# Patient Record
Sex: MALE | Race: ASIAN | NOT HISPANIC OR LATINO | Employment: UNEMPLOYED | URBAN - METROPOLITAN AREA
[De-identification: names, ages, dates, MRNs, and addresses within clinical notes are randomized per-mention and may not be internally consistent; named-entity substitution may affect disease eponyms.]

---

## 2017-01-03 ENCOUNTER — GENERIC CONVERSION - ENCOUNTER (OUTPATIENT)
Dept: OTHER | Facility: OTHER | Age: 52
End: 2017-01-03

## 2017-03-13 ENCOUNTER — GENERIC CONVERSION - ENCOUNTER (OUTPATIENT)
Dept: OTHER | Facility: OTHER | Age: 52
End: 2017-03-13

## 2017-03-13 ENCOUNTER — APPOINTMENT (OUTPATIENT)
Dept: LAB | Facility: CLINIC | Age: 52
End: 2017-03-13
Payer: COMMERCIAL

## 2017-03-13 ENCOUNTER — TRANSCRIBE ORDERS (OUTPATIENT)
Dept: LAB | Facility: CLINIC | Age: 52
End: 2017-03-13

## 2017-03-13 DIAGNOSIS — E03.9 UNSPECIFIED HYPOTHYROIDISM: ICD-10-CM

## 2017-03-13 DIAGNOSIS — E11.9 TYPE 2 DIABETES MELLITUS WITHOUT COMPLICATIONS (HCC): ICD-10-CM

## 2017-03-13 DIAGNOSIS — I10 ESSENTIAL (PRIMARY) HYPERTENSION: ICD-10-CM

## 2017-03-13 DIAGNOSIS — E11.9 DIABETES MELLITUS WITHOUT COMPLICATION (HCC): Primary | ICD-10-CM

## 2017-03-13 DIAGNOSIS — F10.99 ALCOHOL USE WITH ALCOHOL-INDUCED DISORDER (HCC): ICD-10-CM

## 2017-03-13 DIAGNOSIS — E03.9 HYPOTHYROIDISM: ICD-10-CM

## 2017-03-13 LAB
ALBUMIN SERPL BCP-MCNC: 3.9 G/DL (ref 3.5–5)
ALP SERPL-CCNC: 73 U/L (ref 46–116)
ALT SERPL W P-5'-P-CCNC: 67 U/L (ref 12–78)
ANION GAP SERPL CALCULATED.3IONS-SCNC: 7 MMOL/L (ref 4–13)
AST SERPL W P-5'-P-CCNC: 36 U/L (ref 5–45)
BILIRUB SERPL-MCNC: 0.7 MG/DL (ref 0.2–1)
BUN SERPL-MCNC: 9 MG/DL (ref 5–25)
CALCIUM SERPL-MCNC: 8.7 MG/DL (ref 8.3–10.1)
CHLORIDE SERPL-SCNC: 101 MMOL/L (ref 100–108)
CO2 SERPL-SCNC: 30 MMOL/L (ref 21–32)
CREAT SERPL-MCNC: 0.9 MG/DL (ref 0.6–1.3)
CREAT UR-MCNC: 229 MG/DL
EST. AVERAGE GLUCOSE BLD GHB EST-MCNC: 154 MG/DL
GFR SERPL CREATININE-BSD FRML MDRD: >60 ML/MIN/1.73SQ M
GLUCOSE SERPL-MCNC: 169 MG/DL (ref 65–140)
HBA1C MFR BLD: 7 % (ref 4.2–6.3)
MICROALBUMIN UR-MCNC: 14 MG/L (ref 0–20)
MICROALBUMIN/CREAT 24H UR: 6 MG/G CREATININE (ref 0–30)
POTASSIUM SERPL-SCNC: 4.3 MMOL/L (ref 3.5–5.3)
PROT SERPL-MCNC: 7.5 G/DL (ref 6.4–8.2)
SODIUM SERPL-SCNC: 138 MMOL/L (ref 136–145)
TSH SERPL DL<=0.05 MIU/L-ACNC: 4.24 UIU/ML (ref 0.36–3.74)

## 2017-03-13 PROCEDURE — 80053 COMPREHEN METABOLIC PANEL: CPT

## 2017-03-13 PROCEDURE — 82570 ASSAY OF URINE CREATININE: CPT

## 2017-03-13 PROCEDURE — 36415 COLL VENOUS BLD VENIPUNCTURE: CPT

## 2017-03-13 PROCEDURE — 83036 HEMOGLOBIN GLYCOSYLATED A1C: CPT

## 2017-03-13 PROCEDURE — 84443 ASSAY THYROID STIM HORMONE: CPT

## 2017-03-13 PROCEDURE — 82043 UR ALBUMIN QUANTITATIVE: CPT

## 2017-03-30 ENCOUNTER — GENERIC CONVERSION - ENCOUNTER (OUTPATIENT)
Dept: OTHER | Facility: OTHER | Age: 52
End: 2017-03-30

## 2017-04-03 ENCOUNTER — APPOINTMENT (OUTPATIENT)
Dept: LAB | Facility: CLINIC | Age: 52
End: 2017-04-03
Payer: COMMERCIAL

## 2017-04-03 DIAGNOSIS — E03.9 UNSPECIFIED HYPOTHYROIDISM: ICD-10-CM

## 2017-04-03 DIAGNOSIS — E11.9 DIABETES MELLITUS WITHOUT COMPLICATION (HCC): ICD-10-CM

## 2017-04-03 LAB
EST. AVERAGE GLUCOSE BLD GHB EST-MCNC: 143 MG/DL
HBA1C MFR BLD: 6.6 % (ref 4.2–6.3)
TSH SERPL DL<=0.05 MIU/L-ACNC: 0.06 UIU/ML (ref 0.36–3.74)

## 2017-04-03 PROCEDURE — 83036 HEMOGLOBIN GLYCOSYLATED A1C: CPT

## 2017-04-03 PROCEDURE — 84443 ASSAY THYROID STIM HORMONE: CPT

## 2017-04-03 PROCEDURE — 36415 COLL VENOUS BLD VENIPUNCTURE: CPT

## 2017-04-11 ENCOUNTER — GENERIC CONVERSION - ENCOUNTER (OUTPATIENT)
Dept: OTHER | Facility: OTHER | Age: 52
End: 2017-04-11

## 2017-04-27 ENCOUNTER — ALLSCRIPTS OFFICE VISIT (OUTPATIENT)
Dept: OTHER | Facility: OTHER | Age: 52
End: 2017-04-27

## 2017-06-05 ENCOUNTER — TRANSCRIBE ORDERS (OUTPATIENT)
Dept: LAB | Facility: CLINIC | Age: 52
End: 2017-06-05

## 2017-06-05 ENCOUNTER — APPOINTMENT (OUTPATIENT)
Dept: LAB | Facility: CLINIC | Age: 52
End: 2017-06-05
Payer: COMMERCIAL

## 2017-06-05 DIAGNOSIS — E03.9 HYPOTHYROIDISM: ICD-10-CM

## 2017-06-05 LAB — TSH SERPL DL<=0.05 MIU/L-ACNC: 0.6 UIU/ML (ref 0.36–3.74)

## 2017-06-05 PROCEDURE — 84443 ASSAY THYROID STIM HORMONE: CPT

## 2017-06-05 PROCEDURE — 36415 COLL VENOUS BLD VENIPUNCTURE: CPT

## 2017-06-08 DIAGNOSIS — E03.9 HYPOTHYROIDISM: ICD-10-CM

## 2017-09-18 ENCOUNTER — GENERIC CONVERSION - ENCOUNTER (OUTPATIENT)
Dept: OTHER | Facility: OTHER | Age: 52
End: 2017-09-18

## 2017-11-22 ENCOUNTER — ALLSCRIPTS OFFICE VISIT (OUTPATIENT)
Dept: OTHER | Facility: OTHER | Age: 52
End: 2017-11-22

## 2017-11-22 DIAGNOSIS — E03.9 HYPOTHYROIDISM: ICD-10-CM

## 2017-11-22 DIAGNOSIS — I10 ESSENTIAL (PRIMARY) HYPERTENSION: ICD-10-CM

## 2017-11-22 DIAGNOSIS — E78.5 HYPERLIPIDEMIA: ICD-10-CM

## 2017-11-22 DIAGNOSIS — E11.9 TYPE 2 DIABETES MELLITUS WITHOUT COMPLICATIONS (HCC): ICD-10-CM

## 2017-11-22 DIAGNOSIS — M25.512 PAIN IN LEFT SHOULDER: ICD-10-CM

## 2017-12-01 ENCOUNTER — TRANSCRIBE ORDERS (OUTPATIENT)
Dept: LAB | Facility: CLINIC | Age: 52
End: 2017-12-01

## 2017-12-01 ENCOUNTER — APPOINTMENT (OUTPATIENT)
Dept: LAB | Facility: CLINIC | Age: 52
End: 2017-12-01
Payer: COMMERCIAL

## 2017-12-01 DIAGNOSIS — E03.9 HYPOTHYROIDISM: ICD-10-CM

## 2017-12-01 DIAGNOSIS — E11.9 TYPE 2 DIABETES MELLITUS WITHOUT COMPLICATIONS (HCC): ICD-10-CM

## 2017-12-01 DIAGNOSIS — I10 ESSENTIAL (PRIMARY) HYPERTENSION: ICD-10-CM

## 2017-12-01 DIAGNOSIS — E78.5 HYPERLIPIDEMIA: ICD-10-CM

## 2017-12-01 DIAGNOSIS — M25.512 PAIN IN LEFT SHOULDER: ICD-10-CM

## 2017-12-01 LAB
ALBUMIN SERPL BCP-MCNC: 3.7 G/DL (ref 3.5–5)
ALP SERPL-CCNC: 82 U/L (ref 46–116)
ALT SERPL W P-5'-P-CCNC: 72 U/L (ref 12–78)
ANION GAP SERPL CALCULATED.3IONS-SCNC: 4 MMOL/L (ref 4–13)
AST SERPL W P-5'-P-CCNC: 59 U/L (ref 5–45)
BILIRUB SERPL-MCNC: 1.5 MG/DL (ref 0.2–1)
BUN SERPL-MCNC: 10 MG/DL (ref 5–25)
CALCIUM SERPL-MCNC: 9.2 MG/DL (ref 8.3–10.1)
CHLORIDE SERPL-SCNC: 100 MMOL/L (ref 100–108)
CHOLEST SERPL-MCNC: 159 MG/DL (ref 50–200)
CO2 SERPL-SCNC: 30 MMOL/L (ref 21–32)
CREAT SERPL-MCNC: 0.82 MG/DL (ref 0.6–1.3)
CREAT UR-MCNC: 213 MG/DL
ERYTHROCYTE [DISTWIDTH] IN BLOOD BY AUTOMATED COUNT: 11.8 % (ref 11.6–15.1)
EST. AVERAGE GLUCOSE BLD GHB EST-MCNC: 157 MG/DL
GFR SERPL CREATININE-BSD FRML MDRD: 103 ML/MIN/1.73SQ M
GLUCOSE P FAST SERPL-MCNC: 187 MG/DL (ref 65–99)
HBA1C MFR BLD: 7.1 % (ref 4.2–6.3)
HCT VFR BLD AUTO: 48.2 % (ref 36.5–49.3)
HDLC SERPL-MCNC: 52 MG/DL (ref 40–60)
HGB BLD-MCNC: 16.4 G/DL (ref 12–17)
LDLC SERPL CALC-MCNC: 89 MG/DL (ref 0–100)
MCH RBC QN AUTO: 30.5 PG (ref 26.8–34.3)
MCHC RBC AUTO-ENTMCNC: 34 G/DL (ref 31.4–37.4)
MCV RBC AUTO: 90 FL (ref 82–98)
MICROALBUMIN UR-MCNC: 8 MG/L (ref 0–20)
MICROALBUMIN/CREAT 24H UR: 4 MG/G CREATININE (ref 0–30)
PLATELET # BLD AUTO: 187 THOUSANDS/UL (ref 149–390)
PMV BLD AUTO: 9.9 FL (ref 8.9–12.7)
POTASSIUM SERPL-SCNC: 5.2 MMOL/L (ref 3.5–5.3)
PROT SERPL-MCNC: 7.8 G/DL (ref 6.4–8.2)
RBC # BLD AUTO: 5.37 MILLION/UL (ref 3.88–5.62)
SODIUM SERPL-SCNC: 134 MMOL/L (ref 136–145)
TRIGL SERPL-MCNC: 90 MG/DL
TSH SERPL DL<=0.05 MIU/L-ACNC: 1.46 UIU/ML (ref 0.36–3.74)
WBC # BLD AUTO: 7.5 THOUSAND/UL (ref 4.31–10.16)

## 2017-12-01 PROCEDURE — 83036 HEMOGLOBIN GLYCOSYLATED A1C: CPT

## 2017-12-01 PROCEDURE — 82043 UR ALBUMIN QUANTITATIVE: CPT

## 2017-12-01 PROCEDURE — 36415 COLL VENOUS BLD VENIPUNCTURE: CPT

## 2017-12-01 PROCEDURE — 84443 ASSAY THYROID STIM HORMONE: CPT

## 2017-12-01 PROCEDURE — 80061 LIPID PANEL: CPT

## 2017-12-01 PROCEDURE — 85027 COMPLETE CBC AUTOMATED: CPT

## 2017-12-01 PROCEDURE — 80053 COMPREHEN METABOLIC PANEL: CPT

## 2017-12-01 PROCEDURE — 82570 ASSAY OF URINE CREATININE: CPT

## 2017-12-05 ENCOUNTER — GENERIC CONVERSION - ENCOUNTER (OUTPATIENT)
Dept: FAMILY MEDICINE CLINIC | Facility: CLINIC | Age: 52
End: 2017-12-05

## 2017-12-05 ENCOUNTER — APPOINTMENT (OUTPATIENT)
Dept: PHYSICAL THERAPY | Facility: CLINIC | Age: 52
End: 2017-12-05
Payer: COMMERCIAL

## 2017-12-05 DIAGNOSIS — M25.512 PAIN IN LEFT SHOULDER: ICD-10-CM

## 2017-12-05 PROCEDURE — G8984 CARRY CURRENT STATUS: HCPCS

## 2017-12-05 PROCEDURE — 97140 MANUAL THERAPY 1/> REGIONS: CPT

## 2017-12-05 PROCEDURE — G8985 CARRY GOAL STATUS: HCPCS

## 2017-12-05 PROCEDURE — 97110 THERAPEUTIC EXERCISES: CPT

## 2017-12-05 PROCEDURE — 97161 PT EVAL LOW COMPLEX 20 MIN: CPT

## 2017-12-07 ENCOUNTER — APPOINTMENT (OUTPATIENT)
Dept: PHYSICAL THERAPY | Facility: CLINIC | Age: 52
End: 2017-12-07
Payer: COMMERCIAL

## 2017-12-07 PROCEDURE — 97140 MANUAL THERAPY 1/> REGIONS: CPT

## 2017-12-07 PROCEDURE — 97110 THERAPEUTIC EXERCISES: CPT

## 2017-12-11 ENCOUNTER — APPOINTMENT (OUTPATIENT)
Dept: PHYSICAL THERAPY | Facility: CLINIC | Age: 52
End: 2017-12-11
Payer: COMMERCIAL

## 2017-12-14 ENCOUNTER — APPOINTMENT (OUTPATIENT)
Dept: PHYSICAL THERAPY | Facility: CLINIC | Age: 52
End: 2017-12-14
Payer: COMMERCIAL

## 2017-12-14 PROCEDURE — 97110 THERAPEUTIC EXERCISES: CPT

## 2017-12-14 PROCEDURE — 97140 MANUAL THERAPY 1/> REGIONS: CPT

## 2017-12-18 ENCOUNTER — APPOINTMENT (OUTPATIENT)
Dept: PHYSICAL THERAPY | Facility: CLINIC | Age: 52
End: 2017-12-18
Payer: COMMERCIAL

## 2017-12-18 PROCEDURE — 97110 THERAPEUTIC EXERCISES: CPT

## 2017-12-18 PROCEDURE — 97140 MANUAL THERAPY 1/> REGIONS: CPT

## 2017-12-21 ENCOUNTER — APPOINTMENT (OUTPATIENT)
Dept: PHYSICAL THERAPY | Facility: CLINIC | Age: 52
End: 2017-12-21
Payer: COMMERCIAL

## 2017-12-22 ENCOUNTER — APPOINTMENT (OUTPATIENT)
Dept: PHYSICAL THERAPY | Facility: CLINIC | Age: 52
End: 2017-12-22
Payer: COMMERCIAL

## 2017-12-22 PROCEDURE — 97140 MANUAL THERAPY 1/> REGIONS: CPT

## 2017-12-22 PROCEDURE — 97110 THERAPEUTIC EXERCISES: CPT

## 2017-12-26 ENCOUNTER — APPOINTMENT (OUTPATIENT)
Dept: PHYSICAL THERAPY | Facility: CLINIC | Age: 52
End: 2017-12-26
Payer: COMMERCIAL

## 2017-12-26 PROCEDURE — 97110 THERAPEUTIC EXERCISES: CPT

## 2017-12-26 PROCEDURE — 97140 MANUAL THERAPY 1/> REGIONS: CPT

## 2017-12-28 ENCOUNTER — APPOINTMENT (OUTPATIENT)
Dept: PHYSICAL THERAPY | Facility: CLINIC | Age: 52
End: 2017-12-28
Payer: COMMERCIAL

## 2018-01-02 ENCOUNTER — APPOINTMENT (OUTPATIENT)
Dept: PHYSICAL THERAPY | Facility: CLINIC | Age: 53
End: 2018-01-02
Payer: COMMERCIAL

## 2018-01-02 PROCEDURE — 97140 MANUAL THERAPY 1/> REGIONS: CPT

## 2018-01-02 PROCEDURE — 97110 THERAPEUTIC EXERCISES: CPT

## 2018-01-04 ENCOUNTER — APPOINTMENT (OUTPATIENT)
Dept: PHYSICAL THERAPY | Facility: CLINIC | Age: 53
End: 2018-01-04
Payer: COMMERCIAL

## 2018-01-08 ENCOUNTER — APPOINTMENT (OUTPATIENT)
Dept: PHYSICAL THERAPY | Facility: CLINIC | Age: 53
End: 2018-01-08
Payer: COMMERCIAL

## 2018-01-08 PROCEDURE — 97110 THERAPEUTIC EXERCISES: CPT

## 2018-01-08 PROCEDURE — 97140 MANUAL THERAPY 1/> REGIONS: CPT

## 2018-01-10 NOTE — RESULT NOTES
Verified Results  (1) COMPREHENSIVE METABOLIC PANEL [id number] 81:94JF Ernie Storrz Order Number: [id number]     Test Name Result Flag Reference   GLUCOSE,RANDM 169 mg/dL H    If the patient is fasting, the ADA then defines impaired fasting glucose as > 100 mg/dL and diabetes as > or equal to 123 mg/dL  SODIUM 138 mmol/L  136-145   POTASSIUM 4 3 mmol/L  3 5-5 3   CHLORIDE 101 mmol/L  100-108   CARBON DIOXIDE 30 mmol/L  21-32   ANION GAP (CALC) 7 mmol/L  4-13   BLOOD UREA NITROGEN 9 mg/dL  5-25   CREATININE 0 90 mg/dL  0 60-1 30   Standardized to IDMS reference method   CALCIUM 8 7 mg/dL  8 3-10 1   BILI, TOTAL 0 70 mg/dL  0 20-1 00   ALK PHOSPHATAS 73 U/L     ALT (SGPT) 67 U/L  12-78   AST(SGOT) 36 U/L  5-45   ALBUMIN 3 9 g/dL  3 5-5 0   TOTAL PROTEIN 7 5 g/dL  6 4-8 2   eGFR Non-African American      >60 0 ml/min/1 73sq m   - Patient Instructions: This bloodwork is non-fasting  Please drink two glasses of water morning of bloodwork  National Kidney Disease Education Program recommendations are as follows:  GFR calculation is accurate only with a steady state creatinine  Chronic Kidney disease less than 60 ml/min/1 73 sq  meters  Kidney failure less than 15 ml/min/1 73 sq  meters       (1) MICROALBUMIN CREATININE RATIO, RANDOM URINE 73IJK0521 07:25AM [facility] Order Number: [id number]     Test Name Result Flag Reference   MICROALBUMIN/ CREAT R 6 mg/g creatinine  0-30   MICROALBUMIN,URINE 14 0 mg/L  0 0-20 0   CREATININE URINE 229 0 mg/dL

## 2018-01-11 ENCOUNTER — APPOINTMENT (OUTPATIENT)
Dept: PHYSICAL THERAPY | Facility: CLINIC | Age: 53
End: 2018-01-11
Payer: COMMERCIAL

## 2018-01-12 NOTE — PROGRESS NOTES
Assessment    1  Encounter for preventive health examination (V70 0) (Z00 00)   2  Hypothyroidism (244 9) (E03 9)   3  Diabetes mellitus type II, controlled (250 00) (E11 9)   4  Benign essential hypertension (401 1) (I10)   5  Left shoulder pain (719 41) (M25 512)    Plan  Benign essential hypertension, Diabetes mellitus type II, controlled, Hyperlipidemia,  Hypothyroidism, Left shoulder pain    · (1) LIPID PANEL FASTING W DIRECT LDL REFLEX; Status:Active; Requested  for:22Nov2017;    · (1) TSH WITH FT4 REFLEX; Status:Active; Requested for:22Nov2017;   Diabetes mellitus type II, controlled, Left shoulder pain    · (1) HEMOGLOBIN A1C; Status:Active; Requested for:22Nov2017;   Left shoulder pain    · (1) CBC/ PLT (NO DIFF); Status:Active; Requested for:22Nov2017;    · (1) COMPREHENSIVE METABOLIC PANEL; Status:Active; Requested for:22Nov2017;    · (1) MICROALBUMIN CREATININE RATIO, RANDOM URINE; Status:Active; Requested  for:22Nov2017;    · *1 - [facility]  55 yo M with left shoulder insidious onset of  left shoulder pain, suspect rotator cuff tendinopathy without any significant tear  Please  evaluate and treat with modalitities as indicated  Treat 2-3 times per week for 6-8  weeks  Status: Active  Requested for: 22Nov2017  Care Summary provided  : Yes    Discussion/Summary  Impression: health maintenance visit, healthy adult male  Currently, he eats an adequate diet and has an adequate exercise regimen  HTN- Well controlled with current medications and asymptomatic  Will continue with current medications and check screening blood work  Hypothyroid- Continue with medications  Will check TSH  If any abnormalities will have patient return to discuss any changes in medication dosing  DMII- Counselled on diet and physical activity recommendations  Avoid carb heavy meals and get at least 150 minutes of moderate intensity CV activity weekly  Will check a1c   Continue with metformin 1000 mg BID  Left shoulder pain- Strength intact with good ROM  No signs of rotator cuff tear or impingement, however there is likely rotator cuff tendinopathy  Discussed treatment options, will refer to PT to work on rotator cuff strengthening and ROM exercises  The patient was counseled regarding instructions for management, risk factor reductions, patient and family education, impressions, risks and benefits of treatment options, importance of compliance with treatment  Possible side effects of new medications were reviewed with the patient/guardian today  The treatment plan was reviewed with the patient/guardian  The patient/guardian understands and agrees with the treatment plan     Self Referrals: No      Chief Complaint  Annual physical      History of Present Illness  HM, Adult Male: The patient is being seen for a health maintenance evaluation  General Health: The patient's health since the last visit is described as good  He has regular dental visits  He denies vision problems  He denies hearing loss  Immunizations status: up to date  Lifestyle:  He consumes a diverse and healthy diet  He does not have any weight concerns  He exercises regularly  He does not use tobacco  He consumes alcohol  He denies drug use  Screening:   HPI: 55 yo M with history of HTN, DMII, and hypothyroid comes for routine follow up and to discuss pain that he has been having in his left shoulder  He has ashvni taking his medications daily as directed  He denies having any increased fatigue, increased thirst of urination  He gets regular physical activity with daily yoga and riding on a stationary bike about every other day  He watches what he eats and avoids fried, fatty, or carbohydrate heavy foods  He does not check his blood sugars or blood pressure regularly  He denies having any changes in his vision, headaches, chest pain or chest pressure  He has been having pain in his left shoulder for the past 1-2 months   He denies any injury or specific activity that brought on the pain  The pain is worse with reaching over his head or with lifting things above his shoulders  There is no numbness, tingling or weakness of his left arm  Pain does not wake him up from sleep  Review of Systems    Constitutional: No fever or chills, feels well, no tiredness, no recent weight gain or weight loss  Eyes: No complaints of eye pain, no red eyes, no discharge from eyes, no itchy eyes  ENT: no complaints of earache, no hearing loss, no nosebleeds, no nasal discharge, no sore throat, no hoarseness  Cardiovascular: No complaints of slow heart rate, no fast heart rate, no chest pain, no palpitations, no leg claudication, no lower extremity  Respiratory: No complaints of shortness of breath, no wheezing, no cough, no SOB on exertion, no orthopnea or PND  Gastrointestinal: No complaints of abdominal pain, no constipation, no nausea or vomiting, no diarrhea or bloody stools  Genitourinary: No complaints of dysuria, no incontinence, no hesitancy, no nocturia, no genital lesion, no testicular pain  Musculoskeletal: as noted in HPI  Integumentary: No complaints of skin rash or skin lesions, no itching, no skin wound, no dry skin  Neurological: No compliants of headache, no confusion, no convulsions, no numbness or tingling, no dizziness or fainting, no limb weakness, no difficulty walking  Psychiatric: Is not suicidal, no sleep disturbances, no anxiety or depression, no change in personality, no emotional problems  Endocrine: No complaints of proptosis, no hot flashes, no muscle weakness, no erectile dysfunction, no deepening of the voice, no feelings of weakness  Hematologic/Lymphatic: No complaints of swollen glands, no swollen glands in the neck, does not bleed easily, no easy bruising  ROS reviewed  Active Problems    1  Alcohol use disorder (305 00) (F10 99)   2  Benign essential hypertension (401 1) (I10)   3   Diabetes mellitus type II, controlled (250 00) (E11 9)   4  Hyperlipidemia (272 4) (E78 5)   5  Hypothyroidism (244 9) (E03 9)   6  Need for influenza vaccination (V04 81) (Z23)   7  Right wrist pain (719 43) (M25 531)    Past Medical History    · History of Elevated LFTs (790 6) (R79 89)   · History of back injury (V15 59) (G10 025)   · History of balanitis (V13 89) (C63 143)   · History of low back pain (V13 59) (Z87 39)   · History of type 2 diabetes mellitus (V12 29) (Z86 39)   · History of upper respiratory infection (V12 09) (Z87 09)   · History of Impaired fasting glucose (790 21) (R73 01)   · History of Shoulder joint pain, unspecified laterality    Family History  Mother    · Family history of diabetes mellitus (V18 0) (Z83 3)   · Denied: Family history of substance abuse   · No family history of mental disorder  Father    · Family history of diabetes mellitus (V18 0) (Z83 3)   · Denied: Family history of substance abuse   · No family history of mental disorder    Social History    · Never A Smoker    Current Meds   1  Levothyroxine Sodium 150 MCG Oral Tablet; TAKE 1 TAB BY MOUTH ONCE DAILY   WITH WATER ON EMPTY STOMACH  WAIT 45 MINS FOR ANY FOOD/JUICE/MEDS; Therapy: 16GNR0534 to (Evaluate:14Mar2018)  Requested for: 86Zcx9187; Last   Rx:[id number] Ordered   2  Lisinopril-Hydrochlorothiazide 10-12 5 MG Oral Tablet; TAKE 1 TABLET DAILY IN THE   MORNING; Therapy: 22Fgl9794 to (479 [id number])  Requested for: 27Apr2017; Last   Rx:[id number] Ordered   3  MetFORMIN HCl - 1000 MG Oral Tablet; TAKE ONE TABLET BY MOUTH TWICE DAILY   AS DIRECTED; Therapy: 67WCA7679 to (RFFYDEJB:67BWS8088)  Requested for: 27Apr2017; Last   Rx:27Apr2017 Ordered   4  TraMADol HCl - 50 MG Oral Tablet; TAKE 1 TABLET 3 TIMES DAILY AS NEEDED; Therapy: 87YFW3604 to (Evaluate:76Dxq8678); Last Rx:[id number] Ordered    Allergies    1   No Known Drug Allergies    Vitals   Recorded: 69DHZ9420 10:07AM   Temperature 97 2 F, Tympanic   Heart Rate 80 Respiration 14   Systolic 871, LUE, Sitting   Diastolic 72, LUE, Sitting   BP CUFF SIZE Large   Height 5 ft 7 5 in   Weight 199 lb 2 oz   BMI Calculated 30 73   BSA Calculated 2 03     Physical Exam    Constitutional   General appearance: No acute distress, well appearing and well nourished  Head and Face   Head and face: Normal     Palpation of the face and sinuses: No sinus tenderness  Eyes   Conjunctiva and lids: No erythema, swelling or discharge  Pupils and irises: Equal, round, reactive to light  Ears, Nose, Mouth, and Throat   External inspection of ears and nose: Normal     Otoscopic examination: Tympanic membranes translucent with normal light reflex  Canals patent without erythema  Hearing: Normal     Nasal mucosa, septum, and turbinates: Normal without edema or erythema  Lips, teeth, and gums: Normal, good dentition  Oropharynx: Normal with no erythema, edema, exudate or lesions  Neck   Neck: Supple, symmetric, trachea midline, no masses  Thyroid: Normal, no thyromegaly  Pulmonary   Respiratory effort: No increased work of breathing or signs of respiratory distress  Auscultation of lungs: Clear to auscultation  Cardiovascular   Auscultation of heart: Normal rate and rhythm, normal S1 and S2, no murmurs  Peripheral vascular exam: Normal     Examination of extremities for edema and/or varicosities: Normal     Abdomen   Abdomen: Non-tender, no masses  Lymphatic   Palpation of lymph nodes in neck: No lymphadenopathy  Musculoskeletal   Gait and station: Normal     Inspection/palpation of digits and nails: Normal without clubbing or cyanosis  Inspection/palpation of joints, bones, and muscles: Abnormal   Left shoulder with FROM  Msucle strength testing 5/5 with rotator cuff testing  Pain illicited with empty can, belly press and lift off  Range of motion: Normal   Painful active ROM above 90 degrees of abduction     Muscle strength/tone: Normal     Skin   Skin and subcutaneous tissue: Normal without rashes or lesions  Palpation of skin and subcutaneous tissue: Normal turgor  Neurologic   Cranial nerves: Cranial nerves 2-12 intact  Cortical function: Normal mental status  Reflexes: 2+ and symmetric  Sensation: No sensory loss  Coordination: Normal finger to nose and heel to shin  Psychiatric   Judgment and insight: Normal     Orientation to person, place and time: Normal     Recent and remote memory: Intact  Mood and affect: Normal        Attending Note  Attending Note: Attending Note: I discussed the case with the Resident and reviewed the Resident's note, I supervised the Resident and I agree with the Resident management plan as it was presented to me  Level of Participation: I was present in clinic, but did not examine the patient  I agree with the Resident's note  Signatures   Electronically signed by :  Shirley Pizarro DO; Nov 22 2017 10:51AM EST                       (Author)    Electronically signed by : GISELLE Wolfe ; Nov 27 2017  9:07AM EST                       (Author)

## 2018-01-13 VITALS
BODY MASS INDEX: 30.18 KG/M2 | RESPIRATION RATE: 14 BRPM | HEART RATE: 80 BPM | TEMPERATURE: 97.2 F | SYSTOLIC BLOOD PRESSURE: 122 MMHG | WEIGHT: 199.13 LBS | HEIGHT: 68 IN | DIASTOLIC BLOOD PRESSURE: 72 MMHG

## 2018-01-13 VITALS
RESPIRATION RATE: 16 BRPM | SYSTOLIC BLOOD PRESSURE: 116 MMHG | TEMPERATURE: 97 F | WEIGHT: 201.13 LBS | BODY MASS INDEX: 30.48 KG/M2 | HEART RATE: 88 BPM | DIASTOLIC BLOOD PRESSURE: 80 MMHG | HEIGHT: 68 IN

## 2018-01-13 NOTE — RESULT NOTES
Verified Results  (1) TSH 03Apr2017 07:40AM [facility]     Test Name Result Flag Reference   TSH 0 058 uIU/mL L 0 358-3 740   Patients undergoing fluorescein dye angiography may retain small amounts of fluorescein in the body for 48-72 hours post procedure  Samples containing fluorescein can produce falsely depressed TSH values  If the patient had this procedure,a specimen should be resubmitted post fluorescein clearance  (1) HEMOGLOBIN A1C 03Apr2017 07:40AM [facility]     Test Name Result Flag Reference   HEMOGLOBIN A1C 6 6 % H 4 2-6 3   EST  AVG   GLUCOSE 143 mg/dl

## 2018-01-15 ENCOUNTER — APPOINTMENT (OUTPATIENT)
Dept: PHYSICAL THERAPY | Facility: CLINIC | Age: 53
End: 2018-01-15
Payer: COMMERCIAL

## 2018-01-15 PROCEDURE — 97140 MANUAL THERAPY 1/> REGIONS: CPT

## 2018-01-15 PROCEDURE — 97110 THERAPEUTIC EXERCISES: CPT

## 2018-01-15 NOTE — RESULT NOTES
Verified Results  (1) HEMOGLOBIN A1C [id number] 07:25AM Gaurang Lybonnie   TW Order Number: [id number]     Test Name Result Flag Reference   HEMOGLOBIN A1C 7 0 % H 4 2-6 3   EST  AVG  GLUCOSE 154 mg/dl       (1) TSH 89BOQ3193 07:25AM Gaurang Lye   TW Order Number: [id number]     Test Name Result Flag Reference   TSH 4 243 uIU/mL H 0 358-3 740   - Patient Instructions: This bloodwork is non-fasting  Please drink two glasses of water morning of bloodwork  - Patient Instructions: This bloodwork is non-fasting  Please drink two glasses of water morning of bloodwork  Patients undergoing fluorescein dye angiography may retain small amounts of fluorescein in the body for 48-72 hours post procedure  Samples containing fluorescein can produce falsely depressed TSH values  If the patient had this procedure,a specimen should be resubmitted post fluorescein clearance         Plan  Hypothyroidism    · From  Levothyroxine Sodium 175 MCG Oral Tablet TAKE 1 TABLET DAILY AS  DIRECTED To Levothyroxine Sodium 200 MCG Oral Tablet TAKE 1 TABLET DAILY AS  DIRECTED

## 2018-01-16 NOTE — RESULT NOTES
Message   will d/w pt at next visit     Verified Results  (1) COMPREHENSIVE METABOLIC PANEL 69FTX4878 68:39WF Lilo Dunn    Order Number: [id number]     Test Name Result Flag Reference   GLUCOSE,RANDM 182 mg/dL H    If the patient is fasting, the ADA then defines impaired fasting glucose as > 100 mg/dL and diabetes as > or equal to 123 mg/dL  SODIUM 137 mmol/L  136-145   POTASSIUM 4 2 mmol/L  3 5-5 3   CHLORIDE 101 mmol/L  100-108   CARBON DIOXIDE 29 mmol/L  21-32   ANION GAP (CALC) 7 mmol/L  4-13   BLOOD UREA NITROGEN 11 mg/dL  5-25   CREATININE 0 85 mg/dL  0 60-1 30   Standardized to IDMS reference method   CALCIUM 8 6 mg/dL  8 3-10 1   BILI, TOTAL 1 00 mg/dL  0 20-1 00   ALK PHOSPHATAS 76 U/L     ALT (SGPT) 86 U/L H 12-78   AST(SGOT) 59 U/L H 5-45   ALBUMIN 3 7 g/dL  3 5-5 0   TOTAL PROTEIN 7 5 g/dL  6 4-8 2   eGFR Non-African American      >60 0 ml/min/1 73sq [facility] Energy Disease Education Program recommendations are as follows:  GFR calculation is accurate only with a steady state creatinine  Chronic Kidney disease less than 60 ml/min/1 73 sq  meters  Kidney failure less than 15 ml/min/1 73 sq  meters  (1) LIPID PANEL, FASTING [id number] 06:44AM Lilo Dunn    Order Number: [id number]     Test Name Result Flag Reference   CHOLESTEROL 147 mg/dL     HDL,DIRECT 40 mg/dL  40-60   Specimen collection should occur prior to Metamizole administration due to the potential for falsely depressed results  LDL CHOLESTEROL CALCULATED 74 mg/dL  0-100   Triglyceride:         Normal              <150 mg/dl       Borderline High    150-199 mg/dl       High               200-499 mg/dl       Very High          >499 mg/dl  Cholesterol:         Desirable        <200 mg/dl      Borderline High  200-239 mg/dl      High             >239 mg/dl  HDL Cholesterol:        High    >59 mg/dL      Low     <41 mg/dL  LDL CALCULATED:    This screening LDL is a calculated result    It does not have the accuracy of the Direct Measured LDL in the monitoring of patients with hyperlipidemia and/or statin therapy  Direct Measure LDL (VXU522) must be ordered separately in these patients  TRIGLYCERIDES 164 mg/dL H <=150   Specimen collection should occur prior to N-Acetylcysteine or Metamizole administration due to the potential for falsely depressed results  (1) TSH WITH FT4 REFLEX [id number] 06:44AM [employer]    Order Number: [id number]     Test Name Result Flag Reference   TSH 1 979 uIU/mL  0 358-3 740   Patients undergoing fluorescein dye angiography may retain small amounts of fluorescein in the body for 48-72 hours post procedure  Samples containing fluorescein can produce falsely depressed TSH values  If the patient had this procedure,a specimen should be resubmitted post fluorescein clearance  (1) HEMOGLOBIN A1C [id number] 06:44AM [employer]    Order Number: [id number]     Test Name Result Flag Reference   HEMOGLOBIN A1C 7 1 % H 4 2-6 3   EST  AVG   GLUCOSE 157 mg/dl

## 2018-01-18 ENCOUNTER — APPOINTMENT (OUTPATIENT)
Dept: PHYSICAL THERAPY | Facility: CLINIC | Age: 53
End: 2018-01-18
Payer: COMMERCIAL

## 2018-01-18 PROCEDURE — 97110 THERAPEUTIC EXERCISES: CPT

## 2018-01-18 PROCEDURE — 97140 MANUAL THERAPY 1/> REGIONS: CPT

## 2018-01-18 NOTE — MISCELLANEOUS
Provider Comments  Provider Comments:   pt no showed today      Signatures   Electronically signed by : Angelica Adhikari, ; Mar 30 2017  5:55PM EST                       (Author)

## 2018-01-22 ENCOUNTER — APPOINTMENT (OUTPATIENT)
Dept: PHYSICAL THERAPY | Facility: CLINIC | Age: 53
End: 2018-01-22
Payer: COMMERCIAL

## 2018-01-22 PROCEDURE — 97110 THERAPEUTIC EXERCISES: CPT

## 2018-01-22 PROCEDURE — 97140 MANUAL THERAPY 1/> REGIONS: CPT

## 2018-01-25 ENCOUNTER — APPOINTMENT (OUTPATIENT)
Dept: PHYSICAL THERAPY | Facility: CLINIC | Age: 53
End: 2018-01-25
Payer: COMMERCIAL

## 2018-01-29 ENCOUNTER — OFFICE VISIT (OUTPATIENT)
Dept: PHYSICAL THERAPY | Facility: CLINIC | Age: 53
End: 2018-01-29
Payer: COMMERCIAL

## 2018-01-29 DIAGNOSIS — M25.512 LEFT SHOULDER PAIN, UNSPECIFIED CHRONICITY: ICD-10-CM

## 2018-01-29 DIAGNOSIS — S46.002D UNSPECIFIED INJURY OF MUSCLE(S) AND TENDON(S) OF THE ROTATOR CUFF OF LEFT SHOULDER, SUBSEQUENT ENCOUNTER: ICD-10-CM

## 2018-01-29 DIAGNOSIS — M75.42 IMPINGEMENT SYNDROME OF LEFT SHOULDER: Primary | ICD-10-CM

## 2018-01-29 PROCEDURE — 97140 MANUAL THERAPY 1/> REGIONS: CPT

## 2018-01-29 PROCEDURE — 97110 THERAPEUTIC EXERCISES: CPT

## 2018-01-29 NOTE — PROGRESS NOTES
Daily Note     Today's date: 2018  Patient name: Jett Connelly  : 1965  MRN: [id number]  Referring provider: Blane Balderrama DO  Dx:   Encounter Diagnoses   Name Primary?  Impingement syndrome of left shoulder Yes    Left shoulder pain, unspecified chronicity     Unspecified injury of muscle(s) and tendon(s) of the rotator cuff of left shoulder, subsequent encounter                   Subjective: Pt reports left shoulder pain with overhead reaching  Pt reports experiencing moderate increased left shoulder pain last week of unknown cause  Objective: See treatment diary below      Assessment: Pt demonstrated min increased left shoulder pain with resisted horizontal abduction, pain-free tolerance to remainder of TE program  Pt demonstrated fair scapular stability  Plan: Assess response to tx nv  RE NV      Precautions: none    Daily Treatment Diary     Manual  18            L shoulder PROM, post glide with IR/ER, inf glide with elevation, shoulder posterior capsule stretch with scapular stabilized 10 min                                                                    Exercise Diary  18            Pulleys flex/abd 5"/1x15 ea            Shoulder horizontal abduction RTB  3x18            Wall scap protraction 3x12            Lifting weights to shoulder height 3#  3x10            TB Rows GTB  3x15            TB ER GTB  3x18            TB IR  GTB  3x18            TB extensions GTB  3x12            Serratus punch 3#  2x20            Mod sleeper stretch 20"x4                                                                                                                                                  Modalities

## 2018-02-01 ENCOUNTER — OFFICE VISIT (OUTPATIENT)
Dept: PHYSICAL THERAPY | Facility: CLINIC | Age: 53
End: 2018-02-01
Payer: COMMERCIAL

## 2018-02-01 DIAGNOSIS — M25.512 LEFT SHOULDER PAIN, UNSPECIFIED CHRONICITY: ICD-10-CM

## 2018-02-01 DIAGNOSIS — M75.42 IMPINGEMENT SYNDROME OF LEFT SHOULDER: Primary | ICD-10-CM

## 2018-02-01 DIAGNOSIS — S46.002D UNSPECIFIED INJURY OF MUSCLE(S) AND TENDON(S) OF THE ROTATOR CUFF OF LEFT SHOULDER, SUBSEQUENT ENCOUNTER: ICD-10-CM

## 2018-02-01 PROCEDURE — 97110 THERAPEUTIC EXERCISES: CPT

## 2018-02-01 PROCEDURE — 97140 MANUAL THERAPY 1/> REGIONS: CPT

## 2018-02-01 NOTE — PROGRESS NOTES
Daily Note     Today's date: 2018  Patient name: Irina Foy  : 1965  MRN: [id number]  Referring provider: Mini aHir DO  Dx:   Encounter Diagnoses   Name Primary?  Impingement syndrome of left shoulder Yes    Left shoulder pain, unspecified chronicity     Unspecified injury of muscle(s) and tendon(s) of the rotator cuff of left shoulder, subsequent encounter                   Subjective: Pt reports increased L shoulder pain for 1-2 days following last tx session  Pt requested to hold resisted shoulder horizontal abduction and wall scap protraction exercises  Objective: See treatment diary below      Assessment: Pt demonstrated min anterior shoulder pain with box lifts, min pain with end range shoulder PROM in all planes  Pt demonstrated good tolerance to modified TE program        Plan: RE NV      Precautions: none     Daily Treatment Diary      Manual  18                   L shoulder PROM, post glide with IR/ER, inf glide with elevation, shoulder posterior capsule stretch with scapular stabilized 10 min 10 min                                                                                                                       Exercise Diary  18  2                   Pulleys flex/abd 5"/1x15 ea  5"  1x15 ea                   Shoulder horizontal abduction RTB  3x18  np                   Wall scap protraction 3x12  np                   Lifting weights to shoulder height 3#  3x10  3#  3x10                   TB Rows GTB  3x15  GTB  3x15                   TB ER GTB  3x18  GTB  3x18                   TB IR  GTB  3x18  GTB  3x18                   TB extensions GTB  3x12  GTB  3x12                   Serratus punch 3#  2x20 3#  2x20                   Mod sleeper stretch 20"x4  20"x4                                                                                                                                                                                                                                                                         Modalities

## 2018-02-05 ENCOUNTER — APPOINTMENT (OUTPATIENT)
Dept: PHYSICAL THERAPY | Facility: CLINIC | Age: 53
End: 2018-02-05
Payer: COMMERCIAL

## 2018-02-08 ENCOUNTER — OFFICE VISIT (OUTPATIENT)
Dept: PHYSICAL THERAPY | Facility: CLINIC | Age: 53
End: 2018-02-08
Payer: COMMERCIAL

## 2018-02-08 DIAGNOSIS — M25.512 LEFT SHOULDER PAIN, UNSPECIFIED CHRONICITY: ICD-10-CM

## 2018-02-08 DIAGNOSIS — S46.002D UNSPECIFIED INJURY OF MUSCLE(S) AND TENDON(S) OF THE ROTATOR CUFF OF LEFT SHOULDER, SUBSEQUENT ENCOUNTER: ICD-10-CM

## 2018-02-08 DIAGNOSIS — M75.42 IMPINGEMENT SYNDROME OF LEFT SHOULDER: Primary | ICD-10-CM

## 2018-02-08 PROCEDURE — 97110 THERAPEUTIC EXERCISES: CPT | Performed by: PHYSICAL THERAPIST

## 2018-02-08 PROCEDURE — 97112 NEUROMUSCULAR REEDUCATION: CPT | Performed by: PHYSICAL THERAPIST

## 2018-02-08 PROCEDURE — 97140 MANUAL THERAPY 1/> REGIONS: CPT | Performed by: PHYSICAL THERAPIST

## 2018-02-09 ENCOUNTER — OFFICE VISIT (OUTPATIENT)
Dept: FAMILY MEDICINE CLINIC | Facility: CLINIC | Age: 53
End: 2018-02-09
Payer: COMMERCIAL

## 2018-02-09 ENCOUNTER — HOSPITAL ENCOUNTER (OUTPATIENT)
Dept: RADIOLOGY | Facility: HOSPITAL | Age: 53
Discharge: HOME | End: 2018-02-09
Payer: COMMERCIAL

## 2018-02-09 ENCOUNTER — TRANSCRIBE ORDERS (OUTPATIENT)
Dept: ADMINISTRATIVE | Facility: HOSPITAL | Age: 53
End: 2018-02-09

## 2018-02-09 VITALS
HEIGHT: 68 IN | TEMPERATURE: 97 F | HEART RATE: 82 BPM | WEIGHT: 199.6 LBS | BODY MASS INDEX: 30.25 KG/M2 | RESPIRATION RATE: 16 BRPM | SYSTOLIC BLOOD PRESSURE: 130 MMHG | DIASTOLIC BLOOD PRESSURE: 82 MMHG

## 2018-02-09 DIAGNOSIS — M25.512 CHRONIC LEFT SHOULDER PAIN: ICD-10-CM

## 2018-02-09 DIAGNOSIS — M25.512 CHRONIC LEFT SHOULDER PAIN: Primary | ICD-10-CM

## 2018-02-09 DIAGNOSIS — G89.29 CHRONIC LEFT SHOULDER PAIN: ICD-10-CM

## 2018-02-09 DIAGNOSIS — G89.29 CHRONIC LEFT SHOULDER PAIN: Primary | ICD-10-CM

## 2018-02-09 PROCEDURE — 73030 X-RAY EXAM OF SHOULDER: CPT

## 2018-02-09 PROCEDURE — 99213 OFFICE O/P EST LOW 20 MIN: CPT | Performed by: FAMILY MEDICINE

## 2018-02-09 RX ORDER — NAPROXEN 500 MG/1
500 TABLET ORAL 2 TIMES DAILY WITH MEALS
Qty: 28 TABLET | Refills: 0 | Status: SHIPPED | OUTPATIENT
Start: 2018-02-09 | End: 2018-02-23

## 2018-02-09 NOTE — ASSESSMENT & PLAN NOTE
Acute on chronic left shoulder pain with exacerbation of symptoms likely secondary to PT strengthening exercises with use of bands   Patient also has impingement syndrome   - advised to hold off PT for next two weeks, will obtain left shoulder XRAY  - advised naproxen 500 mg BID with meals for next two weeks, heating pad to shoulder in the am and ice in the evening, continue stretching exercises to prevent adhesive capsilitus   - follow up in 2 weeks for corticosteroid injection and review of XRAY

## 2018-02-09 NOTE — PROGRESS NOTES
Assessment/Plan:    Left shoulder pain  Acute on chronic left shoulder pain with exacerbation of symptoms likely secondary to PT strengthening exercises with use of bands  Patient also has impingement syndrome   - advised to hold off PT for next two weeks, will obtain left shoulder XRAY  - advised naproxen 500 mg BID with meals for next two weeks, heating pad to shoulder in the am and ice in the evening, continue stretching exercises to prevent adhesive capsilitus   - follow up in 2 weeks for corticosteroid injection and review of XRAY        Diagnoses and all orders for this visit:    Chronic left shoulder pain  -     XR shoulder 2+ vw left; Future  -     naproxen (NAPROSYN) 500 mg tablet; Take 1 tablet (500 mg total) by mouth 2 (two) times a day with meals for 14 days          Subjective:      Patient ID: Anne-Marie Novoa is a 50 y o  male  This is a 50 y o  M who presents for acute on chronic pain of left shoulder, he has been doing PT for the past two months  He denies injury to the shoulder or injection  PT is doing exercises with band for strengthening and over the head pulley exercises  He takes naproxen as needed, last dose was last night  Shoulder Pain    The pain is present in the left shoulder  This is a chronic problem  The current episode started more than 1 month ago  There has been no history of extremity trauma  The problem occurs constantly  The problem has been gradually worsening  The quality of the pain is described as aching  The pain is at a severity of 9/10  The pain is moderate  Pertinent negatives include no fever  The symptoms are aggravated by activity and lying down  He has tried NSAIDS and cold (PT ) for the symptoms  The treatment provided mild relief  His past medical history is significant for diabetes         The following portions of the patient's history were reviewed and updated as appropriate: allergies, current medications, past family history, past medical history, past social history, past surgical history and problem list     Review of Systems   Constitutional: Negative for fatigue, fever and unexpected weight change  HENT: Negative for congestion and rhinorrhea  Respiratory: Negative for cough and shortness of breath  Cardiovascular: Negative for chest pain and palpitations  Gastrointestinal: Negative for abdominal distention, abdominal pain, constipation, diarrhea and vomiting  Genitourinary: Negative for difficulty urinating  Musculoskeletal:        As noted in HPI  Neurological: Negative for dizziness and headaches  Psychiatric/Behavioral: Negative for behavioral problems  Objective:    Vitals:    02/09/18 1444   BP: 130/82   Pulse: 82   Resp: 16   Temp: (!) 97 °F (36 1 °C)        Physical Exam   Constitutional: He is oriented to person, place, and time  He appears well-developed and well-nourished  No distress  HENT:   Head: Normocephalic and atraumatic  Nose: Nose normal    Mouth/Throat: Oropharynx is clear and moist  No oropharyngeal exudate  Eyes: Conjunctivae are normal    Neck: Normal range of motion  Neck supple  No thyromegaly present  Cardiovascular: Normal rate, regular rhythm and normal heart sounds  No murmur heard  Pulmonary/Chest: Effort normal and breath sounds normal  No respiratory distress  He has no wheezes  He has no rales  Abdominal: Soft  Bowel sounds are normal  He exhibits no distension  There is no tenderness  Musculoskeletal: He exhibits tenderness  He exhibits no edema  Mild limited ROM of left shoulder, pain with abducton, no weakness in Rotator cuff weakness of supraspinatus and teres minor  Negative empty can test  Patient can reach hand to lumbar position    Neurological: He is alert and oriented to person, place, and time

## 2018-02-12 ENCOUNTER — APPOINTMENT (OUTPATIENT)
Dept: PHYSICAL THERAPY | Facility: CLINIC | Age: 53
End: 2018-02-12
Payer: COMMERCIAL

## 2018-02-15 ENCOUNTER — APPOINTMENT (OUTPATIENT)
Dept: PHYSICAL THERAPY | Facility: CLINIC | Age: 53
End: 2018-02-15
Payer: COMMERCIAL

## 2018-02-19 ENCOUNTER — APPOINTMENT (OUTPATIENT)
Dept: PHYSICAL THERAPY | Facility: CLINIC | Age: 53
End: 2018-02-19
Payer: COMMERCIAL

## 2018-02-21 ENCOUNTER — OFFICE VISIT (OUTPATIENT)
Dept: FAMILY MEDICINE CLINIC | Facility: CLINIC | Age: 53
End: 2018-02-21
Payer: COMMERCIAL

## 2018-02-21 VITALS
HEIGHT: 68 IN | RESPIRATION RATE: 16 BRPM | TEMPERATURE: 97.2 F | WEIGHT: 200.2 LBS | DIASTOLIC BLOOD PRESSURE: 80 MMHG | BODY MASS INDEX: 30.34 KG/M2 | HEART RATE: 80 BPM | SYSTOLIC BLOOD PRESSURE: 124 MMHG

## 2018-02-21 DIAGNOSIS — M19.019 SHOULDER ARTHRITIS: Primary | ICD-10-CM

## 2018-02-21 PROCEDURE — 20610 DRAIN/INJ JOINT/BURSA W/O US: CPT | Performed by: FAMILY MEDICINE

## 2018-02-21 RX ORDER — LIDOCAINE HYDROCHLORIDE 10 MG/ML
5 INJECTION, SOLUTION EPIDURAL; INFILTRATION; INTRACAUDAL; PERINEURAL
Status: COMPLETED | OUTPATIENT
Start: 2018-02-21 | End: 2018-02-21

## 2018-02-21 RX ORDER — TRIAMCINOLONE ACETONIDE 40 MG/ML
40 INJECTION, SUSPENSION INTRA-ARTICULAR; INTRAMUSCULAR
Status: COMPLETED | OUTPATIENT
Start: 2018-02-21 | End: 2018-02-21

## 2018-02-21 RX ADMIN — TRIAMCINOLONE ACETONIDE 40 MG: 40 INJECTION, SUSPENSION INTRA-ARTICULAR; INTRAMUSCULAR at 16:23

## 2018-02-21 RX ADMIN — LIDOCAINE HYDROCHLORIDE 5 ML: 10 INJECTION, SOLUTION EPIDURAL; INFILTRATION; INTRACAUDAL; PERINEURAL at 16:23

## 2018-02-21 NOTE — ASSESSMENT & PLAN NOTE
- Xray showed left acromioclavicular arthritis, patient tolerated injection to Left subacromial bursae well without complications  - follow up in 3 months or sooner if needed

## 2018-02-21 NOTE — PROGRESS NOTES
Large joint arthrocentesis  Date/Time: 2/21/2018 4:23 PM  Consent given by: patient  Site marked: site marked  Supporting Documentation  Indications: pain   Procedure Details  Location: shoulder - L subacromial bursa  Needle size: 22 G  Ultrasound guidance: no  Approach: lateral  Medications administered: 40 mg triamcinolone acetonide 40 mg/mL; 5 mL lidocaine (PF) 1 %    Patient tolerance: patient tolerated the procedure well with no immediate complications        Plan:   Shoulder arthritis  - Xray showed left acromioclavicular arthritis, patient tolerated injection to Left subacromial bursae well without complications  - follow up in 3 months or sooner if needed

## 2018-02-22 ENCOUNTER — APPOINTMENT (OUTPATIENT)
Dept: PHYSICAL THERAPY | Facility: CLINIC | Age: 53
End: 2018-02-22
Payer: COMMERCIAL

## 2018-02-26 ENCOUNTER — APPOINTMENT (OUTPATIENT)
Dept: PHYSICAL THERAPY | Facility: CLINIC | Age: 53
End: 2018-02-26
Payer: COMMERCIAL

## 2018-03-01 ENCOUNTER — APPOINTMENT (OUTPATIENT)
Dept: PHYSICAL THERAPY | Facility: CLINIC | Age: 53
End: 2018-03-01

## 2018-03-06 ENCOUNTER — EVALUATION (OUTPATIENT)
Dept: PHYSICAL THERAPY | Facility: CLINIC | Age: 53
End: 2018-03-06

## 2018-03-06 DIAGNOSIS — M75.42 IMPINGEMENT SYNDROME OF LEFT SHOULDER: Primary | ICD-10-CM

## 2018-03-06 DIAGNOSIS — S46.002D UNSPECIFIED INJURY OF MUSCLE(S) AND TENDON(S) OF THE ROTATOR CUFF OF LEFT SHOULDER, SUBSEQUENT ENCOUNTER: ICD-10-CM

## 2018-03-06 DIAGNOSIS — M25.512 LEFT SHOULDER PAIN, UNSPECIFIED CHRONICITY: ICD-10-CM

## 2018-03-06 PROCEDURE — 97140 MANUAL THERAPY 1/> REGIONS: CPT | Performed by: PHYSICAL THERAPIST

## 2018-03-06 PROCEDURE — 97110 THERAPEUTIC EXERCISES: CPT | Performed by: PHYSICAL THERAPIST

## 2018-03-06 PROCEDURE — 97112 NEUROMUSCULAR REEDUCATION: CPT | Performed by: PHYSICAL THERAPIST

## 2018-03-06 NOTE — PROGRESS NOTES
PT Re-Evaluation     Today's date: 3/6/2018  Patient name: Anna Marie Munson  : 1965  MRN: [id number]  Referring provider: Carmella Cotto DO  Dx:   Encounter Diagnoses   Name Primary?  Impingement syndrome of left shoulder Yes    Unspecified injury of muscle(s) and tendon(s) of the rotator cuff of left shoulder, subsequent encounter     Left shoulder pain, unspecified chronicity                   Assessment  Impairments: abnormal or restricted ROM, impaired physical strength and pain with function    Patient is irritable  Assessment details: The patient has attended 15 visits of OPPT  The patient's ROM has increased slightly since last visit on 18  Since his last visit, the patient had a follow-up with the MD who ordered an injection  Overall, the patient now reports significantly less pain during daily activities and no pain with IR now  The patient's compliance is fair to activity modification and performing his HEP  The patient would benefit from continued PT to address his remaining ROM and strength deficits and transition him to an HEP  Understanding of Dx/Px/POC: fair   Prognosis: fair    Goals  STG: To be completed in 4 weeks    1  The patient will increase shoulder flexion to 120 degrees when reaching into an overhead cabinet  met  2  The patient will increase UE strength to 4/5 MMT when carrying groceries into the house  met  3  The patient will report no more than 4/10 pain during all adls  LTG: To be completed in 8 weeks    1  The patient will increase shoulder flexion to 175 degrees when changing a lightbulb overhead  2  The patient will increase UE strength to 5/5 MMT when carrying groceries into the house  3  The patient will report no more than 1/10 pain during all adls       Plan  Patient would benefit from: skilled PT  Planned modality interventions: cryotherapy  Planned therapy interventions: manual therapy, neuromuscular re-education, patient education, postural training, strengthening, self care, stretching, therapeutic activities, therapeutic exercise and functional ROM exercises  Frequency: 2x week  Duration in weeks: 6  Plan details: POC expires 18        Subjective Evaluation    History of Present Illness  Date of onset: 10/25/2017  Mechanism of injury: See IE  Quality of life: good    Pain  Current pain ratin  At best pain ratin  At worst pain ratin  Aggravating factors: overhead activity and lifting  Progression: improved    Treatments  Previous treatment: physical therapy  Current treatment: physical therapy  Patient Goals  Patient goals for therapy: decreased pain          Objective     Passive Range of Motion   Left Shoulder   Flexion: 155 degrees   Abduction: 152 degrees with pain  External rotation 90°: 82 degrees   Internal rotation 90°: 40 degrees     Right Shoulder   Normal passive range of motion    Strength/Myotome Testing     Left Shoulder     Planes of Motion   Flexion: 5   Abduction: 4+ (pain)   External rotation at 0°: 5   Internal rotation at 0°: 5     Right Shoulder   Normal muscle strength      Precautions: none     Daily Treatment Diary      Manual  18  2/  3/6               L shoulder PROM, post glide with IR/ER, inf glide with elevation, shoulder posterior capsule stretch with scapular stabilized 10 min 10 min  15'  15'                                                                                                                   Exercise Diary  18  2/  3/6               Pulleys flex/abd 5"/1x15 ea  5"  1x15 ea  5"x15  5"x15               Shoulder horizontal abduction RTB  3x18  np  np                 Wall scap protraction 3x12  np  np                 Lifting weights to shoulder height 3#  3x10  3#  3x10  np                 TB Rows GTB  3x15  GTB  3x15  3x15 G  G 3x15               TB ER GTB  3x18  GTB  3x18  3x15 G  G 3x15               TB IR  GTB  3x18  GTB  3x18  3x15 G  G 3x15               TB extensions GTB  3x12  GTB  3x12  3x12 G  G 3x15               Serratus punch 3#  2x20 3#  2x20  3# 3x10  3# 3x15               Mod sleeper stretch 20"x4  20"x4  20"x4  20"x4                                                                                                                                                                                                                                                                     Modalities

## 2018-03-07 ENCOUNTER — APPOINTMENT (OUTPATIENT)
Dept: PHYSICAL THERAPY | Facility: CLINIC | Age: 53
End: 2018-03-07

## 2018-03-09 ENCOUNTER — OFFICE VISIT (OUTPATIENT)
Dept: PHYSICAL THERAPY | Facility: CLINIC | Age: 53
End: 2018-03-09

## 2018-03-09 DIAGNOSIS — S46.002D UNSPECIFIED INJURY OF MUSCLE(S) AND TENDON(S) OF THE ROTATOR CUFF OF LEFT SHOULDER, SUBSEQUENT ENCOUNTER: ICD-10-CM

## 2018-03-09 DIAGNOSIS — M25.512 LEFT SHOULDER PAIN, UNSPECIFIED CHRONICITY: ICD-10-CM

## 2018-03-09 DIAGNOSIS — M75.42 IMPINGEMENT SYNDROME OF LEFT SHOULDER: Primary | ICD-10-CM

## 2018-03-09 PROCEDURE — 97112 NEUROMUSCULAR REEDUCATION: CPT | Performed by: PHYSICAL THERAPIST

## 2018-03-09 PROCEDURE — 97110 THERAPEUTIC EXERCISES: CPT | Performed by: PHYSICAL THERAPIST

## 2018-03-09 NOTE — PROGRESS NOTES
Daily Note     Today's date: 3/9/2018  Patient name: Gary Ca  : 1965  MRN: [id number]  Referring provider: Sebastien Grey DO  Dx:   Encounter Diagnosis     ICD-10-CM    1  Impingement syndrome of left shoulder M75 42    2  Unspecified injury of muscle(s) and tendon(s) of the rotator cuff of left shoulder, subsequent encounter S46 002D    3  Left shoulder pain, unspecified chronicity M25 512                   Subjective: The patient reports only mild pain in the shoulder today rating his pain a 3/10  Objective: See treatment diary below      Assessment: Patient tolerated the treatment along with new exercises well reporting mild pain along upper arm that resolved with rest        Plan: Progress treatment as tolerated  Focus on good arthrokinematics         Precautions: none     Daily Treatment Diary      Manual  1/29/18  2/1  2/8  3/6  38             L shoulder PROM, post glide with IR/ER, inf glide with elevation, shoulder posterior capsule stretch with scapular stabilized 10 min 10 min  15'  15'  10'                                                                                                                 Exercise Diary  1/29/18  2/1  2/8  3/6  3/8             Pulleys flex/abd 5"/1x15 ea  5"  1x15 ea  5"x15  5"x15  5"x15             Shoulder horizontal abduction RTB  3x18  np  np                 Wall scap protraction 3x12  np  np                 Lifting weights to shoulder height 3#  3x10  3#  3x10  np                 TB Rows GTB  3x15  GTB  3x15  3x15 G  G 3x15  G 3x15             TB ER GTB  3x18  GTB  3x18  3x15 G  G 3x15  G 3x15             TB IR  GTB  3x18  GTB  3x18  3x15 G  G 3x15  G 3x15             TB extensions GTB  3x12  GTB  3x12  3x12 G  G 3x15  G 3x15             Serratus punch 3#  2x20 3#  2x20  3# 3x10  3# 3x15  4# 3x15             Mod sleeper stretch 20"x4  20"x4  20"x4  20"x4  20"x4              S/L ER c weight         3# 2x10              UT Stretch         3x20"              wall Slides         3" 2x10                                                                                                                                                                                           Modalities

## 2018-03-12 ENCOUNTER — OFFICE VISIT (OUTPATIENT)
Dept: PHYSICAL THERAPY | Facility: CLINIC | Age: 53
End: 2018-03-12

## 2018-03-12 DIAGNOSIS — S46.002D UNSPECIFIED INJURY OF MUSCLE(S) AND TENDON(S) OF THE ROTATOR CUFF OF LEFT SHOULDER, SUBSEQUENT ENCOUNTER: ICD-10-CM

## 2018-03-12 DIAGNOSIS — M25.512 LEFT SHOULDER PAIN, UNSPECIFIED CHRONICITY: ICD-10-CM

## 2018-03-12 DIAGNOSIS — M75.42 IMPINGEMENT SYNDROME OF LEFT SHOULDER: Primary | ICD-10-CM

## 2018-03-12 PROCEDURE — 97140 MANUAL THERAPY 1/> REGIONS: CPT

## 2018-03-12 PROCEDURE — 97110 THERAPEUTIC EXERCISES: CPT

## 2018-03-12 PROCEDURE — 97112 NEUROMUSCULAR REEDUCATION: CPT

## 2018-03-12 NOTE — PROGRESS NOTES
Daily Note     Today's date: 3/12/2018  Patient name: Mello Stephens  : 1965  MRN: [id number]  Referring provider: Saeed Cortez DO  Dx:   Encounter Diagnosis     ICD-10-CM    1  Impingement syndrome of left shoulder M75 42    2  Unspecified injury of muscle(s) and tendon(s) of the rotator cuff of left shoulder, subsequent encounter S46 002D    3  Left shoulder pain, unspecified chronicity M25 512                   Subjective: Pt reports decreased left shoulder pain with daily activities, continued pain with sleeping  Objective: See treatment diary below      Assessment: Pt demonstrated increased pain-free tolerance to PROM, increased tolerance to scapular strengthening  Plan: Continue to progress strengthening as per pt tolerance  Focus on good arthrokinematics         Precautions: none     Daily Treatment Diary      Manual  1/29/18  2/1  2/8  3/6  3/8  3/12           L shoulder PROM, post glide with IR/ER, inf glide with elevation, shoulder posterior capsule stretch with scapular stabilized 10 min 10 min  15'  15'  10'  10'                                                                                                               Exercise Diary  1/29/18  2/1  2/8  3/6  3/8  3/12           Pulleys flex/abd 5"/1x15 ea  5"  1x15 ea  5"x15  5"x15  5"x15  5"x15           Shoulder horizontal abduction RTB  3x18  np  np                 Wall scap protraction 3x12  np  np                 Lifting weights to shoulder height 3#  3x10  3#  3x10  np                 TB Rows GTB  3x15  GTB  3x15  3x15 G  G 3x15  G 3x15  G  3x20           TB ER GTB  3x18  GTB  3x18  3x15 G  G 3x15  G 3x15 G  3x15           TB IR  GTB  3x18  GTB  3x18  3x15 G  G 3x15  G 3x15  G  3x15           TB extensions GTB  3x12  GTB  3x12  3x12 G  G 3x15  G 3x15  G  3x15           Serratus punch 3#  2x20 3#  2x20  3# 3x10  3# 3x15  4# 3x15  4#  3x15           Mod sleeper stretch 20"x4  20"x4  20"x4  20"x4  20"x4  20"x4            S/L ER c weight         3# 2x10  3#  2x12            UT Stretch         3x20"  3x20"            wall Slides         3" 2x10  3"  2x10                                                                                                                                                                                         Modalities

## 2018-03-15 ENCOUNTER — APPOINTMENT (OUTPATIENT)
Dept: PHYSICAL THERAPY | Facility: CLINIC | Age: 53
End: 2018-03-15

## 2018-03-19 ENCOUNTER — OFFICE VISIT (OUTPATIENT)
Dept: PHYSICAL THERAPY | Facility: CLINIC | Age: 53
End: 2018-03-19

## 2018-03-19 DIAGNOSIS — M75.42 IMPINGEMENT SYNDROME OF LEFT SHOULDER: Primary | ICD-10-CM

## 2018-03-19 DIAGNOSIS — M25.512 LEFT SHOULDER PAIN, UNSPECIFIED CHRONICITY: ICD-10-CM

## 2018-03-19 DIAGNOSIS — S46.002D UNSPECIFIED INJURY OF MUSCLE(S) AND TENDON(S) OF THE ROTATOR CUFF OF LEFT SHOULDER, SUBSEQUENT ENCOUNTER: ICD-10-CM

## 2018-03-19 PROCEDURE — 97140 MANUAL THERAPY 1/> REGIONS: CPT | Performed by: PHYSICAL THERAPIST

## 2018-03-19 PROCEDURE — 97110 THERAPEUTIC EXERCISES: CPT | Performed by: PHYSICAL THERAPIST

## 2018-03-19 NOTE — PROGRESS NOTES
Daily Note     Today's date: 3/19/2018  Patient name: Cynthia Smallwood  : 1965  MRN: [id number]  Referring provider: Gaurang Salas DO  Dx:   Encounter Diagnosis     ICD-10-CM    1  Impingement syndrome of left shoulder M75 42    2  Unspecified injury of muscle(s) and tendon(s) of the rotator cuff of left shoulder, subsequent encounter S46 002D    3  Left shoulder pain, unspecified chronicity M25 512                   Subjective: Pt arrived twenty minutes late for therapy today but was able to be accommodated  Objective: See treatment diary below      Assessment: Pt demonstrated increased pain-free tolerance to PROM, increased tolerance to scapular strengthening  Plan: Continue to progress strengthening as per pt tolerance  Focus on good arthrokinematics         Precautions: none     Daily Treatment Diary      Manual  1/29/18  2/1  2/8  3/6  3/8  3/12  3/19         L shoulder PROM, post glide with IR/ER, inf glide with elevation, shoulder posterior capsule stretch with scapular stabilized 10 min 10 min  15'  15'  10'  10'                                                                                                               Exercise Diary  1/29/18  2/1  2/8  3/6  3/8  3/12  3/19         Pulleys flex/abd 5"/1x15 ea  5"  1x15 ea  5"x15  5"x15  5"x15  5"x15           Shoulder horizontal abduction RTB  3x18  np  np                 Wall scap protraction 3x12  np  np                 Lifting weights to shoulder height 3#  3x10  3#  3x10  np                 TB Rows GTB  3x15  GTB  3x15  3x15 G  G 3x15  G 3x15  G  3x20  G 3x20         TB ER GTB  3x18  GTB  3x18  3x15 G  G 3x15  G 3x15 G  3x15  G 3x15         TB IR  GTB  3x18  GTB  3x18  3x15 G  G 3x15  G 3x15  G  3x15  G 3x15         TB extensions GTB  3x12  GTB  3x12  3x12 G  G 3x15  G 3x15  G  3x15  G 3x15         Serratus punch 3#  2x20 3#  2x20  3# 3x10  3# 3x15  4# 3x15  4#  3x15           Mod sleeper stretch 20"x4  20"x4  20"x4  20"x4  20"x4  20"x4            S/L ER c weight         3# 2x10  3#  2x12            UT Stretch         3x20"  3x20"            wall Slides         3" 2x10  3"  2x10                                                                                                                                                                                         Modalities

## 2018-03-19 NOTE — PROGRESS NOTES
Daily Note     Today's date: 3/19/2018  Patient name: Simi Farmer  : 1965  MRN: [id number]  Referring provider: Linda Casarez DO  Dx:   Encounter Diagnosis     ICD-10-CM    1  Impingement syndrome of left shoulder M75 42    2  Unspecified injury of muscle(s) and tendon(s) of the rotator cuff of left shoulder, subsequent encounter S46 002D    3  Left shoulder pain, unspecified chronicity M25 512                   Subjective: Pt reports pain is getting much better  Pt reports 3/10 L shoulder pain at worst with overhead reaching  Pt reports that he can now sleep on his L shoulder  Objective: See treatment diary below  Tx cut short b/c pt had to leave early  Assessment: Pt demonstrated mild pain with end-range IR PROM  Plan: Continue POC    Precautions: none     Daily Treatment Diary      Manual  1/29/18  2/1  2/8  3/6  3/8  3/12  3/19         L shoulder PROM, post glide with IR/ER, inf glide with elevation, shoulder posterior capsule stretch with scapular stabilized 10 min 10 min  15'  15'  10'  10'  10 min                                                                                                             Exercise Diary  1/29/18  2/1  2/8  3/6  3/8  3/12  3/19         Pulleys flex/abd 5"/1x15 ea  5"  1x15 ea  5"x15  5"x15  5"x15  5"x15  5"x15 ea         Shoulder horizontal abduction RTB  3x18  np  np       np         Wall scap protraction 3x12  np  np        np         Lifting weights to shoulder height 3#  3x10  3#  3x10  np        np         TB Rows GTB  3x15  GTB  3x15  3x15 G  G 3x15  G 3x15  G  3x20 G/ 3x20         TB ER GTB  3x18  GTB  3x18  3x15 G  G 3x15  G 3x15 G  3x15  G/ 3x20         TB IR  GTB  3x18  GTB  3x18  3x15 G  G 3x15  G 3x15  G  3x15  G/ 3x20         TB extensions GTB  3x12  GTB  3x12  3x12 G  G 3x15  G 3x15  G  3x15 G/ 3x20         Serratus punch 3#  2x20 3#  2x20  3# 3x10  3# 3x15  4# 3x15  4#  3x15  np         Mod sleeper stretch 20"x4  20"x4  20"x4  20"x4  20"x4  20"x4 20"x4          S/L ER c weight         3# 2x10  3#  2x12  np          UT Stretch         3x20"  3x20"  np          wall Slides         3" 2x10  3"  2x10  np                                                                                                                                                                                       Modalities

## 2018-03-22 ENCOUNTER — OFFICE VISIT (OUTPATIENT)
Dept: PHYSICAL THERAPY | Facility: CLINIC | Age: 53
End: 2018-03-22

## 2018-03-22 DIAGNOSIS — S46.002D UNSPECIFIED INJURY OF MUSCLE(S) AND TENDON(S) OF THE ROTATOR CUFF OF LEFT SHOULDER, SUBSEQUENT ENCOUNTER: ICD-10-CM

## 2018-03-22 DIAGNOSIS — M25.512 LEFT SHOULDER PAIN, UNSPECIFIED CHRONICITY: ICD-10-CM

## 2018-03-22 DIAGNOSIS — M75.42 IMPINGEMENT SYNDROME OF LEFT SHOULDER: Primary | ICD-10-CM

## 2018-03-22 PROCEDURE — G8985 CARRY GOAL STATUS: HCPCS | Performed by: PHYSICAL THERAPIST

## 2018-03-22 PROCEDURE — 97110 THERAPEUTIC EXERCISES: CPT | Performed by: PHYSICAL THERAPIST

## 2018-03-22 PROCEDURE — 97112 NEUROMUSCULAR REEDUCATION: CPT | Performed by: PHYSICAL THERAPIST

## 2018-03-22 PROCEDURE — G8984 CARRY CURRENT STATUS: HCPCS | Performed by: PHYSICAL THERAPIST

## 2018-03-22 PROCEDURE — 97140 MANUAL THERAPY 1/> REGIONS: CPT | Performed by: PHYSICAL THERAPIST

## 2018-03-22 NOTE — PROGRESS NOTES
Daily Note     Today's date: 3/22/2018  Patient name: Anne-Marie Novoa  : 1965  MRN: [id number]  Referring provider: Geovanna Davis DO  Dx:   Encounter Diagnosis     ICD-10-CM    1  Impingement syndrome of left shoulder M75 42    2  Unspecified injury of muscle(s) and tendon(s) of the rotator cuff of left shoulder, subsequent encounter S46 002D    3  Left shoulder pain, unspecified chronicity M25 512                   Subjective: Pt arrived 10 minutes late for therapy but was able to be accommodated  Objective: See treatment diary below      Assessment: Pt reported mild pain with ER strengthening today  Improved arthrokinematics exhibited today  Plan: Continue to progress strengthening as per pt tolerance  Focus on good arthrokinematics         Precautions: none     Daily Treatment Diary      Manual  1/29/18  2/1  2/8  3/6  3/8  3/12  3/19  3/22       L shoulder PROM, post glide with IR/ER, inf glide with elevation, shoulder posterior capsule stretch with scapular stabilized 10 min 10 min  15'  15'  10'  10'    8'                                                                                                           Exercise Diary  1/29/18  2/1  2/8  3/6  3/8  3/12  3/19  3/22       Pulleys flex/abd 5"/1x15 ea  5"  1x15 ea  5"x15  5"x15  5"x15  5"x15    5"x15       Shoulder horizontal abduction RTB  3x18  np  np                 Wall scap protraction 3x12  np  np                 Lifting weights to shoulder height 3#  3x10  3#  3x10  np                 TB Rows GTB  3x15  GTB  3x15  3x15 G  G 3x15  G 3x15  G  3x20  G 3x20  G 3x25       TB ER GTB  3x18  GTB  3x18  3x15 G  G 3x15  G 3x15 G  3x15  G 3x15  G3x18       TB IR  GTB  3x18  GTB  3x18  3x15 G  G 3x15  G 3x15  G  3x15  G 3x15  G3x18       TB extensions GTB  3x12  GTB  3x12  3x12 G  G 3x15  G 3x15  G  3x15  G 3x15         Serratus punch 3#  2x20 3#  2x20  3# 3x10  3# 3x15  4# 3x15  4#  3x15    4# 3x15       Mod sleeper stretch 20"x4  20"x4  20"x4  20"x4  20"x4  20"x4    20"x4        S/L ER c weight         3# 2x10  3#  2x12    3# 2x15        UT Stretch         3x20"  3x20"    3x20"        wall Slides         3" 2x10  3"  2x10    3" 2x10                                                                                                                                                                                     Modalities

## 2018-03-26 ENCOUNTER — OFFICE VISIT (OUTPATIENT)
Dept: PHYSICAL THERAPY | Facility: CLINIC | Age: 53
End: 2018-03-26

## 2018-03-26 DIAGNOSIS — M75.42 IMPINGEMENT SYNDROME OF LEFT SHOULDER: Primary | ICD-10-CM

## 2018-03-26 DIAGNOSIS — M25.512 LEFT SHOULDER PAIN, UNSPECIFIED CHRONICITY: ICD-10-CM

## 2018-03-26 DIAGNOSIS — S46.002D UNSPECIFIED INJURY OF MUSCLE(S) AND TENDON(S) OF THE ROTATOR CUFF OF LEFT SHOULDER, SUBSEQUENT ENCOUNTER: ICD-10-CM

## 2018-03-26 PROCEDURE — 97140 MANUAL THERAPY 1/> REGIONS: CPT | Performed by: PHYSICAL THERAPIST

## 2018-03-26 PROCEDURE — 97110 THERAPEUTIC EXERCISES: CPT | Performed by: PHYSICAL THERAPIST

## 2018-03-26 PROCEDURE — 97112 NEUROMUSCULAR REEDUCATION: CPT | Performed by: PHYSICAL THERAPIST

## 2018-03-26 NOTE — PROGRESS NOTES
Daily Note     Today's date: 3/26/2018  Patient name: Anna Marie Munson  : 1965  MRN: [id number]  Referring provider: Carmella Cotto DO  Dx:   Encounter Diagnosis     ICD-10-CM    1  Impingement syndrome of left shoulder M75 42    2  Unspecified injury of muscle(s) and tendon(s) of the rotator cuff of left shoulder, subsequent encounter S46 002D    3  Left shoulder pain, unspecified chronicity M25 512                   Subjective: Pt reports 4/10 L lateral shoulder and upper arm pain at worst with end-ROM functional reaching  Objective: See treatment diary below  PROM: ER at 90 de deg*, IR at 90 de deg, FLEX: 155 deg, ABD: 155 deg      Assessment: Pt demonstrated full PROM in all planes except IR  Pt unable to complete TE secondary to leaving early  Plan: Cont  POC with emphasis on IR ROM and arthrokinematics  Consider adding ABD wall slides to address pain with frontal plane ROM        Precautions: none     Daily Treatment Diary      Manual  1/29/18  2/1  2/8  3/6  3/8  3/12  3/19  3/22  3/26     L shoulder PROM, post glide with IR/ER, inf glide with elevation, shoulder posterior capsule stretch with scapular stabilized 10 min 10 min  15'  15'  10'  10'    8'  8 min      Gr IV inf, post [facility] [employer] mobs                 1x30 ea                                                                                 Exercise Diary  18  2  2  3/6  3/8  3/12  3/19  3/22  3/26     Pulleys flex/abd 5"/1x15 ea  5"  1x15 ea  5"x15  5"x15  5"x15  5"x15    5"x15  5"x15     Shoulder horizontal abduction RTB  3x18  np  np                 Wall scap protraction 3x12  np  np                 Lifting weights to shoulder height 3#  3x10  3#  3x10  np                 TB Rows GTB  3x15  GTB  3x15  3x15 G  G 3x15  G 3x15  G  3x20  G 3x20  G 3x25  np     TB ER GTB  3x18  GTB  3x18  3x15 G  G 3x15  G 3x15 G  3x15  G 3x15  G3x18  np     TB IR  GTB  3x18  GTB  3x18  3x15 G  G 3x15  G 3x15  G  3x15  G 3x15  G3x18  np   TB extensions GTB  3x12  GTB  3x12  3x12 G  G 3x15  G 3x15  G  3x15  G 3x15    np     Serratus punch 3#  2x20 3#  2x20  3# 3x10  3# 3x15  4# 3x15  4#  3x15    4# 3x15  4#/   3x15     Mod sleeper stretch 20"x4  20"x4  20"x4  20"x4  20"x4  20"x4    20"x4  20"x4      S/L ER c weight         3# 2x10  3#  2x12    3# 2x15  3#/   2x15      UT Stretch         3x20"  3x20"    3x20"  3x20"      wall Slides         3" 2x10  3"  2x10    3" 2x10  flex/   3x10                                                                                                                                                                                   Modalities

## 2018-03-29 ENCOUNTER — OFFICE VISIT (OUTPATIENT)
Dept: PHYSICAL THERAPY | Facility: CLINIC | Age: 53
End: 2018-03-29

## 2018-03-29 DIAGNOSIS — M75.42 IMPINGEMENT SYNDROME OF LEFT SHOULDER: Primary | ICD-10-CM

## 2018-03-29 DIAGNOSIS — M25.512 LEFT SHOULDER PAIN, UNSPECIFIED CHRONICITY: ICD-10-CM

## 2018-03-29 DIAGNOSIS — S46.002D UNSPECIFIED INJURY OF MUSCLE(S) AND TENDON(S) OF THE ROTATOR CUFF OF LEFT SHOULDER, SUBSEQUENT ENCOUNTER: ICD-10-CM

## 2018-03-29 PROCEDURE — 97112 NEUROMUSCULAR REEDUCATION: CPT | Performed by: PHYSICAL THERAPIST

## 2018-03-29 PROCEDURE — 97110 THERAPEUTIC EXERCISES: CPT | Performed by: PHYSICAL THERAPIST

## 2018-03-29 PROCEDURE — 97140 MANUAL THERAPY 1/> REGIONS: CPT | Performed by: PHYSICAL THERAPIST

## 2018-03-29 NOTE — PROGRESS NOTES
Daily Note     Today's date: 3/29/2018  Patient name: Gayatri Boo  : 1965  MRN: [id number]  Referring provider: Fariba Franz DO  Dx:   Encounter Diagnosis     ICD-10-CM    1  Impingement syndrome of left shoulder M75 42    2  Unspecified injury of muscle(s) and tendon(s) of the rotator cuff of left shoulder, subsequent encounter S46 002D    3  Left shoulder pain, unspecified chronicity M25 512                   Subjective: Pt reports 3-4/10 L shoulder pain with lateral reaching  Objective: See treatment diary below; added abduction wall slides  PROM: ER at 90 deg of abduction: 85 deg*, IR at 90 deg of abduction: 70 deg, FLEX: 155 deg, ABD: 157deg      Assessment: Pt demonstrated increased shoulder IR PROM, increased tolerance to RC strengthening  Pt demonstrated mild pain with shoulder abduction wall slides  Plan: Continue poc as per PT        Precautions: none     Daily Treatment Diary      Manual  18  2  2/8  3/6  3/8  3/12  3/19  3/22  3/26  3/28   L shoulder PROM, post glide with IR/ER, inf glide with elevation, shoulder posterior capsule stretch with scapular stabilized 10 min 10 min  15'  15'  10'  10'    8'  8 min  10'    Gr IV inf, post [facility] mobs                 1x30 ea np                                                                               Exercise Diary  18  2  2/8  3/6  3/8  3/12  3/19  3/22  3/26 3/28   Pulleys flex/abd 5"/1x15 ea  5"  1x15 ea  5"x15  5"x15  5"x15  5"x15    5"x15  5"x15 5"x15   Shoulder horizontal abduction RTB  3x18  np  np                 Wall scap protraction 3x12  np  np                 Lifting weights to shoulder height 3#  3x10  3#  3x10  np                 TB Rows GTB  3x15  GTB  3x15  3x15 G  G 3x15  G 3x15  G  3x20  G 3x20  G 3x25  np  G  3x25   TB ER GTB  3x18  GTB  3x18  3x15 G  G 3x15  G 3x15 G  3x15  G 3x15  G3x18  np  G  3x20   TB IR  GTB  3x18  GTB  3x18  3x15 G  G 3x15  G 3x15  G  3x15  G 3x15  G3x18  np G  3x20   TB extensions GTB  3x12  GTB  3x12  3x12 G  G 3x15  G 3x15  G  3x15  G 3x15    np  G  3x15   Serratus punch 3#  2x20 3#  2x20  3# 3x10  3# 3x15  4# 3x15  4#  3x15    4# 3x15  4#/   3x15  5#  3x10   Mod sleeper stretch 20"x4  20"x4  20"x4  20"x4  20"x4  20"x4    20"x4  20"x4  20"x4    S/L ER c weight         3# 2x10  3#  2x12    3# 2x15  3#/   2x15 3#/  2x15    UT Stretch         3x20"  3x20"    3x20"  3x20"  3x20"    wall Slides         3" 2x10  3"  2x10    3" 2x10  flex/   3x10  flex/  3x10  Abd/  2x10                                                                                                                                                                                 Modalities

## 2018-04-02 ENCOUNTER — OFFICE VISIT (OUTPATIENT)
Dept: PHYSICAL THERAPY | Facility: CLINIC | Age: 53
End: 2018-04-02

## 2018-04-02 DIAGNOSIS — G89.29 CHRONIC LEFT SHOULDER PAIN: ICD-10-CM

## 2018-04-02 DIAGNOSIS — M25.512 CHRONIC LEFT SHOULDER PAIN: ICD-10-CM

## 2018-04-02 DIAGNOSIS — S46.002D UNSPECIFIED INJURY OF MUSCLE(S) AND TENDON(S) OF THE ROTATOR CUFF OF LEFT SHOULDER, SUBSEQUENT ENCOUNTER: ICD-10-CM

## 2018-04-02 DIAGNOSIS — M75.42 IMPINGEMENT SYNDROME OF LEFT SHOULDER: Primary | ICD-10-CM

## 2018-04-02 PROCEDURE — 97112 NEUROMUSCULAR REEDUCATION: CPT | Performed by: PHYSICAL THERAPIST

## 2018-04-02 PROCEDURE — 97140 MANUAL THERAPY 1/> REGIONS: CPT | Performed by: PHYSICAL THERAPIST

## 2018-04-02 PROCEDURE — G8985 CARRY GOAL STATUS: HCPCS | Performed by: PHYSICAL THERAPIST

## 2018-04-02 PROCEDURE — G8984 CARRY CURRENT STATUS: HCPCS | Performed by: PHYSICAL THERAPIST

## 2018-04-02 PROCEDURE — 97110 THERAPEUTIC EXERCISES: CPT | Performed by: PHYSICAL THERAPIST

## 2018-04-02 NOTE — PROGRESS NOTES
Daily Note     Today's date: 2018  Patient name: Marlee Agrawal  : 1965  MRN: [id number]  Referring provider: Raul Hansen DO  Dx:   Encounter Diagnosis     ICD-10-CM    1  Impingement syndrome of left shoulder M75 42    2  Unspecified injury of muscle(s) and tendon(s) of the rotator cuff of left shoulder, subsequent encounter S46 002D                   Subjective: Pt reports L shoulder stiffness without pain currently  Objective: See treatment diary below    Assessment: Pt demonstrated (-) scapular assistance test and no improvement with ABD pain following MWM  Plan: Assess response NV and cont  RTC strengthening        Precautions: none     Daily Treatment Diary      Manual  1/29/18  2/1  2/8  3/6  3/8  3/12  3/19  3/22  3/26  3/28  4/2   L shoulder PROM, post glide with IR/ER, inf glide with elevation, shoulder posterior capsule stretch with scapular stabilized 10 min 10 min  15'  15'  10'  10'    8'  8 min  10'  6 min    Gr IV inf, post GH mobs                 1x30 ea np  1x30    seated ABD MWM                      1x10                                                         Exercise Diary  1/29/18  2/1  2/8  3/6  3/8  3/12  3/19  3/22  3/26 3/28 4/2   Pulleys flex/abd 5"/1x15 ea  5"  1x15 ea  5"x15  5"x15  5"x15  5"x15    5"x15  5"x15 5"x15  5"x15   Shoulder horizontal abduction RTB  3x18  np  np                np   Wall scap protraction 3x12  np  np                np   Lifting weights to shoulder height 3#  3x10  3#  3x10  np                np   TB Rows GTB  3x15  GTB  3x15  3x15 G  G 3x15  G 3x15  G  3x20  G 3x20  G 3x25  np  G  3x25  blue/   3x15   TB ER GTB  3x18  GTB  3x18  3x15 G  G 3x15  G 3x15 G  3x15  G 3x15  G3x18  np  G  3x20  G/3x20   TB IR  GTB  3x18  GTB  3x18  3x15 G  G 3x15  G 3x15  G  3x15  G 3x15  G3x18  np G  3x20  G/3x20   TB extensions GTB  3x12  GTB  3x12  3x12 G  G 3x15  G 3x15  G  3x15  G 3x15    np  G  3x15  np   Serratus punch 3#  2x20 3#  2x20  3# 3x10  3# 3x15  4# 3x15  4#  3x15    4# 3x15  4#/   3x15  5#  3x10  5#/3x10   Mod sleeper stretch 20"x4  20"x4  20"x4  20"x4  20"x4  20"x4    20"x4  20"x4  20"x4  20"x4    S/L ER c weight         3# 2x10  3#  2x12    3# 2x15  3#/   2x15 3#/  2x15  3#/2x15    UT Stretch         3x20"  3x20"    3x20"  3x20"  3x20"  3x20"    wall Slides         3" 2x10  3"  2x10    3" 2x10  flex/   3x10  flex/  3x10  Abd/  2x10  flex/3x10   abd/2x10                                                                                                                                                                                        Modalities

## 2018-04-06 ENCOUNTER — OFFICE VISIT (OUTPATIENT)
Dept: PHYSICAL THERAPY | Facility: CLINIC | Age: 53
End: 2018-04-06

## 2018-04-06 DIAGNOSIS — M25.512 LEFT SHOULDER PAIN, UNSPECIFIED CHRONICITY: ICD-10-CM

## 2018-04-06 DIAGNOSIS — G89.29 CHRONIC LEFT SHOULDER PAIN: ICD-10-CM

## 2018-04-06 DIAGNOSIS — M75.42 IMPINGEMENT SYNDROME OF LEFT SHOULDER: Primary | ICD-10-CM

## 2018-04-06 DIAGNOSIS — S46.002D UNSPECIFIED INJURY OF MUSCLE(S) AND TENDON(S) OF THE ROTATOR CUFF OF LEFT SHOULDER, SUBSEQUENT ENCOUNTER: ICD-10-CM

## 2018-04-06 DIAGNOSIS — M25.512 CHRONIC LEFT SHOULDER PAIN: ICD-10-CM

## 2018-04-06 PROCEDURE — 97110 THERAPEUTIC EXERCISES: CPT

## 2018-04-06 PROCEDURE — 97140 MANUAL THERAPY 1/> REGIONS: CPT

## 2018-04-06 PROCEDURE — 97112 NEUROMUSCULAR REEDUCATION: CPT

## 2018-04-06 NOTE — PROGRESS NOTES
Daily Note     Today's date: 2018  Patient name: Gary Morales  : 1965  MRN: [id number]  Referring provider: Amanda Milton DO  Dx:   Encounter Diagnosis     ICD-10-CM    1  Impingement syndrome of left shoulder M75 42    2  Unspecified injury of muscle(s) and tendon(s) of the rotator cuff of left shoulder, subsequent encounter S46 002D    3  Chronic left shoulder pain M25 512     G89 29    4  Left shoulder pain, unspecified chronicity M25 512                   Subjective: Pt reports mild left shoulder pain with reaching behind his back and behind his head  Objective: See treatment diary below    Assessment: Pt demonstrated increased tolerance to RTC strengthening, moderate left shoulder pain with abduction wall slides  Plan: Assess response NV and progress strengthening as per pt tolerance        Precautions: none     Daily Treatment Diary      Manual  18             L shoulder PROM, post glide with IR/ER, inf glide with elevation, shoulder posterior capsule stretch with scapular stabilized 10 min              Gr IV inf, post GH mobs  np              seated ABD MWM  np                                                                   Exercise Diary  18             Pulleys flex/abd 5"/1x15 ea             Shoulder horizontal abduction np             Wall scap protraction np             Lifting weights to shoulder height np             TB Rows Blue  3x20             TB ER BlueTB  3x15             TB IR  BlueTB  3x15             TB extensions GTB  3x20             Serratus punch 5#  3x15             Mod sleeper stretch 20"x4              S/L ER c weight  3#/3x15              UT Stretch  3x20"              wall Slides  flex/3x10  abd/2x10                                                                                                                                                                                                  Modalities

## 2018-04-09 ENCOUNTER — OFFICE VISIT (OUTPATIENT)
Dept: PHYSICAL THERAPY | Facility: CLINIC | Age: 53
End: 2018-04-09

## 2018-04-09 DIAGNOSIS — M75.42 IMPINGEMENT SYNDROME OF LEFT SHOULDER: Primary | ICD-10-CM

## 2018-04-09 DIAGNOSIS — G89.29 CHRONIC LEFT SHOULDER PAIN: ICD-10-CM

## 2018-04-09 DIAGNOSIS — M25.512 CHRONIC LEFT SHOULDER PAIN: ICD-10-CM

## 2018-04-09 DIAGNOSIS — M25.512 LEFT SHOULDER PAIN, UNSPECIFIED CHRONICITY: ICD-10-CM

## 2018-04-09 DIAGNOSIS — S46.002D UNSPECIFIED INJURY OF MUSCLE(S) AND TENDON(S) OF THE ROTATOR CUFF OF LEFT SHOULDER, SUBSEQUENT ENCOUNTER: ICD-10-CM

## 2018-04-09 PROCEDURE — 97112 NEUROMUSCULAR REEDUCATION: CPT | Performed by: PHYSICAL THERAPIST

## 2018-04-09 PROCEDURE — 97110 THERAPEUTIC EXERCISES: CPT | Performed by: PHYSICAL THERAPIST

## 2018-04-09 PROCEDURE — 97140 MANUAL THERAPY 1/> REGIONS: CPT | Performed by: PHYSICAL THERAPIST

## 2018-04-09 NOTE — PROGRESS NOTES
Daily Note     Today's date: 2018  Patient name: Liberty Cooper  : 1965  MRN: [id number]  Referring provider: Saba Christie DO  Dx:   Encounter Diagnosis     ICD-10-CM    1  Impingement syndrome of left shoulder M75 42    2  Unspecified injury of muscle(s) and tendon(s) of the rotator cuff of left shoulder, subsequent encounter S46 002D    3  Chronic left shoulder pain M25 512     G89 29    4  Left shoulder pain, unspecified chronicity M25 512                   Subjective: Pt reports 3/10 posterior shoulder pain at worst with ABD  Objective: See treatment diary below    Assessment: Pt demonstrated increased pain-free sh ABD after MWM, but reports a painful arc with lowering even with the MWM  Plan: Consider scapular repositioning with ABD nv to address his painful arc        Precautions: none     Daily Treatment Diary      Manual  18            L shoulder PROM, post glide with IR/ER, inf glide with elevation, shoulder posterior capsule stretch with scapular stabilized 10 min 10 min             Gr IV inf, post GH mobs  np 1x30 ea             seated ABD MWM  np 1x15                                                                  Exercise Diary  18            Pulleys flex/abd 5"/1x15 ea 5"x15 ea            Shoulder horizontal abduction np R/ 3x20            Wall scap protraction np 3x20            Lifting weights to shoulder height np np            TB Rows Blue  3x20 B/ 3x20            TB ER BlueTB  3x15 B/ 3x15            TB IR  BlueTB  3x15 B/ 3x15            TB extensions GTB  3x20 G/ 3x20            Serratus punch 5#  3x15 HEP            Mod sleeper stretch 20"x4 20"x4             S/L ER c weight  3#/3x15 HEP             UT Stretch  3x20" 3x20"             wall Slides  flex/3x10  abd/2x10 Flex/ 3x10 ABD/ 2x10                                                                                                                                                                                                 Modalities

## 2018-04-12 ENCOUNTER — APPOINTMENT (OUTPATIENT)
Dept: PHYSICAL THERAPY | Facility: CLINIC | Age: 53
End: 2018-04-12

## 2018-04-16 ENCOUNTER — OFFICE VISIT (OUTPATIENT)
Dept: PHYSICAL THERAPY | Facility: CLINIC | Age: 53
End: 2018-04-16

## 2018-04-16 DIAGNOSIS — M75.42 IMPINGEMENT SYNDROME OF LEFT SHOULDER: Primary | ICD-10-CM

## 2018-04-16 DIAGNOSIS — M25.512 CHRONIC LEFT SHOULDER PAIN: ICD-10-CM

## 2018-04-16 DIAGNOSIS — S46.002D UNSPECIFIED INJURY OF MUSCLE(S) AND TENDON(S) OF THE ROTATOR CUFF OF LEFT SHOULDER, SUBSEQUENT ENCOUNTER: ICD-10-CM

## 2018-04-16 DIAGNOSIS — M25.512 LEFT SHOULDER PAIN, UNSPECIFIED CHRONICITY: ICD-10-CM

## 2018-04-16 DIAGNOSIS — G89.29 CHRONIC LEFT SHOULDER PAIN: ICD-10-CM

## 2018-04-16 PROCEDURE — 97140 MANUAL THERAPY 1/> REGIONS: CPT

## 2018-04-16 PROCEDURE — 97110 THERAPEUTIC EXERCISES: CPT

## 2018-04-16 PROCEDURE — 97112 NEUROMUSCULAR REEDUCATION: CPT

## 2018-04-16 NOTE — PROGRESS NOTES
Daily Note     Today's date: 2018  Patient name: Junior Argueta  : 1965  MRN: [id number]  Referring provider: Sebastian Carlisle DO  Dx:   Encounter Diagnosis     ICD-10-CM    1  Impingement syndrome of left shoulder M75 42    2  Unspecified injury of muscle(s) and tendon(s) of the rotator cuff of left shoulder, subsequent encounter S46 002D    3  Chronic left shoulder pain M25 512     G89 29    4  Left shoulder pain, unspecified chronicity M25 512                   Subjective: Pt reports occasional mild posterior shoulder pain but mostly tightness with lateral reaching when using his left arm  Objective: See treatment diary below    Assessment: Pt demonstrated continued pain with shoulder abduction wall slides, increased tolerance to scapular strengthening       Plan: Assess response to scapular repositioning with ABD nv       Precautions: none     Daily Treatment Diary      Manual  18           L shoulder PROM, post glide with IR/ER, inf glide with elevation, shoulder posterior capsule stretch with scapular stabilized 10 min 10 min 10 min            Gr IV inf, post GH mobs  np 1x30 ea np            seated ABD MWM  np 1x15 np                                                                 Exercise Diary  18           Pulleys flex/abd 5"/1x15 ea 5"x15 ea 5"x15 ea           Shoulder horizontal abduction np R/ 3x20 G  3x15           Wall scap protraction np 3x20 3x20           Lifting weights to shoulder height np np np           TB Rows Blue  3x20 B/ 3x20 B/  3x20           TB ER BlueTB  3x15 B/ 3x15 B/ 3x15           TB IR  BlueTB  3x15 B/ 3x15 B/ 3x15           TB extensions GTB  3x20 G/ 3x20 G/  3x20           Serratus punch 5#  3x15 HEP HEP           Mod sleeper stretch 20"x4 20"x4 20"x4            S/L ER c weight  3#/3x15 HEP HEP            UT Stretch  3x20" 3x20"             wall Slides  flex/3x10  abd/2x10 Flex/ 3x10 ABD/ 2x10 Flex  3x10  ABD  2x10  scapular repositioning with AROM ABD      1x10                                                                                                                                                                              Modalities

## 2018-04-19 ENCOUNTER — APPOINTMENT (OUTPATIENT)
Dept: PHYSICAL THERAPY | Facility: CLINIC | Age: 53
End: 2018-04-19

## 2018-04-23 ENCOUNTER — APPOINTMENT (OUTPATIENT)
Dept: PHYSICAL THERAPY | Facility: CLINIC | Age: 53
End: 2018-04-23

## 2018-04-26 ENCOUNTER — APPOINTMENT (OUTPATIENT)
Dept: PHYSICAL THERAPY | Facility: CLINIC | Age: 53
End: 2018-04-26

## 2018-04-30 ENCOUNTER — APPOINTMENT (OUTPATIENT)
Dept: PHYSICAL THERAPY | Facility: CLINIC | Age: 53
End: 2018-04-30

## 2018-04-30 RX ORDER — LISINOPRIL AND HYDROCHLOROTHIAZIDE 12.5; 1 MG/1; MG/1
TABLET ORAL
Qty: 90 TABLET | Refills: 2 | OUTPATIENT
Start: 2018-04-30

## 2018-04-30 NOTE — TELEPHONE ENCOUNTER
I have been inappropriately named as PCP for this patient  I have not seen Mr Jackie Jade in 1 year  Please have him schedule appt with new PCP as he will need to establish care with new physician for continuity  I will send 1 month refill until he establishes care with a resident at the clinic      Thank you,  Damián Fisher

## 2018-05-02 DIAGNOSIS — E11.9 TYPE 2 DIABETES MELLITUS WITHOUT COMPLICATION, WITHOUT LONG-TERM CURRENT USE OF INSULIN (HCC): ICD-10-CM

## 2018-05-02 DIAGNOSIS — I10 ESSENTIAL HYPERTENSION: Primary | ICD-10-CM

## 2018-05-02 RX ORDER — LISINOPRIL AND HYDROCHLOROTHIAZIDE 12.5; 1 MG/1; MG/1
1 TABLET ORAL DAILY
Qty: 90 TABLET | Refills: 0 | Status: SHIPPED | OUTPATIENT
Start: 2018-05-02 | End: 2018-07-27 | Stop reason: SDUPTHER

## 2018-05-02 NOTE — PROGRESS NOTES
Patient has no showed his past four consecutive visits  The patient is discharged from OPPT due to non-compliance

## 2018-07-27 DIAGNOSIS — E11.9 TYPE 2 DIABETES MELLITUS WITHOUT COMPLICATION, WITHOUT LONG-TERM CURRENT USE OF INSULIN (HCC): ICD-10-CM

## 2018-07-27 DIAGNOSIS — I10 ESSENTIAL HYPERTENSION: ICD-10-CM

## 2018-07-27 RX ORDER — LISINOPRIL AND HYDROCHLOROTHIAZIDE 12.5; 1 MG/1; MG/1
TABLET ORAL
Qty: 90 TABLET | Refills: 0 | Status: SHIPPED | OUTPATIENT
Start: 2018-07-27 | End: 2018-11-09 | Stop reason: SDUPTHER

## 2018-09-04 ENCOUNTER — TELEPHONE (OUTPATIENT)
Dept: FAMILY MEDICINE CLINIC | Facility: CLINIC | Age: 53
End: 2018-09-04

## 2018-09-04 NOTE — TELEPHONE ENCOUNTER
Patient only need POC A1C which can be done in the office then will get a script for December 2018 labs while at appointment

## 2018-09-04 NOTE — TELEPHONE ENCOUNTER
Pt is scheduled for his 6 month f/u with Dr Ortega Memory for 9/18th,  Pt wanted to know can blood work orders be created so he can get that done prior to appt  Call pt when done   Thanks

## 2018-09-18 ENCOUNTER — OFFICE VISIT (OUTPATIENT)
Dept: FAMILY MEDICINE CLINIC | Facility: CLINIC | Age: 53
End: 2018-09-18
Payer: COMMERCIAL

## 2018-09-18 VITALS
SYSTOLIC BLOOD PRESSURE: 110 MMHG | RESPIRATION RATE: 16 BRPM | TEMPERATURE: 97.8 F | WEIGHT: 197.6 LBS | BODY MASS INDEX: 29.95 KG/M2 | HEIGHT: 68 IN | HEART RATE: 78 BPM | DIASTOLIC BLOOD PRESSURE: 80 MMHG

## 2018-09-18 DIAGNOSIS — I10 BENIGN ESSENTIAL HYPERTENSION: Primary | ICD-10-CM

## 2018-09-18 DIAGNOSIS — E78.5 HYPERLIPIDEMIA, UNSPECIFIED HYPERLIPIDEMIA TYPE: ICD-10-CM

## 2018-09-18 DIAGNOSIS — E03.9 HYPOTHYROIDISM, UNSPECIFIED TYPE: ICD-10-CM

## 2018-09-18 DIAGNOSIS — E11.9 CONTROLLED TYPE 2 DIABETES MELLITUS WITHOUT COMPLICATION, WITHOUT LONG-TERM CURRENT USE OF INSULIN (HCC): ICD-10-CM

## 2018-09-18 LAB — SL AMB POCT HEMOGLOBIN AIC: 6.5

## 2018-09-18 PROCEDURE — 83036 HEMOGLOBIN GLYCOSYLATED A1C: CPT | Performed by: FAMILY MEDICINE

## 2018-09-18 PROCEDURE — 3008F BODY MASS INDEX DOCD: CPT | Performed by: FAMILY MEDICINE

## 2018-09-18 PROCEDURE — 3044F HG A1C LEVEL LT 7.0%: CPT | Performed by: FAMILY MEDICINE

## 2018-09-18 PROCEDURE — 99213 OFFICE O/P EST LOW 20 MIN: CPT | Performed by: FAMILY MEDICINE

## 2018-09-18 PROCEDURE — 3079F DIAST BP 80-89 MM HG: CPT | Performed by: FAMILY MEDICINE

## 2018-09-18 PROCEDURE — 3074F SYST BP LT 130 MM HG: CPT | Performed by: FAMILY MEDICINE

## 2018-09-18 RX ORDER — ATORVASTATIN CALCIUM 20 MG/1
20 TABLET, FILM COATED ORAL DAILY
Qty: 90 TABLET | Refills: 0 | Status: SHIPPED | OUTPATIENT
Start: 2018-09-18 | End: 2019-06-13 | Stop reason: SDUPTHER

## 2018-09-18 NOTE — ASSESSMENT & PLAN NOTE
- ASCVD risk is 4 1% but given T2DM will start on moderate intensity statin   - will check yearly lipid panel in December

## 2018-09-18 NOTE — ASSESSMENT & PLAN NOTE
- well controlled, POC A1C 6 5 today  - continue metformin 1000 mg BID  - will consider decreasing dose at next visit if maintains A1C since he is well controlled  - advised statin - atorvastatin 40 mg given diabetes, ASCVD risk is 4 1%  - will check yearly labs in Dec including lipid panel, CMP, CBC, TSH

## 2018-09-18 NOTE — PROGRESS NOTES
Assessment/Plan     Diabetes mellitus type II, controlled ([facility] [address] )  - well controlled, POC A1C 6 5 today  - continue metformin 1000 mg BID  - will consider decreasing dose at next visit if maintains A1C since he is well controlled  - advised statin - atorvastatin 40 mg given diabetes, ASCVD risk is 4 1%  - will check yearly labs in Dec including lipid panel, CMP, CBC, TSH     Hypothyroidism  - well controlled on levothyroxine 150 mcg, will check yearly TSH in December  Benign essential hypertension  - well cotnrolled under goal of <130/90, continue HCTZ-lisinopril  - no microalbunemia but will check yearly lab     Hyperlipidemia  - ASCVD risk is 4 1% but given T2DM will start on moderate intensity statin   - will check yearly lipid panel in December      Diagnoses and all orders for this visit:    Benign essential hypertension  -     Comprehensive metabolic panel; Future  -     CBC and differential; Future    Hypothyroidism, unspecified type  -     TSH, 3rd generation; Future    Controlled type 2 diabetes mellitus without complication, without long-term current use of insulin (HCC)  -     Microalbumin / creatinine urine ratio; Future  -     atorvastatin (LIPITOR) 20 mg tablet; Take 1 tablet (20 mg total) by mouth daily    Hyperlipidemia, unspecified hyperlipidemia type  -     Lipid panel; Future  -     atorvastatin (LIPITOR) 20 mg tablet; Take 1 tablet (20 mg total) by mouth daily         Subjective     Chief Complaint: FOllow up for chronic conditions     HPI:   This is a 49 yo M who presents for 6 month follow up for hypothyriodism, HTN and metformin  He is compliant with BP medications, BP at home are 110-120/70-80  He is taking metformin daily, his sugars are 150 fasting           The following portions of the patient's history were reviewed and updated as appropriate: allergies, current medications, past family history, past medical history, past social history, past surgical history and problem list     Review of Systems  Review of Systems   Constitutional: Negative for fatigue and fever  HENT: Negative for congestion and postnasal drip  Respiratory: Negative for cough and shortness of breath  Cardiovascular: Negative for chest pain, palpitations and leg swelling  Gastrointestinal: Negative for abdominal pain, constipation, diarrhea, nausea and vomiting  Genitourinary: Negative for dysuria  Neurological: Negative for dizziness and headaches  Denies neuropathy         Objective   Vitals:    09/18/18 1433   BP: 110/80   Pulse: 78   Resp: 16   Temp: 97 8 °F (36 6 °C)       Physical Exam   Constitutional: He is oriented to person, place, and time  He appears well-developed and well-nourished  HENT:   Mouth/Throat: Oropharynx is clear and moist    Eyes: Conjunctivae are normal    Neck: No thyromegaly present  Cardiovascular: Normal rate, regular rhythm and normal heart sounds  No murmur heard  Pulmonary/Chest: Effort normal and breath sounds normal  No respiratory distress  He has no wheezes  He has no rales  Abdominal: Soft  Bowel sounds are normal  He exhibits no distension  There is no tenderness  Musculoskeletal: He exhibits no edema  Neurological: He is alert and oriented to person, place, and time  Skin: Skin is warm and dry  Psychiatric: He has a normal mood and affect  Lab Results: I have reviewed all the lab results

## 2018-09-18 NOTE — ASSESSMENT & PLAN NOTE
- well cotnrolled under goal of <130/90, continue HCTZ-lisinopril  - no microalbunemia but will check yearly lab

## 2018-10-04 LAB
LEFT EYE DIABETIC RETINOPATHY: NORMAL
RIGHT EYE DIABETIC RETINOPATHY: NORMAL

## 2018-11-09 DIAGNOSIS — I10 ESSENTIAL HYPERTENSION: ICD-10-CM

## 2018-11-09 DIAGNOSIS — E03.9 HYPOTHYROIDISM, UNSPECIFIED TYPE: Primary | ICD-10-CM

## 2018-11-09 RX ORDER — LEVOTHYROXINE SODIUM 0.15 MG/1
TABLET ORAL
Qty: 90 TABLET | Refills: 1 | Status: SHIPPED | OUTPATIENT
Start: 2018-11-09 | End: 2019-11-04 | Stop reason: SDUPTHER

## 2018-11-09 RX ORDER — LISINOPRIL AND HYDROCHLOROTHIAZIDE 12.5; 1 MG/1; MG/1
TABLET ORAL
Qty: 90 TABLET | Refills: 0 | Status: SHIPPED | OUTPATIENT
Start: 2018-11-09 | End: 2018-12-18 | Stop reason: SDUPTHER

## 2018-11-17 NOTE — PROGRESS NOTES
PT Re-Evaluation     Today's date: 2018  Patient name: Garrick Oconnell  : 1965  MRN: [id number]  Referring provider: Saundra Muñoz DO  Dx:   Encounter Diagnoses   Name Primary?  Impingement syndrome of left shoulder Yes    Left shoulder pain, unspecified chronicity     Unspecified injury of muscle(s) and tendon(s) of the rotator cuff of left shoulder, subsequent encounter                   Assessment  Impairments: abnormal or restricted ROM, impaired physical strength and pain with function    Patient is irritable  Assessment details: The patient has attended 14 visits of OPPT  Initially the patient was progressing in ROM, strength, and function and reporting less pain; however, the patient has reported an exacerbation of symptoms the past two weeks reporting high levels of pain and demonstrating some loss of ROM and pain-free function  The patient's compliance is fair to activity modification and performing his HEP  Despite the exacerbation, the patient's ROM has significantly improved, but the patient exhibited near full ROM prior to exacerbation  Overall, patient's rehab potential is fair  The patient would still benefit from PT to help relieve symptoms and address impairments but was advised to follow up with his MD due to his continued high pain levels  Understanding of Dx/Px/POC: fair   Prognosis: fair    Goals  STG: To be completed in 4 weeks    1  The patient will increase shoulder flexion to 120 degrees when reaching into an overhead cabinet  met  2  The patient will increase UE strength to 4/5 MMT when carrying groceries into the house  met  3  The patient will report no more than 4/10 pain during all adls  LTG: To be completed in 8 weeks    1  The patient will increase shoulder flexion to 175 degrees when changing a lightbulb overhead  2  The patient will increase UE strength to 5/5 MMT when carrying groceries into the house     3  The patient will report no more than 1/10 pain during all adls       Plan  Patient would benefit from: skilled PT  Planned modality interventions: cryotherapy  Planned therapy interventions: manual therapy, neuromuscular re-education, patient education, postural training, strengthening, self care, stretching, therapeutic activities, therapeutic exercise and functional ROM exercises  Frequency: 2x week  Duration in weeks: 6  Plan details: POC expires 3/22/18        Subjective Evaluation    History of Present Illness  Date of onset: 10/25/2017  Mechanism of injury: See IE  Quality of life: good    Pain  At worst pain ratin  Aggravating factors: overhead activity and lifting  Progression: improved    Treatments  Previous treatment: physical therapy  Current treatment: physical therapy  Patient Goals  Patient goals for therapy: decreased pain          Objective     Passive Range of Motion   Left Shoulder   Flexion: 140 degrees with pain  Abduction: 152 degrees with pain  External rotation 90°: 82 degrees   Internal rotation 90°: 50 degrees     Right Shoulder   Normal passive range of motion    Strength/Myotome Testing     Left Shoulder     Planes of Motion   Flexion: 4+ (pain)   Abduction: 4+ (pain)   External rotation at 0°: 5   Internal rotation at 0°: 4+ (pain)     Right Shoulder   Normal muscle strength      Precautions: none     Daily Treatment Diary      Manual  18                 L shoulder PROM, post glide with IR/ER, inf glide with elevation, shoulder posterior capsule stretch with scapular stabilized 10 min 10 min  15'                                                                                                                     Exercise Diary  18  28                 Pulleys flex/abd 5"/1x15 ea  5"  1x15 ea  5"x15                 Shoulder horizontal abduction RTB  3x18  np  np                 Wall scap protraction 3x12  np  np                 Lifting weights to shoulder height 3#  3x10  3#  3x10  np                 TB Rows GTB  3x15  GTB  3x15  3x15 G                 TB ER GTB  3x18  GTB  3x18  3x15 G                 TB IR  GTB  3x18  GTB  3x18  3x15 G                 TB extensions GTB  3x12  GTB  3x12  3x12 G                 Serratus punch 3#  2x20 3#  2x20  3# 3x10                 Mod sleeper stretch 20"x4  20"x4  20"x4                                                                                                                                                                                                                                                                       Modalities                                                                                                     17-Nov-2018 14:14

## 2018-12-11 ENCOUNTER — APPOINTMENT (OUTPATIENT)
Dept: LAB | Facility: CLINIC | Age: 53
End: 2018-12-11
Payer: COMMERCIAL

## 2018-12-11 DIAGNOSIS — E11.9 CONTROLLED TYPE 2 DIABETES MELLITUS WITHOUT COMPLICATION, WITHOUT LONG-TERM CURRENT USE OF INSULIN (HCC): ICD-10-CM

## 2018-12-11 DIAGNOSIS — I10 BENIGN ESSENTIAL HYPERTENSION: ICD-10-CM

## 2018-12-11 DIAGNOSIS — E03.9 HYPOTHYROIDISM, UNSPECIFIED TYPE: ICD-10-CM

## 2018-12-11 DIAGNOSIS — E78.5 HYPERLIPIDEMIA, UNSPECIFIED HYPERLIPIDEMIA TYPE: ICD-10-CM

## 2018-12-11 LAB
ALBUMIN SERPL BCP-MCNC: 4.1 G/DL (ref 3.5–5)
ALP SERPL-CCNC: 84 U/L (ref 46–116)
ALT SERPL W P-5'-P-CCNC: 59 U/L (ref 12–78)
ANION GAP SERPL CALCULATED.3IONS-SCNC: 8 MMOL/L (ref 4–13)
AST SERPL W P-5'-P-CCNC: 57 U/L (ref 5–45)
BASOPHILS # BLD AUTO: 0.04 THOUSANDS/ΜL (ref 0–0.1)
BASOPHILS NFR BLD AUTO: 1 % (ref 0–1)
BILIRUB SERPL-MCNC: 1.6 MG/DL (ref 0.2–1)
BUN SERPL-MCNC: 12 MG/DL (ref 5–25)
CALCIUM SERPL-MCNC: 8.9 MG/DL (ref 8.3–10.1)
CHLORIDE SERPL-SCNC: 100 MMOL/L (ref 100–108)
CHOLEST SERPL-MCNC: 143 MG/DL (ref 50–200)
CO2 SERPL-SCNC: 31 MMOL/L (ref 21–32)
CREAT SERPL-MCNC: 0.85 MG/DL (ref 0.6–1.3)
CREAT UR-MCNC: 198 MG/DL
EOSINOPHIL # BLD AUTO: 0.22 THOUSAND/ΜL (ref 0–0.61)
EOSINOPHIL NFR BLD AUTO: 3 % (ref 0–6)
ERYTHROCYTE [DISTWIDTH] IN BLOOD BY AUTOMATED COUNT: 12 % (ref 11.6–15.1)
GFR SERPL CREATININE-BSD FRML MDRD: 101 ML/MIN/1.73SQ M
GLUCOSE P FAST SERPL-MCNC: 169 MG/DL (ref 65–99)
HCT VFR BLD AUTO: 45.8 % (ref 36.5–49.3)
HDLC SERPL-MCNC: 52 MG/DL (ref 40–60)
HGB BLD-MCNC: 16.3 G/DL (ref 12–17)
IMM GRANULOCYTES # BLD AUTO: 0.01 THOUSAND/UL (ref 0–0.2)
IMM GRANULOCYTES NFR BLD AUTO: 0 % (ref 0–2)
LDLC SERPL CALC-MCNC: 59 MG/DL (ref 0–100)
LYMPHOCYTES # BLD AUTO: 2.69 THOUSANDS/ΜL (ref 0.6–4.47)
LYMPHOCYTES NFR BLD AUTO: 42 % (ref 14–44)
MCH RBC QN AUTO: 32.8 PG (ref 26.8–34.3)
MCHC RBC AUTO-ENTMCNC: 35.6 G/DL (ref 31.4–37.4)
MCV RBC AUTO: 92 FL (ref 82–98)
MICROALBUMIN UR-MCNC: 21.1 MG/L (ref 0–20)
MICROALBUMIN/CREAT 24H UR: 11 MG/G CREATININE (ref 0–30)
MONOCYTES # BLD AUTO: 0.49 THOUSAND/ΜL (ref 0.17–1.22)
MONOCYTES NFR BLD AUTO: 8 % (ref 4–12)
NEUTROPHILS # BLD AUTO: 2.98 THOUSANDS/ΜL (ref 1.85–7.62)
NEUTS SEG NFR BLD AUTO: 46 % (ref 43–75)
NONHDLC SERPL-MCNC: 91 MG/DL
NRBC BLD AUTO-RTO: 0 /100 WBCS
PLATELET # BLD AUTO: 168 THOUSANDS/UL (ref 149–390)
PMV BLD AUTO: 9.4 FL (ref 8.9–12.7)
POTASSIUM SERPL-SCNC: 4.3 MMOL/L (ref 3.5–5.3)
PROT SERPL-MCNC: 7.8 G/DL (ref 6.4–8.2)
RBC # BLD AUTO: 4.97 MILLION/UL (ref 3.88–5.62)
SODIUM SERPL-SCNC: 139 MMOL/L (ref 136–145)
TRIGL SERPL-MCNC: 162 MG/DL
TSH SERPL DL<=0.05 MIU/L-ACNC: 15.2 UIU/ML (ref 0.36–3.74)
WBC # BLD AUTO: 6.43 THOUSAND/UL (ref 4.31–10.16)

## 2018-12-11 PROCEDURE — 85025 COMPLETE CBC W/AUTO DIFF WBC: CPT

## 2018-12-11 PROCEDURE — 80053 COMPREHEN METABOLIC PANEL: CPT

## 2018-12-11 PROCEDURE — 84443 ASSAY THYROID STIM HORMONE: CPT

## 2018-12-11 PROCEDURE — 80061 LIPID PANEL: CPT

## 2018-12-11 PROCEDURE — 82570 ASSAY OF URINE CREATININE: CPT

## 2018-12-11 PROCEDURE — 82043 UR ALBUMIN QUANTITATIVE: CPT

## 2018-12-11 PROCEDURE — 36415 COLL VENOUS BLD VENIPUNCTURE: CPT

## 2018-12-13 ENCOUNTER — TELEPHONE (OUTPATIENT)
Dept: FAMILY MEDICINE CLINIC | Facility: CLINIC | Age: 53
End: 2018-12-13

## 2018-12-13 NOTE — TELEPHONE ENCOUNTER
----- Message from Alivia Hua DO sent at 12/13/2018 12:45 PM EST -----  Please call patient to have them schedule appointment due to discuss abnormal TSH and follow up on diabetes   Thank you, Dr Mendoza Waggoner

## 2018-12-18 DIAGNOSIS — I10 ESSENTIAL HYPERTENSION: ICD-10-CM

## 2018-12-18 RX ORDER — LISINOPRIL AND HYDROCHLOROTHIAZIDE 12.5; 1 MG/1; MG/1
1 TABLET ORAL DAILY
Qty: 90 TABLET | Refills: 1 | Status: SHIPPED | OUTPATIENT
Start: 2018-12-18 | End: 2019-06-13 | Stop reason: SDUPTHER

## 2018-12-20 ENCOUNTER — OFFICE VISIT (OUTPATIENT)
Dept: FAMILY MEDICINE CLINIC | Facility: CLINIC | Age: 53
End: 2018-12-20
Payer: COMMERCIAL

## 2018-12-20 VITALS
BODY MASS INDEX: 30.27 KG/M2 | HEIGHT: 69 IN | HEART RATE: 92 BPM | SYSTOLIC BLOOD PRESSURE: 128 MMHG | TEMPERATURE: 96.6 F | RESPIRATION RATE: 16 BRPM | DIASTOLIC BLOOD PRESSURE: 96 MMHG | WEIGHT: 204.4 LBS

## 2018-12-20 DIAGNOSIS — E03.9 HYPOTHYROIDISM, UNSPECIFIED TYPE: Primary | ICD-10-CM

## 2018-12-20 DIAGNOSIS — G89.29 CHRONIC PAIN OF LEFT KNEE: ICD-10-CM

## 2018-12-20 DIAGNOSIS — M25.562 CHRONIC PAIN OF LEFT KNEE: ICD-10-CM

## 2018-12-20 DIAGNOSIS — Z12.11 SCREENING FOR COLON CANCER: ICD-10-CM

## 2018-12-20 DIAGNOSIS — F10.10 ALCOHOL ABUSE: ICD-10-CM

## 2018-12-20 DIAGNOSIS — R74.01 ELEVATED AST (SGOT): ICD-10-CM

## 2018-12-20 PROCEDURE — 99214 OFFICE O/P EST MOD 30 MIN: CPT | Performed by: FAMILY MEDICINE

## 2018-12-20 PROCEDURE — 3008F BODY MASS INDEX DOCD: CPT | Performed by: FAMILY MEDICINE

## 2018-12-20 NOTE — ASSESSMENT & PLAN NOTE
- mild elevation of AST, alcohol abuse with daily drinking, and mild elevation of bilirubin to 1 6  - will check US liver, advised to cut down  and stop drinking as detailed below

## 2018-12-20 NOTE — ASSESSMENT & PLAN NOTE
- TSH 15 - patient noncompliant with levothyroxine, advised to restart levothyroxine and recheck TSH in 3 months

## 2018-12-20 NOTE — ASSESSMENT & PLAN NOTE
- advised to discontinue daily alcohol consumption   - offered referral to social work for alcohol detox programs which patient declines at this time

## 2018-12-20 NOTE — PROGRESS NOTES
Assessment/Plan     Hypothyroidism  - TSH 15 - patient noncompliant with levothyroxine, advised to restart levothyroxine and recheck TSH in 3 months     Elevated AST (SGOT)  - mild elevation of AST, alcohol abuse with daily drinking, and mild elevation of bilirubin to 1 6  - will check US liver, advised to cut down  and stop drinking as detailed below    Chronic pain of left knee  - likely patellor arthritis, advised can use tylenol or NSAIDs, can ice affected knee after exercise, also discussed topic agents and corticosteroid injection in which patient is not interested at this time     Alcohol abuse  - advised to discontinue daily alcohol consumption   - offered referral to social work for alcohol detox programs which patient declines at this time      Diagnoses and all orders for this visit:    Hypothyroidism, unspecified type    Screening for colon cancer  -     Ambulatory referral to Gastroenterology; Future    Elevated AST (SGOT)  -     US liver; Future    Chronic pain of left knee    Alcohol abuse  -     US liver; Future         Subjective     Chief Complaint: Follow up for abnormal labs     HPI:   This is a 47 yo M who presents for follow up for abnormal labs  His TSH was elevated  His dose was decreased from 175 to 150 mcg 6 months ago  He reports that he missed his medication on several occasions  He reports he also has pain in left knee when he rides the bike in the gym  He also reports he has pain when walking up the steps  The following portions of the patient's history were reviewed and updated as appropriate: allergies, current medications, past family history, past medical history, past social history, past surgical history and problem list     Review of Systems  Review of Systems   Constitutional: Negative for fatigue and fever  HENT: Negative for congestion and sinus pressure  Respiratory: Negative for cough and shortness of breath      Cardiovascular: Negative for chest pain and palpitations  Gastrointestinal: Negative for abdominal pain, constipation, diarrhea, nausea and vomiting  Genitourinary: Negative for difficulty urinating  Musculoskeletal:        As noted in HPI    Skin: Negative for rash  Neurological: Negative for dizziness and headaches  Objective   Vitals:    12/20/18 1651   BP: 128/96   Pulse: 92   Resp: 16   Temp: (!) 96 6 °F (35 9 °C)       Physical Exam   Constitutional: He is oriented to person, place, and time  He appears well-developed and well-nourished  HENT:   Mouth/Throat: Oropharynx is clear and moist    Eyes: Conjunctivae are normal    Neck: Neck supple  Cardiovascular: Normal rate and regular rhythm  No murmur heard  Pulmonary/Chest: Effort normal and breath sounds normal  No respiratory distress  Abdominal: Soft  Bowel sounds are normal  There is no tenderness  Musculoskeletal: He exhibits no edema  Neurological: He is alert and oriented to person, place, and time  Psychiatric: He has a normal mood and affect  Lab Results: I have reviewed all the lab results

## 2018-12-20 NOTE — ASSESSMENT & PLAN NOTE
- likely patellor arthritis, advised can use tylenol or NSAIDs, can ice affected knee after exercise, also discussed topic agents and corticosteroid injection in which patient is not interested at this time

## 2018-12-24 ENCOUNTER — APPOINTMENT (EMERGENCY)
Dept: RADIOLOGY | Facility: HOSPITAL | Age: 53
End: 2018-12-24
Payer: COMMERCIAL

## 2018-12-24 ENCOUNTER — HOSPITAL ENCOUNTER (EMERGENCY)
Facility: HOSPITAL | Age: 53
Discharge: HOME | End: 2018-12-24
Attending: EMERGENCY MEDICINE | Admitting: EMERGENCY MEDICINE
Payer: COMMERCIAL

## 2018-12-24 VITALS
OXYGEN SATURATION: 99 % | SYSTOLIC BLOOD PRESSURE: 135 MMHG | WEIGHT: 201.06 LBS | TEMPERATURE: 99.5 F | DIASTOLIC BLOOD PRESSURE: 73 MMHG | BODY MASS INDEX: 29.69 KG/M2 | RESPIRATION RATE: 20 BRPM | HEART RATE: 97 BPM

## 2018-12-24 DIAGNOSIS — J11.1 INFLUENZA-LIKE ILLNESS: Primary | ICD-10-CM

## 2018-12-24 LAB
FLUAV AG SPEC QL IA: POSITIVE
FLUBV AG SPEC QL IA: NEGATIVE

## 2018-12-24 PROCEDURE — 99283 EMERGENCY DEPT VISIT LOW MDM: CPT

## 2018-12-24 PROCEDURE — 87400 INFLUENZA A/B EACH AG IA: CPT | Performed by: PHYSICIAN ASSISTANT

## 2018-12-24 RX ORDER — IBUPROFEN 600 MG/1
600 TABLET ORAL ONCE
Status: COMPLETED | OUTPATIENT
Start: 2018-12-24 | End: 2018-12-24

## 2018-12-24 RX ORDER — NAPROXEN 500 MG/1
500 TABLET ORAL EVERY 12 HOURS PRN
Qty: 10 TABLET | Refills: 0 | Status: SHIPPED | OUTPATIENT
Start: 2018-12-24 | End: 2019-12-04

## 2018-12-24 RX ADMIN — IBUPROFEN 600 MG: 600 TABLET ORAL at 15:06

## 2018-12-24 NOTE — ED PROVIDER NOTES
History  Chief Complaint   Patient presents with    Cough     Pt reports having a cough for 3 days with a headache  Pt reports yellow sputum  Pt took cough syrup yesterday and Tylenol  Pt reports no fever  María Coe is a 48 y o  Male with a PMHx of HTN and DM who presents to the ED with complaints of productive cough (yellow sputum), nasal congestion, sore throat, chills and intermittent headache and body aches x 3 days  Patient states approximately 2 days ago he had 2 episodes of emesis (yellow, mucus)  Denies SOB, wheezing, nausea, diarrhea, abdominal pain, chest pain, dysuria, hematuria, urinary frequency, urinary urgency, dysphagia, trismus, drooling, ear pain, neck pain, neck stiffness, rash  Patient did not receive influenza vaccination this year  If he has been taking Tylenol and over-the-counter cough syrup without relief  Contacts include children and wife at home with similar symptoms          Flu Symptoms   Presenting symptoms: cough, myalgias, rhinorrhea, sore throat and vomiting    Presenting symptoms: no diarrhea, no fatigue, no fever, no headaches, no nausea and no shortness of breath    Cough:     Cough characteristics:  Productive    Sputum characteristics:  Yellow    Severity:  Moderate    Duration:  3 days    Timing:  Intermittent    Progression:  Unchanged    Chronicity:  New  Myalgias:     Location:  Neck, shoulders, back and legs    Quality:  Aching    Severity:  Mild    Timing:  Intermittent    Progression:  Waxing and waning  Sore throat:     Severity:  Moderate    Onset quality:  Gradual    Duration:  3 days    Timing:  Intermittent    Progression:  Unchanged  Severity:  Mild  Duration:  2 days  Progression:  Resolved  Associated symptoms: chills and nasal congestion    Associated symptoms: no decreased appetite, no decrease in physical activity, no ear pain, no mental status change, no neck stiffness and no syncope    Risk factors: sick contacts        Prior to Admission Medications   Prescriptions Last Dose Informant Patient Reported? Taking?   atorvastatin (LIPITOR) 20 mg tablet   No No   Sig: Take 1 tablet (20 mg total) by mouth daily   levothyroxine 150 mcg tablet   No No   Sig: TAKE ONE TABLET BY MOUTH ONCE DAILY WITH WATER ON AN EMPTY STOMACH WAIT 45 MINUTES FOR ANY FOOD/JUICE/MEDICATIONS   lisinopril-hydrochlorothiazide (PRINZIDE,ZESTORETIC) 10-12 5 MG per tablet   No No   Sig: Take 1 tablet by mouth daily   metFORMIN (GLUCOPHAGE) 1000 MG tablet   No No   Sig: TAKE 1 TABLET BY MOUTH TWICE DAILY WITH MEALS   naproxen (NAPROSYN) 500 mg tablet  Self No No   Sig: Take 1 tablet (500 mg total) by mouth 2 (two) times a day with meals for 14 days   Patient not taking: Reported on 12/20/2018       Facility-Administered Medications: None       Past Medical History:   Diagnosis Date    Diabetes mellitus ([facility] [address] )     Elevated LFTs     last assessed 02Nov2015    Hypertension     Impaired fasting glucose     last assessed 29Jan2014       Past Surgical History:   Procedure Laterality Date    APPENDECTOMY         Family History   Problem Relation Age of Onset    Diabetes Mother     Diabetes Father      I have reviewed and agree with the history as documented  Social History   Substance Use Topics    Smoking status: Never Smoker    Smokeless tobacco: Never Used    Alcohol use 1 8 oz/week     3 Glasses of wine per week      Comment: drinks daily         Review of Systems   Constitutional: Positive for chills  Negative for appetite change, decreased appetite, fatigue, fever and unexpected weight change  HENT: Positive for congestion, rhinorrhea, sneezing and sore throat  Negative for dental problem, drooling, ear pain, tinnitus, trouble swallowing and voice change  Eyes: Negative for pain, discharge, redness and visual disturbance  Respiratory: Positive for cough  Negative for choking, shortness of breath, wheezing and stridor      Cardiovascular: Negative for chest pain, palpitations and leg swelling  Gastrointestinal: Positive for vomiting  Negative for abdominal pain, blood in stool, constipation, diarrhea and nausea  Genitourinary: Negative for dysuria, flank pain, frequency, hematuria and urgency  Musculoskeletal: Positive for back pain and myalgias  Negative for arthralgias, gait problem, joint swelling, neck pain and neck stiffness  Skin: Negative for color change, pallor and rash  Neurological: Negative for dizziness, seizures, light-headedness and headaches  Physical Exam  Physical Exam   Constitutional: He is oriented to person, place, and time  Vital signs are normal  He appears well-developed and well-nourished  He is cooperative  Non-toxic appearance  No distress  HENT:   Head: Normocephalic and atraumatic  Right Ear: Hearing, external ear and ear canal normal  No mastoid tenderness  Tympanic membrane is bulging  Tympanic membrane is not perforated, not erythematous and not retracted  A middle ear effusion is present  Left Ear: Hearing, external ear and ear canal normal  No mastoid tenderness  Tympanic membrane is bulging  Tympanic membrane is not perforated, not erythematous and not retracted  A middle ear effusion is present  Nose: Mucosal edema present  Mouth/Throat: Uvula is midline and mucous membranes are normal  Posterior oropharyngeal erythema present  Eyes: Pupils are equal, round, and reactive to light  Conjunctivae, EOM and lids are normal    Neck: Trachea normal, normal range of motion, full passive range of motion without pain and phonation normal  Neck supple  No neck rigidity  Cardiovascular: Normal rate, regular rhythm, intact distal pulses and normal pulses  Pulmonary/Chest: Effort normal and breath sounds normal    Abdominal: Soft  Bowel sounds are normal  There is no tenderness  Lymphadenopathy:     He has no cervical adenopathy  Neurological: He is alert and oriented to person, place, and time   He has normal strength  No sensory deficit  GCS eye subscore is 4  GCS verbal subscore is 5  GCS motor subscore is 6  Skin: Skin is warm and dry  Capillary refill takes less than 2 seconds  Nursing note and vitals reviewed  Vital Signs  ED Triage Vitals   Temperature Pulse Respirations Blood Pressure SpO2   12/24/18 1449 12/24/18 1448 12/24/18 1448 12/24/18 1448 12/24/18 1448   99 5 °F (37 5 °C) 97 20 135/73 99 %      Temp Source Heart Rate Source Patient Position - Orthostatic VS BP Location FiO2 (%)   12/24/18 1449 12/24/18 1448 12/24/18 1448 12/24/18 1448 --   Oral Monitor Lying Right arm       Pain Score       12/24/18 1448       3           Vitals:    12/24/18 1448   BP: 135/73   Pulse: 97   Patient Position - Orthostatic VS: Lying       Visual Acuity      ED Medications  Medications   ibuprofen (MOTRIN) tablet 600 mg (600 mg Oral Given 12/24/18 1506)       Diagnostic Studies  Results Reviewed     Procedure Component Value Units Date/Time    Rapid Influenza Screen with Reflex PCR [[id number]] Collected:  12/24/18 1507    Lab Status: In process Specimen:  Nasopharyngeal from Nasopharyngeal Swab Updated:  12/24/18 1510                 No orders to display              Procedures  Procedures       Phone Contacts  ED Phone Contact    ED Course  ED Course as of Dec 24 1521   Mon Dec 24, 2018   1508 Upon further evaluation, patient states wife and children at home are sick with similar symptoms  Most likely viral / influenza in etiology  Will obtain influenza testing at this time  [address] [facility] Educated patient regarding diagnosis and management  Advised patient to follow up with PCP  Advised patient to RTER for persistent or worsening symptoms  MDM  Number of Diagnoses or Management Options  Influenza-like illness: new and does not require workup  Diagnosis management comments: Differential diagnosis included but not limited to:   Influenza, viral illness, pneumonia, bronchitis, pharyngitis, sinusitis     Patient Progress  Patient progress: improved    CritCare Time    Disposition  Final diagnoses:   Influenza-like illness     Time reflects when diagnosis was documented in both MDM as applicable and the Disposition within this note     Time User Action Codes Description Comment    12/24/2018  3:05 PM Audra Ryder [facility] Influenza-like illness       ED Disposition     ED Disposition Condition Comment    Discharge  Shane discharge to home/self care  Condition at discharge: Good        Follow-up Information     Follow up With Specialties Details Why Contact Info Additional [address] Emergency Department Emergency Medicine Go to If symptoms worsen [address] [facility]  [facility] ED, [place], [place], [address]          Discharge Medication List as of 12/24/2018  3:09 PM      CONTINUE these medications which have CHANGED    Details   naproxen (NAPROSYN) 500 mg tablet Take 1 tablet (500 mg total) by mouth every 12 (twelve) hours as needed for mild pain for up to 5 days, Starting Mon 12/24/2018, Until Sat 12/29/2018, Print         CONTINUE these medications which have NOT CHANGED    Details   atorvastatin (LIPITOR) 20 mg tablet Take 1 tablet (20 mg total) by mouth daily, Starting Tue 9/18/2018, Normal      levothyroxine 150 mcg tablet TAKE ONE TABLET BY MOUTH ONCE DAILY WITH WATER ON AN EMPTY STOMACH WAIT 45 MINUTES FOR ANY FOOD/JUICE/MEDICATIONS, Normal      lisinopril-hydrochlorothiazide (PRINZIDE,ZESTORETIC) 10-12 5 MG per tablet Take 1 tablet by mouth daily, Starting Tue 12/18/2018, Normal      metFORMIN (GLUCOPHAGE) 1000 MG tablet TAKE 1 TABLET BY MOUTH TWICE DAILY WITH MEALS, Normal           No discharge procedures on file      ED Provider  Electronically Signed by           Deepak Tabares PA-C  12/24/18 9985

## 2018-12-24 NOTE — DISCHARGE INSTRUCTIONS
Influenza   WHAT YOU NEED TO KNOW:   Influenza (the flu) is an infection caused by the influenza virus  The flu is easily spread when an infected person coughs, sneezes, or has close contact with others  You may be able to spread the flu to others for 1 week or longer after signs or symptoms appear  DISCHARGE INSTRUCTIONS:   Call 911 for any of the following:   · You have trouble breathing, and your lips look purple or blue  · You have a seizure  Return to the emergency department if:   · You are dizzy, or you are urinating less or not at all  · You have a headache with a stiff neck, and you feel tired or confused  · You have new pain or pressure in your chest     · Your symptoms, such as shortness of breath, vomiting, or diarrhea, get worse  · Your symptoms, such as fever and coughing, seem to get better, but then get worse  Contact your healthcare provider if:   · You have new muscle pain or weakness  · You have questions or concerns about your condition or care  Medicines: You may need any of the following:  · Acetaminophen  decreases pain and fever  It is available without a doctor's order  Ask how much to take and how often to take it  Follow directions  Acetaminophen can cause liver damage if not taken correctly  · NSAIDs , such as ibuprofen, help decrease swelling, pain, and fever  This medicine is available with or without a doctor's order  NSAIDs can cause stomach bleeding or kidney problems in certain people  If you take blood thinner medicine, always ask your healthcare provider if NSAIDs are safe for you  Always read the medicine label and follow directions  · Antivirals  help fight a viral infection  · Take your medicine as directed  Contact your healthcare provider if you think your medicine is not helping or if you have side effects  Tell him or her if you are allergic to any medicine  Keep a list of the medicines, vitamins, and herbs you take   Include the amounts, and when and why you take them  Bring the list or the pill bottles to follow-up visits  Carry your medicine list with you in case of an emergency  Rest  as much as you can to help you recover  Drink liquids as directed  to help prevent dehydration  Ask how much liquid to drink each day and which liquids are best for you  Prevent the spread of influenza:   · Wash your hands often  Use soap and water  Wash your hands after you use the bathroom, change a child's diapers, or sneeze  Wash your hands before you prepare or eat food  Use gel hand cleanser when soap and water are not available  Do not touch your eyes, nose, or mouth unless you have washed your hands first            · Cover your mouth when you sneeze or cough  Cough into a tissue or the bend of your arm  · Clean shared items with a germ-killing   Clean table surfaces, doorknobs, and light switches  Do not share towels, silverware, and dishes with people who are sick  Wash bed sheets, towels, silverware, and dishes with soap and water  · Wear a mask  over your mouth and nose if you are sick or are near anyone who is sick  · Stay away from others  if you are sick  · Influenza vaccine  helps prevent influenza (flu)  Everyone older than 6 months should get a yearly influenza vaccine  Get the vaccine as soon as it is available, usually in September or October each year  Follow up with your healthcare provider as directed:  Write down your questions so you remember to ask them during your visits  © 2017 [address] [employer] Information is for End User's use only and may not be sold, redistributed or otherwise used for commercial purposes  All illustrations and images included in CareNotes® are the copyrighted property of [employer]  or [employer]imit[employer] Loja  The above information is an  only  It is not intended as medical advice for individual conditions or treatments   Talk to your doctor, nurse or pharmacist before following any medical regimen to see if it is safe and effective for you

## 2019-01-22 ENCOUNTER — OFFICE VISIT (OUTPATIENT)
Dept: FAMILY MEDICINE CLINIC | Facility: CLINIC | Age: 54
End: 2019-01-22

## 2019-01-22 VITALS
RESPIRATION RATE: 16 BRPM | HEIGHT: 69 IN | BODY MASS INDEX: 29.74 KG/M2 | DIASTOLIC BLOOD PRESSURE: 80 MMHG | WEIGHT: 200.8 LBS | SYSTOLIC BLOOD PRESSURE: 120 MMHG | HEART RATE: 78 BPM | TEMPERATURE: 97.4 F

## 2019-01-22 DIAGNOSIS — R05.9 COUGH: ICD-10-CM

## 2019-01-22 DIAGNOSIS — Z23 ENCOUNTER FOR IMMUNIZATION: Primary | ICD-10-CM

## 2019-01-22 PROCEDURE — 90715 TDAP VACCINE 7 YRS/> IM: CPT | Performed by: FAMILY MEDICINE

## 2019-01-22 PROCEDURE — 90471 IMMUNIZATION ADMIN: CPT | Performed by: FAMILY MEDICINE

## 2019-01-22 PROCEDURE — 99213 OFFICE O/P EST LOW 20 MIN: CPT | Performed by: FAMILY MEDICINE

## 2019-01-22 NOTE — PROGRESS NOTES
Assessment/Plan     Cough  - likely viral etiology, had influenza tested positive on 12/24/18, now may have a new viral illness  - supportive care with Flonase and nasal saline rinses, no signs or symptoms consistent with bacterial infection   - return precautions given, follow up as needed      Diagnoses and all orders for this visit:    Encounter for immunization  -     TDAP VACCINE GREATER THAN OR EQUAL TO 6YO IM    Cough         Subjective     Chief Complaint: cough    HPI:   This is a 49 yo M who presents for cough  He reports 3 week history of productive cough of clear/yellow sputum, fatigue, lack of appetite, headache, nasal congestion  Denies fever, chills, wheezing or SOB or sinus pain  His symptoms are worse at night, his symptoms improved a week ago and then acutely worsened  The following portions of the patient's history were reviewed and updated as appropriate: allergies, current medications, past family history, past medical history, past social history, past surgical history and problem list     Review of Systems  Review of Systems   Constitutional: Negative for chills, fatigue and fever  HENT: Positive for congestion, rhinorrhea and sore throat  Negative for sinus pain and sinus pressure  Respiratory: Positive for cough  Negative for shortness of breath and wheezing  Cardiovascular: Negative for chest pain and palpitations  Gastrointestinal: Negative for abdominal pain, diarrhea, nausea and vomiting  Genitourinary: Negative for difficulty urinating  Musculoskeletal: Negative for back pain  Neurological: Positive for headaches  Objective   Vitals:    01/22/19 1458   BP: 120/80   Pulse: 78   Resp: 16   Temp: (!) 97 4 °F (36 3 °C)       Physical Exam   Constitutional: He is oriented to person, place, and time  He appears well-developed and well-nourished  No distress     HENT:   Right Ear: External ear normal    Left Ear: External ear normal    Mouth/Throat: Oropharynx is clear and moist    White ulcerations on soft palate    Eyes: Right eye exhibits no discharge  Left eye exhibits no discharge  Scleral injection    Neck: Neck supple  Cardiovascular: Normal rate, regular rhythm and normal heart sounds  No murmur heard  Pulmonary/Chest: Effort normal and breath sounds normal  No respiratory distress  He has no wheezes  Abdominal: Soft  Bowel sounds are normal  There is no tenderness  Musculoskeletal: He exhibits no edema  Neurological: He is alert and oriented to person, place, and time  Skin: Skin is warm and dry  No rash noted  Psychiatric: He has a normal mood and affect  Lab Results: I have reviewed all the lab results

## 2019-01-22 NOTE — ASSESSMENT & PLAN NOTE
- likely viral etiology, had influenza tested positive on 12/24/18, now may have a new viral illness  - supportive care with Flonase and nasal saline rinses, no signs or symptoms consistent with bacterial infection   - return precautions given, follow up as needed

## 2019-01-24 ENCOUNTER — HOSPITAL ENCOUNTER (OUTPATIENT)
Dept: ULTRASOUND IMAGING | Facility: HOSPITAL | Age: 54
Discharge: HOME | End: 2019-01-24
Payer: COMMERCIAL

## 2019-01-24 DIAGNOSIS — F10.10 ALCOHOL ABUSE: ICD-10-CM

## 2019-01-24 DIAGNOSIS — R74.01 ELEVATED AST (SGOT): ICD-10-CM

## 2019-01-24 PROCEDURE — 76705 ECHO EXAM OF ABDOMEN: CPT

## 2019-01-27 DIAGNOSIS — E11.9 TYPE 2 DIABETES MELLITUS WITHOUT COMPLICATION, WITHOUT LONG-TERM CURRENT USE OF INSULIN (HCC): ICD-10-CM

## 2019-03-19 ENCOUNTER — OFFICE VISIT (OUTPATIENT)
Dept: FAMILY MEDICINE CLINIC | Facility: CLINIC | Age: 54
End: 2019-03-19

## 2019-03-19 VITALS
HEIGHT: 69 IN | HEART RATE: 74 BPM | DIASTOLIC BLOOD PRESSURE: 80 MMHG | BODY MASS INDEX: 29.09 KG/M2 | SYSTOLIC BLOOD PRESSURE: 130 MMHG | RESPIRATION RATE: 14 BRPM | WEIGHT: 196.4 LBS | TEMPERATURE: 97.8 F

## 2019-03-19 DIAGNOSIS — M25.552 CHRONIC LEFT HIP PAIN: Primary | ICD-10-CM

## 2019-03-19 DIAGNOSIS — G89.29 CHRONIC LEFT HIP PAIN: Primary | ICD-10-CM

## 2019-03-19 PROCEDURE — 99213 OFFICE O/P EST LOW 20 MIN: CPT | Performed by: FAMILY MEDICINE

## 2019-03-19 NOTE — PROGRESS NOTES
Ko Negro 1965 male MRN: [id number]    Family Medicine Acute Visit    ASSESSMENT/PLAN  Diagnoses and all orders for this visit:    Chronic left hip pain  Comments:  -ddx osteoarthritis of L hip, trochanteric bursitis, iliopsoas syndrome, sciatica  History and physical exam more consistent with OA  Tylenol prn, max 4g/day--may take 1000mg q6hr prn, PT referral    -encouraged patient to continue lifestyle modifications including healthy diet and exercise regimen   -schedule follow-up appointment for OMT in 1-2 weeks  Orders:  -     Ambulatory referral to Physical Therapy; Future    Future Appointments   Date Time Provider Blu Jean   4/2/2019  1:00 PM DO BAMBI Macedo [facility]      SUBJECTIVE  CC: Hip Pain and Back Pain    HPI:  Ko Negro is a 48 y o  male with pmhx diabetes well-controlled with metformin, HTN, and osteoarthritis who presents for evaluation of 2 months of L hip and L buttock pain  Pain is dull, occasionally sharp, non-radiating  He has prior h/o sciatica, states this does not feel like sciatica  Patient states his pain was gradual in onset, is exacerbated when moving from seated to standing position, such as getting out of his vehicle; he states his pain gradually improves with movement throughout the day, and improves when he exercises 3-4x per week  He has not tried any OTC medications for his discomfort  No positions exacerbate his pain, however ROM movements tend to worsen his pain; he does occasionally feel/hear crepitance when he moves his L hip  No fever, chills, weight loss, no steroid use, no midline spine tenderness, no IVDA, no trauma, no urinary or fecal incontinence, no urinary retention, no saddle anesthesia  Patient works with vehicle inspection, non-sedentary job  Occasional heavy lifting however patient cannot recall specific time where lifting that his pain started  1 week ago lifted a heavy box and it did exacerbate his pain   He has been exercising and dieting, has intentionally lost weight and made lifestyle modifications since diabetes diagnosis  Review of Systems   Constitutional: Negative for activity change, appetite change, chills, fatigue, fever and unexpected weight change  HENT: Negative  Eyes: Negative  Respiratory: Negative  Negative for cough and shortness of breath  Cardiovascular: Negative  Negative for chest pain and leg swelling  Gastrointestinal: Negative  Negative for abdominal pain and constipation  Endocrine: Negative  Genitourinary: Negative  Negative for decreased urine volume, difficulty urinating, dysuria, flank pain, frequency, hematuria and urgency  Musculoskeletal: Positive for arthralgias  Negative for back pain, gait problem, joint swelling and myalgias  Skin: Negative  Allergic/Immunologic: Negative  Neurological: Negative  Negative for dizziness, tremors, weakness and numbness  Hematological: Negative  Psychiatric/Behavioral: Negative        Historical Information   The patient history was reviewed as follows:  Past Medical History:   Diagnosis Date    Diabetes mellitus ([facility] [address] )     Elevated LFTs     last assessed 02Nov2015    Hypertension     Impaired fasting glucose     last assessed 29Jan2014         Past Surgical History:   Procedure Laterality Date    APPENDECTOMY       Family History   Problem Relation Age of Onset    Diabetes Mother     Diabetes Father       Social History   Social History     Substance and Sexual Activity   Alcohol Use Yes    Alcohol/week: 1 8 oz    Types: 3 Glasses of wine per week    Comment: drinks daily      Social History     Substance and Sexual Activity   Drug Use No     Social History     Tobacco Use   Smoking Status Never Smoker   Smokeless Tobacco Never Used       Medications:     Current Outpatient Medications:     atorvastatin (LIPITOR) 20 mg tablet, Take 1 tablet (20 mg total) by mouth daily, Disp: 90 tablet, Rfl: 0   levothyroxine 150 mcg tablet, TAKE ONE TABLET BY MOUTH ONCE DAILY WITH WATER ON AN EMPTY STOMACH WAIT 45 MINUTES FOR ANY FOOD/JUICE/MEDICATIONS, Disp: 90 tablet, Rfl: 1    lisinopril-hydrochlorothiazide (PRINZIDE,ZESTORETIC) 10-12 5 MG per tablet, Take 1 tablet by mouth daily, Disp: 90 tablet, Rfl: 1    metFORMIN (GLUCOPHAGE) 1000 MG tablet, TAKE 1 TABLET BY MOUTH TWICE DAILY WITH MEALS, Disp: 180 tablet, Rfl: 0    naproxen (NAPROSYN) 500 mg tablet, Take 1 tablet (500 mg total) by mouth every 12 (twelve) hours as needed for mild pain for up to 5 days, Disp: 10 tablet, Rfl: 0    No Known Allergies    OBJECTIVE  Vitals:   Vitals:    03/19/19 1309   BP: 130/80   BP Location: Right arm   Patient Position: Sitting   Cuff Size: Large   Pulse: 74   Resp: 14   Temp: 97 8 °F (36 6 °C)   TempSrc: Tympanic   Weight: 89 1 kg (196 lb 6 4 oz)   Height: 5' 9" (1 753 m)         Physical Exam   Constitutional: He is oriented to person, place, and time  He appears well-developed and well-nourished  Pleasant 54yo M who appears stated age; in no acute distress  Sitting comfortably in chair  HENT:   Head: Normocephalic and atraumatic  Cardiovascular: Normal rate, regular rhythm and normal heart sounds  No murmur heard  Pulmonary/Chest: Effort normal and breath sounds normal  No respiratory distress  Abdominal: Soft  There is no tenderness  Musculoskeletal:        Right hip: He exhibits decreased range of motion  He exhibits normal strength, no tenderness, no bony tenderness, no swelling, no crepitus and no deformity  Left hip: He exhibits decreased range of motion and tenderness  He exhibits normal strength, no bony tenderness, no swelling, no crepitus and no deformity  Right knee: Normal         Left knee: Normal         Thoracic back: Normal  He exhibits normal range of motion, no tenderness, no bony tenderness, no deformity and no pain          Lumbar back: Normal  He exhibits normal range of motion, no tenderness, no bony tenderness, no deformity and no pain  Tenderness to palpation over L greater trochanter of the femur  No crepitus  No overlying warmth or erythema  No palpable deformity or edema  Negative straight leg raise bilaterally  Diminished ROM bilateral hip flexion, IR/ER, ROM R > L  No saddle anesthesia  Spine no step-offs or deformities, no midline thoracic or lumbar tenderness  Normal gait  Neurological: He is alert and oriented to person, place, and time  He has normal strength  No cranial nerve deficit or sensory deficit  He exhibits normal muscle tone  Reflex Scores:       Patellar reflexes are 2+ on the right side and 2+ on the left side  Achilles reflexes are 2+ on the right side and 2+ on the left side  Intact sensation bilateral LEs, 5/5 muscle strength bilateral LEs including hip flexion and extension, knee flexion and extension, as well as dorsiflexion and plantarflexion bilateral LEs  Skin: Skin is warm and dry  He is not diaphoretic  Psychiatric: He has a normal mood and affect  His behavior is normal    Vitals reviewed       Malachi Santana DO, PGY-1  Emergency Medicine Resident  [facility]   3/19/2019

## 2019-06-13 DIAGNOSIS — E11.9 CONTROLLED TYPE 2 DIABETES MELLITUS WITHOUT COMPLICATION, WITHOUT LONG-TERM CURRENT USE OF INSULIN (HCC): ICD-10-CM

## 2019-06-13 DIAGNOSIS — E78.5 HYPERLIPIDEMIA, UNSPECIFIED HYPERLIPIDEMIA TYPE: ICD-10-CM

## 2019-06-13 DIAGNOSIS — I10 ESSENTIAL HYPERTENSION: ICD-10-CM

## 2019-06-13 DIAGNOSIS — E11.9 TYPE 2 DIABETES MELLITUS WITHOUT COMPLICATION, WITHOUT LONG-TERM CURRENT USE OF INSULIN (HCC): ICD-10-CM

## 2019-06-13 RX ORDER — ATORVASTATIN CALCIUM 20 MG/1
TABLET, FILM COATED ORAL
Qty: 90 TABLET | Refills: 0 | Status: SHIPPED | OUTPATIENT
Start: 2019-06-13 | End: 2019-09-23 | Stop reason: SDUPTHER

## 2019-06-13 RX ORDER — LISINOPRIL AND HYDROCHLOROTHIAZIDE 12.5; 1 MG/1; MG/1
TABLET ORAL
Qty: 90 TABLET | Refills: 1 | Status: SHIPPED | OUTPATIENT
Start: 2019-06-13 | End: 2020-01-14

## 2019-07-16 ENCOUNTER — OFFICE VISIT (OUTPATIENT)
Dept: FAMILY MEDICINE CLINIC | Facility: CLINIC | Age: 54
End: 2019-07-16

## 2019-07-16 VITALS
RESPIRATION RATE: 18 BRPM | WEIGHT: 200 LBS | TEMPERATURE: 97.6 F | HEART RATE: 80 BPM | BODY MASS INDEX: 29.62 KG/M2 | DIASTOLIC BLOOD PRESSURE: 80 MMHG | SYSTOLIC BLOOD PRESSURE: 120 MMHG | HEIGHT: 69 IN

## 2019-07-16 DIAGNOSIS — M25.562 CHRONIC PAIN OF LEFT KNEE: ICD-10-CM

## 2019-07-16 DIAGNOSIS — Z12.11 COLON CANCER SCREENING: ICD-10-CM

## 2019-07-16 DIAGNOSIS — F10.20 UNCOMPLICATED ALCOHOL DEPENDENCE (HCC): ICD-10-CM

## 2019-07-16 DIAGNOSIS — E11.9 CONTROLLED TYPE 2 DIABETES MELLITUS WITHOUT COMPLICATION, WITHOUT LONG-TERM CURRENT USE OF INSULIN (HCC): Primary | ICD-10-CM

## 2019-07-16 DIAGNOSIS — G89.29 CHRONIC PAIN OF LEFT KNEE: ICD-10-CM

## 2019-07-16 DIAGNOSIS — I10 BENIGN ESSENTIAL HYPERTENSION: ICD-10-CM

## 2019-07-16 DIAGNOSIS — E03.9 HYPOTHYROIDISM, UNSPECIFIED TYPE: ICD-10-CM

## 2019-07-16 PROCEDURE — 3074F SYST BP LT 130 MM HG: CPT | Performed by: FAMILY MEDICINE

## 2019-07-16 PROCEDURE — 99213 OFFICE O/P EST LOW 20 MIN: CPT | Performed by: FAMILY MEDICINE

## 2019-07-16 PROCEDURE — 3079F DIAST BP 80-89 MM HG: CPT | Performed by: FAMILY MEDICINE

## 2019-07-16 NOTE — PROGRESS NOTES
Assessment/Plan     Diabetes mellitus type II, controlled ([facility] )  Lab Results   Component Value Date    HGBA1C 7 1 02/18/2019     HbA1c in 9/18 was 6 5  Patient currently is on metformin 1000 mg twice daily, but admits that he usually takes metformin only in the morning  Explained him the importance of taking metformin twice daily, and advised him to follow diabetic diet, and importance of exercise  He denies smoking      Hypothyroidism  Patient currently on levothyroxine 150 mcg daily, but reports that he does not take it on a regular basis  Will check TSH    Benign essential hypertension  Well controlled with lisinopril hydrochlorothiazide  Also encouraged to take DASH diet    Chronic pain of left knee  Recommended physical therapy, patient does not want referral for physical therapy at this time  Will continue to monitor    Colon cancer screening  Referral for Gastroenterology given    Uncomplicated alcohol dependence ([facility] )  Patient continues to drink 4 beers in a day, reports that he has drinking since age 34  Did have ultrasound done in January which showed severe hepatic steatosis  Patient is working on on a home, does not want any social work program referral at this time  Next drinking and its adverse effects on liver    Diagnoses and all orders for this visit:    Controlled type 2 diabetes mellitus without complication, without long-term current use of insulin (HCC)    Chronic pain of left knee    Hypothyroidism, unspecified type  -     TSH, 3rd generation; Future    Benign essential hypertension    Colon cancer screening  -     Ambulatory referral to Gastroenterology; Future    Uncomplicated alcohol dependence ([facility] )         Subjective     Chief Complaint   Patient presents with    Follow-up       53-year-old male presented to office for review of 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 which showed slight bump in his A1c    Patient reports that he usually does not take metformin twice daily, sometimes skips on his levothyroxine doses as well  He also has alcohol dependence and continues to drink 4 beers every day  He has been trying to cut down on his drinking, reports that he wants to do it himself  He he does not have any acute concerns today  The following portions of the patient's history were reviewed and updated as appropriate: allergies, current medications, past family history, past medical history, past social history, past surgical history and problem list     Review of Systems   Constitutional: Negative for activity change, chills and fever  HENT: Negative for congestion  Respiratory: Negative for shortness of breath  Cardiovascular: Negative for chest pain and palpitations  Gastrointestinal: Negative for diarrhea, nausea and vomiting  Genitourinary: Negative for difficulty urinating  Musculoskeletal: Negative for arthralgias  Neurological: Negative for headaches  Objective     Vitals:Blood pressure 120/80, pulse 80, temperature 97 6 °F (36 4 °C), resp  rate 18, height 5' 9" (1 753 m), weight 90 7 kg (200 lb)  Physical Exam:  Physical Exam   Constitutional: He is oriented to person, place, and time  He appears well-developed and well-nourished  HENT:   Head: Normocephalic and atraumatic  Mouth/Throat: Oropharynx is clear and moist    Eyes: Conjunctivae and EOM are normal    Neck: Normal range of motion  Neck supple  Cardiovascular: Normal rate, regular rhythm and normal heart sounds  Exam reveals no friction rub  No murmur heard  Pulmonary/Chest: Effort normal and breath sounds normal  No respiratory distress  He has no wheezes  He has no rales  Abdominal: Soft  Bowel sounds are normal  He exhibits no distension  There is no tenderness  Musculoskeletal: Normal range of motion  Neurological: He is alert and oriented to person, place, and time  Skin: Skin is warm and dry

## 2019-07-16 NOTE — ASSESSMENT & PLAN NOTE
Patient currently on levothyroxine 150 mcg daily, but reports that he does not take it on a regular basis    Will check TSH

## 2019-07-16 NOTE — ASSESSMENT & PLAN NOTE
Lab Results   Component Value Date    HGBA1C 7 1 09/18/2018     Patient currently is on metformin 1000 mg twice daily, but admits that he usually takes metformin only in the morning  Explained him the importance of taking metformin twice daily, and advised him to follow diabetic diet, and importance of exercise    He denies smoking

## 2019-07-16 NOTE — ASSESSMENT & PLAN NOTE
Recommended physical therapy, patient does not want referral for physical therapy at this time    Will continue to monitor

## 2019-07-16 NOTE — ASSESSMENT & PLAN NOTE
Patient continues to drink 4 beers in a day, reports that he has drinking since age 34  Did have ultrasound done in January which showed severe hepatic steatosis  Patient is working on on a home, does not want any social work program referral at this time    Next drinking and its adverse effects on liver

## 2019-09-04 ENCOUNTER — TRANSCRIBE ORDERS (OUTPATIENT)
Dept: LAB | Facility: CLINIC | Age: 54
End: 2019-09-04

## 2019-09-04 ENCOUNTER — LAB (OUTPATIENT)
Dept: LAB | Facility: CLINIC | Age: 54
End: 2019-09-04
Payer: COMMERCIAL

## 2019-09-04 DIAGNOSIS — E03.9 HYPOTHYROIDISM, UNSPECIFIED TYPE: ICD-10-CM

## 2019-09-04 LAB — TSH SERPL DL<=0.05 MIU/L-ACNC: 4.64 UIU/ML (ref 0.36–3.74)

## 2019-09-04 PROCEDURE — 84443 ASSAY THYROID STIM HORMONE: CPT

## 2019-09-04 PROCEDURE — 36415 COLL VENOUS BLD VENIPUNCTURE: CPT

## 2019-09-09 NOTE — RESULT ENCOUNTER NOTE
TSH 4 645, pt currently on levothyroxine 150 mcg daily   In last visit he told me that he does not take it regularly  Please ask him to continue levothyroxine 150 mcg daily on empty stomach and we will check TSH again in 6 weeks  Thank you

## 2019-09-23 DIAGNOSIS — E78.5 HYPERLIPIDEMIA, UNSPECIFIED HYPERLIPIDEMIA TYPE: ICD-10-CM

## 2019-09-23 DIAGNOSIS — E11.9 CONTROLLED TYPE 2 DIABETES MELLITUS WITHOUT COMPLICATION, WITHOUT LONG-TERM CURRENT USE OF INSULIN (HCC): ICD-10-CM

## 2019-09-23 DIAGNOSIS — E11.9 TYPE 2 DIABETES MELLITUS WITHOUT COMPLICATION, WITHOUT LONG-TERM CURRENT USE OF INSULIN (HCC): ICD-10-CM

## 2019-09-23 RX ORDER — ATORVASTATIN CALCIUM 20 MG/1
TABLET, FILM COATED ORAL
Qty: 90 TABLET | Refills: 0 | Status: SHIPPED | OUTPATIENT
Start: 2019-09-23 | End: 2020-05-05 | Stop reason: SDUPTHER

## 2019-11-04 ENCOUNTER — OFFICE VISIT (OUTPATIENT)
Dept: FAMILY MEDICINE CLINIC | Facility: CLINIC | Age: 54
End: 2019-11-04

## 2019-11-04 VITALS
DIASTOLIC BLOOD PRESSURE: 80 MMHG | WEIGHT: 200 LBS | SYSTOLIC BLOOD PRESSURE: 130 MMHG | BODY MASS INDEX: 29.62 KG/M2 | TEMPERATURE: 98.9 F | HEIGHT: 69 IN | HEART RATE: 76 BPM | RESPIRATION RATE: 16 BRPM

## 2019-11-04 DIAGNOSIS — E03.9 HYPOTHYROIDISM, UNSPECIFIED TYPE: ICD-10-CM

## 2019-11-04 DIAGNOSIS — N34.2 URETHRITIS: Primary | ICD-10-CM

## 2019-11-04 DIAGNOSIS — R30.0 DYSURIA: ICD-10-CM

## 2019-11-04 LAB
SL AMB  POCT GLUCOSE, UA: 100
SL AMB LEUKOCYTE ESTERASE,UA: NEGATIVE
SL AMB POCT BILIRUBIN,UA: NORMAL
SL AMB POCT BLOOD,UA: NEGATIVE
SL AMB POCT CLARITY,UA: CLEAR
SL AMB POCT COLOR,UA: YELLOW
SL AMB POCT KETONES,UA: NEGATIVE
SL AMB POCT NITRITE,UA: NEGATIVE
SL AMB POCT PH,UA: 6.5
SL AMB POCT SPECIFIC GRAVITY,UA: 1.01
SL AMB POCT URINE PROTEIN: NEGATIVE
SL AMB POCT UROBILINOGEN: 0.2

## 2019-11-04 PROCEDURE — 87086 URINE CULTURE/COLONY COUNT: CPT | Performed by: FAMILY MEDICINE

## 2019-11-04 PROCEDURE — 81003 URINALYSIS AUTO W/O SCOPE: CPT | Performed by: FAMILY MEDICINE

## 2019-11-04 PROCEDURE — 99214 OFFICE O/P EST MOD 30 MIN: CPT | Performed by: FAMILY MEDICINE

## 2019-11-04 RX ORDER — LEVOTHYROXINE SODIUM 0.15 MG/1
150 TABLET ORAL DAILY
Qty: 90 TABLET | Refills: 1 | Status: SHIPPED | OUTPATIENT
Start: 2019-11-04 | End: 2019-12-04

## 2019-11-04 RX ORDER — PHENAZOPYRIDINE HYDROCHLORIDE 100 MG/1
100 TABLET, FILM COATED ORAL 3 TIMES DAILY PRN
Qty: 15 TABLET | Refills: 0 | Status: SHIPPED | OUTPATIENT
Start: 2019-11-04 | End: 2019-12-04 | Stop reason: ALTCHOICE

## 2019-11-04 RX ORDER — CEPHALEXIN 500 MG/1
500 CAPSULE ORAL EVERY 12 HOURS SCHEDULED
Qty: 14 CAPSULE | Refills: 0 | Status: SHIPPED | OUTPATIENT
Start: 2019-11-04 | End: 2019-11-11

## 2019-11-04 NOTE — PATIENT INSTRUCTIONS

## 2019-11-04 NOTE — PROGRESS NOTES
Assessment/Plan:       Diagnoses and all orders for this visit:    Urethritis:  · Symptoms suggestive of urethritis  · Will treat with Keflex 500 milligrams every 12 hours for 7 days  · Prescribed Pyridium 100 milligrams every 3 hours as needed for symptomatic relief  · Patient declined any STD screening     -     cephalexin (KEFLEX) 500 mg capsule; Take 1 capsule (500 mg total) by mouth every 12 (twelve) hours for 7 days  -     phenazopyridine (PYRIDIUM) 100 mg tablet; Take 1 tablet (100 mg total) by mouth 3 (three) times a day as needed for bladder spasms    Dysuria:  · UA came negative for nitrates and leukocyte  · Send urine for culture due to patient's symptoms     -     Urine culture; Future  -     POCT urine dip auto non-scope  -     phenazopyridine (PYRIDIUM) 100 mg tablet; Take 1 tablet (100 mg total) by mouth 3 (three) times a day as needed for bladder spasms    Hypothyroidism:  · Last TSH on 9/4/2019:  4 645  · Patient has been noncompliant with medication  · Advised patient to take medication daily empty stomach  · Continue same dose of levothyroxine 150 micrograms daily at this time  · Repeat TSH in 1 month  -     levothyroxine 150 mcg tablet; Take 1 tablet (150 mcg total) by mouth daily        Follow-up in 3-4 days if symptoms not resolved  Subjective:      Patient ID: Julius Poe is a 48 y o  male here for frequency, burning urination since 3-4 days    Difficulty Urinating    This is a new problem  Episode onset: 3-4 days  The problem occurs every urination  The problem has been gradually worsening  The quality of the pain is described as aching  The pain is at a severity of 5/10  The pain is moderate  There has been no fever  He is sexually active  There is no history of pyelonephritis  Associated symptoms include frequency and urgency  Pertinent negatives include no chills, discharge, flank pain, hematuria, nausea, sweats or vomiting   He has tried nothing for the symptoms  Reports redness at the end of penis  Denies any rash in genital area  Sexually active 1 partner, denies any exposure to STDs  Denies any previous urinary complaints  Requested refill of levothyroxine 150 micrograms daily  Reports that he is taking medication, sometimes skip medication  The following portions of the patient's history were reviewed and updated as appropriate: allergies, current medications, past family history, past medical history, past social history, past surgical history and problem list       Review of Systems   Constitutional: Negative for chills and fever  Gastrointestinal: Negative for abdominal pain, blood in stool, constipation, nausea and vomiting  Endocrine: Negative for cold intolerance, heat intolerance, polydipsia, polyphagia and polyuria  Genitourinary: Positive for difficulty urinating, dysuria, frequency, penile pain and urgency  Negative for discharge, flank pain, genital sores, hematuria, penile swelling, scrotal swelling and testicular pain  Objective:      /80 (BP Location: Left arm, Patient Position: Sitting, Cuff Size: Large)   Pulse 76   Temp 98 9 °F (37 2 °C) (Tympanic)   Resp 16   Ht 5' 9" (1 753 m)   Wt 90 7 kg (200 lb)   BMI 29 53 kg/m²          Physical Exam   Constitutional: He appears well-developed and well-nourished  HENT:   Head: Normocephalic  Eyes: Conjunctivae are normal    Neck: Neck supple  Abdominal: Soft  Bowel sounds are normal  He exhibits no mass  There is no tenderness  There is no guarding  Genitourinary:   Genitourinary Comments: Mild erythema at the end penis, no rash seen, no discharge seen  Musculoskeletal: Normal range of motion  Neurological: He is alert  Skin: Skin is warm  Psychiatric: He has a normal mood and affect   His behavior is normal

## 2019-11-05 RX ORDER — LEVOTHYROXINE SODIUM 0.15 MG/1
150 TABLET ORAL DAILY
Qty: 90 TABLET | Refills: 1 | Status: SHIPPED | OUTPATIENT
Start: 2019-11-05 | End: 2020-05-05 | Stop reason: SDUPTHER

## 2019-11-06 LAB — BACTERIA UR CULT: NORMAL

## 2019-11-15 ENCOUNTER — OFFICE VISIT (OUTPATIENT)
Dept: FAMILY MEDICINE CLINIC | Facility: CLINIC | Age: 54
End: 2019-11-15

## 2019-11-15 VITALS
SYSTOLIC BLOOD PRESSURE: 132 MMHG | HEIGHT: 69 IN | DIASTOLIC BLOOD PRESSURE: 82 MMHG | WEIGHT: 199 LBS | TEMPERATURE: 96.7 F | RESPIRATION RATE: 16 BRPM | BODY MASS INDEX: 29.47 KG/M2 | HEART RATE: 78 BPM

## 2019-11-15 DIAGNOSIS — E11.9 TYPE 2 DIABETES MELLITUS WITHOUT COMPLICATION, WITHOUT LONG-TERM CURRENT USE OF INSULIN (HCC): ICD-10-CM

## 2019-11-15 DIAGNOSIS — E11.9 CONTROLLED TYPE 2 DIABETES MELLITUS WITHOUT COMPLICATION, WITHOUT LONG-TERM CURRENT USE OF INSULIN (HCC): ICD-10-CM

## 2019-11-15 DIAGNOSIS — N34.2 URETHRITIS: Primary | ICD-10-CM

## 2019-11-15 LAB
SL AMB  POCT GLUCOSE, UA: 250
SL AMB LEUKOCYTE ESTERASE,UA: NEGATIVE
SL AMB POCT BILIRUBIN,UA: NEGATIVE
SL AMB POCT BLOOD,UA: NEGATIVE
SL AMB POCT CLARITY,UA: ABNORMAL
SL AMB POCT COLOR,UA: YELLOW
SL AMB POCT HEMOGLOBIN AIC: 8.6 (ref ?–6.5)
SL AMB POCT KETONES,UA: NEGATIVE
SL AMB POCT NITRITE,UA: NEGATIVE
SL AMB POCT PH,UA: 5
SL AMB POCT SPECIFIC GRAVITY,UA: 1.02
SL AMB POCT URINE PROTEIN: NEGATIVE
SL AMB POCT UROBILINOGEN: 0.2

## 2019-11-15 PROCEDURE — 87491 CHLMYD TRACH DNA AMP PROBE: CPT | Performed by: EMERGENCY MEDICINE

## 2019-11-15 PROCEDURE — 99214 OFFICE O/P EST MOD 30 MIN: CPT | Performed by: FAMILY MEDICINE

## 2019-11-15 PROCEDURE — 81002 URINALYSIS NONAUTO W/O SCOPE: CPT | Performed by: FAMILY MEDICINE

## 2019-11-15 PROCEDURE — 87591 N.GONORRHOEAE DNA AMP PROB: CPT | Performed by: EMERGENCY MEDICINE

## 2019-11-15 PROCEDURE — 3725F SCREEN DEPRESSION PERFORMED: CPT | Performed by: FAMILY MEDICINE

## 2019-11-15 PROCEDURE — 3052F HG A1C>EQUAL 8.0%<EQUAL 9.0%: CPT | Performed by: FAMILY MEDICINE

## 2019-11-15 PROCEDURE — 3008F BODY MASS INDEX DOCD: CPT | Performed by: FAMILY MEDICINE

## 2019-11-15 PROCEDURE — 83036 HEMOGLOBIN GLYCOSYLATED A1C: CPT | Performed by: FAMILY MEDICINE

## 2019-11-15 PROCEDURE — 1036F TOBACCO NON-USER: CPT | Performed by: FAMILY MEDICINE

## 2019-11-15 RX ORDER — CLOTRIMAZOLE 1 %
CREAM (GRAM) TOPICAL 2 TIMES DAILY
Qty: 30 G | Refills: 0 | Status: SHIPPED | OUTPATIENT
Start: 2019-11-15 | End: 2019-12-04

## 2019-11-15 NOTE — PROGRESS NOTES
Assessment/Plan:    Urethritis  POCT UA: Negative nitrite/leukocyte esterase  Glucosuria  White fluid around foreskin with erythematous macules  Urine GC pending  Symptoms possibly due to balanitis  Will treat with clotrimazole cream   RTC two weeks for reevaluation  Diabetes mellitus type II, controlled ([facility] [address] )  Patient states he recently returned from vacation in [facility] where he enjoyed sweets and beer  Currently compliant with metformin 500mg BID  UA in office positive for glucosuria  A1c today: 8 6  Discussed diet modifications  Plan to increase metformin to 1000mg BID  Lab Results   Component Value Date    HGBA1C 6 5 09/18/2018          Problem List Items Addressed This Visit        Endocrine    Diabetes mellitus type II, controlled ([facility] [address] )     Patient states he recently returned from vacation in [facility] where he enjoyed sweets and beer  Currently compliant with metformin 500mg BID  UA in office positive for glucosuria  A1c today: 8 6  Discussed diet modifications  Plan to increase metformin to 1000mg BID  Lab Results   Component Value Date    HGBA1C 6 5 09/18/2018            Relevant Medications    metFORMIN (GLUCOPHAGE) 1000 MG tablet    Other Relevant Orders    POCT hemoglobin A1c (Completed)    POCT urine dip (Completed)       Genitourinary    Urethritis - Primary     POCT UA: Negative nitrite/leukocyte esterase  Glucosuria  White fluid around foreskin with erythematous macules  Urine GC pending  Symptoms possibly due to balanitis  Will treat with clotrimazole cream   RTC two weeks for reevaluation              Relevant Medications    clotrimazole (LOTRIMIN) 1 % cream    Other Relevant Orders    Chlamydia/GC amplified DNA by PCR      Other Visit Diagnoses     Type 2 diabetes mellitus without complication, without long-term current use of insulin ([facility] )        Relevant Medications    metFORMIN (GLUCOPHAGE) 1000 MG tablet            Subjective:      Patient ID: Rosa King is a 48 y o  male  Pedro David is a 48y o  year old male with PMH of urethritis presenting to the [address] [facility] for dysuria  Patient has had two weeks of pain on glans penis and associated burning discomfort  Patient was seen in clinic 11 days ago and treated with cephalexin BID for 7 days and pyridium which provided little relief  Patient clarifies that discomfort is on the glans surface and feels like a "burning" sensation with urination  Denies urethral discharge, hematuria  Patient has not trialed any topical therapies for the symptoms  Patient denies abdominal pain, pain in penis/testicles/scrotum  Patient states he is in monogamous relationship with wife and states he has not had intercourse prior to symptom onset  Difficulty Urinating    This is a recurrent problem  The current episode started 1 to 4 weeks ago  The problem occurs every urination  The problem has been unchanged  The quality of the pain is described as burning  The pain is mild  There has been no fever  He is sexually active  There is no history of pyelonephritis  Pertinent negatives include no chills, discharge, flank pain, frequency, hematuria, urgency or vomiting  He has tried antibiotics for the symptoms  The treatment provided mild relief  The following portions of the patient's history were reviewed and updated as appropriate: allergies, current medications, past family history, past medical history, past social history, past surgical history and problem list     Review of Systems   Constitutional: Negative for chills and fever  HENT: Negative for congestion  Eyes: Negative for redness  Respiratory: Negative for cough, chest tightness, shortness of breath and wheezing  Cardiovascular: Negative for chest pain, palpitations and leg swelling  Gastrointestinal: Negative for abdominal pain and vomiting  Endocrine: Negative for polyuria  Genitourinary: Positive for dysuria   Negative for difficulty urinating, discharge, flank pain, frequency, genital sores, hematuria, penile pain, penile swelling, scrotal swelling, testicular pain and urgency  Musculoskeletal: Negative for arthralgias  Skin: Positive for rash  Neurological: Negative for headaches  Hematological: Negative for adenopathy  Psychiatric/Behavioral: Negative for behavioral problems and confusion  All other systems reviewed and are negative  Objective:      /82 (BP Location: Left arm, Patient Position: Sitting, Cuff Size: Large)   Pulse 78   Temp (!) 96 7 °F (35 9 °C) (Tympanic)   Resp 16   Ht 5' 9" (1 753 m)   Wt 90 3 kg (199 lb)   BMI 29 39 kg/m²          Physical Exam   Constitutional: He is oriented to person, place, and time  He appears well-developed and well-nourished  No distress  HENT:   Head: Normocephalic and atraumatic  Nose: Nose normal    Eyes: Conjunctivae are normal    Neck: Neck supple  Cardiovascular: Normal rate, regular rhythm and normal heart sounds  Pulmonary/Chest: Effort normal and breath sounds normal  No respiratory distress  He has no wheezes  He has no rales  Abdominal: Soft  Bowel sounds are normal  There is no tenderness  Genitourinary: Testes normal  Right testis shows no swelling and no tenderness  Left testis shows no swelling and no tenderness  Uncircumcised  Penile tenderness present  Genitourinary Comments: No urethral discharge or bleeding  Neurological: He is alert and oriented to person, place, and time  Skin: Capillary refill takes less than 2 seconds  Rash (glans penis) noted  Rash is macular  Thin, white discharge between glans penis and uncircumsised foreskin  Glans penis has areas of dry erythematous macules  No ulcers, vesicles on penis/foreskin/scrotum     Psychiatric: He has a normal mood and affect  His behavior is normal    Nursing note and vitals reviewed

## 2019-11-15 NOTE — ASSESSMENT & PLAN NOTE
POCT UA: Negative nitrite/leukocyte esterase  Glucosuria  White fluid around foreskin with erythematous macules  Urine GC pending  Symptoms possibly due to balanitis  Will treat with clotrimazole cream   RTC two weeks for reevaluation

## 2019-11-15 NOTE — ASSESSMENT & PLAN NOTE
Patient states he recently returned from vacation in [facility] where he enjoyed sweets and beer  Currently compliant with metformin 500mg BID  UA in office positive for glucosuria  A1c today: 8 6  Discussed diet modifications  Plan to increase metformin to 1000mg BID      Lab Results   Component Value Date    HGBA1C 6 5 09/18/2018

## 2019-11-16 LAB
C TRACH DNA SPEC QL NAA+PROBE: NEGATIVE
N GONORRHOEA DNA SPEC QL NAA+PROBE: NEGATIVE

## 2019-12-04 ENCOUNTER — OFFICE VISIT (OUTPATIENT)
Dept: FAMILY MEDICINE CLINIC | Facility: CLINIC | Age: 54
End: 2019-12-04

## 2019-12-04 VITALS
HEIGHT: 69 IN | TEMPERATURE: 96.1 F | HEART RATE: 78 BPM | SYSTOLIC BLOOD PRESSURE: 122 MMHG | DIASTOLIC BLOOD PRESSURE: 80 MMHG | WEIGHT: 200.8 LBS | RESPIRATION RATE: 18 BRPM | BODY MASS INDEX: 29.74 KG/M2

## 2019-12-04 DIAGNOSIS — E03.9 HYPOTHYROIDISM, UNSPECIFIED TYPE: ICD-10-CM

## 2019-12-04 DIAGNOSIS — L84 CORN OF FOOT: ICD-10-CM

## 2019-12-04 DIAGNOSIS — G89.29 CHRONIC PAIN OF LEFT KNEE: ICD-10-CM

## 2019-12-04 DIAGNOSIS — H35.00 RETINOPATHY: ICD-10-CM

## 2019-12-04 DIAGNOSIS — M25.562 CHRONIC PAIN OF LEFT KNEE: ICD-10-CM

## 2019-12-04 DIAGNOSIS — E11.9 CONTROLLED TYPE 2 DIABETES MELLITUS WITHOUT COMPLICATION, WITHOUT LONG-TERM CURRENT USE OF INSULIN (HCC): Primary | ICD-10-CM

## 2019-12-04 DIAGNOSIS — I10 BENIGN ESSENTIAL HYPERTENSION: ICD-10-CM

## 2019-12-04 PROBLEM — N48.1 BALANITIS: Status: ACTIVE | Noted: 2019-11-15

## 2019-12-04 PROCEDURE — 3074F SYST BP LT 130 MM HG: CPT | Performed by: FAMILY MEDICINE

## 2019-12-04 PROCEDURE — 1036F TOBACCO NON-USER: CPT | Performed by: FAMILY MEDICINE

## 2019-12-04 PROCEDURE — 3079F DIAST BP 80-89 MM HG: CPT | Performed by: FAMILY MEDICINE

## 2019-12-04 PROCEDURE — 3008F BODY MASS INDEX DOCD: CPT | Performed by: FAMILY MEDICINE

## 2019-12-04 PROCEDURE — 99213 OFFICE O/P EST LOW 20 MIN: CPT | Performed by: FAMILY MEDICINE

## 2019-12-04 NOTE — ASSESSMENT & PLAN NOTE
Patient currently taking levothyroxine 150 mcg, does not take it consistently, last TSH was 4 6    Patient reports taking it regularly now Will recheck TSH

## 2019-12-04 NOTE — ASSESSMENT & PLAN NOTE
Lab Results   Component Value Date    HGBA1C 8 6 (A) 11/15/2019     Uncontrolled  Continue with increased dose of metformin 1000 mg b i d , reviewed the risk of alcohol with metformin   lifestyle modification with diet and exercise   will recheck A1c in 3 months   will get diabetic retinopathy screen done in office today   referral for podiatry given

## 2019-12-04 NOTE — PROGRESS NOTES
Assessment/Plan     Controlled type 2 diabetes mellitus without complication, without long-term current use of insulin ([facility])    Lab Results   Component Value Date    HGBA1C 8 6 (A) 11/15/2019     Uncontrolled  Continue with increased dose of metformin 1000 mg b i d , reviewed the risk of alcohol with metformin   lifestyle modification with diet and exercise   will recheck A1c in 3 months   will get diabetic retinopathy screen done in office today   referral for podiatry given    Hypothyroidism   Patient currently taking levothyroxine 150 mcg, does not take it consistently, last TSH was 4 6  Patient reports taking it regularly now Will recheck TSH    Benign essential hypertension   Within goal, goal blood pressure less than 140/90   continue lisinopril hydrochlorothiazide combination   lifestyle modification with diet and exercise    Chronic pain of left knee   Likely secondary to osteoarthritis, referral for physical therapy given    Balanitis   Symptoms resolved with clotrimazole cream   Urine for gonorrhea and chlamydia negative    Corn of foot   Referral for podiatry given    Diagnoses and all orders for this visit:    Controlled type 2 diabetes mellitus without complication, without long-term current use of insulin ([facility] [address] )  -     Ambulatory referral to Podiatry; Future  -     IRIS Diabetic eye exam    Hypothyroidism, unspecified type  -     TSH, 3rd generation; Future    Benign essential hypertension    Chronic pain of left knee  -     Ambulatory referral to Physical Therapy; Future    Retinopathy  -     IRIS Diabetic eye exam    Corn of foot         Subjective     Chief Complaint   Patient presents with    Knee Pain        56-year-old male presented to office for follow-up visit for balanitis  Patient was given clotrimazole cream in the last visit, and reports much improvement with it  Patient today complains of knee pain, which is more in the morning, but decreases as he ambulates throughout the day    He denies any injuries  He also wants lesion in his left foot examined, which he states that it hurts during ambulation    Knee Pain          The following portions of the patient's history were reviewed and updated as appropriate: allergies, current medications, past family history, past medical history, past social history, past surgical history and problem list     Review of Systems   Constitutional: Negative for activity change, chills and fever  HENT: Negative for congestion  Respiratory: Negative for shortness of breath  Cardiovascular: Negative for chest pain  Gastrointestinal: Negative for diarrhea, nausea and vomiting  Genitourinary: Negative for difficulty urinating  Musculoskeletal: Positive for arthralgias  Neurological: Negative for dizziness and headaches  Objective     Vitals:Blood pressure 122/80, pulse 78, temperature (!) 96 1 °F (35 6 °C), resp  rate 18, height 5' 9" (1 753 m), weight 91 1 kg (200 lb 12 8 oz)  Physical Exam:  Physical Exam   Constitutional: He is oriented to person, place, and time  He appears well-developed and well-nourished  HENT:   Head: Normocephalic and atraumatic  Mouth/Throat: Oropharynx is clear and moist    Eyes: Conjunctivae and EOM are normal    Neck: Normal range of motion  Neck supple  Cardiovascular: Normal rate, regular rhythm and normal heart sounds  Exam reveals no friction rub  No murmur heard  Pulmonary/Chest: Effort normal and breath sounds normal  No respiratory distress  He has no wheezes  He has no rales  Abdominal: Soft  Bowel sounds are normal  He exhibits no distension  There is no tenderness  Musculoskeletal: Normal range of motion  Tenderness medial left knee joint line  No restriction of range of motion active or passive   Neurological: He is alert and oriented to person, place, and time  Skin: Skin is warm and dry     1 cm swelling palpated on the sole of left foot, tender, no lichenification of overlying skin Vitals reviewed        Lab Results:

## 2019-12-04 NOTE — ASSESSMENT & PLAN NOTE
Within goal, goal blood pressure less than 140/90   continue lisinopril hydrochlorothiazide combination   lifestyle modification with diet and exercise

## 2019-12-05 LAB
LEFT EYE DIABETIC RETINOPATHY: ABNORMAL
LEFT EYE IMAGE QUALITY: ABNORMAL
LEFT EYE MACULAR EDEMA: ABNORMAL
LEFT EYE OTHER RETINOPATHY: ABNORMAL
RIGHT EYE DIABETIC RETINOPATHY: ABNORMAL
RIGHT EYE IMAGE QUALITY: ABNORMAL
RIGHT EYE MACULAR EDEMA: ABNORMAL
RIGHT EYE OTHER RETINOPATHY: ABNORMAL
SEVERITY (EYE EXAM): ABNORMAL

## 2019-12-05 PROCEDURE — 2022F DILAT RTA XM EVC RTNOPTHY: CPT | Performed by: FAMILY MEDICINE

## 2019-12-06 DIAGNOSIS — H40.9 GLAUCOMA OF LEFT EYE, UNSPECIFIED GLAUCOMA TYPE: Primary | ICD-10-CM

## 2019-12-06 NOTE — RESULT ENCOUNTER NOTE
Patient with suspicion of glaucoma on eye exam   Referral for Ophthalmology placed  Please mail the referral to the patient  Patient aware  If patient is unable to get in with Ophthalmology within 2 weeks, please help in scheduling appointment as soon as possible

## 2019-12-23 ENCOUNTER — APPOINTMENT (OUTPATIENT)
Dept: LAB | Facility: CLINIC | Age: 54
End: 2019-12-23
Payer: COMMERCIAL

## 2019-12-23 ENCOUNTER — TRANSCRIBE ORDERS (OUTPATIENT)
Dept: LAB | Facility: CLINIC | Age: 54
End: 2019-12-23

## 2019-12-23 DIAGNOSIS — E03.9 HYPOTHYROIDISM, UNSPECIFIED TYPE: ICD-10-CM

## 2019-12-23 LAB — TSH SERPL DL<=0.05 MIU/L-ACNC: 1.28 UIU/ML (ref 0.36–3.74)

## 2019-12-23 PROCEDURE — 84443 ASSAY THYROID STIM HORMONE: CPT

## 2019-12-23 PROCEDURE — 36415 COLL VENOUS BLD VENIPUNCTURE: CPT

## 2020-01-06 ENCOUNTER — OFFICE VISIT (OUTPATIENT)
Dept: PODIATRY | Facility: CLINIC | Age: 55
End: 2020-01-06
Payer: COMMERCIAL

## 2020-01-06 VITALS
HEIGHT: 69 IN | HEART RATE: 76 BPM | DIASTOLIC BLOOD PRESSURE: 85 MMHG | BODY MASS INDEX: 29.95 KG/M2 | SYSTOLIC BLOOD PRESSURE: 125 MMHG | WEIGHT: 202.2 LBS

## 2020-01-06 DIAGNOSIS — E11.9 CONTROLLED TYPE 2 DIABETES MELLITUS WITHOUT COMPLICATION, WITHOUT LONG-TERM CURRENT USE OF INSULIN (HCC): ICD-10-CM

## 2020-01-06 DIAGNOSIS — M72.2 FIBROMATOSIS OF PLANTAR FASCIA: Primary | ICD-10-CM

## 2020-01-06 PROCEDURE — 99203 OFFICE O/P NEW LOW 30 MIN: CPT | Performed by: PODIATRIST

## 2020-01-06 RX ORDER — TRAMADOL HYDROCHLORIDE 50 MG/1
TABLET ORAL
COMMUNITY
Start: 2016-05-06 | End: 2020-04-20

## 2020-01-06 NOTE — PATIENT INSTRUCTIONS
Foot Care for People with Diabetes   WHAT YOU NEED TO KNOW:   · Foot care helps protect your feet and prevent foot ulcers or sores  Long-term high blood sugar levels can damage the blood vessels and nerves in your legs and feet  This damage makes it hard to feel pressure, pain, temperature, and touch  You may not be able to feel a cut or sore, or shoes that are too tight  Foot care is needed to prevent serious problems, such as an infection or amputation  · Diabetes may cause your toes to become crooked or curved under  These changes may affect the way you walk and can lead to increased pressure on your foot  The pressure can decrease blood flow to your feet  Lack of blood flow increases your risk for a foot ulcer  Do not ignore small problems, such as dry skin or small wounds  These can become life-threatening over time without proper care  DISCHARGE INSTRUCTIONS:   Contact your healthcare provider if:   · Your feet become numb, weak, or hard to move  · You have pus draining from a sore on your foot  · You have a wound on your foot that gets bigger, deeper, or does not heal      · You see blisters, cuts, scratches, calluses, or sores on your foot  · You have a fever, and your feet become red, warm, and swollen  · Your toenails become thick, curled, or yellow  · You find it hard to check your feet because your vision is poor  · You have questions or concerns about your condition or care  Foot care:   · Check your feet each day  Look at your whole foot, including the bottom, and between and under your toes  Check for wounds, corns, and calluses  Use a mirror to see the bottom of your feet  The skin on your feet may be shiny, tight, or darker than normal  Your feet may also be cold and pale  Feel your feet by running your hands along the tops, bottoms, sides, and between your toes  Redness, swelling, and warmth are signs of blood flow problems that can lead to a foot ulcer   Do not try to remove corns or calluses yourself  · Wash your feet each day with soap and warm water  Do not use hot water, because this can injure your foot  Dry your feet gently with a towel after you wash them  Dry between and under your toes  · Apply lotion or a moisturizer on your dry feet  Ask your healthcare provider what lotions are best to use  Do not put lotion or moisturizer between your toes  · Cut your toenails correctly  File or cut your toenails straight across  Use a soft brush to clean around your toenails  If your toenails are very thick, you may need to have a healthcare provider or specialist cut them  · Protect your feet  Do not walk barefoot or wear your shoes without socks  Check your shoes for rocks or other objects that can hurt your feet  Wear cotton socks to help keep your feet dry  Wear socks without toe seams, or wear them with the seams inside out  Change your socks each day  Do not wear socks that are dirty or damp  · Wear shoes that fit well  Wear shoes that do not rub against any area of your feet  Your shoes should be ½ to ¾ inch (1 to 2 centimeters) longer than your feet  Your shoes should also have extra space around the widest part of your feet  Walking or athletic shoes with laces or straps that adjust are best  Ask your healthcare provider for help to choose shoes that fit you best  Ask him if you need to wear an insert, orthotic, or bandage on your feet  Follow up with your healthcare provider or foot specialist as directed: You will need to have your feet checked at least once a year  You may need a foot exam more often if you have nerve damage, foot deformities, or ulcers  Write down your questions so you remember to ask them during your visits  © 2017 [employer] Information is for End User's use only and may not be sold, redistributed or otherwise used for commercial purposes   All illustrations and images included in CareNotes® are the copyrighted property of [employer]  or [employer]ja  The above information is an  only  It is not intended as medical advice for individual conditions or treatments  Talk to your doctor, nurse or pharmacist before following any medical regimen to see if it is safe and effective for you

## 2020-01-06 NOTE — PROGRESS NOTES
Assessment/Plan:       Diagnoses and all orders for this visit:    Fibromatosis of plantar fascia  Comments:  left arch, monitor for now  Discussed options and etiology  Controlled type 2 diabetes mellitus without complication, without long-term current use of insulin ([address] )  -     Ambulatory referral to Podiatry    Other orders  -     traMADol (ULTRAM) 50 mg tablet; Take by mouth          -  Etiology and diagnosis options discussed regarding small plantar fibroma  This is a benign tumor  His is very small in only tender if he puts direct pressure on it  We discussed soft shoes and monitoring for now  We discussed complications that could come with surgical excision which I do not recommend at this time     -Educated on DM risk to lower extremities, proper shoe gear, and daily monitoring of feet    -Educated on A1C and the risks of poorly controlled Diabetes   -Discussed weight loss and suitable exercise regiment    -  Generally patient has well controlled diabetes  Recent elevation A1c was due to a vacation where patient admitted he was not following his normal diet restrictions  Patient has no significant neurovascular disease in his feet and is low risk for complications  He should get annual diabetic foot examinations  Subjective:      Patient ID: Pedro David is a 48 y o  male  PMH significant for DM2, A1C 8 6 in November 2019  Patients PCP recommended DM foot examination  Patient feels a lump In his left arch  It is tender when pressure is applied  HE does not think it's getting bigger  He denies numbness in his feet but sometimes feeling a tingling on top of his left foot  He normally has an A1C in the 6's but recently went to [facility] and says his diet was terrible  His A1C had risen over 8        The following portions of the patient's history were reviewed and updated as appropriate: allergies, current medications, past family history, past medical history, past social history, past surgical history and problem list     Review of Systems   Constitutional: Negative  Respiratory: Negative for cough and shortness of breath  Cardiovascular: Negative for leg swelling  Gastrointestinal: Negative for diarrhea and nausea  Musculoskeletal: Positive for joint swelling  Neurological: Negative for numbness  Objective:      /85   Pulse 76   Ht 5' 9" (1 753 m) Comment: verbal  Wt 91 7 kg (202 lb 3 2 oz)   BMI 29 86 kg/m²          Physical Exam   Constitutional: He appears well-developed and well-nourished  No distress  Cardiovascular: Pulses are no weak pulses  Pulses:       Dorsalis pedis pulses are 2+ on the right side, and 2+ on the left side  Posterior tibial pulses are 2+ on the right side, and 2+ on the left side  Feet:   Right Foot:   Skin Integrity: Negative for ulcer, callus or dry skin  Left Foot:   Skin Integrity: Negative for ulcer, callus or dry skin  Psychiatric: His mood appears not anxious  He is not agitated  Vitals reviewed  Diabetic Foot Exam    Patient's shoes and socks removed  Right Foot/Ankle   Right Foot Inspection  Skin Exam: skin intact no dry skin, no callus, no abnormal color, no ulcer and no callus                          Toe Exam: no swelling, no tenderness, erythema and  no right toe deformity  Sensory   Vibration: intact  Proprioception: intact   Monofilament testing: intact  Vascular  Capillary refills: < 3 seconds  The right DP pulse is 2+  The right PT pulse is 2+  Left Foot/Ankle  Left Foot Inspection  Skin Exam: skin intactno dry skin, normal color, no ulcer and no callus                         Toe Exam: no swelling, no tenderness, no erythema and no left toe deformity                   Sensory   Vibration: intact  Proprioception: intact  Monofilament: intact  Vascular  Capillary refills: < 3 seconds  The left DP pulse is 2+  The left PT pulse is 2+  Assign Risk Category:  No deformity present;  No loss of protective sensation;  No weak pulses       Risk: 0

## 2020-01-14 DIAGNOSIS — I10 ESSENTIAL HYPERTENSION: ICD-10-CM

## 2020-01-14 RX ORDER — LISINOPRIL AND HYDROCHLOROTHIAZIDE 12.5; 1 MG/1; MG/1
TABLET ORAL
Qty: 90 TABLET | Refills: 0 | Status: SHIPPED | OUTPATIENT
Start: 2020-01-14 | End: 2020-04-20 | Stop reason: SDUPTHER

## 2020-02-05 ENCOUNTER — OFFICE VISIT (OUTPATIENT)
Dept: FAMILY MEDICINE CLINIC | Facility: CLINIC | Age: 55
End: 2020-02-05

## 2020-02-05 VITALS
DIASTOLIC BLOOD PRESSURE: 90 MMHG | HEART RATE: 90 BPM | HEIGHT: 69 IN | WEIGHT: 197.8 LBS | RESPIRATION RATE: 16 BRPM | TEMPERATURE: 97.6 F | BODY MASS INDEX: 29.3 KG/M2 | SYSTOLIC BLOOD PRESSURE: 130 MMHG

## 2020-02-05 DIAGNOSIS — M47.9 SPONDYLOSIS: ICD-10-CM

## 2020-02-05 DIAGNOSIS — G89.29 CHRONIC BILATERAL LOW BACK PAIN WITH BILATERAL SCIATICA: Primary | ICD-10-CM

## 2020-02-05 DIAGNOSIS — M54.41 CHRONIC BILATERAL LOW BACK PAIN WITH BILATERAL SCIATICA: Primary | ICD-10-CM

## 2020-02-05 DIAGNOSIS — E11.9 TYPE 2 DIABETES MELLITUS WITHOUT COMPLICATION, WITHOUT LONG-TERM CURRENT USE OF INSULIN (HCC): ICD-10-CM

## 2020-02-05 DIAGNOSIS — M54.42 CHRONIC BILATERAL LOW BACK PAIN WITH BILATERAL SCIATICA: Primary | ICD-10-CM

## 2020-02-05 PROCEDURE — 3075F SYST BP GE 130 - 139MM HG: CPT | Performed by: FAMILY MEDICINE

## 2020-02-05 PROCEDURE — 1036F TOBACCO NON-USER: CPT | Performed by: FAMILY MEDICINE

## 2020-02-05 PROCEDURE — 2022F DILAT RTA XM EVC RTNOPTHY: CPT | Performed by: FAMILY MEDICINE

## 2020-02-05 PROCEDURE — T1015 CLINIC SERVICE: HCPCS | Performed by: FAMILY MEDICINE

## 2020-02-05 PROCEDURE — 3008F BODY MASS INDEX DOCD: CPT | Performed by: FAMILY MEDICINE

## 2020-02-05 PROCEDURE — 99213 OFFICE O/P EST LOW 20 MIN: CPT | Performed by: FAMILY MEDICINE

## 2020-02-05 PROCEDURE — 3080F DIAST BP >= 90 MM HG: CPT | Performed by: FAMILY MEDICINE

## 2020-02-05 RX ORDER — MELOXICAM 7.5 MG/1
7.5 TABLET ORAL DAILY
Qty: 30 TABLET | Refills: 0 | Status: SHIPPED | OUTPATIENT
Start: 2020-02-05 | End: 2020-08-17

## 2020-02-05 NOTE — PROGRESS NOTES
Keira Burk 1965 male MRN: [id number]    Family Medicine Acute Visit    ASSESSMENT/PLAN  Diagnoses and all orders for this visit:    Chronic bilateral low back pain with bilateral sciatica  -     Ambulatory referral to Physical Therapy; Future  -     meloxicam (MOBIC) 7 5 mg tablet; Take 1 tablet (7 5 mg total) by mouth daily Take 1 tablet daily for the first five days; then take as needed  - Clinical findings discussed with patient today consistent with lumbar pain with radicular symptoms and L1 distribution  Prior lumbar radiographs reviewed with an x-ray in 2015 noting L2-3 since degenerative changes and L5-S1 moderate disc space narrowing  Recommended patient undergo formal physical therapy  In regards to pain control, Mobic 7 5 mg daily p r n  prescribed and also recommended to use extra-strength Tylenol in between if needed  Follow up 2-3 months for follow-up evaluation, if symptoms do not improve consider further imaging with repeat x-rays and possible MRI  Spondylosis  -     Ambulatory referral to Physical Therapy; Future    Type 2 diabetes mellitus without complication, without long-term current use of insulin ([facility])  -     metFORMIN (GLUCOPHAGE) 1000 MG tablet; Take 1 tablet (1,000 mg total) by mouth 2 (two) times a day with meals              No future appointments  SUBJECTIVE  CC: Back Pain      HPI:  Keira Burk is a 48 y o  male who presents for low back pain for approximately 1 week, no precipitating injury  Pertinent history of chronic history of low back for several years already  No precipitating injury  Prior radiographs of lumbar spine from 02/24/2015 notes mild degnerateive chagnes of L2-L3 and moderate disc space narrowing at L5-S1  Pain occasionally radiates around waist bilaterally  Reports low back stiffness  Described as aching/burning  Takes extra strength tylenol daily which used to help but not as much relief anymore   Some relief when using massage chair  Patient reports difficulty sleeping at night from aching low back pain  Denies bowel/bladder incontinence, weakness, numbness/tingling, weight loss  Additionally, in regards his type 2 diabetes he is requesting a refill as metformin  Review of Systems   Constitutional: Negative for chills, fever and unexpected weight change  Respiratory: Negative for shortness of breath  Cardiovascular: Negative for chest pain and leg swelling  Gastrointestinal: Negative for abdominal pain, constipation, diarrhea, nausea and vomiting  Endocrine: Negative for polyphagia  Musculoskeletal: Positive for back pain  Negative for gait problem and joint swelling  Skin: Negative for rash  Neurological: Negative for dizziness, weakness, numbness and headaches         Historical Information   The patient history was reviewed as follows:  Past Medical History:   Diagnosis Date    Diabetes mellitus ([facility] [address] )     Elevated LFTs     last assessed 02Nov2015    Hypertension     Impaired fasting glucose     last assessed 29Jan2014         Past Surgical History:   Procedure Laterality Date    APPENDECTOMY       Family History   Problem Relation Age of Onset    Diabetes Mother     Diabetes Father       Social History   Social History     Substance and Sexual Activity   Alcohol Use Yes    Alcohol/week: 3 0 standard drinks    Types: 3 Glasses of wine per week    Comment: drinks daily      Social History     Substance and Sexual Activity   Drug Use No     Social History     Tobacco Use   Smoking Status Never Smoker   Smokeless Tobacco Never Used       Medications:     Current Outpatient Medications:     atorvastatin (LIPITOR) 20 mg tablet, TAKE 1 TABLET BY MOUTH ONCE DAILY, Disp: 90 tablet, Rfl: 0    levothyroxine 150 mcg tablet, Take 1 tablet (150 mcg total) by mouth daily, Disp: 90 tablet, Rfl: 1    lisinopril-hydrochlorothiazide (PRINZIDE,ZESTORETIC) 10-12 5 MG per tablet, TAKE 1 TABLET BY MOUTH ONCE DAILY, Disp: 90 tablet, Rfl: 0    metFORMIN (GLUCOPHAGE) 1000 MG tablet, Take 1 tablet (1,000 mg total) by mouth 2 (two) times a day with meals, Disp: 180 tablet, Rfl: 0    traMADol (ULTRAM) 50 mg tablet, Take by mouth, Disp: , Rfl:     meloxicam (MOBIC) 7 5 mg tablet, Take 1 tablet (7 5 mg total) by mouth daily Take 1 tablet daily for the first five days; then take as needed, Disp: 30 tablet, Rfl: 0    No Known Allergies    OBJECTIVE  Vitals:   Vitals:    02/05/20 0911   BP: 130/90   BP Location: Left arm   Patient Position: Sitting   Cuff Size: Large   Pulse: 90   Resp: 16   Temp: 97 6 °F (36 4 °C)   TempSrc: Tympanic   Weight: 89 7 kg (197 lb 12 8 oz)   Height: 5' 9" (1 753 m)         Physical Exam   Constitutional: He appears well-developed and well-nourished  No distress  HENT:   Head: Normocephalic and atraumatic  Right Ear: External ear normal    Left Ear: External ear normal    Nose: Nose normal    Eyes: Conjunctivae are normal  No scleral icterus  Neck: Normal range of motion  Neck supple  Cardiovascular: Normal rate  Pulmonary/Chest: Effort normal  No respiratory distress  Abdominal: Soft  Neurological: He is alert  Skin: Skin is warm and dry  He is not diaphoretic     Psychiatric: His behavior is normal           Ortho Exam:  BACK EXAM:  Gait: normal, no trendelenberg gait, no antalgic gait    BACK TENDERNESS:  Spinous Processes: no  Paraspinal Muscles: + left and right paraspinal lumbar tenderness  SI Joint: no  Sacrum: no    ROM:  Flexion:   Limited to 100° secondary to lumbar pain  Extension:  Limited to 10° due to lumbar pain  Sidebending: intact, left lateral bending increases lumbar pain    DERMATOMAL SENSATION:  L1: normal   L2: normal   L3: normal   L4: normal   L5: normal   S1: normal    STRENGTH (bilateral):  Knee Extension: 5/5  Knee Flexion: 5/5  Foot Dorsiflexion: 5/5  Great Toe Extension: 5/5  Foot Plantarflexion: 5/5  Hip Flexion: 5/5  Hip Abduction: 5/5    REFLEXES:  Patellar: 2+ bilateral  Achilles: 2+ bilateral  Clonus: negative bilateral    BACK:   SUPINE STRAIGHT LEG: negative  SLUMP: negative    HIP:  LOG ROLL: negative  GEOFFREY: negative  FADIR: negative          Regla Arroyo MD  2/6/2020

## 2020-02-24 ENCOUNTER — TELEPHONE (OUTPATIENT)
Dept: FAMILY MEDICINE CLINIC | Facility: CLINIC | Age: 55
End: 2020-02-24

## 2020-04-20 ENCOUNTER — TELEMEDICINE (OUTPATIENT)
Dept: FAMILY MEDICINE CLINIC | Facility: CLINIC | Age: 55
End: 2020-04-20

## 2020-04-20 DIAGNOSIS — E03.9 HYPOTHYROIDISM, UNSPECIFIED TYPE: Primary | ICD-10-CM

## 2020-04-20 DIAGNOSIS — I10 ESSENTIAL HYPERTENSION: ICD-10-CM

## 2020-04-20 DIAGNOSIS — E11.9 TYPE 2 DIABETES MELLITUS WITHOUT COMPLICATION, WITHOUT LONG-TERM CURRENT USE OF INSULIN (HCC): ICD-10-CM

## 2020-04-20 PROCEDURE — G2012 BRIEF CHECK IN BY MD/QHP: HCPCS | Performed by: FAMILY MEDICINE

## 2020-04-20 RX ORDER — LISINOPRIL AND HYDROCHLOROTHIAZIDE 12.5; 1 MG/1; MG/1
1 TABLET ORAL DAILY
Qty: 90 TABLET | Refills: 0 | Status: SHIPPED | OUTPATIENT
Start: 2020-04-20 | End: 2020-07-28

## 2020-05-04 ENCOUNTER — TELEPHONE (OUTPATIENT)
Dept: FAMILY MEDICINE CLINIC | Facility: CLINIC | Age: 55
End: 2020-05-04

## 2020-05-04 LAB
ALBUMIN SERPL-MCNC: 4.4 G/DL (ref 3.6–5.1)
ALBUMIN/CREAT UR: 5 MCG/MG CREAT
ALBUMIN/GLOB SERPL: 1.9 (CALC) (ref 1–2.5)
ALP SERPL-CCNC: 68 U/L (ref 35–144)
ALT SERPL-CCNC: 35 U/L (ref 9–46)
AST SERPL-CCNC: 27 U/L (ref 10–35)
BILIRUB SERPL-MCNC: 1.2 MG/DL (ref 0.2–1.2)
BUN SERPL-MCNC: 9 MG/DL (ref 7–25)
BUN/CREAT SERPL: ABNORMAL (CALC) (ref 6–22)
CALCIUM SERPL-MCNC: 9.6 MG/DL (ref 8.6–10.3)
CHLORIDE SERPL-SCNC: 100 MMOL/L (ref 98–110)
CHOLEST SERPL-MCNC: 103 MG/DL
CHOLEST/HDLC SERPL: 2.1 (CALC)
CO2 SERPL-SCNC: 30 MMOL/L (ref 20–32)
CREAT SERPL-MCNC: 0.81 MG/DL (ref 0.7–1.33)
CREAT UR-MCNC: 92 MG/DL (ref 20–320)
EST. AVERAGE GLUCOSE BLD GHB EST-MCNC: 148 (CALC)
EST. AVERAGE GLUCOSE BLD GHB EST-SCNC: 8.2 (CALC)
GLOBULIN SER CALC-MCNC: 2.3 G/DL (CALC) (ref 1.9–3.7)
GLUCOSE SERPL-MCNC: 137 MG/DL (ref 65–99)
HBA1C MFR BLD: 6.8 % OF TOTAL HGB
HDLC SERPL-MCNC: 49 MG/DL
LDLC SERPL CALC-MCNC: 38 MG/DL (CALC)
MICROALBUMIN UR-MCNC: 0.5 MG/DL
NONHDLC SERPL-MCNC: 54 MG/DL (CALC)
POTASSIUM SERPL-SCNC: 4.8 MMOL/L (ref 3.5–5.3)
PROT SERPL-MCNC: 6.7 G/DL (ref 6.1–8.1)
SL AMB EGFR AFRICAN AMERICAN: 118 ML/MIN/1.73M2
SL AMB EGFR NON AFRICAN AMERICAN: 102 ML/MIN/1.73M2
SODIUM SERPL-SCNC: 138 MMOL/L (ref 135–146)
TRIGL SERPL-MCNC: 82 MG/DL

## 2020-05-05 ENCOUNTER — TELEMEDICINE (OUTPATIENT)
Dept: FAMILY MEDICINE CLINIC | Facility: CLINIC | Age: 55
End: 2020-05-05

## 2020-05-05 DIAGNOSIS — N48.1 BALANITIS: Primary | ICD-10-CM

## 2020-05-05 DIAGNOSIS — E11.9 TYPE 2 DIABETES MELLITUS WITHOUT COMPLICATION, WITHOUT LONG-TERM CURRENT USE OF INSULIN (HCC): ICD-10-CM

## 2020-05-05 DIAGNOSIS — E78.5 HYPERLIPIDEMIA, UNSPECIFIED HYPERLIPIDEMIA TYPE: ICD-10-CM

## 2020-05-05 DIAGNOSIS — E03.9 HYPOTHYROIDISM, UNSPECIFIED TYPE: ICD-10-CM

## 2020-05-05 DIAGNOSIS — E11.9 CONTROLLED TYPE 2 DIABETES MELLITUS WITHOUT COMPLICATION, WITHOUT LONG-TERM CURRENT USE OF INSULIN (HCC): ICD-10-CM

## 2020-05-05 PROCEDURE — G2012 BRIEF CHECK IN BY MD/QHP: HCPCS | Performed by: FAMILY MEDICINE

## 2020-05-05 RX ORDER — ATORVASTATIN CALCIUM 20 MG/1
20 TABLET, FILM COATED ORAL DAILY
Qty: 90 TABLET | Refills: 0 | Status: SHIPPED | OUTPATIENT
Start: 2020-05-05 | End: 2020-11-23

## 2020-05-05 RX ORDER — LEVOTHYROXINE SODIUM 0.15 MG/1
150 TABLET ORAL DAILY
Qty: 90 TABLET | Refills: 1 | Status: SHIPPED | OUTPATIENT
Start: 2020-05-05 | End: 2020-11-23 | Stop reason: SDUPTHER

## 2020-05-05 RX ORDER — CLOTRIMAZOLE 1 %
CREAM (GRAM) TOPICAL 2 TIMES DAILY
Qty: 30 G | Refills: 0 | Status: SHIPPED | OUTPATIENT
Start: 2020-05-05 | End: 2020-08-17

## 2020-06-12 ENCOUNTER — TELEPHONE (OUTPATIENT)
Dept: FAMILY MEDICINE CLINIC | Facility: CLINIC | Age: 55
End: 2020-06-12

## 2020-06-12 ENCOUNTER — OFFICE VISIT (OUTPATIENT)
Dept: FAMILY MEDICINE CLINIC | Facility: CLINIC | Age: 55
End: 2020-06-12

## 2020-06-12 VITALS
BODY MASS INDEX: 27.76 KG/M2 | HEART RATE: 78 BPM | HEIGHT: 69 IN | SYSTOLIC BLOOD PRESSURE: 142 MMHG | WEIGHT: 187.4 LBS | RESPIRATION RATE: 18 BRPM | DIASTOLIC BLOOD PRESSURE: 84 MMHG | TEMPERATURE: 97.9 F

## 2020-06-12 DIAGNOSIS — G89.29 CHRONIC PAIN OF LEFT KNEE: ICD-10-CM

## 2020-06-12 DIAGNOSIS — G89.29 CHRONIC PAIN IN PENIS: ICD-10-CM

## 2020-06-12 DIAGNOSIS — N48.89 CHRONIC PAIN IN PENIS: ICD-10-CM

## 2020-06-12 DIAGNOSIS — N48.1 BALANITIS: Primary | ICD-10-CM

## 2020-06-12 DIAGNOSIS — M25.562 CHRONIC PAIN OF LEFT KNEE: ICD-10-CM

## 2020-06-12 LAB
SL AMB  POCT GLUCOSE, UA: NEGATIVE
SL AMB LEUKOCYTE ESTERASE,UA: NEGATIVE
SL AMB POCT BILIRUBIN,UA: NEGATIVE
SL AMB POCT BLOOD,UA: NEGATIVE
SL AMB POCT CLARITY,UA: CLEAR
SL AMB POCT COLOR,UA: YELLOW
SL AMB POCT KETONES,UA: NEGATIVE
SL AMB POCT NITRITE,UA: NEGATIVE
SL AMB POCT PH,UA: 6.5
SL AMB POCT SPECIFIC GRAVITY,UA: 1.01
SL AMB POCT URINE PROTEIN: NEGATIVE
SL AMB POCT UROBILINOGEN: 0.2

## 2020-06-12 PROCEDURE — 3008F BODY MASS INDEX DOCD: CPT | Performed by: FAMILY MEDICINE

## 2020-06-12 PROCEDURE — 3077F SYST BP >= 140 MM HG: CPT | Performed by: FAMILY MEDICINE

## 2020-06-12 PROCEDURE — 3079F DIAST BP 80-89 MM HG: CPT | Performed by: FAMILY MEDICINE

## 2020-06-12 PROCEDURE — 1036F TOBACCO NON-USER: CPT | Performed by: FAMILY MEDICINE

## 2020-06-12 PROCEDURE — 81002 URINALYSIS NONAUTO W/O SCOPE: CPT | Performed by: FAMILY MEDICINE

## 2020-06-12 PROCEDURE — 99214 OFFICE O/P EST MOD 30 MIN: CPT | Performed by: FAMILY MEDICINE

## 2020-06-12 PROCEDURE — 2022F DILAT RTA XM EVC RTNOPTHY: CPT | Performed by: FAMILY MEDICINE

## 2020-07-25 DIAGNOSIS — I10 ESSENTIAL HYPERTENSION: ICD-10-CM

## 2020-07-28 RX ORDER — LISINOPRIL AND HYDROCHLOROTHIAZIDE 12.5; 1 MG/1; MG/1
TABLET ORAL
Qty: 90 TABLET | Refills: 0 | Status: SHIPPED | OUTPATIENT
Start: 2020-07-28 | End: 2020-09-02 | Stop reason: SDUPTHER

## 2020-08-17 ENCOUNTER — OFFICE VISIT (OUTPATIENT)
Dept: FAMILY MEDICINE CLINIC | Facility: CLINIC | Age: 55
End: 2020-08-17

## 2020-08-17 VITALS
HEART RATE: 98 BPM | WEIGHT: 189.6 LBS | SYSTOLIC BLOOD PRESSURE: 124 MMHG | DIASTOLIC BLOOD PRESSURE: 82 MMHG | TEMPERATURE: 97.9 F | HEIGHT: 69 IN | RESPIRATION RATE: 18 BRPM | BODY MASS INDEX: 28.08 KG/M2

## 2020-08-17 DIAGNOSIS — Z12.11 SCREENING FOR COLON CANCER: ICD-10-CM

## 2020-08-17 DIAGNOSIS — N48.1 BALANITIS: ICD-10-CM

## 2020-08-17 DIAGNOSIS — E03.9 HYPOTHYROIDISM, UNSPECIFIED TYPE: ICD-10-CM

## 2020-08-17 DIAGNOSIS — I10 BENIGN ESSENTIAL HYPERTENSION: ICD-10-CM

## 2020-08-17 DIAGNOSIS — Z13.5 SCREENING FOR DIABETIC RETINOPATHY: ICD-10-CM

## 2020-08-17 DIAGNOSIS — E11.9 CONTROLLED TYPE 2 DIABETES MELLITUS WITHOUT COMPLICATION, WITHOUT LONG-TERM CURRENT USE OF INSULIN (HCC): Primary | ICD-10-CM

## 2020-08-17 DIAGNOSIS — Z11.4 SCREENING FOR HIV (HUMAN IMMUNODEFICIENCY VIRUS): ICD-10-CM

## 2020-08-17 PROBLEM — B37.89 CANDIDA RASH OF GROIN: Status: ACTIVE | Noted: 2020-08-17

## 2020-08-17 PROBLEM — M72.2 FIBROMATOSIS OF PLANTAR FASCIA: Status: ACTIVE | Noted: 2019-12-04

## 2020-08-17 LAB
CREAT UR-MCNC: 125 MG/DL
MICROALBUMIN UR-MCNC: 7.8 MG/L (ref 0–20)
MICROALBUMIN/CREAT 24H UR: 6 MG/G CREATININE (ref 0–30)
SL AMB POCT HEMOGLOBIN AIC: 6.3 (ref ?–6.5)

## 2020-08-17 PROCEDURE — 1036F TOBACCO NON-USER: CPT | Performed by: FAMILY MEDICINE

## 2020-08-17 PROCEDURE — 3044F HG A1C LEVEL LT 7.0%: CPT | Performed by: FAMILY MEDICINE

## 2020-08-17 PROCEDURE — 82570 ASSAY OF URINE CREATININE: CPT | Performed by: FAMILY MEDICINE

## 2020-08-17 PROCEDURE — 83036 HEMOGLOBIN GLYCOSYLATED A1C: CPT | Performed by: FAMILY MEDICINE

## 2020-08-17 PROCEDURE — 3008F BODY MASS INDEX DOCD: CPT | Performed by: FAMILY MEDICINE

## 2020-08-17 PROCEDURE — 3061F NEG MICROALBUMINURIA REV: CPT | Performed by: FAMILY MEDICINE

## 2020-08-17 PROCEDURE — 3061F NEG MICROALBUMINURIA REV: CPT | Performed by: PODIATRIST

## 2020-08-17 PROCEDURE — 99214 OFFICE O/P EST MOD 30 MIN: CPT | Performed by: FAMILY MEDICINE

## 2020-08-17 PROCEDURE — 3074F SYST BP LT 130 MM HG: CPT | Performed by: FAMILY MEDICINE

## 2020-08-17 PROCEDURE — 82043 UR ALBUMIN QUANTITATIVE: CPT | Performed by: FAMILY MEDICINE

## 2020-08-17 PROCEDURE — 3044F HG A1C LEVEL LT 7.0%: CPT | Performed by: PODIATRIST

## 2020-08-17 PROCEDURE — 3079F DIAST BP 80-89 MM HG: CPT | Performed by: FAMILY MEDICINE

## 2020-08-17 PROCEDURE — 2022F DILAT RTA XM EVC RTNOPTHY: CPT | Performed by: FAMILY MEDICINE

## 2020-08-17 PROCEDURE — 3008F BODY MASS INDEX DOCD: CPT | Performed by: PODIATRIST

## 2020-08-17 RX ORDER — CLOTRIMAZOLE 1 %
CREAM (GRAM) TOPICAL 2 TIMES DAILY
Qty: 30 G | Refills: 0 | Status: SHIPPED | OUTPATIENT
Start: 2020-08-17 | End: 2021-03-25 | Stop reason: SDUPTHER

## 2020-08-17 NOTE — ASSESSMENT & PLAN NOTE
Patient was able to see Urology and was prescribed a topical medication that has significantly alleviated his symptoms  He plans to follow up with Urology in September 2020 but otherwise has no concerns related to this issue today

## 2020-08-17 NOTE — ASSESSMENT & PLAN NOTE
Patient was able to see Urology and was given a cream to apply that has significantly alleviated his pain  He plans to follow up with Urology in September 2020, but otherwise has no current concerns related to his penis at this time

## 2020-08-17 NOTE — ASSESSMENT & PLAN NOTE
Patient has an erythematous, pruritic, mildly painful rash in the bilateral inguinal region with associated satellite lesions consistent with Candida infection  Will prescribe topical clotrimazole and encourage patient to keep the area cool and dry

## 2020-08-17 NOTE — PROGRESS NOTES
Assessment/Plan:    Type 2 diabetes mellitus without complication, without long-term current use of insulin (HCC)  Pt with well-controlled T2DM on metformin 1000 BID  HbA1c was 6 8 on 5/1/20 and 6 3 today  Plan to recheck HbA1c in year  Encouraged patient to continue his current diet and exercise routines  Will check urine microalbumin/creatinine ratio today, as well as perform screening eye exam    Lab Results   Component Value Date    HGBA1C 6 3 08/17/2020       Benign essential hypertension  BP well controlled today at 124/80  Encouraged patient to continue diet and exercise routine  Will continue current medication regimen of lisinopril-HCTZ (10/12 5) 1x/day  Chronic pain in penis  Patient was able to see Urology and was given a cream to apply that has significantly alleviated his pain  He plans to follow up with Urology in September 2020, but otherwise has no current concerns related to his penis at this time  Candida rash of groin  Patient has an erythematous, pruritic, mildly painful rash in the bilateral inguinal region with associated satellite lesions consistent with Candida infection  Will prescribe topical clotrimazole and encourage patient to keep the area cool and dry  Diagnoses and all orders for this visit:    Controlled type 2 diabetes mellitus without complication, without long-term current use of insulin (HCC)  -     POCT hemoglobin A1c    Screening for HIV (human immunodeficiency virus)  -     HIV 1/2 Antigen/Antibody (4th Generation) w Reflex [facility]; Future    Hypothyroidism, unspecified type    Benign essential hypertension  -     Microalbumin / creatinine urine ratio    Balanitis  -     clotrimazole (LOTRIMIN) 1 % cream; Apply topically 2 (two) times a day    Screening for diabetic retinopathy  -     IRIS Diabetic eye exam    Screening for colon cancer  -     Ambulatory referral to Gastroenterology; Future          Subjective:      Patient ID: Ko Negro is a 48 y o  male     This is a 47 y/o male with a PMHx of DM, HTN, HLD, hypothyroidism, and balanitis here for a follow up visit and evaluation of a rash  He reports 3-4 days of a red, pruritic, mildly painful rash in the bilateral groin region that has worsened since onset  He tried a topical OTC medication for the rash with no relief, though he does not remember the name of the medication  He has had no associated fever or leg pain  He was seen here on 6/12/20 and in the ED on 6/15/20 for evaluation of pain in his penis that was thought to be urethritis vs balanitis and was prescribed fluconazole  He was able to follow up with Urology since that time and was given a topical medication that has significantly improved his symptoms, and he is scheduled to follow up with Urology in September 2020  He has no current concerns regarding this issue at this time  He reports walking 4-10 miles/day and eating a well-balanced diet  He has had no recent illness  Review of Systems   Constitutional: Negative for chills and fever  HENT: Negative for rhinorrhea and sore throat  Eyes: Negative for visual disturbance  Respiratory: Negative for cough and shortness of breath  Cardiovascular: Negative for chest pain, palpitations and leg swelling  Gastrointestinal: Negative for abdominal pain, constipation, diarrhea, nausea and vomiting  Endocrine: Negative for polydipsia  Genitourinary: Positive for penile pain (mild and much improved, as noted in HPI)  Musculoskeletal: Negative for back pain  Skin: Positive for rash (inguinal region, as noted in HPI)  Neurological: Negative for dizziness and headaches  Psychiatric/Behavioral: Negative for sleep disturbance  Objective:      /82   Pulse 98   Temp 97 9 °F (36 6 °C)   Resp 18   Ht 5' 9" (1 753 m)   Wt 86 kg (189 lb 9 6 oz)   BMI 28 00 kg/m²          Physical Exam  Constitutional:       General: He is not in acute distress    HENT:      Head: Normocephalic and atraumatic  Right Ear: External ear normal       Left Ear: External ear normal       Nose: No congestion  Mouth/Throat:      Mouth: Mucous membranes are moist       Pharynx: No oropharyngeal exudate or posterior oropharyngeal erythema  Eyes:      Conjunctiva/sclera: Conjunctivae normal    Neck:      Musculoskeletal: Neck supple  Cardiovascular:      Rate and Rhythm: Normal rate and regular rhythm  Pulses: Normal pulses  Heart sounds: Normal heart sounds  No murmur  No friction rub  No gallop  Pulmonary:      Effort: Pulmonary effort is normal  No respiratory distress  Breath sounds: Normal breath sounds  No wheezing, rhonchi or rales  Abdominal:      Palpations: Abdomen is soft  Tenderness: There is no abdominal tenderness  There is no guarding or rebound  Musculoskeletal: Normal range of motion  General: No swelling or tenderness  Lymphadenopathy:      Cervical: No cervical adenopathy  Skin:     General: Skin is warm and dry  Findings: Rash (erythematous rash in bilateral inguinal region with associated satellite lesions) present  Neurological:      Mental Status: He is alert     Psychiatric:         Mood and Affect: Mood normal

## 2020-08-17 NOTE — ASSESSMENT & PLAN NOTE
BP well controlled today at 124/80  Encouraged patient to continue diet and exercise routine  Will continue current medication regimen of lisinopril-HCTZ (10/12 5) 1x/day

## 2020-08-17 NOTE — ASSESSMENT & PLAN NOTE
Patient was previously uninsured so was unable to have colonoscopy done, but now has insurance and plans to schedule colonoscopy soon  Discussed the importance of colonoscopy screening and gave him a prescription for the procedure today  He verbalized understanding

## 2020-08-17 NOTE — ASSESSMENT & PLAN NOTE
Pt with well-controlled T2DM on metformin 1000 BID  HbA1c was 6 8 on 5/1/20 and 6 3 today  Plan to recheck HbA1c in 1 year  Will check urine microalbumin/creatinine ratio today, as well as perform screening diabetic eye exam  Encouraged patient to continue his current diet and exercise routines    Lab Results   Component Value Date    HGBA1C 6 3 08/17/2020

## 2020-09-02 DIAGNOSIS — I10 ESSENTIAL HYPERTENSION: ICD-10-CM

## 2020-09-02 RX ORDER — LISINOPRIL AND HYDROCHLOROTHIAZIDE 12.5; 1 MG/1; MG/1
TABLET ORAL
Qty: 90 TABLET | Refills: 0 | Status: SHIPPED | OUTPATIENT
Start: 2020-09-02 | End: 2020-11-23 | Stop reason: SDUPTHER

## 2020-10-05 DIAGNOSIS — E11.9 TYPE 2 DIABETES MELLITUS WITHOUT COMPLICATION, WITHOUT LONG-TERM CURRENT USE OF INSULIN (HCC): ICD-10-CM

## 2020-10-28 DIAGNOSIS — E11.9 TYPE 2 DIABETES MELLITUS WITHOUT COMPLICATION, WITHOUT LONG-TERM CURRENT USE OF INSULIN (HCC): ICD-10-CM

## 2020-11-23 ENCOUNTER — OFFICE VISIT (OUTPATIENT)
Dept: FAMILY MEDICINE CLINIC | Facility: CLINIC | Age: 55
End: 2020-11-23

## 2020-11-23 VITALS
TEMPERATURE: 96.5 F | OXYGEN SATURATION: 98 % | HEART RATE: 101 BPM | RESPIRATION RATE: 18 BRPM | BODY MASS INDEX: 28.97 KG/M2 | SYSTOLIC BLOOD PRESSURE: 118 MMHG | HEIGHT: 69 IN | WEIGHT: 195.6 LBS | DIASTOLIC BLOOD PRESSURE: 80 MMHG

## 2020-11-23 DIAGNOSIS — E78.5 HYPERLIPIDEMIA, UNSPECIFIED HYPERLIPIDEMIA TYPE: ICD-10-CM

## 2020-11-23 DIAGNOSIS — I10 ESSENTIAL HYPERTENSION: ICD-10-CM

## 2020-11-23 DIAGNOSIS — R42 DIZZINESS: Primary | ICD-10-CM

## 2020-11-23 DIAGNOSIS — E11.9 TYPE 2 DIABETES MELLITUS WITHOUT COMPLICATION, WITHOUT LONG-TERM CURRENT USE OF INSULIN (HCC): ICD-10-CM

## 2020-11-23 DIAGNOSIS — I10 BENIGN ESSENTIAL HYPERTENSION: ICD-10-CM

## 2020-11-23 DIAGNOSIS — E11.9 CONTROLLED TYPE 2 DIABETES MELLITUS WITHOUT COMPLICATION, WITHOUT LONG-TERM CURRENT USE OF INSULIN (HCC): ICD-10-CM

## 2020-11-23 DIAGNOSIS — E03.9 HYPOTHYROIDISM, UNSPECIFIED TYPE: ICD-10-CM

## 2020-11-23 PROBLEM — H40.89 OTHER SPECIFIED GLAUCOMA: Status: ACTIVE | Noted: 2020-11-23

## 2020-11-23 LAB — SL AMB POCT HEMOGLOBIN AIC: 6.9 (ref ?–6.5)

## 2020-11-23 PROCEDURE — 83036 HEMOGLOBIN GLYCOSYLATED A1C: CPT | Performed by: FAMILY MEDICINE

## 2020-11-23 PROCEDURE — 3725F SCREEN DEPRESSION PERFORMED: CPT | Performed by: FAMILY MEDICINE

## 2020-11-23 PROCEDURE — 3044F HG A1C LEVEL LT 7.0%: CPT | Performed by: FAMILY MEDICINE

## 2020-11-23 PROCEDURE — 99214 OFFICE O/P EST MOD 30 MIN: CPT | Performed by: FAMILY MEDICINE

## 2020-11-23 PROCEDURE — 3079F DIAST BP 80-89 MM HG: CPT | Performed by: FAMILY MEDICINE

## 2020-11-23 PROCEDURE — 3074F SYST BP LT 130 MM HG: CPT | Performed by: FAMILY MEDICINE

## 2020-11-23 PROCEDURE — 1036F TOBACCO NON-USER: CPT | Performed by: FAMILY MEDICINE

## 2020-11-23 PROCEDURE — 3008F BODY MASS INDEX DOCD: CPT | Performed by: FAMILY MEDICINE

## 2020-11-23 RX ORDER — ATORVASTATIN CALCIUM 40 MG/1
40 TABLET, FILM COATED ORAL DAILY
Qty: 90 TABLET | Refills: 1 | Status: SHIPPED | OUTPATIENT
Start: 2020-11-23 | End: 2021-09-10 | Stop reason: SDUPTHER

## 2020-11-23 RX ORDER — MECLIZINE HYDROCHLORIDE 25 MG/1
25 TABLET ORAL 3 TIMES DAILY PRN
COMMUNITY
Start: 2020-11-19 | End: 2020-12-24 | Stop reason: ALTCHOICE

## 2020-11-23 RX ORDER — LEVOTHYROXINE SODIUM 0.15 MG/1
150 TABLET ORAL DAILY
Qty: 90 TABLET | Refills: 1 | Status: SHIPPED | OUTPATIENT
Start: 2020-11-23 | End: 2021-04-26

## 2020-11-23 RX ORDER — LISINOPRIL AND HYDROCHLOROTHIAZIDE 12.5; 1 MG/1; MG/1
1 TABLET ORAL DAILY
Qty: 90 TABLET | Refills: 0 | Status: SHIPPED | OUTPATIENT
Start: 2020-11-23 | End: 2021-04-11 | Stop reason: SDUPTHER

## 2020-11-23 RX ORDER — LISINOPRIL AND HYDROCHLOROTHIAZIDE 12.5; 1 MG/1; MG/1
1 TABLET ORAL DAILY
Qty: 90 TABLET | Refills: 0 | Status: SHIPPED | OUTPATIENT
Start: 2020-11-23 | End: 2020-11-23 | Stop reason: SDUPTHER

## 2020-11-27 ENCOUNTER — TELEPHONE (OUTPATIENT)
Dept: FAMILY MEDICINE CLINIC | Facility: CLINIC | Age: 55
End: 2020-11-27

## 2020-11-27 DIAGNOSIS — E11.9 TYPE 2 DIABETES MELLITUS WITHOUT COMPLICATION, WITHOUT LONG-TERM CURRENT USE OF INSULIN (HCC): Primary | ICD-10-CM

## 2020-12-07 DIAGNOSIS — E11.9 TYPE 2 DIABETES MELLITUS WITHOUT COMPLICATION, WITHOUT LONG-TERM CURRENT USE OF INSULIN (HCC): Primary | ICD-10-CM

## 2020-12-07 RX ORDER — BLOOD SUGAR DIAGNOSTIC
1 STRIP MISCELLANEOUS 2 TIMES DAILY
Qty: 100 EACH | Refills: 5 | Status: SHIPPED | OUTPATIENT
Start: 2020-12-07 | End: 2021-09-10 | Stop reason: SDUPTHER

## 2020-12-07 RX ORDER — BLOOD SUGAR DIAGNOSTIC
1 STRIP MISCELLANEOUS 2 TIMES DAILY
Qty: 100 EACH | Refills: 5 | Status: SHIPPED | OUTPATIENT
Start: 2020-12-07

## 2020-12-18 ENCOUNTER — HOSPITAL ENCOUNTER (OUTPATIENT)
Dept: MRI IMAGING | Facility: HOSPITAL | Age: 55
Discharge: HOME | End: 2020-12-18
Attending: FAMILY MEDICINE
Payer: COMMERCIAL

## 2020-12-18 ENCOUNTER — TELEPHONE (OUTPATIENT)
Dept: FAMILY MEDICINE CLINIC | Facility: CLINIC | Age: 55
End: 2020-12-18

## 2020-12-18 DIAGNOSIS — R42 DIZZINESS: ICD-10-CM

## 2020-12-18 PROCEDURE — A9585 GADOBUTROL INJECTION: HCPCS | Performed by: FAMILY MEDICINE

## 2020-12-18 PROCEDURE — G1004 CDSM NDSC: HCPCS

## 2020-12-18 PROCEDURE — 70553 MRI BRAIN STEM W/O & W/DYE: CPT

## 2020-12-18 RX ADMIN — GADOBUTROL 8.8 ML: 604.72 INJECTION INTRAVENOUS at 19:40

## 2020-12-24 ENCOUNTER — LAB (OUTPATIENT)
Dept: LAB | Facility: CLINIC | Age: 55
End: 2020-12-24
Payer: COMMERCIAL

## 2020-12-24 ENCOUNTER — TELEPHONE (OUTPATIENT)
Dept: HEMATOLOGY ONCOLOGY | Facility: CLINIC | Age: 55
End: 2020-12-24

## 2020-12-24 ENCOUNTER — OFFICE VISIT (OUTPATIENT)
Dept: FAMILY MEDICINE CLINIC | Facility: CLINIC | Age: 55
End: 2020-12-24

## 2020-12-24 ENCOUNTER — TELEPHONE (OUTPATIENT)
Dept: FAMILY MEDICINE CLINIC | Facility: CLINIC | Age: 55
End: 2020-12-24

## 2020-12-24 VITALS
DIASTOLIC BLOOD PRESSURE: 92 MMHG | OXYGEN SATURATION: 99 % | HEIGHT: 69 IN | HEART RATE: 95 BPM | SYSTOLIC BLOOD PRESSURE: 132 MMHG | RESPIRATION RATE: 16 BRPM | BODY MASS INDEX: 29.27 KG/M2 | WEIGHT: 197.6 LBS | TEMPERATURE: 96.4 F

## 2020-12-24 DIAGNOSIS — H40.89 OTHER GLAUCOMA OF BOTH EYES: ICD-10-CM

## 2020-12-24 DIAGNOSIS — M89.9 FRONTAL SKULL LESION: ICD-10-CM

## 2020-12-24 DIAGNOSIS — Z12.11 SCREENING FOR COLON CANCER: ICD-10-CM

## 2020-12-24 DIAGNOSIS — I10 BENIGN ESSENTIAL HYPERTENSION: ICD-10-CM

## 2020-12-24 DIAGNOSIS — M89.9 FRONTAL SKULL LESION: Primary | ICD-10-CM

## 2020-12-24 LAB
ALBUMIN SERPL BCP-MCNC: 4.2 G/DL (ref 3.5–5)
ALP SERPL-CCNC: 79 U/L (ref 46–116)
ALT SERPL W P-5'-P-CCNC: 38 U/L (ref 12–78)
ANION GAP SERPL CALCULATED.3IONS-SCNC: 8 MMOL/L (ref 4–13)
AST SERPL W P-5'-P-CCNC: 21 U/L (ref 5–45)
BILIRUB SERPL-MCNC: 1.07 MG/DL (ref 0.2–1)
BUN SERPL-MCNC: 11 MG/DL (ref 5–25)
CALCIUM SERPL-MCNC: 9.5 MG/DL (ref 8.3–10.1)
CHLORIDE SERPL-SCNC: 101 MMOL/L (ref 100–108)
CO2 SERPL-SCNC: 29 MMOL/L (ref 21–32)
CREAT SERPL-MCNC: 0.68 MG/DL (ref 0.6–1.3)
ERYTHROCYTE [DISTWIDTH] IN BLOOD BY AUTOMATED COUNT: 11.9 % (ref 11.6–15.1)
GFR SERPL CREATININE-BSD FRML MDRD: 109 ML/MIN/1.73SQ M
GLUCOSE SERPL-MCNC: 167 MG/DL (ref 65–140)
HCT VFR BLD AUTO: 47.6 % (ref 36.5–49.3)
HGB BLD-MCNC: 16.8 G/DL (ref 12–17)
MCH RBC QN AUTO: 31.5 PG (ref 26.8–34.3)
MCHC RBC AUTO-ENTMCNC: 35.3 G/DL (ref 31.4–37.4)
MCV RBC AUTO: 89 FL (ref 82–98)
PLATELET # BLD AUTO: 190 THOUSANDS/UL (ref 149–390)
PMV BLD AUTO: 9.4 FL (ref 8.9–12.7)
POTASSIUM SERPL-SCNC: 4.3 MMOL/L (ref 3.5–5.3)
PROT SERPL-MCNC: 7.9 G/DL (ref 6.4–8.2)
PSA SERPL-MCNC: 0.8 NG/ML (ref 0–4)
RBC # BLD AUTO: 5.34 MILLION/UL (ref 3.88–5.62)
SODIUM SERPL-SCNC: 138 MMOL/L (ref 136–145)
WBC # BLD AUTO: 8.13 THOUSAND/UL (ref 4.31–10.16)

## 2020-12-24 PROCEDURE — 3008F BODY MASS INDEX DOCD: CPT | Performed by: FAMILY MEDICINE

## 2020-12-24 PROCEDURE — 80053 COMPREHEN METABOLIC PANEL: CPT

## 2020-12-24 PROCEDURE — 3080F DIAST BP >= 90 MM HG: CPT | Performed by: FAMILY MEDICINE

## 2020-12-24 PROCEDURE — G0103 PSA SCREENING: HCPCS

## 2020-12-24 PROCEDURE — 1036F TOBACCO NON-USER: CPT | Performed by: FAMILY MEDICINE

## 2020-12-24 PROCEDURE — 85027 COMPLETE CBC AUTOMATED: CPT

## 2020-12-24 PROCEDURE — 36415 COLL VENOUS BLD VENIPUNCTURE: CPT

## 2020-12-24 PROCEDURE — 3075F SYST BP GE 130 - 139MM HG: CPT | Performed by: FAMILY MEDICINE

## 2020-12-24 PROCEDURE — 99214 OFFICE O/P EST MOD 30 MIN: CPT | Performed by: FAMILY MEDICINE

## 2021-01-05 ENCOUNTER — HOSPITAL ENCOUNTER (OUTPATIENT)
Dept: NUCLEAR MEDICINE | Facility: HOSPITAL | Age: 56
Discharge: HOME | End: 2021-01-05
Attending: FAMILY MEDICINE
Payer: COMMERCIAL

## 2021-01-05 ENCOUNTER — TELEPHONE (OUTPATIENT)
Dept: FAMILY MEDICINE CLINIC | Facility: CLINIC | Age: 56
End: 2021-01-05

## 2021-01-05 DIAGNOSIS — M89.9 FRONTAL SKULL LESION: ICD-10-CM

## 2021-01-05 DIAGNOSIS — M89.9 FRONTAL SKULL LESION: Primary | ICD-10-CM

## 2021-01-05 PROCEDURE — A9503 TC99M MEDRONATE: HCPCS

## 2021-01-05 PROCEDURE — 78306 BONE IMAGING WHOLE BODY: CPT

## 2021-01-05 PROCEDURE — G1004 CDSM NDSC: HCPCS

## 2021-01-05 NOTE — TELEPHONE ENCOUNTER
Hi Dr Eli Alexandra, I spoke to [address] at [facility], she sent message to [facility] (Nurse Navigator)  They will call me back with Provider's response  I will keep you updated  Thanks!

## 2021-01-05 NOTE — TELEPHONE ENCOUNTER
I will work on this, as well I sent nurse intake stating specialist suggests for patient to have ct's to be done prior to biopsy and I sent All Blood Work ordered 12/24 per Dr Lorene Cohen request  Hopefully it get's approved asap  Waiting for insurance response at this time

## 2021-01-05 NOTE — TELEPHONE ENCOUNTER
This patient is scheduled for CT CHEST ABDOMEN AND PELVIS W CONTRAST  The information I submitted was not enough to get it approved, so [facility] is requesting a [id number]  If you are able to do so, the number is below  If you wish to withdraw this request let us know so we can call central scheduling to cancel their upcoming appointment  LINH:  [phone] Option #3    ORDERING PHYSICIAN:   Dr Latia Montoya NUMBER: [id number] TRACKING NUMBER: [id number]  RE: Authorization Request   MEMBER ID: [id number]  PATIENT NAME: [address]: Estrellita Yip  We have received your request for Abdomen and Pelvis CT and Chest CT (Computed Tomography - pictures of inside your belly area)  We are  unable to approve based on the information provided to date, please respond to this fax as soon as possible  URGENT: REPLY REQUIRED FOR CASE REVIEW  Request for Additional Clinical Information  Study Requested: Abdomen and Pelvis CT  Please PROVIDE:  Most recent results pertaining to Biopsy  Most recent treatment pertaining to Laboratory evidence suggesting diagnosis (e g  bloodwork, urine studies)    PS: I SENT OVER OFFICE VISITS AND MRI PATIENT HAD DONE   PLEASE LET ME KNOW IF THERE WAS ANYTHING I WAS MISSING TO BE SENT

## 2021-01-06 DIAGNOSIS — M89.9 FRONTAL SKULL LESION: Primary | ICD-10-CM

## 2021-01-07 ENCOUNTER — TRANSCRIBE ORDERS (OUTPATIENT)
Dept: FAMILY MEDICINE CLINIC | Facility: CLINIC | Age: 56
End: 2021-01-07

## 2021-01-07 DIAGNOSIS — H40.89 OTHER GLAUCOMA OF BOTH EYES: ICD-10-CM

## 2021-01-07 DIAGNOSIS — E11.9 TYPE 2 DIABETES MELLITUS WITHOUT COMPLICATION, WITHOUT LONG-TERM CURRENT USE OF INSULIN (HCC): Primary | ICD-10-CM

## 2021-01-07 NOTE — TELEPHONE ENCOUNTER
Per Dr Mattie Davis to schedule CT's   Patient is scheduled 1/8/2021   for CT Chest, Abdomen and Pelvis

## 2021-01-08 ENCOUNTER — HOSPITAL ENCOUNTER (OUTPATIENT)
Dept: CT IMAGING | Facility: HOSPITAL | Age: 56
Discharge: HOME | End: 2021-01-08
Payer: COMMERCIAL

## 2021-01-08 DIAGNOSIS — M89.9 FRONTAL SKULL LESION: ICD-10-CM

## 2021-01-08 PROCEDURE — G1004 CDSM NDSC: HCPCS

## 2021-01-08 PROCEDURE — 71260 CT THORAX DX C+: CPT

## 2021-01-08 PROCEDURE — 74177 CT ABD & PELVIS W/CONTRAST: CPT

## 2021-01-08 RX ADMIN — IOHEXOL 100 ML: 350 INJECTION, SOLUTION INTRAVENOUS at 08:00

## 2021-01-11 DIAGNOSIS — M89.9 FRONTAL SKULL LESION: Primary | ICD-10-CM

## 2021-01-13 ENCOUNTER — APPOINTMENT (OUTPATIENT)
Dept: LAB | Facility: CLINIC | Age: 56
End: 2021-01-13
Payer: COMMERCIAL

## 2021-01-13 DIAGNOSIS — Z11.4 SCREENING FOR HIV (HUMAN IMMUNODEFICIENCY VIRUS): ICD-10-CM

## 2021-01-13 DIAGNOSIS — M89.9 FRONTAL SKULL LESION: ICD-10-CM

## 2021-01-13 DIAGNOSIS — E11.9 TYPE 2 DIABETES MELLITUS WITHOUT COMPLICATION, WITHOUT LONG-TERM CURRENT USE OF INSULIN (HCC): ICD-10-CM

## 2021-01-13 LAB
ALBUMIN SERPL BCP-MCNC: 4 G/DL (ref 3.5–5)
ALP SERPL-CCNC: 74 U/L (ref 46–116)
ALT SERPL W P-5'-P-CCNC: 36 U/L (ref 12–78)
ANION GAP SERPL CALCULATED.3IONS-SCNC: 6 MMOL/L (ref 4–13)
AST SERPL W P-5'-P-CCNC: 26 U/L (ref 5–45)
BILIRUB SERPL-MCNC: 1.24 MG/DL (ref 0.2–1)
BUN SERPL-MCNC: 9 MG/DL (ref 5–25)
CALCIUM SERPL-MCNC: 8.9 MG/DL (ref 8.3–10.1)
CHLORIDE SERPL-SCNC: 100 MMOL/L (ref 100–108)
CHOLEST SERPL-MCNC: 145 MG/DL (ref 50–200)
CO2 SERPL-SCNC: 31 MMOL/L (ref 21–32)
CREAT SERPL-MCNC: 0.88 MG/DL (ref 0.6–1.3)
ERYTHROCYTE [SEDIMENTATION RATE] IN BLOOD: 1 MM/HOUR (ref 0–19)
EST. AVERAGE GLUCOSE BLD GHB EST-MCNC: 134 MG/DL
GFR SERPL CREATININE-BSD FRML MDRD: 98 ML/MIN/1.73SQ M
GLUCOSE P FAST SERPL-MCNC: 152 MG/DL (ref 65–99)
HBA1C MFR BLD: 6.3 %
HDLC SERPL-MCNC: 50 MG/DL
LDLC SERPL CALC-MCNC: 70 MG/DL (ref 0–100)
NONHDLC SERPL-MCNC: 95 MG/DL
POTASSIUM SERPL-SCNC: 4.2 MMOL/L (ref 3.5–5.3)
PROT SERPL-MCNC: 7.5 G/DL (ref 6.4–8.2)
SODIUM SERPL-SCNC: 137 MMOL/L (ref 136–145)
TRIGL SERPL-MCNC: 127 MG/DL

## 2021-01-13 PROCEDURE — 80061 LIPID PANEL: CPT

## 2021-01-13 PROCEDURE — 3044F HG A1C LEVEL LT 7.0%: CPT | Performed by: FAMILY MEDICINE

## 2021-01-13 PROCEDURE — 80053 COMPREHEN METABOLIC PANEL: CPT

## 2021-01-13 PROCEDURE — 84166 PROTEIN E-PHORESIS/URINE/CSF: CPT | Performed by: PATHOLOGY

## 2021-01-13 PROCEDURE — 84166 PROTEIN E-PHORESIS/URINE/CSF: CPT | Performed by: FAMILY MEDICINE

## 2021-01-13 PROCEDURE — 83036 HEMOGLOBIN GLYCOSYLATED A1C: CPT

## 2021-01-13 PROCEDURE — 87389 HIV-1 AG W/HIV-1&-2 AB AG IA: CPT

## 2021-01-13 PROCEDURE — 84165 PROTEIN E-PHORESIS SERUM: CPT | Performed by: PATHOLOGY

## 2021-01-13 PROCEDURE — 84165 PROTEIN E-PHORESIS SERUM: CPT

## 2021-01-13 PROCEDURE — 36415 COLL VENOUS BLD VENIPUNCTURE: CPT

## 2021-01-13 PROCEDURE — 85652 RBC SED RATE AUTOMATED: CPT

## 2021-01-13 PROCEDURE — 3044F HG A1C LEVEL LT 7.0%: CPT | Performed by: NEUROLOGICAL SURGERY

## 2021-01-14 LAB — HIV 1+2 AB+HIV1 P24 AG SERPL QL IA: NORMAL

## 2021-01-15 LAB
ALBUMIN SERPL ELPH-MCNC: 4.5 G/DL (ref 3.5–5)
ALBUMIN SERPL ELPH-MCNC: 60.8 % (ref 52–65)
ALBUMIN UR ELPH-MCNC: 100 %
ALPHA1 GLOB MFR UR ELPH: 0 %
ALPHA1 GLOB SERPL ELPH-MCNC: 0.24 G/DL (ref 0.1–0.4)
ALPHA1 GLOB SERPL ELPH-MCNC: 3.3 % (ref 2.5–5)
ALPHA2 GLOB MFR UR ELPH: 0 %
ALPHA2 GLOB SERPL ELPH-MCNC: 0.54 G/DL (ref 0.4–1.2)
ALPHA2 GLOB SERPL ELPH-MCNC: 7.3 % (ref 7–13)
B-GLOBULIN MFR UR ELPH: 0 %
BETA GLOB ABNORMAL SERPL ELPH-MCNC: 0.44 G/DL (ref 0.4–0.8)
BETA1 GLOB SERPL ELPH-MCNC: 5.9 % (ref 5–13)
BETA2 GLOB SERPL ELPH-MCNC: 4.8 % (ref 2–8)
BETA2+GAMMA GLOB SERPL ELPH-MCNC: 0.36 G/DL (ref 0.2–0.5)
GAMMA GLOB ABNORMAL SERPL ELPH-MCNC: 1.32 G/DL (ref 0.5–1.6)
GAMMA GLOB MFR UR ELPH: 0 %
GAMMA GLOB SERPL ELPH-MCNC: 17.9 % (ref 12–22)
IGG/ALB SER: 1.55 {RATIO} (ref 1.1–1.8)
PROT PATTERN SERPL ELPH-IMP: NORMAL
PROT PATTERN UR ELPH-IMP: NORMAL
PROT SERPL-MCNC: 7.4 G/DL (ref 6.4–8.2)
PROT UR-MCNC: 10 MG/DL

## 2021-01-15 PROCEDURE — 3061F NEG MICROALBUMINURIA REV: CPT | Performed by: FAMILY MEDICINE

## 2021-01-15 PROCEDURE — 3061F NEG MICROALBUMINURIA REV: CPT | Performed by: NEUROLOGICAL SURGERY

## 2021-01-18 ENCOUNTER — TELEPHONE (OUTPATIENT)
Dept: FAMILY MEDICINE CLINIC | Facility: CLINIC | Age: 56
End: 2021-01-18

## 2021-01-18 NOTE — TELEPHONE ENCOUNTER
Hi Ladies, When you can, can you print out this patient's ophthalmology referral and send to me please!  Thanks!!

## 2021-01-20 ENCOUNTER — TELEPHONE (OUTPATIENT)
Dept: FAMILY MEDICINE CLINIC | Facility: CLINIC | Age: 56
End: 2021-01-20

## 2021-01-22 ENCOUNTER — CONSULT (OUTPATIENT)
Dept: HEMATOLOGY ONCOLOGY | Facility: CLINIC | Age: 56
End: 2021-01-22
Payer: COMMERCIAL

## 2021-01-22 VITALS
TEMPERATURE: 97.3 F | OXYGEN SATURATION: 98 % | WEIGHT: 195.6 LBS | DIASTOLIC BLOOD PRESSURE: 74 MMHG | SYSTOLIC BLOOD PRESSURE: 130 MMHG | HEART RATE: 98 BPM | RESPIRATION RATE: 18 BRPM | HEIGHT: 69 IN | BODY MASS INDEX: 28.97 KG/M2

## 2021-01-22 DIAGNOSIS — M89.9 FRONTAL SKULL LESION: Primary | ICD-10-CM

## 2021-01-22 PROCEDURE — 99245 OFF/OP CONSLTJ NEW/EST HI 55: CPT | Performed by: INTERNAL MEDICINE

## 2021-01-22 NOTE — PROGRESS NOTES
[facility] - Hematology & Medical Oncology  Outpatient Visit Encounter Note      Zunilda Silva 50 y o  male [id number] Date:  1/22/2021      SUBJECTIVE      Mr  Jakub Keys is a 53-year-old male see me as a new consult for a frontal skull lesion  He is referred by his family physician  As of this office visit there has been full body workup there is no diagnosis  In review of his chart and talking with him, there was an MRI of the brain done after he experienced dizziness confusion double vision  The scan showed no abnormalities in the brain but there was a left paramedian frontal bone lesion at 2 cm that was suspicious for eroding into the calvarium  The radiologist read this report with a possible differential of osseous metastases  After seeing his family physician, he underwent an extensive diagnostic workup  A bone scan was done that showed the same MRI finding with no other findings  A CT of the chest abdomen and pelvis was done that failed to show any focal lesions  A CMP and CBC done are both relatively normal   A PSA was done that was also normal   A urine electrophoresis was negative for any monoclonal bands  A serum electrophoresis also was negative  He is scheduled to see neurosurgery on January 25th  He denies any fevers chills nausea vomiting headache abdominal pain shortness of breath cough weight loss  He denies any tobacco or alcohol abuse history  He denies any cancer history with himself  He says that his mother was diagnosed with early stage breast cancer  I have reviewed the relevant past medical, surgical, social and family history  I have also reviewed allergies and medications for this patient      Review of Systems  ROS      OBJECTIVE     Physical Exam  Vitals:    01/22/21 1408   BP: 130/74   BP Location: Left arm   Patient Position: Sitting   Cuff Size: Adult   Pulse: 98   Resp: 18   Temp: (!) 97 3 °F (36 3 °C) TempSrc: Tympanic   SpO2: 98%   Weight: 88 7 kg (195 lb 9 6 oz)   Height: 5' 9" (1 753 m)       Physical Exam  Constitutional:       General: He is not in acute distress  Appearance: He is not ill-appearing, toxic-appearing or diaphoretic  HENT:      Head: Normocephalic and atraumatic  Eyes:      Extraocular Movements: Extraocular movements intact  Pupils: Pupils are equal, round, and reactive to light  Neck:      Musculoskeletal: Normal range of motion and neck supple  Cardiovascular:      Rate and Rhythm: Normal rate  Pulmonary:      Effort: Pulmonary effort is normal  No respiratory distress  Abdominal:      General: Bowel sounds are normal  There is no distension  Palpations: Abdomen is soft  Tenderness: There is no abdominal tenderness  There is no guarding  Musculoskeletal: Normal range of motion  General: No swelling or tenderness  Right lower leg: No edema  Left lower leg: No edema  Neurological:      General: No focal deficit present  Mental Status: He is alert and oriented to person, place, and time  Gait: Gait normal           Imaging  Relevant imaging reviewed in chart    Labs  Relevant labs reviewed in chart   ASSESSMENT & PLAN      Diagnosis ICD-10-CM Associated Orders   1  Frontal skull lesion  M89 9 Ambulatory referral to Hematology / Oncology       · There is no tissue diagnosis at this point  · SPEP and UPEP are negative  There is no indication that there is any plasma cell dyscrasia at this point  CBC and CMP are also unremarkable  CT chest abdomen pelvis is also unremarkable  · I had sent a message to his family doctor when I was reviewing this patient's chart and recommended that he see neurosurgery for consultation  · I anticipate that neurosurgery will see the patient and decide if they can go ahead and biopsy this identifiable lesion so that there is a tissue diagnosis    · I have no additional diagnostic workup planned for this patient at this point  Follow Up   One month hoping that there is an answer to what he has in the calvarium  All questions were answered to the patient's satisfaction during this encounter  They appreciated and thanked me for spending time with them  The patient knows the contact information for our office and know to reach out for any relevant concerns related to this encounter  For all other listed problems and medical diagnosis in his chart - they are managed by PCP and/or other specialists, which patient acknowledges  Dr Amish Flores MD  Hematology & Medical Oncology

## 2021-01-25 ENCOUNTER — CONSULT (OUTPATIENT)
Dept: NEUROSURGERY | Facility: CLINIC | Age: 56
End: 2021-01-25
Payer: COMMERCIAL

## 2021-01-25 VITALS
RESPIRATION RATE: 16 BRPM | HEIGHT: 69 IN | TEMPERATURE: 96.4 F | DIASTOLIC BLOOD PRESSURE: 85 MMHG | HEART RATE: 87 BPM | WEIGHT: 199 LBS | SYSTOLIC BLOOD PRESSURE: 136 MMHG | BODY MASS INDEX: 29.47 KG/M2

## 2021-01-25 DIAGNOSIS — M89.9 FRONTAL SKULL LESION: Primary | ICD-10-CM

## 2021-01-25 PROCEDURE — 1036F TOBACCO NON-USER: CPT | Performed by: NEUROLOGICAL SURGERY

## 2021-01-25 PROCEDURE — 3008F BODY MASS INDEX DOCD: CPT | Performed by: FAMILY MEDICINE

## 2021-01-25 PROCEDURE — 3008F BODY MASS INDEX DOCD: CPT | Performed by: NEUROLOGICAL SURGERY

## 2021-01-25 PROCEDURE — 3075F SYST BP GE 130 - 139MM HG: CPT | Performed by: NEUROLOGICAL SURGERY

## 2021-01-25 PROCEDURE — 3079F DIAST BP 80-89 MM HG: CPT | Performed by: NEUROLOGICAL SURGERY

## 2021-01-25 PROCEDURE — 99204 OFFICE O/P NEW MOD 45 MIN: CPT | Performed by: NEUROLOGICAL SURGERY

## 2021-01-25 NOTE — PROGRESS NOTES
Neurosurgery Office Note  Leslie Crawley 50 y o  male MRN: [id number]      Assessment/Plan      Diagnoses and all orders for this visit:    Frontal skull lesion  -     Ambulatory referral to Neurosurgery  -     MRI brain w wo contrast; Future          Discussion:    79-year-old male who over 2 months ago had an episode of dizziness after being in a car with a warm heater, and then dizziness on standing  He has had no symptoms since  He had a CT scan of the head around that time that was read essentially is normal   His PCP then referred him for a follow-up MRI scan, that was done towards the middle of 12/2020  This showed a bony left frontal lesion, with perhaps a Tatiana of the inner table, and some epidural extension  This led to further workup, including bone scan, CT chest and pelvis to evaluate for primary malignancy, etcetera  The CT of the chest and pelvis was negative, the bone scan had some mild uptake at the site, no other sites, which is not definitive for either benign or malignant etiology  On exam today, the patient is entirely normal   This lesion is not particularly palpable, there is no softness the skull, is nontender  Case [facility]  Biopsy was felt to be reasonable as an option  I discussed the patient various options for management, ranging from observation to biopsy to resection and reconstruction of the skull  I discouraged the ladder without a biopsy  However, as he is asymptomatic, I think a follow-up study in the near future to assess its behavior is reasonable  He is most comfortable with that approach, although we did discuss biopsy in some detail  Ultimately reviewed on follow-up scan in 6 weeks, which would be 3 months from the prior MRI scan  I have placed the order for the study  I will see him back after that is complete      01/25/21 Metrics: [id number] 16079=6 000; VAS 90; ECOG 0; KPS 90          CHIEF COMPLAINT    Chief Complaint   Patient presents with    Consult     NP 2CM LESION MRI BRAIN SL       HISTORY    History of Present Illness     50y o  year old male     HPI    See Discussion    REVIEW OF SYSTEMS    Review of Systems   Constitutional: Negative  HENT: Negative  Eyes: Negative  Respiratory: Negative  Cardiovascular: Negative  Gastrointestinal: Negative  Endocrine: Negative  Genitourinary: Negative  Musculoskeletal: Negative for back pain (left knee only with exercise such as running)  Skin: Negative  Allergic/Immunologic: Negative  Neurological: Negative for dizziness, seizures, syncope, weakness, numbness and headaches  Hematological: Negative  Psychiatric/Behavioral: Negative  Meds/Allergies     Current Outpatient Medications   Medication Sig Dispense Refill    atorvastatin (LIPITOR) 40 mg tablet Take 1 tablet (40 mg total) by mouth daily 90 tablet 1    clotrimazole (LOTRIMIN) 1 % cream Apply topically 2 (two) times a day 30 g 0    glucose blood (Accu-Chek Guide) test strip Use 1 each 2 (two) times a day Use as instructed 100 each 5    glucose blood (OneTouch Verio) test strip Use 1 each 2 (two) times a day Use as instructed 100 each 5    levothyroxine 150 mcg tablet Take 1 tablet (150 mcg total) by mouth daily 90 tablet 1    lisinopril-hydrochlorothiazide (PRINZIDE,ZESTORETIC) 10-12 5 MG per tablet Take 1 tablet by mouth daily 90 tablet 0    metFORMIN (GLUCOPHAGE) 1000 MG tablet Take 1 tablet (1,000 mg total) by mouth 2 (two) times a day with meals 180 tablet 1     No current facility-administered medications for this visit          No Known Allergies    PAST HISTORY    Past Medical History:   Diagnosis Date    Diabetes mellitus ([facility] [address] )     Elevated LFTs     last assessed 02Nov2015    Hypertension     Impaired fasting glucose     last assessed 29Jan2014       Past Surgical History:   Procedure Laterality Date    APPENDECTOMY         Social History     Tobacco Use    Smoking status: Never Smoker    Smokeless tobacco: Never Used   Substance Use Topics    Alcohol use: Yes     Alcohol/week: 3 0 standard drinks     Types: 3 Glasses of wine per week     Comment: drinks daily     Drug use: No       Family History   Problem Relation Age of Onset    Diabetes Mother     Diabetes Father          The following portions of the patient's history were reviewed in this encounter and updated as appropriate: Past medical, surgical, family, and social history, as well as medications, allergies, and review of systems  EXAM    Vitals:Blood pressure 136/85, pulse 87, temperature (!) 96 4 °F (35 8 °C), resp  rate 16, height 5' 9" (1 753 m), weight 90 3 kg (199 lb)  ,Body mass index is 29 39 kg/m²  Physical Exam  Vitals signs reviewed  Constitutional:       Appearance: Normal appearance  He is well-developed  HENT:      Head: Normocephalic and atraumatic  Eyes:      General: No scleral icterus  Neck:      Musculoskeletal: Neck supple  Cardiovascular:      Rate and Rhythm: Normal rate  Pulmonary:      Effort: Pulmonary effort is normal    Skin:     General: Skin is warm and dry  Neurological:      Mental Status: He is alert and oriented to person, place, and time  Sensory: No sensory deficit  Psychiatric:         Speech: Speech normal          Behavior: Behavior normal          Neurologic Exam     Mental Status   Oriented to person, place, and time  Attention: normal    Speech: speech is normal   Level of consciousness: alert    Cranial Nerves     CN VII   Facial expression full, symmetric  Motor Exam   Muscle bulk: normal  Overall muscle tone: normal  Moves all extremities, grossly normal     Gait, Coordination, and Reflexes     Tremor   Resting tremor: absent  Intention tremor: absent  Action tremor: absent  No aids            MEDICAL DECISION MAKING    Imaging Studies:     MRI scan brain, 12/18/20:    ADDENDUM:     Prior outside CT scan dated 11/19/2020 is now been made available for comparison  The focal lesion within the left paramedian frontal bone seen on the current MRI examination corresponds to an irregular marrow lesion within the diploic space with some endosteal scalloping and erosion of the inner table of the bone on the prior CT scan  Again the findings are nonspecific but suspicious for a primary bony lesion or osseous metastasis  Consider metastatic workup which may include CT chest, abdomen and   pelvis  Bone scan imaging would better evaluate this lesion as well as identify any other bony lesions within the skeletal system  IMPRESSION:     There is a subtle marrow replacing lesion within the left paramedian frontal bone measuring nearly 2 cm in length demonstrating enhancement  There is suspected erosion through the inner table of the calvarium with mild dural thickening and enhancement   seen within the underlying dura including a small subcentimeter nodular focus of enhancement on series 12 image 17  Findings are nonspecific and differential considerations would include an osseous metastasis  We are attempting to obtain a CT of the   brain dated 11/19/2020 which was performed at an outside institution  Nm Bone Scan Whole Body    Result Date: 1/5/2021  Narrative: BONE SCAN  WHOLE BODY INDICATION: Marrow replacing lesion left frontal bone  M89 9: Disorder of bone, unspecified PREVIOUS FILM CORRELATION:    Outside head CT 11/19/2020, MRI brain 12/18/2020 TECHNIQUE:   This study was performed following the intravenous administration of 24 4 mCi Tc-99m labeled MDP  Delayed, anterior and posterior whole body images were acquired, 2-3 hours after radiopharmaceutical administration  Additional delayed oblique static images acquired of the bilateral ribs and pelvis  FINDINGS:  Small focus of increased radiotracer uptake at the left frontal calvarium  This corresponds to the lesion seen on prior imaging   Mild linear radiotracer uptake at the manubriosternal junction compatible with normal physiologic activity  A few foci of radiotracer uptake at the bilateral shoulders and knees likely to arthritic changes in the distribution  Renal activity is symmetric  Impression: 1  Small focus of increased radiotracer uptake at the left frontal calvarium which corresponds to the known calvarial lesion  Bone scan uptake in itself is nonspecific and can be seen with both benign or malignant entities  2   No additional foci suspicious for osseous metastasis  Workstation performed: [id number]     Ct Chest Abdomen Pelvis W Contrast    Result Date: 1/8/2021  Narrative: CT CHEST, ABDOMEN AND PELVIS WITH IV CONTRAST INDICATION:   M89 9: Disorder of bone, unspecified  Left frontal bone lucent lesion, possibly metastatic  COMPARISON:  Bone scan dated 1/5/2021, MRI brain dated 12/18/2020 TECHNIQUE: CT examination of the chest, abdomen and pelvis was performed  Axial, sagittal, and coronal 2D reformatted images were created from the source data and submitted for interpretation  Radiation dose length product (DLP) for this visit:  803 8 mGy-cm   This examination, like all CT scans performed in the [facility], was performed utilizing techniques to minimize radiation dose exposure, including the use of iterative reconstruction and automated exposure control  IV Contrast:  100 mL of iohexol (OMNIPAQUE) Enteric Contrast: Enteric contrast was administered  FINDINGS: CHEST LUNGS:  Lungs are clear  There is no tracheal or endobronchial lesion  PLEURA:  Unremarkable  HEART/GREAT VESSELS:  Unremarkable for patient's age  MEDIASTINUM AND JOEY:  Unremarkable  CHEST WALL AND LOWER NECK:   Unremarkable  ABDOMEN LIVER/BILIARY TREE:  Fatty liver noted  GALLBLADDER:  No calcified gallstones  No pericholecystic inflammatory change  SPLEEN:  Unremarkable  PANCREAS:  Unremarkable  ADRENAL GLANDS:  Unremarkable  KIDNEYS/URETERS:  Unremarkable  No hydronephrosis   STOMACH AND BOWEL:  Unremarkable  APPENDIX:  There are expected postoperative changes of appendectomy  ABDOMINOPELVIC CAVITY:  No ascites  No pneumoperitoneum  No lymphadenopathy  VESSELS:  Unremarkable for patient's age  PELVIS REPRODUCTIVE ORGANS:  Unremarkable for patient's age  URINARY BLADDER:  Unremarkable  ABDOMINAL WALL/INGUINAL REGIONS:  There is a small fat-containing umbilical hernia  OSSEOUS STRUCTURES:  No acute fracture or destructive osseous lesion  Impression: No evidence of primary neoplasm throughout the chest, abdomen or pelvis  No additional bone lesions identified  Workstation performed: [id number]       I have personally reviewed pertinent reports     and I have personally reviewed pertinent films in PACS with a Radiologist   At [facility]

## 2021-03-03 ENCOUNTER — OFFICE VISIT (OUTPATIENT)
Dept: GASTROENTEROLOGY | Facility: AMBULARY SURGERY CENTER | Age: 56
End: 2021-03-03
Payer: COMMERCIAL

## 2021-03-03 VITALS
WEIGHT: 195 LBS | DIASTOLIC BLOOD PRESSURE: 88 MMHG | BODY MASS INDEX: 28.88 KG/M2 | SYSTOLIC BLOOD PRESSURE: 142 MMHG | HEIGHT: 69 IN

## 2021-03-03 DIAGNOSIS — Z12.11 SCREENING FOR COLON CANCER: Primary | ICD-10-CM

## 2021-03-03 DIAGNOSIS — R74.01 ELEVATED AST (SGOT): ICD-10-CM

## 2021-03-03 PROCEDURE — 99244 OFF/OP CNSLTJ NEW/EST MOD 40: CPT | Performed by: INTERNAL MEDICINE

## 2021-03-03 NOTE — LETTER
March 3, 2021     Oral Uribe MD  [facility] [address] [address]    Patient: Heriberto Quevedo   YOB: 1965   Date of Visit: 3/3/2021       Dear Dr Consuelo Parrish: Thank you for referring Heriberto Quevedo to me for evaluation  Below are my notes for this consultation  If you have questions, please do not hesitate to call me  I look forward to following your patient along with you  Sincerely,        Anabel Ureña MD        CC: No Recipients  Anabel Ureña MD  3/3/2021  2:57 PM  Sign when Signing Visit  [facility] -  [facility]  Heriberto Quevedo 50 y o  male MRN: [id number]  Unit/Bed#:  Encounter: [id number]        Consults    ASSESSMENT/PLAN:       1  Colon cancer screening- average risk, no prior colonoscopy, no change in bowel habits or hematochezia  Patient is not on any antiplatelets or anticoagulants  No family history of colon cancer  Patient was explained about  the risks and benefits of the procedure  Risks including but not limited to bleeding, infection, perforation were explained in detail  Also explained about less than 100% sensitivity with the exam and other alternatives  2   Mild elevation of total bilirubin- previously noted to have mild elevation of transaminases, suspect likely secondary to alcohol use  Had a lengthy discussion with patient regarding importance of stopping alcohol use  No signs of cirrhosis a clinically, no signs of cirrhosis biochemically  Discussed to avoid all NSAIDs, limit the use of acetaminophen  -  If LFTs are persistently elevated, would recommend right upper quadrant ultrasound to assess for gallstones and choledocholithiasis        ______________________________________________________________________    Reason for Consult / Principal Problem: [unfilled]    HPI: Heriberto Quevedo is a 50y o  year old maleWith history of diabetes, hypertension, presents for colon cancer screening evaluation  Patient has never had a colonoscopy  Denies any family history of colon cancer  No change in bowel habits or hematochezia  Does not take any antiplatelets or anticoagulants  He denies any upper GI symptoms including acid reflux, dysphagia, odynophagia, loss of appetite or early satiety  , labs were reviewed, is notable for mild elevation of transaminases a dating back to 2017 which have normalized however bilirubin is mildly elevated at 1 02   CT scan earlier this year was notable for fatty changes in the liver  Patient reports extensive alcohol use previously however he is cutting down  Review of Systems: The remainder of the review of systems was negative except for the pertinent positives noted in HPI  Historical Information   Past Medical History:   Diagnosis Date    Diabetes mellitus ([facility] [address] )     Elevated LFTs     last assessed 02Nov2015    Hypertension     Impaired fasting glucose     last assessed 29Jan2014     Past Surgical History:   Procedure Laterality Date    APPENDECTOMY       Social History   Social History     Substance and Sexual Activity   Alcohol Use Yes    Alcohol/week: 3 0 standard drinks    Types: 3 Glasses of wine per week    Comment: drinks daily      Social History     Substance and Sexual Activity   Drug Use No     Social History     Tobacco Use   Smoking Status Never Smoker   Smokeless Tobacco Never Used     Family History   Problem Relation Age of Onset    Diabetes Mother     Breast cancer Mother     Diabetes Father        Meds/Allergies     (Not in a hospital admission)    No current facility-administered medications for this visit  No Known Allergies    Objective     Blood pressure 142/88, height 5' 9" (1 753 m), weight 88 5 kg (195 lb)      [unfilled]    PHYSICAL EXAM     GEN: well nourished, well developed, no acute distress  HEENT: anicteric, MMM, no cervical or supraclavicular lymphadenopathy  CV: RRR, no m/r/g  CHEST: CTA b/l, no WRR  ABD: +BS, soft, NT/ND, no hepatosplenomegaly  EXT: no c/c/e  SKIN: no rashes,  NEURO: aaox3    Lab Results:   No visits with results within 1 Day(s) from this visit  Latest known visit with results is:   Appointment on 01/13/2021   Component Date Value    Sodium 01/13/2021 137     Potassium 01/13/2021 4 2     Chloride 01/13/2021 100     CO2 01/13/2021 31     ANION GAP 01/13/2021 6     BUN 01/13/2021 9     Creatinine 01/13/2021 0 88     Glucose, Fasting 01/13/2021 152*    Calcium 01/13/2021 8 9     AST 01/13/2021 26     ALT 01/13/2021 36     Alkaline Phosphatase 01/13/2021 74     Total Protein 01/13/2021 7 5     Albumin 01/13/2021 4 0     Total Bilirubin 01/13/2021 1 24*    eGFR 01/13/2021 98     Hemoglobin A1C 01/13/2021 6 3*    EAG 01/13/2021 134     Cholesterol 01/13/2021 145     Triglycerides 01/13/2021 127     HDL, Direct 01/13/2021 50     LDL Calculated 01/13/2021 70     Non-HDL-Chol (CHOL-HDL) 01/13/2021 95     HIV-1/HIV-2 Ab 01/13/2021 Non-Reactive     A/G Ratio 01/13/2021 1 55     Albumin Electrophoresis 01/13/2021 60 8     Albumin CONC 01/13/2021 4 50     Alpha 1 01/13/2021 3 3     ALPHA 1 CONC 01/13/2021 0 24     Alpha 2 01/13/2021 7 3     ALPHA 2 CONC 01/13/2021 0 54     Beta-1 01/13/2021 5 9     BETA 1 CONC 01/13/2021 0 44     Beta-2 01/13/2021 4 8     BETA 2 CONC 01/13/2021 0 36     Gamma Globulin 01/13/2021 17 9     GAMMA CONC 01/13/2021 1 32     Total Protein 01/13/2021 7 4     SPEP Interpretation 01/13/2021 No monoclonal bands noted   Reviewed by: Amadou Bah DO **Electronic Signature**     Sed Rate 01/13/2021 1      Imaging Studies: I have personally reviewed pertinent films in PACS

## 2021-03-03 NOTE — PROGRESS NOTES
Consultation -  [facility]  Sherry Lancaster 50 y o  male MRN: [id number]  Unit/Bed#:  Encounter: [id number]        Consults    ASSESSMENT/PLAN:       1  Colon cancer screening- average risk, no prior colonoscopy, no change in bowel habits or hematochezia  Patient is not on any antiplatelets or anticoagulants  No family history of colon cancer  Patient was explained about  the risks and benefits of the procedure  Risks including but not limited to bleeding, infection, perforation were explained in detail  Also explained about less than 100% sensitivity with the exam and other alternatives  2   Mild elevation of total bilirubin- previously noted to have mild elevation of transaminases, suspect likely secondary to alcohol use  Had a lengthy discussion with patient regarding importance of stopping alcohol use  No signs of cirrhosis a clinically, no signs of cirrhosis biochemically  Discussed to avoid all NSAIDs, limit the use of acetaminophen  -  If LFTs are persistently elevated, would recommend right upper quadrant ultrasound to assess for gallstones and choledocholithiasis  ______________________________________________________________________    Reason for Consult / Principal Problem: [unfilled]    HPI: Sherry Lancaster is a 50y o  year old maleWith history of diabetes, hypertension, presents for colon cancer screening evaluation  Patient has never had a colonoscopy  Denies any family history of colon cancer  No change in bowel habits or hematochezia  Does not take any antiplatelets or anticoagulants  He denies any upper GI symptoms including acid reflux, dysphagia, odynophagia, loss of appetite or early satiety  , labs were reviewed, is notable for mild elevation of transaminases a dating back to 2017 which have normalized however bilirubin is mildly elevated at 1 02   CT scan earlier this year was notable for fatty changes in the liver    Patient reports extensive alcohol use previously however he is cutting down  Review of Systems: The remainder of the review of systems was negative except for the pertinent positives noted in HPI  Historical Information   Past Medical History:   Diagnosis Date    Diabetes mellitus ([address] )     Elevated LFTs     last assessed 02Nov2015    Hypertension     Impaired fasting glucose     last assessed 29Jan2014     Past Surgical History:   Procedure Laterality Date    APPENDECTOMY       Social History   Social History     Substance and Sexual Activity   Alcohol Use Yes    Alcohol/week: 3 0 standard drinks    Types: 3 Glasses of wine per week    Comment: drinks daily      Social History     Substance and Sexual Activity   Drug Use No     Social History     Tobacco Use   Smoking Status Never Smoker   Smokeless Tobacco Never Used     Family History   Problem Relation Age of Onset    Diabetes Mother     Breast cancer Mother     Diabetes Father        Meds/Allergies     (Not in a hospital admission)    No current facility-administered medications for this visit  No Known Allergies    Objective     Blood pressure 142/88, height 5' 9" (1 753 m), weight 88 5 kg (195 lb)  [unfilled]    PHYSICAL EXAM     GEN: well nourished, well developed, no acute distress  HEENT: anicteric, MMM, no cervical or supraclavicular lymphadenopathy  CV: RRR, no m/r/g  CHEST: CTA b/l, no WRR  ABD: +BS, soft, NT/ND, no hepatosplenomegaly  EXT: no c/c/e  SKIN: no rashes,  NEURO: aaox3    Lab Results:   No visits with results within 1 Day(s) from this visit     Latest known visit with results is:   Appointment on 01/13/2021   Component Date Value    Sodium 01/13/2021 137     Potassium 01/13/2021 4 2     Chloride 01/13/2021 100     CO2 01/13/2021 31     ANION GAP 01/13/2021 6     BUN 01/13/2021 9     Creatinine 01/13/2021 0 88     Glucose, Fasting 01/13/2021 152*    Calcium 01/13/2021 8 9     AST 01/13/2021 26     ALT 01/13/2021 36     Alkaline Phosphatase 01/13/2021 74     Total Protein 01/13/2021 7 5     Albumin 01/13/2021 4 0     Total Bilirubin 01/13/2021 1 24*    eGFR 01/13/2021 98     Hemoglobin A1C 01/13/2021 6 3*    EAG 01/13/2021 134     Cholesterol 01/13/2021 145     Triglycerides 01/13/2021 127     HDL, Direct 01/13/2021 50     LDL Calculated 01/13/2021 70     Non-HDL-Chol (CHOL-HDL) 01/13/2021 95     HIV-1/HIV-2 Ab 01/13/2021 Non-Reactive     A/G Ratio 01/13/2021 1 55     Albumin Electrophoresis 01/13/2021 60 8     Albumin CONC 01/13/2021 4 50     Alpha 1 01/13/2021 3 3     ALPHA 1 CONC 01/13/2021 0 24     Alpha 2 01/13/2021 7 3     ALPHA 2 CONC 01/13/2021 0 54     Beta-1 01/13/2021 5 9     BETA 1 CONC 01/13/2021 0 44     Beta-2 01/13/2021 4 8     BETA 2 CONC 01/13/2021 0 36     Gamma Globulin 01/13/2021 17 9     GAMMA CONC 01/13/2021 1 32     Total Protein 01/13/2021 7 4     SPEP Interpretation 01/13/2021 No monoclonal bands noted   Reviewed by: Nilsa Youngblood DO **Electronic Signature**     Sed Rate 01/13/2021 1      Imaging Studies: I have personally reviewed pertinent films in PACS

## 2021-03-11 ENCOUNTER — HOSPITAL ENCOUNTER (OUTPATIENT)
Dept: MRI IMAGING | Facility: HOSPITAL | Age: 56
Discharge: HOME | End: 2021-03-11
Attending: NEUROLOGICAL SURGERY
Payer: COMMERCIAL

## 2021-03-11 DIAGNOSIS — M89.9 FRONTAL SKULL LESION: ICD-10-CM

## 2021-03-11 PROCEDURE — A9585 GADOBUTROL INJECTION: HCPCS | Performed by: NEUROLOGICAL SURGERY

## 2021-03-11 PROCEDURE — 70553 MRI BRAIN STEM W/O & W/DYE: CPT

## 2021-03-11 PROCEDURE — G1004 CDSM NDSC: HCPCS

## 2021-03-11 RX ADMIN — GADOBUTROL 8 ML: 604.72 INJECTION INTRAVENOUS at 11:21

## 2021-03-21 NOTE — ASSESSMENT & PLAN NOTE
Patient seen in outpatient office for follow-up for left frontal bony lesion  · This was an incidental finding on MRI during a workup for dizziness by his PCP  · Patient also underwent bone scan, CT chest abdomen and pelvis to evaluate for primary malignancy which were negative  · Patient's case was reviewed at [facility]  Biopsy was felt to be reasonable as an option but given patient is asymptomatic and has not had any further symptoms, no biopsy recommended at this time  Imaging:    MRI brain w/wo 3/11/21:No significant interval change since 12/18/2020  Stable enhancing lesion within the left frontal calvarium with mild subjacent nodular dural thickening  Plan:  · Reviewed imaging with patient  · Given patient is asymptomatic and has not had any further symptoms, no biopsy is recommended at this time  · Patient will follow-up in 6 months with MRI brain w/wo or sooner if symptoms worsen  · Patient made aware if he develops any new neurological changes or red flags to follow-up sooner  · Patient made aware to contact Neurosurgery with any further questions or concerns

## 2021-03-22 ENCOUNTER — OFFICE VISIT (OUTPATIENT)
Dept: NEUROSURGERY | Facility: CLINIC | Age: 56
End: 2021-03-22
Payer: COMMERCIAL

## 2021-03-22 VITALS
WEIGHT: 190 LBS | DIASTOLIC BLOOD PRESSURE: 90 MMHG | HEIGHT: 69 IN | BODY MASS INDEX: 28.14 KG/M2 | SYSTOLIC BLOOD PRESSURE: 135 MMHG | TEMPERATURE: 99 F

## 2021-03-22 DIAGNOSIS — M89.9 FRONTAL SKULL LESION: Primary | ICD-10-CM

## 2021-03-22 PROCEDURE — 1036F TOBACCO NON-USER: CPT | Performed by: NURSE PRACTITIONER

## 2021-03-22 PROCEDURE — 99214 OFFICE O/P EST MOD 30 MIN: CPT | Performed by: NURSE PRACTITIONER

## 2021-03-22 NOTE — PROGRESS NOTES
Neurosurgery Office Note  Tom Carvalho 50 y o  male MRN: [id number]      Assessment/Plan     Frontal skull lesion  Patient seen in outpatient office for follow-up for left frontal bony lesion  · This was an incidental finding on MRI during a workup for dizziness by his PCP  · Patient also underwent bone scan, CT chest abdomen and pelvis to evaluate for primary malignancy which were negative  · Patient's case was reviewed at [facility]  Biopsy was felt to be reasonable as an option but given patient is asymptomatic and has not had any further symptoms, no biopsy recommended at this time  Imaging:    MRI brain w/wo 3/11/21:No significant interval change since 12/18/2020  Stable enhancing lesion within the left frontal calvarium with mild subjacent nodular dural thickening  Plan:  · Reviewed imaging with patient  · Given patient is asymptomatic and has not had any further symptoms, no biopsy is recommended at this time  · Patient will follow-up in 6 months with MRI brain w/wo or sooner if symptoms worsen  · Patient made aware if he develops any new neurological changes or red flags to follow-up sooner  · Patient made aware to contact Neurosurgery with any further questions or concerns  Diagnoses and all orders for this visit:    Frontal skull lesion  -     MRI brain with and without contrast; Future            CHIEF COMPLAINT    Chief Complaint   Patient presents with    Follow-up     Frontal skull lesion       HISTORY    History of Present Illness     50y o  year old male  with past medical history significant for type 2 diabetes, hypertension, and hyperlipidemia  Patient seen in outpatient office today for further follow-up of left frontal bony lesion  This was an incidental finding on MRI during a workup for dizziness by his PCP  Patient also underwent bone scan, CT chest abdomen and pelvis to evaluate for primary malignancy which were all negative    Patient's case was reviewed at [facility]  Biopsy was felt to be reasonable as an option but given patient is asymptomatic and has not had any further symptoms, no biopsy recommended at this time  Patient has no complaints  He denies any headaches, dizziness, blurry vision, chest pain, shortness of breath, abdominal pain, nausea, vomiting, diarrhea, no problems with bowel or bladder, no new weakness or numbness/ tingling  Patient states his dizziness has resolved  He denies any recent falls or traumas or difficulty with his balance  Patient will follow-up in 6 months with repeat MRI brain or sooner if symptoms worsen  Patient made aware to contact Neurosurgery with any further questions or concerns  HPI    See Discussion    REVIEW OF SYSTEMS    Review of Systems   Constitutional: Negative  HENT: Negative  Eyes: Negative  Respiratory: Negative  Cardiovascular: Negative  Gastrointestinal: Negative  Endocrine: Negative  Genitourinary: Negative  Musculoskeletal: Negative  Skin: Negative  Allergic/Immunologic: Negative  Neurological: Negative  Hematological: Negative  Psychiatric/Behavioral: Negative  ROS reviewed with patient and agree and changes were made as needed      Meds/Allergies     Current Outpatient Medications   Medication Sig Dispense Refill    atorvastatin (LIPITOR) 40 mg tablet Take 1 tablet (40 mg total) by mouth daily 90 tablet 1    clotrimazole (LOTRIMIN) 1 % cream Apply topically 2 (two) times a day 30 g 0    glucose blood (Accu-Chek Guide) test strip Use 1 each 2 (two) times a day Use as instructed 100 each 5    glucose blood (OneTouch Verio) test strip Use 1 each 2 (two) times a day Use as instructed 100 each 5    levothyroxine 150 mcg tablet Take 1 tablet (150 mcg total) by mouth daily 90 tablet 1    lisinopril-hydrochlorothiazide (PRINZIDE,ZESTORETIC) 10-12 5 MG per tablet Take 1 tablet by mouth daily 90 tablet 0    metFORMIN (GLUCOPHAGE) 1000 MG tablet Take 1 tablet (1,000 mg total) by mouth 2 (two) times a day with meals 180 tablet 1    Na Sulfate-K Sulfate-Mg Sulf 17 5-3 13-1 6 [id number] SOLN Take 1 applicator by mouth once for 1 dose 1 Bottle 0     No current facility-administered medications for this visit  No Known Allergies    PAST HISTORY    Past Medical History:   Diagnosis Date    [facility] mellitus ([facility] [address] [facility]     Elevated LFTs     last assessed 02Nov2015    Hypertension     Impaired fasting glucose     last assessed 29Jan2014       Past Surgical History:   Procedure Laterality Date    APPENDECTOMY         Social History     Tobacco Use    Smoking status: Never Smoker    Smokeless tobacco: Never Used   Substance Use Topics    Alcohol use: Yes     Alcohol/week: 3 0 standard drinks     Types: 3 Glasses of wine per week     Comment: drinks daily     Drug use: No       Family History   Problem Relation Age of Onset    Diabetes Mother     Breast cancer Mother     Diabetes Father          Above history personally reviewed  EXAM    Vitals:Blood pressure 135/90, temperature 99 °F (37 2 °C), height 5' 9" (1 753 m), weight 86 2 kg (190 lb)  ,Body mass index is 28 06 kg/m²  Physical Exam  Vitals signs reviewed  Constitutional:       General: He is awake  He is not in acute distress  Appearance: Normal appearance  He is not ill-appearing  HENT:      Head: Normocephalic and atraumatic  Eyes:      Extraocular Movements: Extraocular movements intact and EOM normal       Conjunctiva/sclera: Conjunctivae normal       Pupils: Pupils are equal, round, and reactive to light  Neck:      Musculoskeletal: Normal range of motion and neck supple  Cardiovascular:      Rate and Rhythm: Normal rate  Pulmonary:      Effort: Pulmonary effort is normal  No respiratory distress  Chest:      Chest wall: No tenderness  Abdominal:      General: There is no distension  Palpations: Abdomen is soft  Tenderness: There is no abdominal tenderness  Musculoskeletal: Normal range of motion  Skin:     General: Skin is warm and dry  Neurological:      Mental Status: He is alert and oriented to person, place, and time  Coordination: Finger-Nose-Finger Test normal       Gait: Gait is intact  Deep Tendon Reflexes: Strength normal       Reflex Scores:       Tricep reflexes are 1+ on the right side and 1+ on the left side  Bicep reflexes are 1+ on the right side and 1+ on the left side  Brachioradialis reflexes are 1+ on the right side and 1+ on the left side  Patellar reflexes are 1+ on the right side and 1+ on the left side  Achilles reflexes are 1+ on the right side and 1+ on the left side  Psychiatric:         Attention and Perception: Attention and perception normal          Mood and Affect: Mood and affect normal          Speech: Speech normal          Behavior: Behavior normal  Behavior is cooperative  Thought Content: Thought content normal          Cognition and Memory: Cognition and memory normal          Judgment: Judgment normal          Neurologic Exam     Mental Status   Oriented to person, place, and time  Follows 2 step commands  Attention: normal  Concentration: normal    Speech: speech is normal   Level of consciousness: alert  Knowledge: good  Able to perform simple calculations  Able to name object  Able to repeat  Normal comprehension  Cranial Nerves     CN III, IV, VI   Pupils are equal, round, and reactive to light  Extraocular motions are normal    CN III: no CN III palsy  CN VI: no CN VI palsy  Nystagmus: none   Diplopia: none  Conjugate gaze: present    CN V   Facial sensation intact  CN VII   Facial expression full, symmetric       CN VIII   CN VIII normal    Hearing: intact    CN IX, X   CN IX normal      CN XI   CN XI normal      CN XII   CN XII normal      Motor Exam   Muscle bulk: normal  Overall muscle tone: normal  Right arm pronator drift: absent  Left arm pronator drift: absent    Strength   Strength 5/5 throughout  Sensory Exam   Light touch normal    Proprioception normal    JPS and DST intact     Gait, Coordination, and Reflexes     Gait  Gait: normal    Coordination   Finger to nose coordination: normal    Tremor   Resting tremor: absent  Intention tremor: absent  Action tremor: absent    Reflexes   Right brachioradialis: 1+  Left brachioradialis: 1+  Right biceps: 1+  Left biceps: 1+  Right triceps: 1+  Left triceps: 1+  Right patellar: 1+  Left patellar: 1+  Right achilles: 1+  Left achilles: 1+  Right Nash: absent  Left Nash: absent  Right ankle clonus: absent  Left ankle clonus: absent        MEDICAL DECISION MAKING    Imaging Studies:     Mri Brain W Wo Contrast    Result Date: 3/15/2021  Narrative: MRI BRAIN WITH AND WITHOUT CONTRAST INDICATION: M89 9: Disorder of bone, unspecified  COMPARISON:  12/18/2020  Numerous and bone scan from 1/5/2021 correlation is also made to prior chest, abdomen and pelvis CT from 1/18/2021  TECHNIQUE: Sagittal T1, axial T2, axial FLAIR, axial T1, axial [place], axial diffusion  Sagittal, axial T1 postcontrast   Axial bravo postcontrast with coronal reconstructions  IV Contrast:  8 mL of Gadobutrol injection (SINGLE-DOSE)  IMAGE QUALITY:   Diagnostic  FINDINGS: BRAIN PARENCHYMA:  There is no discrete mass, mass effect or midline shift  There is no intracranial hemorrhage  Normal posterior fossa  Diffusion imaging is unremarkable  There are no white matter changes in the cerebral hemispheres  Postcontrast imaging of the brain demonstrates no abnormal enhancement  VENTRICLES:  Normal for the patient's age  SELLA AND PITUITARY GLAND:  Normal  ORBITS:  Normal  PARANASAL SINUSES:  Normal  VASCULATURE:  Evaluation of the major intracranial vasculature demonstrates appropriate flow voids  CALVARIUM AND SKULL BASE:  There is a stable enhancing left frontal calvarial lesion measuring approximately 1 7 x 0 6 cm    There is associated nodular enhancement again noted along the subjacent dura suspicious for mild epidural extension  No new areas  of abnormal enhancement identified  EXTRACRANIAL SOFT TISSUES:  Normal      Impression: No significant interval change since 12/18/2020  Stable enhancing lesion within the left frontal calvarium with mild subjacent nodular dural thickening  Workstation performed: [id number]       I have personally reviewed pertinent reports     and I have personally reviewed pertinent films in PACS

## 2021-03-25 ENCOUNTER — OFFICE VISIT (OUTPATIENT)
Dept: FAMILY MEDICINE CLINIC | Facility: CLINIC | Age: 56
End: 2021-03-25

## 2021-03-25 VITALS
DIASTOLIC BLOOD PRESSURE: 80 MMHG | SYSTOLIC BLOOD PRESSURE: 122 MMHG | BODY MASS INDEX: 28.38 KG/M2 | TEMPERATURE: 97.6 F | HEIGHT: 69 IN | OXYGEN SATURATION: 99 % | WEIGHT: 191.6 LBS | HEART RATE: 92 BPM | RESPIRATION RATE: 18 BRPM

## 2021-03-25 DIAGNOSIS — N48.1 BALANITIS: ICD-10-CM

## 2021-03-25 DIAGNOSIS — E11.9 TYPE 2 DIABETES MELLITUS WITHOUT COMPLICATION, WITHOUT LONG-TERM CURRENT USE OF INSULIN (HCC): Primary | ICD-10-CM

## 2021-03-25 DIAGNOSIS — I10 BENIGN ESSENTIAL HYPERTENSION: ICD-10-CM

## 2021-03-25 DIAGNOSIS — E03.9 HYPOTHYROIDISM, UNSPECIFIED TYPE: ICD-10-CM

## 2021-03-25 PROCEDURE — 99214 OFFICE O/P EST MOD 30 MIN: CPT | Performed by: FAMILY MEDICINE

## 2021-03-25 RX ORDER — CLOTRIMAZOLE 1 %
CREAM (GRAM) TOPICAL 2 TIMES DAILY
Qty: 30 G | Refills: 0 | Status: SHIPPED | OUTPATIENT
Start: 2021-03-25

## 2021-03-25 NOTE — PROGRESS NOTES
Assessment/Plan     Hypothyroidism    Currently on levothyroxine 150 mcg daily, will check TSH    Type 2 diabetes mellitus without complication, without long-term current use of insulin ([facility])    Lab Results   Component Value Date    HGBA1C 6 3 (H) 01/13/2021      on metformin 1000 mg b i d , reports that sometimes he misses 2nd dose of metformin  Advised to take metformin regularly  Lifestyle modification with diet and exercise  Will check A1c next visit    Benign essential hypertension   Blood pressure 122/80, continue current regimen  Lifestyle modification with diet and exercise    Balanitis    Advised to start clotrimazole b i d  and follow-up with urology  Patient does have an appointment scheduled with Urology in April Diagnoses and all orders for this visit:    Type 2 diabetes mellitus without complication, without long-term current use of insulin ([facility])    Balanitis  -     clotrimazole (LOTRIMIN) 1 % cream; Apply topically 2 (two) times a day    Hypothyroidism, unspecified type  -     TSH, 3rd generation; Future    Benign essential hypertension         Subjective     Chief Complaint   Patient presents with    Rash     sores, red bumps - burning and itching         26-year-old male presented to office today with complaints of rash on his penis patient has had this rash before, improves with application of clotrimazole  Rash is associated with itching  He also follows with urology and has appointment coming up in April  He also reports that he has noticed his sugars have recently been 170-180 after meals, he takes metformin 1000 mg twice a day, but misses evening doses sometimes  He is also on levothyroxine and will need repeat blood work  Rash  Pertinent negatives include no congestion, diarrhea, fever, shortness of breath or vomiting         The following portions of the patient's history were reviewed and updated as appropriate: allergies, current medications, past family history, past medical history, past social history, past surgical history and problem list     Review of Systems   Constitutional: Negative for activity change, chills and fever  HENT: Negative for congestion  Respiratory: Negative for shortness of breath  Cardiovascular: Negative for chest pain  Gastrointestinal: Negative for diarrhea, nausea and vomiting  Genitourinary: Negative for difficulty urinating  Musculoskeletal: Negative for back pain  Skin: Positive for rash  Neurological: Negative for headaches  Objective     Vitals:Blood pressure 122/80, pulse 92, temperature 97 6 °F (36 4 °C), temperature source Tympanic, resp  rate 18, height 5' 9" (1 753 m), weight 86 9 kg (191 lb 9 6 oz), SpO2 99 %  Physical Exam:  Physical Exam  Vitals signs reviewed  Constitutional:       Appearance: Normal appearance  He is well-developed  He is not ill-appearing  HENT:      Head: Normocephalic and atraumatic  Right Ear: External ear normal       Left Ear: External ear normal    Eyes:      Conjunctiva/sclera: Conjunctivae normal    Neck:      Musculoskeletal: Normal range of motion and neck supple  Cardiovascular:      Rate and Rhythm: Normal rate and regular rhythm  Heart sounds: Normal heart sounds  No murmur  No friction rub  Pulmonary:      Effort: Pulmonary effort is normal  No respiratory distress  Breath sounds: Normal breath sounds  No wheezing or rales  Musculoskeletal: Normal range of motion  Skin:     General: Skin is warm and dry  Comments: Erythematous lesions on the glans of penis on retraction of foreskin   Neurological:      Mental Status: He is alert and oriented to person, place, and time

## 2021-03-25 NOTE — ASSESSMENT & PLAN NOTE
Advised to start clotrimazole b i d  and follow-up with urology    Patient does have an appointment scheduled with Urology in April

## 2021-03-25 NOTE — ASSESSMENT & PLAN NOTE
Lab Results   Component Value Date    HGBA1C 6 3 (H) 01/13/2021      on metformin 1000 mg b i d , reports that sometimes he misses 2nd dose of metformin  Advised to take metformin regularly  Lifestyle modification with diet and exercise    Will check A1c next visit

## 2021-03-27 ENCOUNTER — IMMUNIZATIONS (OUTPATIENT)
Dept: FAMILY MEDICINE CLINIC | Facility: HOSPITAL | Age: 56
End: 2021-03-27

## 2021-03-27 DIAGNOSIS — Z23 ENCOUNTER FOR IMMUNIZATION: Primary | ICD-10-CM

## 2021-03-27 PROCEDURE — 0001A SARS-COV-2 / COVID-19 MRNA VACCINE (PFIZER-BIONTECH) 30 MCG: CPT

## 2021-03-27 PROCEDURE — 91300 SARS-COV-2 / COVID-19 MRNA VACCINE (PFIZER-BIONTECH) 30 MCG: CPT

## 2021-04-11 DIAGNOSIS — I10 ESSENTIAL HYPERTENSION: ICD-10-CM

## 2021-04-11 RX ORDER — LISINOPRIL AND HYDROCHLOROTHIAZIDE 12.5; 1 MG/1; MG/1
TABLET ORAL
Qty: 90 TABLET | Refills: 0 | Status: SHIPPED | OUTPATIENT
Start: 2021-04-11 | End: 2021-07-25

## 2021-04-13 ENCOUNTER — TRANSCRIBE ORDERS (OUTPATIENT)
Dept: LAB | Facility: CLINIC | Age: 56
End: 2021-04-13

## 2021-04-13 ENCOUNTER — APPOINTMENT (OUTPATIENT)
Dept: LAB | Facility: CLINIC | Age: 56
End: 2021-04-13
Payer: COMMERCIAL

## 2021-04-13 DIAGNOSIS — E03.9 HYPOTHYROIDISM, UNSPECIFIED TYPE: ICD-10-CM

## 2021-04-13 LAB — TSH SERPL DL<=0.05 MIU/L-ACNC: 0.17 UIU/ML (ref 0.36–3.74)

## 2021-04-13 PROCEDURE — 36415 COLL VENOUS BLD VENIPUNCTURE: CPT

## 2021-04-13 PROCEDURE — 84443 ASSAY THYROID STIM HORMONE: CPT

## 2021-04-19 ENCOUNTER — IMMUNIZATIONS (OUTPATIENT)
Dept: FAMILY MEDICINE CLINIC | Facility: HOSPITAL | Age: 56
End: 2021-04-19

## 2021-04-19 DIAGNOSIS — Z23 ENCOUNTER FOR IMMUNIZATION: Primary | ICD-10-CM

## 2021-04-19 PROCEDURE — 91300 SARS-COV-2 / COVID-19 MRNA VACCINE (PFIZER-BIONTECH) 30 MCG: CPT

## 2021-04-19 PROCEDURE — 0002A SARS-COV-2 / COVID-19 MRNA VACCINE (PFIZER-BIONTECH) 30 MCG: CPT

## 2021-04-26 ENCOUNTER — OFFICE VISIT (OUTPATIENT)
Dept: FAMILY MEDICINE CLINIC | Facility: CLINIC | Age: 56
End: 2021-04-26

## 2021-04-26 VITALS
SYSTOLIC BLOOD PRESSURE: 134 MMHG | OXYGEN SATURATION: 99 % | DIASTOLIC BLOOD PRESSURE: 92 MMHG | RESPIRATION RATE: 18 BRPM | WEIGHT: 191.8 LBS | HEIGHT: 69 IN | BODY MASS INDEX: 28.41 KG/M2 | TEMPERATURE: 97.8 F | HEART RATE: 90 BPM

## 2021-04-26 DIAGNOSIS — F10.10 ALCOHOL ABUSE: ICD-10-CM

## 2021-04-26 DIAGNOSIS — K02.9 DENTAL CARIES: ICD-10-CM

## 2021-04-26 DIAGNOSIS — E11.9 TYPE 2 DIABETES MELLITUS WITHOUT COMPLICATION, WITHOUT LONG-TERM CURRENT USE OF INSULIN (HCC): ICD-10-CM

## 2021-04-26 DIAGNOSIS — G89.29 CHRONIC RIGHT SHOULDER PAIN: ICD-10-CM

## 2021-04-26 DIAGNOSIS — I10 BENIGN ESSENTIAL HYPERTENSION: ICD-10-CM

## 2021-04-26 DIAGNOSIS — E03.9 HYPOTHYROIDISM, UNSPECIFIED TYPE: ICD-10-CM

## 2021-04-26 DIAGNOSIS — M25.511 CHRONIC RIGHT SHOULDER PAIN: ICD-10-CM

## 2021-04-26 DIAGNOSIS — Z00.00 ANNUAL PHYSICAL EXAM: Primary | ICD-10-CM

## 2021-04-26 LAB — SL AMB POCT HEMOGLOBIN AIC: 6.2 (ref ?–6.5)

## 2021-04-26 PROCEDURE — 99396 PREV VISIT EST AGE 40-64: CPT | Performed by: FAMILY MEDICINE

## 2021-04-26 PROCEDURE — 3008F BODY MASS INDEX DOCD: CPT | Performed by: NURSE PRACTITIONER

## 2021-04-26 PROCEDURE — 3044F HG A1C LEVEL LT 7.0%: CPT | Performed by: NURSE PRACTITIONER

## 2021-04-26 PROCEDURE — 83036 HEMOGLOBIN GLYCOSYLATED A1C: CPT | Performed by: FAMILY MEDICINE

## 2021-04-26 RX ORDER — LEVOTHYROXINE SODIUM 137 UG/1
137 TABLET ORAL DAILY
Qty: 60 TABLET | Refills: 1 | Status: SHIPPED | OUTPATIENT
Start: 2021-04-26 | End: 2021-09-10 | Stop reason: SDUPTHER

## 2021-04-26 NOTE — PROGRESS NOTES
[address] [facility]    NAME: Ananda Anderson  AGE: 50 y o  SEX: male  : 1965     DATE: 2021     Assessment and Plan:     Problem List Items Addressed This Visit        Digestive    Dental caries    Relevant Orders    Ambulatory referral to Dentistry       Endocrine    Type 2 diabetes mellitus without complication, without long-term current use of insulin ([address] )       Lab Results   Component Value Date    HGBA1C 6 2 2021      well controlled, continue metformin 1000 mg b i d  [place] Lifestyle modification with diet and exercise  Will repeat A1c in 6 months         Relevant Orders    POCT hemoglobin A1c (Completed)    Hypothyroidism      TSH on  0 169  Patient currently taking levothyroxine 150 mcg, will decrease to 137 mcg in repeat TSH in 6 weeks  Recommended to take levothyroxine empty stomach - half hour before breakfast every day         Relevant Medications    levothyroxine 137 mcg tablet    Other Relevant Orders    TSH, 3rd generation       Cardiovascular and Mediastinum    Benign essential hypertension      Blood pressure 134/92 today, within goal   Continue current regimen of lisinopril hydrochlorothiazide 10-12 5  Lifestyle modification with diet  And exercise            Other    Right shoulder pain      Discussed physical therapy  Also prescribed Voltaren gel for local application  Relevant Medications    Diclofenac Sodium (VOLTAREN) 1 %    Other Relevant Orders    Ambulatory referral to Physical Therapy    Alcohol abuse       Patient continues to drink 2 shots of vodka every day, reviewed CMP which shows elevated bilirubin  counseled  Against alcohol use           Other Visit Diagnoses     Annual physical exam    -  Primary    BMI 28 0-28 9,adult              Immunizations and preventive care screenings were discussed with patient today   Appropriate education was printed on patient's after visit summary  Counseling:  · Alcohol/drug use: discussed moderation in alcohol intake, the recommendations for healthy alcohol use, and avoidance of illicit drug use  Return in 2 months (on 6/26/2021)  Chief Complaint:     Chief Complaint   Patient presents with    Physical Exam    RT Shoulder Pain     for a couples of weeks     Results     bloodwork      History of Present Illness:     Adult Annual Physical   Patient here for a comprehensive physical exam  Patient does want to review recent blood work done  He is complaining of right shoulder pain today, which started 2 weeks ago  Pain increases with extension of arm  Denies any trauma  But otherwise he is doing well, denies any side effects with the medications  Reports he did follow-up with Urology, and rash on penis is better  Diet and Physical Activity  · Diet/Nutrition: diabetic diet  · Exercise: 3-4 times a week on average  Depression Screening  PHQ-9 Depression Screening    PHQ-9:   Frequency of the following problems over the past two weeks:      Little interest or pleasure in doing things: 0 - not at all  Feeling down, depressed, or hopeless: 0 - not at all  PHQ-2 Score: 0       General Health  · Sleep: sleeps well  · Hearing: normal - bilateral   · Vision: wears glasses  · Dental: brushes teeth once daily   Health  · Symptoms include: Rash on penis for which he follows with Urology     Review of Systems:     Review of Systems   Constitutional: Negative for activity change, chills and fever  HENT: Negative for congestion  Respiratory: Negative for cough and shortness of breath  Cardiovascular: Negative for chest pain  Gastrointestinal: Negative for abdominal pain, diarrhea, nausea and vomiting  Genitourinary: Negative for difficulty urinating  Musculoskeletal: Positive for arthralgias  Skin: Negative for rash  Neurological: Negative for headaches        Past Medical History: Past Medical History:   Diagnosis Date    Diabetes mellitus ([facility] )     Elevated LFTs     last assessed 02Nov2015    Hypertension     Impaired fasting glucose     last assessed 29Jan2014      Past Surgical History:     Past Surgical History:   Procedure Laterality Date    APPENDECTOMY        Family History:     Family History   Problem Relation Age of Onset    Diabetes Mother     Breast cancer Mother     Diabetes Father       Social History:     E-Cigarette/Vaping    E-Cigarette Use Never User      E-Cigarette/Vaping Substances    Nicotine No     THC No     CBD No     Flavoring No     Other No     Unknown No      Social History     Socioeconomic History    Marital status: /Civil Union     Spouse name: None    Number of children: None    Years of education: None    Highest education level: None   Occupational History    None   Social Needs    Financial resource strain: Not hard at all   [employer] insecurity     Worry: Never true     Inability: Never true    Transportation needs     Medical: No     Non-medical: No   Tobacco Use    Smoking status: Never Smoker    Smokeless tobacco: Never Used   Substance and Sexual Activity    Alcohol use:  Yes     Alcohol/week: 3 0 standard drinks     Types: 3 Glasses of wine per week     Comment: drinks daily     Drug use: No    Sexual activity: Not Currently     Partners: Female   Lifestyle    Physical activity     Days per week: None     Minutes per session: None    Stress: None   Relationships    Social connections     Talks on phone: None     Gets together: None     Attends [employer] service: None     Active member of club or organization: None     Attends meetings of clubs or organizations: None     Relationship status: None    Intimate partner violence     Fear of current or ex partner: None     Emotionally abused: None     Physically abused: None     Forced sexual activity: None   Other Topics Concern    None   Social History Narrative    None      Current Medications:     Current Outpatient Medications   Medication Sig Dispense Refill    atorvastatin (LIPITOR) 40 mg tablet Take 1 tablet (40 mg total) by mouth daily 90 tablet 1    clotrimazole (LOTRIMIN) 1 % cream Apply topically 2 (two) times a day 30 g 0    glucose blood (Accu-Chek Guide) test strip Use 1 each 2 (two) times a day Use as instructed 100 each 5    glucose blood (OneTouch Verio) test strip Use 1 each 2 (two) times a day Use as instructed 100 each 5    levothyroxine 137 mcg tablet Take 1 tablet (137 mcg total) by mouth daily 60 tablet 1    lisinopril-hydrochlorothiazide (PRINZIDE,ZESTORETIC) 10-12 5 MG per tablet Take 1 tablet by mouth once daily 90 tablet 0    metFORMIN (GLUCOPHAGE) 1000 MG tablet Take 1 tablet (1,000 mg total) by mouth 2 (two) times a day with meals 180 tablet 1    Diclofenac Sodium (VOLTAREN) 1 % Apply 2 g topically 4 (four) times a day 1 Tube 1     No current facility-administered medications for this visit  Allergies:     No Known Allergies   Physical Exam:     /92 (BP Location: Left arm, Patient Position: Sitting, Cuff Size: Standard)   Pulse 90   Temp 97 8 °F (36 6 °C) (Temporal)   Resp 18   Ht 5' 9" (1 753 m)   Wt 87 kg (191 lb 12 8 oz)   SpO2 99%   BMI 28 32 kg/m²     Physical Exam  Vitals signs and nursing note reviewed  Constitutional:       General: He is not in acute distress  Appearance: Normal appearance  He is well-developed  HENT:      Head: Normocephalic and atraumatic  Right Ear: Tympanic membrane, ear canal and external ear normal       Left Ear: Tympanic membrane, ear canal and external ear normal    Eyes:      Conjunctiva/sclera: Conjunctivae normal    Neck:      Musculoskeletal: Neck supple  Cardiovascular:      Rate and Rhythm: Normal rate and regular rhythm  Heart sounds: No murmur  Pulmonary:      Effort: Pulmonary effort is normal  No respiratory distress        Breath sounds: Normal breath sounds  Abdominal:      General: Bowel sounds are normal  There is no distension  Palpations: Abdomen is soft  There is no mass  Tenderness: There is no abdominal tenderness  Musculoskeletal:         General: No swelling  Comments:  No tenderness, no restriction of ROM right shoulder joint   Skin:     General: Skin is warm and dry  Neurological:      General: No focal deficit present  Mental Status: He is alert and oriented to person, place, and time            Meng Quezada MD  [address]

## 2021-04-26 NOTE — PATIENT INSTRUCTIONS

## 2021-04-26 NOTE — ASSESSMENT & PLAN NOTE
Lab Results   Component Value Date    HGBA1C 6 2 04/26/2021      well controlled, continue metformin 1000 mg b i d  Morelia Kerns Lifestyle modification with diet and exercise    Will repeat A1c in 6 months

## 2021-04-26 NOTE — ASSESSMENT & PLAN NOTE
TSH on 4/13 0 169  Patient currently taking levothyroxine 150 mcg, will decrease to 137 mcg in repeat TSH in 6 weeks    Recommended to take levothyroxine empty stomach - half hour before breakfast every day

## 2021-04-26 NOTE — ASSESSMENT & PLAN NOTE
Patient continues to drink 2 shots of vodka every day, reviewed CMP which shows elevated bilirubin  counseled  Against alcohol use

## 2021-04-26 NOTE — ASSESSMENT & PLAN NOTE
Blood pressure 134/92 today, within goal   Continue current regimen of lisinopril hydrochlorothiazide 10-12 5    Lifestyle modification with diet  And exercise

## 2021-05-10 ENCOUNTER — TELEPHONE (OUTPATIENT)
Dept: FAMILY MEDICINE CLINIC | Facility: CLINIC | Age: 56
End: 2021-05-10

## 2021-05-10 ENCOUNTER — HOSPITAL ENCOUNTER (OUTPATIENT)
Dept: RADIOLOGY | Facility: HOSPITAL | Age: 56
Discharge: HOME | End: 2021-05-10
Payer: COMMERCIAL

## 2021-05-10 ENCOUNTER — OFFICE VISIT (OUTPATIENT)
Dept: FAMILY MEDICINE CLINIC | Facility: CLINIC | Age: 56
End: 2021-05-10

## 2021-05-10 VITALS
TEMPERATURE: 98.7 F | OXYGEN SATURATION: 99 % | RESPIRATION RATE: 18 BRPM | HEIGHT: 69 IN | SYSTOLIC BLOOD PRESSURE: 140 MMHG | DIASTOLIC BLOOD PRESSURE: 100 MMHG | HEART RATE: 103 BPM | WEIGHT: 193 LBS | BODY MASS INDEX: 28.58 KG/M2

## 2021-05-10 DIAGNOSIS — M89.9 FRONTAL SKULL LESION: ICD-10-CM

## 2021-05-10 DIAGNOSIS — M89.9 FRONTAL SKULL LESION: Primary | ICD-10-CM

## 2021-05-10 PROCEDURE — 99214 OFFICE O/P EST MOD 30 MIN: CPT | Performed by: FAMILY MEDICINE

## 2021-05-10 PROCEDURE — 70260 X-RAY EXAM OF SKULL: CPT

## 2021-05-10 NOTE — PROGRESS NOTES
Assessment/Plan:       Problem List Items Addressed This Visit        Other    Frontal skull lesion - Primary     New onset bump on left frontal area of 2 days  Hx of incidental finding on MRI during a workup for dizziness and November/December 2020  Work up negative so far s/p followed up with Neurosx and Heme/onc  Recent  Brain MRI on 03/11/2021 showed no significant interval change from previous MRI  Will order x-ray of the skull             Relevant Orders    XR skull complete 4+ vw    XR facial bones 3+ vw            Subjective:      Patient ID: Bailey Goldman is a 50 y o  male  HPI  Pt here for left sided frontal superficial  pain and bump  Pt is concerned because he has been having this for the past 2 days  He reports he had imaging of his brain due to vertigo in 12/2020  Pt does reports occasional blurry vision that resolves spontaneously  Pt denies current vision changes, headaches, head trauma, vertigo, lightheadedness,n/v, URI sx, seasonal allergies, weight loss  He reports the bump is new and again denies any head trauma or fall  Pt did follow up with Heme/onc and Neurosx for eval of the frontal skull lesion due to possible concern for malignancy  Notes from evaluations reviewed in detail  Work up was negative so far and pt has been asx hence no biopsy was recommended and pt is ware to f/u in 6 months with f/u brain MRI  Imaging as follows:     Brain MRI 12/2020:  "There is a subtle marrow replacing lesion within the left paramedian frontal bone measuring nearly 2 cm in length demonstrating enhancement  There is suspected erosion through the inner table of the calvarium with mild dural thickening and enhancement   seen within the underlying dura including a small subcentimeter nodular focus of enhancement on series 12 image 17  Findings are nonspecific and differential considerations would include an osseous metastasis    We are attempting to obtain a CT of the   brain dated 11/19/2020 which was performed at an outside institution"  "Prior outside CT scan dated 11/19/2020 is now been made available for comparison  The focal lesion within the left paramedian frontal bone seen on the current MRI examination corresponds to an irregular marrow lesion within the diploic space with some   endosteal scalloping and erosion of the inner table of the bone on the prior CT scan  Again the findings are nonspecific but suspicious for a primary bony lesion or osseous metastasis  Consider metastatic workup which may include CT chest, abdomen and   pelvis  Bone scan imaging would better evaluate this lesion as well as identify any other bony lesions within the skeletal system "  Brain MRI 03/2021:  "No significant interval change since 12/18/2020    Stable enhancing lesion within the left frontal calvarium with mild subjacent nodular dural thickening"    The following portions of the patient's history were reviewed and updated as appropriate:   He  has a past medical history of Diabetes mellitus ([address] ), Elevated LFTs, Hypertension, and Impaired fasting glucose    He   Patient Active Problem List    Diagnosis Date Noted    Dental caries 04/26/2021    Frontal skull lesion 12/24/2020    Dizziness 11/23/2020    Other specified glaucoma 11/23/2020    Candida rash of groin 08/17/2020    Chronic pain in penis 06/12/2020    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    Balanitis 11/15/2019    Cough 01/22/2019    Screening for colon cancer 01/22/2019    Elevated AST (SGOT) 12/20/2018    Alcohol abuse 12/20/2018    Shoulder arthritis 02/21/2018    Right shoulder pain 54/45/2104    Uncomplicated alcohol dependence ([address] ) 11/02/2015    Type 2 diabetes mellitus without complication, without long-term current use of insulin ([address] ) 11/02/2015    Benign essential hypertension 04/16/2015    Hyperlipidemia 01/05/2015    Hypothyroidism 08/05/2013     He  has a past surgical history that includes Appendectomy  His family history includes Breast cancer in his mother; Diabetes in his father and mother  He  reports that he has never smoked  He has never used smokeless tobacco  He reports current alcohol use of about 3 0 standard drinks of alcohol per week  He reports that he does not use drugs  Current Outpatient Medications   Medication Sig Dispense Refill    atorvastatin (LIPITOR) 40 mg tablet Take 1 tablet (40 mg total) by mouth daily 90 tablet 1    clotrimazole (LOTRIMIN) 1 % cream Apply topically 2 (two) times a day 30 g 0    Diclofenac Sodium (VOLTAREN) 1 % Apply 2 g topically 4 (four) times a day 1 Tube 1    glucose blood (Accu-Chek Guide) test strip Use 1 each 2 (two) times a day Use as instructed 100 each 5    glucose blood (OneTouch Verio) test strip Use 1 each 2 (two) times a day Use as instructed 100 each 5    levothyroxine 137 mcg tablet Take 1 tablet (137 mcg total) by mouth daily 60 tablet 1    lisinopril-hydrochlorothiazide (PRINZIDE,ZESTORETIC) 10-12 5 MG per tablet Take 1 tablet by mouth once daily 90 tablet 0    metFORMIN (GLUCOPHAGE) 1000 MG tablet Take 1 tablet (1,000 mg total) by mouth 2 (two) times a day with meals 180 tablet 1     No current facility-administered medications for this visit        Current Outpatient Medications on File Prior to Visit   Medication Sig    atorvastatin (LIPITOR) 40 mg tablet Take 1 tablet (40 mg total) by mouth daily    clotrimazole (LOTRIMIN) 1 % cream Apply topically 2 (two) times a day    Diclofenac Sodium (VOLTAREN) 1 % Apply 2 g topically 4 (four) times a day    glucose blood (Accu-Chek Guide) test strip Use 1 each 2 (two) times a day Use as instructed    glucose blood (OneTouch Verio) test strip Use 1 each 2 (two) times a day Use as instructed    levothyroxine 137 mcg tablet Take 1 tablet (137 mcg total) by mouth daily    lisinopril-hydrochlorothiazide (PRINZIDE,ZESTORETIC) 10-12 5 MG per tablet Take 1 tablet by mouth once daily    metFORMIN (GLUCOPHAGE) 1000 MG tablet Take 1 tablet (1,000 mg total) by mouth 2 (two) times a day with meals    [DISCONTINUED] lisinopril-hydrochlorothiazide (PRINZIDE,ZESTORETIC) 10-12 5 MG per tablet Take 1 tablet by mouth daily     No current facility-administered medications on file prior to visit  He has No Known Allergies       Review of Systems   Constitutional: Negative for appetite change and fever  Respiratory: Negative for cough and shortness of breath  Neurological: Negative for dizziness, light-headedness and headaches  Objective:      /100 (BP Location: Left arm, Patient Position: Sitting, Cuff Size: Large)   Pulse 103   Temp 98 7 °F (37 1 °C) (Temporal)   Resp 18   Ht 5' 9" (1 753 m)   Wt 87 5 kg (193 lb)   SpO2 99%   BMI 28 50 kg/m²          Physical Exam  Constitutional:       Appearance: Normal appearance  HENT:      Head:      Comments: Hard bump, vertical left frontal area, mildly TTP      Right Ear: Tympanic membrane, ear canal and external ear normal  There is no impacted cerumen  Left Ear: Tympanic membrane, ear canal and external ear normal  There is no impacted cerumen  Eyes:      Extraocular Movements: Extraocular movements intact  Neck:      Musculoskeletal: Neck supple  Cardiovascular:      Rate and Rhythm: Regular rhythm  Pulmonary:      Effort: No respiratory distress  Breath sounds: No wheezing or rales  Musculoskeletal: Normal range of motion  Skin:     General: Skin is warm and dry  Neurological:      General: No focal deficit present     Psychiatric:         Mood and Affect: Mood normal

## 2021-05-10 NOTE — TELEPHONE ENCOUNTER
Jess from [facility] called  Patient is there, radiologist does not see need to do facial views for lesion  He agrees with skull, did review MRI  If you want facial films done, we need to call [phone]

## 2021-05-10 NOTE — ASSESSMENT & PLAN NOTE
New onset bump on left frontal area of 2 days  Hx of incidental finding on MRI during a workup for dizziness and November/December 2020  Work up negative so far s/p followed up with Neurosx and Heme/onc  Recent  Brain MRI on 03/11/2021 showed no significant interval change from previous MRI  Will order x-ray of the skull

## 2021-05-18 ENCOUNTER — TELEPHONE (OUTPATIENT)
Dept: FAMILY MEDICINE CLINIC | Facility: CLINIC | Age: 56
End: 2021-05-18

## 2021-05-18 NOTE — TELEPHONE ENCOUNTER
Pt no showed to today's appt  Called pt and we discussed normal results of skull Xray  Pt reports swelling has reduced and the Left frontal area is  somewhat tender to palpation but significantly decreased compared to previous  I encouraged pt to f/u in 2-4 weeks for evaluation and f/u with Neurosx if sx recurs or worsens

## 2021-05-28 ENCOUNTER — TELEPHONE (OUTPATIENT)
Dept: GASTROENTEROLOGY | Facility: AMBULARY SURGERY CENTER | Age: 56
End: 2021-05-28

## 2021-05-28 NOTE — TELEPHONE ENCOUNTER
Spoke to pt   Went over bowel prep instructions with pt again re: miralax/dulcolax pt CONFIRMED HE RECEIVED BOWEL PREP INSTRUCTIONS

## 2021-06-01 ENCOUNTER — HOSPITAL ENCOUNTER (OUTPATIENT)
Dept: GASTROENTEROLOGY | Facility: AMBULARY SURGERY CENTER | Age: 56
Setting detail: OUTPATIENT SURGERY
Discharge: HOME | End: 2021-06-01
Attending: INTERNAL MEDICINE | Admitting: INTERNAL MEDICINE
Payer: COMMERCIAL

## 2021-06-01 ENCOUNTER — ANESTHESIA (OUTPATIENT)
Dept: GASTROENTEROLOGY | Facility: AMBULARY SURGERY CENTER | Age: 56
End: 2021-06-01

## 2021-06-01 ENCOUNTER — ANESTHESIA EVENT (OUTPATIENT)
Dept: GASTROENTEROLOGY | Facility: AMBULARY SURGERY CENTER | Age: 56
End: 2021-06-01

## 2021-06-01 VITALS
WEIGHT: 188 LBS | RESPIRATION RATE: 18 BRPM | HEIGHT: 69 IN | DIASTOLIC BLOOD PRESSURE: 77 MMHG | TEMPERATURE: 96.9 F | SYSTOLIC BLOOD PRESSURE: 125 MMHG | BODY MASS INDEX: 27.85 KG/M2 | HEART RATE: 80 BPM | OXYGEN SATURATION: 100 %

## 2021-06-01 DIAGNOSIS — R74.01 ELEVATED AST (SGOT): ICD-10-CM

## 2021-06-01 DIAGNOSIS — Z12.11 SCREENING FOR COLON CANCER: ICD-10-CM

## 2021-06-01 LAB — GLUCOSE SERPL-MCNC: 141 MG/DL (ref 65–140)

## 2021-06-01 PROCEDURE — 45380 COLONOSCOPY AND BIOPSY: CPT | Performed by: INTERNAL MEDICINE

## 2021-06-01 PROCEDURE — 88305 TISSUE EXAM BY PATHOLOGIST: CPT | Performed by: PATHOLOGY

## 2021-06-01 PROCEDURE — 82948 REAGENT STRIP/BLOOD GLUCOSE: CPT

## 2021-06-01 RX ORDER — PROPOFOL 10 MG/ML
INJECTION, EMULSION INTRAVENOUS AS NEEDED
Status: DISCONTINUED | OUTPATIENT
Start: 2021-06-01 | End: 2021-06-01

## 2021-06-01 RX ORDER — SODIUM CHLORIDE, SODIUM LACTATE, POTASSIUM CHLORIDE, CALCIUM CHLORIDE 600; 310; 30; 20 MG/100ML; MG/100ML; MG/100ML; MG/100ML
INJECTION, SOLUTION INTRAVENOUS CONTINUOUS PRN
Status: DISCONTINUED | OUTPATIENT
Start: 2021-06-01 | End: 2021-06-01

## 2021-06-01 RX ORDER — LIDOCAINE HYDROCHLORIDE 10 MG/ML
INJECTION, SOLUTION EPIDURAL; INFILTRATION; INTRACAUDAL; PERINEURAL AS NEEDED
Status: DISCONTINUED | OUTPATIENT
Start: 2021-06-01 | End: 2021-06-01

## 2021-06-01 RX ADMIN — Medication 40 MG: at 08:35

## 2021-06-01 RX ADMIN — Medication 40 MG: at 08:47

## 2021-06-01 RX ADMIN — LIDOCAINE HYDROCHLORIDE 50 MG: 10 INJECTION, SOLUTION EPIDURAL; INFILTRATION; INTRACAUDAL at 08:32

## 2021-06-01 RX ADMIN — PROPOFOL 40 MG: 10 INJECTION, EMULSION INTRAVENOUS at 08:44

## 2021-06-01 RX ADMIN — PROPOFOL 30 MG: 10 INJECTION, EMULSION INTRAVENOUS at 08:33

## 2021-06-01 RX ADMIN — PROPOFOL 40 MG: 10 INJECTION, EMULSION INTRAVENOUS at 08:47

## 2021-06-01 RX ADMIN — PROPOFOL 20 MG: 10 INJECTION, EMULSION INTRAVENOUS at 08:51

## 2021-06-01 RX ADMIN — PROPOFOL 30 MG: 10 INJECTION, EMULSION INTRAVENOUS at 08:35

## 2021-06-01 RX ADMIN — PROPOFOL 30 MG: 10 INJECTION, EMULSION INTRAVENOUS at 08:37

## 2021-06-01 RX ADMIN — SODIUM CHLORIDE, SODIUM LACTATE, POTASSIUM CHLORIDE, AND CALCIUM CHLORIDE: .6; .31; .03; .02 INJECTION, SOLUTION INTRAVENOUS at 08:24

## 2021-06-01 RX ADMIN — PROPOFOL 50 MG: 10 INJECTION, EMULSION INTRAVENOUS at 08:32

## 2021-06-01 RX ADMIN — PROPOFOL 30 MG: 10 INJECTION, EMULSION INTRAVENOUS at 08:39

## 2021-06-01 NOTE — ANESTHESIA PREPROCEDURE EVALUATION
Procedure:  COLONOSCOPY    Relevant Problems   CARDIO   (+) Benign essential hypertension   (+) Hyperlipidemia      ENDO   (+) Hypothyroidism   (+) Type 2 diabetes mellitus without complication, without long-term current use of insulin ([facility])      MUSCULOSKELETAL   (+) Shoulder arthritis        Physical Exam    Airway    Mallampati score: II  TM Distance: >3 FB  Neck ROM: full     Dental   No notable dental hx     Cardiovascular  Cardiovascular exam normal    Pulmonary  Pulmonary exam normal     Other Findings        Anesthesia Plan  ASA Score- 2     Anesthesia Type- IV sedation with anesthesia with ASA Monitors  Additional Monitors:   Airway Plan:           Plan Factors-Exercise tolerance (METS): >4 METS  Chart reviewed  Imaging results reviewed  Existing labs reviewed  Patient summary reviewed  Patient is not a current smoker  Induction-     Postoperative Plan-     Informed Consent- Anesthetic plan and risks discussed with patient  I personally reviewed this patient with the CRNA  Discussed and agreed on the Anesthesia Plan with the CRNA  Claribel Coleman

## 2021-06-01 NOTE — H&P
History and Physical -  [facility]  Ananda Anderson 50 y o  male MRN: [id number]    HPI: Ananda Anderson is a 50y o  year old male who presents for colon cancer screening  Review of Systems    Historical Information   Past Medical History:   Diagnosis Date    Diabetes mellitus ([facility] [address] )     Elevated LFTs     last assessed 02Nov2015    Hypertension     Impaired fasting glucose     last assessed 29Jan2014     Past Surgical History:   Procedure Laterality Date    APPENDECTOMY       Social History   Social History     Substance and Sexual Activity   Alcohol Use Yes    Alcohol/week: 3 0 standard drinks    Types: 3 Glasses of wine per week    Comment: drinks daily      Social History     Substance and Sexual Activity   Drug Use No     Social History     Tobacco Use   Smoking Status Never Smoker   Smokeless Tobacco Never Used     Family History   Problem Relation Age of Onset    Diabetes Mother     Breast cancer Mother     Diabetes Father        Meds/Allergies     (Not in a hospital admission)      No Known Allergies    Objective     There were no vitals taken for this visit  PHYSICAL EXAM    Gen: NAD  CV: RRR  CHEST: Clear  ABD: soft, NT/ND  EXT: no edema  Neuro: AAO      ASSESSMENT/PLAN:  This is a 50y o  year old male here for colon cancer screening  PLAN:   Procedure:  Colonoscopy

## 2021-06-01 NOTE — ANESTHESIA POSTPROCEDURE EVALUATION
Post-Op Assessment Note    CV Status:  Stable  Pain Score: 0    Pain management: adequate     Mental Status:  Alert and awake   Hydration Status:  Stable   PONV Controlled:  None   Airway Patency:  Patent      Post Op Vitals Reviewed: Yes      Staff: CRNA         No complications documented      BP      Temp      Pulse     Resp      SpO2

## 2021-06-28 ENCOUNTER — APPOINTMENT (OUTPATIENT)
Dept: LAB | Facility: CLINIC | Age: 56
End: 2021-06-28
Payer: COMMERCIAL

## 2021-06-28 ENCOUNTER — OFFICE VISIT (OUTPATIENT)
Dept: FAMILY MEDICINE CLINIC | Facility: CLINIC | Age: 56
End: 2021-06-28

## 2021-06-28 VITALS
DIASTOLIC BLOOD PRESSURE: 82 MMHG | WEIGHT: 194.4 LBS | TEMPERATURE: 97.4 F | RESPIRATION RATE: 18 BRPM | HEART RATE: 90 BPM | SYSTOLIC BLOOD PRESSURE: 126 MMHG | BODY MASS INDEX: 28.79 KG/M2 | HEIGHT: 69 IN | OXYGEN SATURATION: 98 %

## 2021-06-28 DIAGNOSIS — Z11.59 ENCOUNTER FOR HEPATITIS C SCREENING TEST FOR LOW RISK PATIENT: Primary | ICD-10-CM

## 2021-06-28 DIAGNOSIS — Z90.49 STATUS POST APPENDECTOMY: ICD-10-CM

## 2021-06-28 DIAGNOSIS — I10 BENIGN ESSENTIAL HYPERTENSION: ICD-10-CM

## 2021-06-28 DIAGNOSIS — H40.89 OTHER GLAUCOMA OF BOTH EYES: ICD-10-CM

## 2021-06-28 DIAGNOSIS — E03.9 HYPOTHYROIDISM, UNSPECIFIED TYPE: ICD-10-CM

## 2021-06-28 DIAGNOSIS — Z11.59 ENCOUNTER FOR HEPATITIS C SCREENING TEST FOR LOW RISK PATIENT: ICD-10-CM

## 2021-06-28 DIAGNOSIS — E11.9 TYPE 2 DIABETES MELLITUS WITHOUT COMPLICATION, WITHOUT LONG-TERM CURRENT USE OF INSULIN (HCC): ICD-10-CM

## 2021-06-28 LAB
HCV AB SER QL: NORMAL
TSH SERPL DL<=0.05 MIU/L-ACNC: 1.37 UIU/ML (ref 0.36–3.74)

## 2021-06-28 PROCEDURE — 84443 ASSAY THYROID STIM HORMONE: CPT

## 2021-06-28 PROCEDURE — 99214 OFFICE O/P EST MOD 30 MIN: CPT | Performed by: FAMILY MEDICINE

## 2021-06-28 PROCEDURE — 3074F SYST BP LT 130 MM HG: CPT | Performed by: FAMILY MEDICINE

## 2021-06-28 PROCEDURE — 3079F DIAST BP 80-89 MM HG: CPT | Performed by: FAMILY MEDICINE

## 2021-06-28 PROCEDURE — 36415 COLL VENOUS BLD VENIPUNCTURE: CPT

## 2021-06-28 PROCEDURE — T1015 CLINIC SERVICE: HCPCS | Performed by: FAMILY MEDICINE

## 2021-06-28 PROCEDURE — 86803 HEPATITIS C AB TEST: CPT

## 2021-06-28 NOTE — ASSESSMENT & PLAN NOTE
Will controlled  Blood pressure 134/92 today  Continue current regimen of lisinopril hydrochlorothiazide 10-12 5    Lifestyle modification with diet  And exercise

## 2021-06-28 NOTE — ASSESSMENT & PLAN NOTE
Lab Results   Component Value Date    HGBA1C 6 2 04/26/2021      well controlled, continue metformin 1000 mg b i d  Mannie Carr   Lifestyle modification with diet exercise

## 2021-06-28 NOTE — ASSESSMENT & PLAN NOTE
Patient reports he used to be on eye drops, currently using his father's eye drops at times    Will give referral to Ophthalmology

## 2021-06-28 NOTE — PROGRESS NOTES
Assessment/Plan:    Hypothyroidism    Levothyroxine was decreased in previous visit, will recheck TSH  Patient is currently on 137mcg daily    Type 2 diabetes mellitus without complication, without long-term current use of insulin ([facility])    Lab Results   Component Value Date    HGBA1C 6 2 04/26/2021      well controlled, continue metformin 1000 mg b i d  Marlyn Blend Lifestyle modification with diet exercise    Benign essential hypertension   Will controlled  Blood pressure 134/92 today  Continue current regimen of lisinopril hydrochlorothiazide 10-12 5  Lifestyle modification with diet  And exercise    Other specified glaucoma   Patient reports he used to be on eye drops, currently using his father's eye drops at times  Will give referral to Ophthalmology    Status post appendectomy   Will healed scar in right lower abdomen, no signs of hernia       Diagnoses and all orders for this visit:    Encounter for hepatitis C screening test for low risk patient  -     Hepatitis C antibody; Future    Hypothyroidism, unspecified type  -     TSH, 3rd generation; Future    Benign essential hypertension    Other glaucoma of both eyes  -     Ambulatory referral to Ophthalmology; Future    Type 2 diabetes mellitus without complication, without long-term current use of insulin ([facility])  -     Cancel: HEMOGLOBIN A1C W/ EAG ESTIMATION; Future    Status post appendectomy          Subjective:      Patient ID: Tarik Contreras is a 50 y o  male  51-year-old male presented to office for follow-up of diabetes  He reports he has been compliant with his medications  He has no acute concerns today except for strain on his  Appendicectomy scar on bending  He denies any swelling or lump at the site  He is   Also requesting referral to Ophthalmology for cataract and glaucoma follow-up  Patient today reports that he used to be on eyedrops for glaucoma, but has not seen an eye doctor for a long time    He uses his father's eyedrops at night Sometimes  The following portions of the patient's history were reviewed and updated as appropriate: allergies, current medications, past family history, past medical history, past social history, past surgical history and problem list     Review of Systems   Constitutional: Negative for activity change, chills and fever  HENT: Negative for congestion  Respiratory: Negative for shortness of breath  Cardiovascular: Negative for chest pain  Gastrointestinal: Negative for diarrhea, nausea and vomiting  Genitourinary: Negative for difficulty urinating  Neurological: Negative for headaches  Objective:      /82 (BP Location: Left arm, Patient Position: Sitting, Cuff Size: Standard)   Pulse 90   Temp (!) 97 4 °F (36 3 °C) (Temporal)   Resp 18   Ht 5' 9" (1 753 m)   Wt 88 2 kg (194 lb 6 4 oz)   SpO2 98%   BMI 28 71 kg/m²          Physical Exam  Constitutional:       Appearance: Normal appearance  He is well-developed  He is not ill-appearing  HENT:      Head: Normocephalic and atraumatic  Eyes:      Conjunctiva/sclera: Conjunctivae normal    Cardiovascular:      Rate and Rhythm: Normal rate and regular rhythm  Heart sounds: Normal heart sounds  No murmur heard  No friction rub  Pulmonary:      Effort: Pulmonary effort is normal  No respiratory distress  Breath sounds: Normal breath sounds  No wheezing or rales  Abdominal:      General: Bowel sounds are normal  There is no distension  Palpations: Abdomen is soft  Tenderness: There is no abdominal tenderness  Comments: Well healed scar from appendicectomy   Musculoskeletal:         General: Normal range of motion  Cervical back: Normal range of motion and neck supple  Skin:     General: Skin is warm and dry  Neurological:      Mental Status: He is alert and oriented to person, place, and time

## 2021-06-28 NOTE — ASSESSMENT & PLAN NOTE
Levothyroxine was decreased in previous visit, will recheck TSH    Patient is currently on 137mcg daily

## 2021-07-25 DIAGNOSIS — E11.9 TYPE 2 DIABETES MELLITUS WITHOUT COMPLICATION, WITHOUT LONG-TERM CURRENT USE OF INSULIN (HCC): ICD-10-CM

## 2021-07-25 DIAGNOSIS — I10 ESSENTIAL HYPERTENSION: ICD-10-CM

## 2021-07-25 RX ORDER — LISINOPRIL AND HYDROCHLOROTHIAZIDE 12.5; 1 MG/1; MG/1
TABLET ORAL
Qty: 90 TABLET | Refills: 0 | Status: SHIPPED | OUTPATIENT
Start: 2021-07-25 | End: 2021-08-13

## 2021-08-13 DIAGNOSIS — I10 ESSENTIAL HYPERTENSION: ICD-10-CM

## 2021-08-13 RX ORDER — LISINOPRIL AND HYDROCHLOROTHIAZIDE 12.5; 1 MG/1; MG/1
TABLET ORAL
Qty: 90 TABLET | Refills: 0 | Status: SHIPPED | OUTPATIENT
Start: 2021-08-13 | End: 2021-11-11 | Stop reason: SDUPTHER

## 2021-09-10 ENCOUNTER — OFFICE VISIT (OUTPATIENT)
Dept: FAMILY MEDICINE CLINIC | Facility: CLINIC | Age: 56
End: 2021-09-10

## 2021-09-10 VITALS
SYSTOLIC BLOOD PRESSURE: 148 MMHG | BODY MASS INDEX: 29.33 KG/M2 | WEIGHT: 198 LBS | DIASTOLIC BLOOD PRESSURE: 90 MMHG | RESPIRATION RATE: 18 BRPM | HEART RATE: 98 BPM | OXYGEN SATURATION: 99 % | HEIGHT: 69 IN | TEMPERATURE: 98.9 F

## 2021-09-10 DIAGNOSIS — M25.511 CHRONIC RIGHT SHOULDER PAIN: ICD-10-CM

## 2021-09-10 DIAGNOSIS — E78.5 HYPERLIPIDEMIA, UNSPECIFIED HYPERLIPIDEMIA TYPE: ICD-10-CM

## 2021-09-10 DIAGNOSIS — G89.29 CHRONIC RIGHT SHOULDER PAIN: ICD-10-CM

## 2021-09-10 DIAGNOSIS — E11.9 CONTROLLED TYPE 2 DIABETES MELLITUS WITHOUT COMPLICATION, WITHOUT LONG-TERM CURRENT USE OF INSULIN (HCC): Primary | ICD-10-CM

## 2021-09-10 DIAGNOSIS — E11.9 TYPE 2 DIABETES MELLITUS WITHOUT COMPLICATION, WITHOUT LONG-TERM CURRENT USE OF INSULIN (HCC): ICD-10-CM

## 2021-09-10 DIAGNOSIS — E03.9 HYPOTHYROIDISM, UNSPECIFIED TYPE: ICD-10-CM

## 2021-09-10 LAB — SL AMB POCT HEMOGLOBIN AIC: 6.7 (ref ?–6.5)

## 2021-09-10 PROCEDURE — 3080F DIAST BP >= 90 MM HG: CPT | Performed by: FAMILY MEDICINE

## 2021-09-10 PROCEDURE — 3077F SYST BP >= 140 MM HG: CPT | Performed by: FAMILY MEDICINE

## 2021-09-10 PROCEDURE — 83036 HEMOGLOBIN GLYCOSYLATED A1C: CPT | Performed by: FAMILY MEDICINE

## 2021-09-10 PROCEDURE — 99213 OFFICE O/P EST LOW 20 MIN: CPT | Performed by: FAMILY MEDICINE

## 2021-09-10 RX ORDER — BLOOD SUGAR DIAGNOSTIC
1 STRIP MISCELLANEOUS 2 TIMES DAILY
Qty: 100 EACH | Refills: 5 | Status: SHIPPED | OUTPATIENT
Start: 2021-09-10

## 2021-09-10 RX ORDER — LEVOTHYROXINE SODIUM 137 UG/1
137 TABLET ORAL DAILY
Qty: 60 TABLET | Refills: 1 | Status: SHIPPED | OUTPATIENT
Start: 2021-09-10 | End: 2022-02-14 | Stop reason: SDUPTHER

## 2021-09-10 RX ORDER — ATORVASTATIN CALCIUM 40 MG/1
40 TABLET, FILM COATED ORAL DAILY
Qty: 90 TABLET | Refills: 1 | Status: SHIPPED | OUTPATIENT
Start: 2021-09-10

## 2021-09-10 RX ORDER — LIDOCAINE 50 MG/G
1 PATCH TOPICAL DAILY
Qty: 30 PATCH | Refills: 0 | Status: SHIPPED | OUTPATIENT
Start: 2021-09-10 | End: 2021-12-21

## 2021-09-10 NOTE — PROGRESS NOTES
Assessment/Plan:    Right shoulder pain  - Patient with complains of right shoulder pain that is ongoing    - Discussed treatment  with NSAIDS such as Aleve, Ibuprofen and trial of Lidocaine patch   - If no improvement to symptoms can consider steroid injection  Patient reports relief with steroids in the past  - Ordered Xray of right shoulder and follow up in 4-6 weeks or sooner if needed  Controlled type 2 diabetes mellitus without complication, without long-term current use of insulin ([facility])    Lab Results   Component Value Date    HGBA1C 6 7 (A) 09/10/2021   well controlled and at goal HgA1c < 7  Continue Metformin 1000mg BID   Continue to monitor blood sugar and encouraged dietary adherence   Diagnoses and all orders for this visit:    Controlled type 2 diabetes mellitus without complication, without long-term current use of insulin ([facility])  -     atorvastatin (LIPITOR) 40 mg tablet; Take 1 tablet (40 mg total) by mouth daily    Type 2 diabetes mellitus without complication, without long-term current use of insulin ([facility])  -     POCT hemoglobin A1c  -     glucose blood (OneTouch Verio) test strip; Use 1 each 2 (two) times a day Use as instructed    Hyperlipidemia, unspecified hyperlipidemia type  -     atorvastatin (LIPITOR) 40 mg tablet; Take 1 tablet (40 mg total) by mouth daily    Hypothyroidism, unspecified type  -     levothyroxine 137 mcg tablet; Take 1 tablet (137 mcg total) by mouth daily    Chronic right shoulder pain  -     lidocaine (LIDODERM) 5 %; Apply 1 patch topically daily Remove & Discard patch within 12 hours or as directed by MD  -     XR shoulder 2+ vw right; Future          Subjective:      Patient ID: Lui Blanco is a 50 y o  male  Patient presents to the clinic due to complains of right shoulder pain  He does have a history of chronic right shoulder pain, received steroid injection about three years ago   This pain is an acute pain, started two months ago and has been progressively getting worse  Patient tried using over the counter gel and tylenol or pain with no significant relief or improvement        Patient would like refill on his thyroid and statin mediation  Patient reports that his fasting blood sugars is between 140-150's  Adheres to diabetic diet  Denies any other acute complain or concern  The following portions of the patient's history were reviewed and updated as appropriate: allergies, current medications, past family history, past medical history, past social history, past surgical history and problem list     Review of Systems   Constitutional: Negative for chills and fever  HENT: Negative for ear pain and sore throat  Eyes: Negative for pain and visual disturbance  Respiratory: Negative for cough and shortness of breath  Cardiovascular: Negative for chest pain and palpitations  Gastrointestinal: Negative for abdominal pain and vomiting  Genitourinary: Negative for dysuria and hematuria  Musculoskeletal: Negative for arthralgias and back pain  Right shoulder pain    Skin: Negative for color change and rash  Neurological: Negative for seizures and syncope  All other systems reviewed and are negative  Objective:      /90 (BP Location: Left arm, Patient Position: Sitting, Cuff Size: Large)   Pulse 98   Temp 98 9 °F (37 2 °C) (Temporal)   Resp 18   Ht 5' 9" (1 753 m)   Wt 89 8 kg (198 lb)   SpO2 99%   BMI 29 24 kg/m²          Physical Exam  Vitals reviewed  Constitutional:       General: He is not in acute distress  Appearance: Normal appearance  He is not ill-appearing, toxic-appearing or diaphoretic  HENT:      Head: Normocephalic and atraumatic  Cardiovascular:      Rate and Rhythm: Normal rate and regular rhythm  Pulses: Normal pulses  Heart sounds: Normal heart sounds  No murmur heard  No gallop  Pulmonary:      Effort: Pulmonary effort is normal  No respiratory distress  Breath sounds: Normal breath sounds  No wheezing  Abdominal:      General: Abdomen is flat  Bowel sounds are normal  There is no distension  Palpations: Abdomen is soft  Tenderness: There is no abdominal tenderness  Musculoskeletal:         General: Tenderness present  No swelling, deformity or signs of injury  Right lower leg: No edema  Left lower leg: No edema  Comments: Right shoulder tenderness around biceps  ROM limited secondary to pain  Sensation intact  Skin:     General: Skin is warm and dry  Capillary Refill: Capillary refill takes less than 2 seconds  Neurological:      Mental Status: He is alert  Mental status is at baseline  Psychiatric:         Mood and Affect: Mood normal          Behavior: Behavior normal          Thought Content:  Thought content normal          Judgment: Judgment normal

## 2021-09-10 NOTE — ASSESSMENT & PLAN NOTE
- Patient with complains of right shoulder pain that is ongoing    - Discussed treatment  with NSAIDS such as Aleve, Ibuprofen and trial of Lidocaine patch   - If no improvement to symptoms can consider steroid injection  Patient reports relief with steroids in the past  - Ordered Xray of right shoulder and follow up in 4-6 weeks or sooner if needed

## 2021-09-10 NOTE — ASSESSMENT & PLAN NOTE
Lab Results   Component Value Date    HGBA1C 6 7 (A) 09/10/2021   well controlled and at goal HgA1c < 7  Continue Metformin 1000mg BID   Continue to monitor blood sugar and encouraged dietary adherence

## 2021-09-13 ENCOUNTER — HOSPITAL ENCOUNTER (OUTPATIENT)
Dept: RADIOLOGY | Facility: HOSPITAL | Age: 56
Discharge: HOME | End: 2021-09-13
Payer: COMMERCIAL

## 2021-09-13 DIAGNOSIS — G89.29 CHRONIC RIGHT SHOULDER PAIN: ICD-10-CM

## 2021-09-13 DIAGNOSIS — M25.511 CHRONIC RIGHT SHOULDER PAIN: ICD-10-CM

## 2021-09-13 PROCEDURE — 73030 X-RAY EXAM OF SHOULDER: CPT

## 2021-09-30 ENCOUNTER — OFFICE VISIT (OUTPATIENT)
Dept: FAMILY MEDICINE CLINIC | Facility: CLINIC | Age: 56
End: 2021-09-30

## 2021-09-30 VITALS
HEIGHT: 69 IN | HEART RATE: 93 BPM | TEMPERATURE: 97.7 F | RESPIRATION RATE: 18 BRPM | DIASTOLIC BLOOD PRESSURE: 98 MMHG | OXYGEN SATURATION: 99 % | BODY MASS INDEX: 29.44 KG/M2 | SYSTOLIC BLOOD PRESSURE: 146 MMHG | WEIGHT: 198.8 LBS

## 2021-09-30 DIAGNOSIS — M19.019 SHOULDER ARTHRITIS: ICD-10-CM

## 2021-09-30 DIAGNOSIS — I10 BENIGN ESSENTIAL HYPERTENSION: Primary | ICD-10-CM

## 2021-09-30 PROCEDURE — 3080F DIAST BP >= 90 MM HG: CPT | Performed by: FAMILY MEDICINE

## 2021-09-30 PROCEDURE — 3077F SYST BP >= 140 MM HG: CPT | Performed by: FAMILY MEDICINE

## 2021-09-30 PROCEDURE — 99214 OFFICE O/P EST MOD 30 MIN: CPT | Performed by: FAMILY MEDICINE

## 2021-09-30 RX ORDER — MELOXICAM 15 MG/1
15 TABLET ORAL DAILY
Qty: 7 TABLET | Refills: 0 | Status: SHIPPED | OUTPATIENT
Start: 2021-09-30 | End: 2022-02-14 | Stop reason: ALTCHOICE

## 2021-09-30 NOTE — ASSESSMENT & PLAN NOTE
Blood pressure 146/98 today, uncontrolled  Patient is currently on lisinopril hydrochlorothiazide 10-12  5  Could be elevated secondary to pain  Advised to avoid NSAIDs other than being prescribed today  Patient to take it only for 7 days    Patient to follow-up in 4 weeks for repeat blood pressure

## 2021-09-30 NOTE — ASSESSMENT & PLAN NOTE
X-ray right  Shoulder shows acromioclavicular arthritis, recommended physical therapy  Referral given  Patient will follow-up in 4 weeks, if no relief will need injections  Also prescribed Mobic 15 milligrams daily for 7 days  Advised not to take other NSAIDs along with Mobic    May take Tylenol as needed

## 2021-09-30 NOTE — PROGRESS NOTES
Assessment/Plan     Benign essential hypertension    Blood pressure 146/98 today, uncontrolled  Patient is currently on lisinopril hydrochlorothiazide 10-12  5  Could be elevated secondary to pain  Advised to avoid NSAIDs other than being prescribed today  Patient to take it only for 7 days  Patient to follow-up in 4 weeks for repeat blood pressure    Shoulder arthritis   X-ray right  Shoulder shows acromioclavicular arthritis, recommended physical therapy  Referral given  Patient will follow-up in 4 weeks, if no relief will need injections  Also prescribed Mobic 15 milligrams daily for 7 days  Advised not to take other NSAIDs along with Mobic  May take Tylenol as needed    Diagnoses and all orders for this visit:    Benign essential hypertension    Shoulder arthritis  -     Ambulatory referral to Physical Therapy; Future  -     meloxicam (Mobic) 15 mg tablet; Take 1 tablet (15 mg total) by mouth daily         Subjective     Chief Complaint   Patient presents with    Results     xrays        51 yo Brisa Moat  Presented to office with complaints of right shoulder pain, patient has been dealing with this pain for couple months now  He reports that Aleve or Advil provides temporary relief, but the pain returns  He is not able to lift the arm above his head because of pain  He reports that when he was 25years old he had shoulder dislocation which corrected by itself but denies any other trauma to right shoulder  He also wants to discuss the results of x-ray right shoulder  He was referred to physical therapy, but he did not start that yet  The following portions of the patient's history were reviewed and updated as appropriate: allergies, current medications, past family history, past medical history, past social history, past surgical history and problem list     Review of Systems   Constitutional: Negative for chills and fever  HENT: Negative for congestion      Respiratory: Negative for shortness of breath  Cardiovascular: Negative for chest pain  Gastrointestinal: Negative for diarrhea, nausea and vomiting  Genitourinary: Negative for difficulty urinating  Musculoskeletal: Positive for arthralgias  Neurological: Negative for headaches  Objective     Vitals:Blood pressure 146/98, pulse 93, temperature 97 7 °F (36 5 °C), temperature source Temporal, resp  rate 18, height 5' 9" (1 753 m), weight 90 2 kg (198 lb 12 8 oz), SpO2 99 %  Physical Exam:  Physical Exam  Constitutional:       Appearance: Normal appearance  He is well-developed  HENT:      Head: Normocephalic and atraumatic  Right Ear: External ear normal       Left Ear: External ear normal    Eyes:      Conjunctiva/sclera: Conjunctivae normal    Cardiovascular:      Rate and Rhythm: Normal rate and regular rhythm  Heart sounds: Normal heart sounds  No murmur heard  No friction rub  Pulmonary:      Effort: Pulmonary effort is normal  No respiratory distress  Breath sounds: Normal breath sounds  No wheezing or rales  Musculoskeletal:         General: Tenderness present  Cervical back: Normal range of motion and neck supple  Comments: Range of motion Right shoulder restricted secondary to pain   Skin:     General: Skin is warm and dry  Neurological:      Mental Status: He is alert and oriented to person, place, and time

## 2021-10-25 ENCOUNTER — EVALUATION (OUTPATIENT)
Dept: PHYSICAL THERAPY | Facility: CLINIC | Age: 56
End: 2021-10-25
Payer: COMMERCIAL

## 2021-10-25 DIAGNOSIS — M19.019 SHOULDER ARTHRITIS: ICD-10-CM

## 2021-10-25 PROCEDURE — 97161 PT EVAL LOW COMPLEX 20 MIN: CPT | Performed by: PHYSICAL THERAPIST

## 2021-10-25 PROCEDURE — 97110 THERAPEUTIC EXERCISES: CPT | Performed by: PHYSICAL THERAPIST

## 2021-10-29 ENCOUNTER — OFFICE VISIT (OUTPATIENT)
Dept: PHYSICAL THERAPY | Facility: CLINIC | Age: 56
End: 2021-10-29
Payer: COMMERCIAL

## 2021-10-29 ENCOUNTER — HOSPITAL ENCOUNTER (OUTPATIENT)
Dept: MRI IMAGING | Facility: HOSPITAL | Age: 56
Discharge: HOME | End: 2021-10-29
Payer: COMMERCIAL

## 2021-10-29 DIAGNOSIS — M89.9 FRONTAL SKULL LESION: ICD-10-CM

## 2021-10-29 DIAGNOSIS — M19.019 SHOULDER ARTHRITIS: Primary | ICD-10-CM

## 2021-10-29 PROCEDURE — A9585 GADOBUTROL INJECTION: HCPCS | Performed by: NURSE PRACTITIONER

## 2021-10-29 PROCEDURE — G1004 CDSM NDSC: HCPCS

## 2021-10-29 PROCEDURE — 97140 MANUAL THERAPY 1/> REGIONS: CPT | Performed by: PHYSICAL THERAPIST

## 2021-10-29 PROCEDURE — 97110 THERAPEUTIC EXERCISES: CPT | Performed by: PHYSICAL THERAPIST

## 2021-10-29 PROCEDURE — 97112 NEUROMUSCULAR REEDUCATION: CPT | Performed by: PHYSICAL THERAPIST

## 2021-10-29 PROCEDURE — 70553 MRI BRAIN STEM W/O & W/DYE: CPT

## 2021-10-29 RX ADMIN — GADOBUTROL 9 ML: 604.72 INJECTION INTRAVENOUS at 18:45

## 2021-11-01 ENCOUNTER — OFFICE VISIT (OUTPATIENT)
Dept: NEUROSURGERY | Facility: CLINIC | Age: 56
End: 2021-11-01
Payer: COMMERCIAL

## 2021-11-01 VITALS
SYSTOLIC BLOOD PRESSURE: 140 MMHG | RESPIRATION RATE: 16 BRPM | WEIGHT: 196 LBS | HEART RATE: 104 BPM | TEMPERATURE: 97.4 F | HEIGHT: 69 IN | BODY MASS INDEX: 29.03 KG/M2 | DIASTOLIC BLOOD PRESSURE: 84 MMHG

## 2021-11-01 DIAGNOSIS — M89.9 FRONTAL SKULL LESION: Primary | ICD-10-CM

## 2021-11-01 PROCEDURE — 99214 OFFICE O/P EST MOD 30 MIN: CPT | Performed by: PHYSICIAN ASSISTANT

## 2021-11-03 ENCOUNTER — OFFICE VISIT (OUTPATIENT)
Dept: PHYSICAL THERAPY | Facility: CLINIC | Age: 56
End: 2021-11-03
Payer: COMMERCIAL

## 2021-11-03 DIAGNOSIS — M19.019 SHOULDER ARTHRITIS: Primary | ICD-10-CM

## 2021-11-03 PROCEDURE — 97140 MANUAL THERAPY 1/> REGIONS: CPT | Performed by: PHYSICAL THERAPIST

## 2021-11-03 PROCEDURE — 97110 THERAPEUTIC EXERCISES: CPT | Performed by: PHYSICAL THERAPIST

## 2021-11-05 ENCOUNTER — OFFICE VISIT (OUTPATIENT)
Dept: PHYSICAL THERAPY | Facility: CLINIC | Age: 56
End: 2021-11-05
Payer: COMMERCIAL

## 2021-11-05 DIAGNOSIS — M19.019 SHOULDER ARTHRITIS: Primary | ICD-10-CM

## 2021-11-05 PROCEDURE — 97140 MANUAL THERAPY 1/> REGIONS: CPT | Performed by: PHYSICAL THERAPIST

## 2021-11-05 PROCEDURE — 97110 THERAPEUTIC EXERCISES: CPT | Performed by: PHYSICAL THERAPIST

## 2021-11-08 DIAGNOSIS — E11.9 TYPE 2 DIABETES MELLITUS WITHOUT COMPLICATION, WITHOUT LONG-TERM CURRENT USE OF INSULIN (HCC): ICD-10-CM

## 2021-11-09 ENCOUNTER — OFFICE VISIT (OUTPATIENT)
Dept: PHYSICAL THERAPY | Facility: CLINIC | Age: 56
End: 2021-11-09
Payer: COMMERCIAL

## 2021-11-09 DIAGNOSIS — M19.019 SHOULDER ARTHRITIS: Primary | ICD-10-CM

## 2021-11-09 PROCEDURE — 97110 THERAPEUTIC EXERCISES: CPT | Performed by: PHYSICAL THERAPIST

## 2021-11-09 PROCEDURE — 97140 MANUAL THERAPY 1/> REGIONS: CPT | Performed by: PHYSICAL THERAPIST

## 2021-11-11 ENCOUNTER — OFFICE VISIT (OUTPATIENT)
Dept: FAMILY MEDICINE CLINIC | Facility: CLINIC | Age: 56
End: 2021-11-11

## 2021-11-11 VITALS
BODY MASS INDEX: 29.53 KG/M2 | WEIGHT: 199.4 LBS | HEART RATE: 82 BPM | DIASTOLIC BLOOD PRESSURE: 80 MMHG | HEIGHT: 69 IN | RESPIRATION RATE: 16 BRPM | SYSTOLIC BLOOD PRESSURE: 140 MMHG | TEMPERATURE: 98.9 F

## 2021-11-11 DIAGNOSIS — G89.29 CHRONIC RIGHT SHOULDER PAIN: Primary | ICD-10-CM

## 2021-11-11 DIAGNOSIS — I10 BENIGN ESSENTIAL HYPERTENSION: ICD-10-CM

## 2021-11-11 DIAGNOSIS — I10 ESSENTIAL HYPERTENSION: ICD-10-CM

## 2021-11-11 DIAGNOSIS — M25.511 CHRONIC RIGHT SHOULDER PAIN: Primary | ICD-10-CM

## 2021-11-11 DIAGNOSIS — E03.9 HYPOTHYROIDISM, UNSPECIFIED TYPE: ICD-10-CM

## 2021-11-11 PROCEDURE — 99214 OFFICE O/P EST MOD 30 MIN: CPT | Performed by: FAMILY MEDICINE

## 2021-11-11 PROCEDURE — 3077F SYST BP >= 140 MM HG: CPT | Performed by: FAMILY MEDICINE

## 2021-11-11 PROCEDURE — 3079F DIAST BP 80-89 MM HG: CPT | Performed by: FAMILY MEDICINE

## 2021-11-11 RX ORDER — LISINOPRIL AND HYDROCHLOROTHIAZIDE 12.5; 1 MG/1; MG/1
1 TABLET ORAL DAILY
Qty: 90 TABLET | Refills: 2 | Status: SHIPPED | OUTPATIENT
Start: 2021-11-11 | End: 2022-02-14 | Stop reason: SDUPTHER

## 2021-11-12 ENCOUNTER — OFFICE VISIT (OUTPATIENT)
Dept: PHYSICAL THERAPY | Facility: CLINIC | Age: 56
End: 2021-11-12
Payer: COMMERCIAL

## 2021-11-12 DIAGNOSIS — M19.019 SHOULDER ARTHRITIS: Primary | ICD-10-CM

## 2021-11-12 PROCEDURE — 97110 THERAPEUTIC EXERCISES: CPT | Performed by: PHYSICAL MEDICINE & REHABILITATION

## 2021-11-12 PROCEDURE — 97140 MANUAL THERAPY 1/> REGIONS: CPT | Performed by: PHYSICAL MEDICINE & REHABILITATION

## 2021-11-12 PROCEDURE — 97112 NEUROMUSCULAR REEDUCATION: CPT | Performed by: PHYSICAL MEDICINE & REHABILITATION

## 2021-11-16 ENCOUNTER — OFFICE VISIT (OUTPATIENT)
Dept: PHYSICAL THERAPY | Facility: CLINIC | Age: 56
End: 2021-11-16
Payer: COMMERCIAL

## 2021-11-16 DIAGNOSIS — M19.019 SHOULDER ARTHRITIS: Primary | ICD-10-CM

## 2021-11-16 PROCEDURE — 97110 THERAPEUTIC EXERCISES: CPT | Performed by: PHYSICAL THERAPIST

## 2021-11-16 PROCEDURE — 97140 MANUAL THERAPY 1/> REGIONS: CPT | Performed by: PHYSICAL THERAPIST

## 2021-11-19 ENCOUNTER — OFFICE VISIT (OUTPATIENT)
Dept: PHYSICAL THERAPY | Facility: CLINIC | Age: 56
End: 2021-11-19
Payer: COMMERCIAL

## 2021-11-19 DIAGNOSIS — M19.019 SHOULDER ARTHRITIS: Primary | ICD-10-CM

## 2021-11-19 PROCEDURE — 97110 THERAPEUTIC EXERCISES: CPT | Performed by: PHYSICAL THERAPIST

## 2021-11-19 PROCEDURE — 97140 MANUAL THERAPY 1/> REGIONS: CPT | Performed by: PHYSICAL THERAPIST

## 2021-11-19 PROCEDURE — 97110 THERAPEUTIC EXERCISES: CPT

## 2021-11-23 ENCOUNTER — EVALUATION (OUTPATIENT)
Dept: PHYSICAL THERAPY | Facility: CLINIC | Age: 56
End: 2021-11-23
Payer: COMMERCIAL

## 2021-11-23 DIAGNOSIS — M19.019 SHOULDER ARTHRITIS: Primary | ICD-10-CM

## 2021-11-23 PROCEDURE — 97140 MANUAL THERAPY 1/> REGIONS: CPT | Performed by: PHYSICAL THERAPIST

## 2021-11-23 PROCEDURE — 97110 THERAPEUTIC EXERCISES: CPT | Performed by: PHYSICAL THERAPIST

## 2021-11-26 ENCOUNTER — OFFICE VISIT (OUTPATIENT)
Dept: PHYSICAL THERAPY | Facility: CLINIC | Age: 56
End: 2021-11-26
Payer: COMMERCIAL

## 2021-11-26 DIAGNOSIS — M19.019 SHOULDER ARTHRITIS: Primary | ICD-10-CM

## 2021-11-26 PROCEDURE — 97140 MANUAL THERAPY 1/> REGIONS: CPT | Performed by: PHYSICAL THERAPIST

## 2021-11-26 PROCEDURE — 97110 THERAPEUTIC EXERCISES: CPT | Performed by: PHYSICAL THERAPIST

## 2021-11-26 PROCEDURE — 97112 NEUROMUSCULAR REEDUCATION: CPT | Performed by: PHYSICAL THERAPIST

## 2021-11-30 ENCOUNTER — OFFICE VISIT (OUTPATIENT)
Dept: PHYSICAL THERAPY | Facility: CLINIC | Age: 56
End: 2021-11-30
Payer: COMMERCIAL

## 2021-11-30 DIAGNOSIS — M19.019 SHOULDER ARTHRITIS: Primary | ICD-10-CM

## 2021-11-30 PROCEDURE — 97110 THERAPEUTIC EXERCISES: CPT | Performed by: PHYSICAL THERAPIST

## 2021-11-30 PROCEDURE — 97112 NEUROMUSCULAR REEDUCATION: CPT | Performed by: PHYSICAL THERAPIST

## 2021-11-30 PROCEDURE — 97140 MANUAL THERAPY 1/> REGIONS: CPT | Performed by: PHYSICAL THERAPIST

## 2021-12-03 ENCOUNTER — OFFICE VISIT (OUTPATIENT)
Dept: PHYSICAL THERAPY | Facility: CLINIC | Age: 56
End: 2021-12-03
Payer: COMMERCIAL

## 2021-12-03 DIAGNOSIS — M19.019 SHOULDER ARTHRITIS: Primary | ICD-10-CM

## 2021-12-03 PROCEDURE — 97110 THERAPEUTIC EXERCISES: CPT | Performed by: PHYSICAL THERAPIST

## 2021-12-03 PROCEDURE — 97140 MANUAL THERAPY 1/> REGIONS: CPT | Performed by: PHYSICAL THERAPIST

## 2021-12-03 PROCEDURE — 97112 NEUROMUSCULAR REEDUCATION: CPT | Performed by: PHYSICAL THERAPIST

## 2021-12-07 ENCOUNTER — OFFICE VISIT (OUTPATIENT)
Dept: PHYSICAL THERAPY | Facility: CLINIC | Age: 56
End: 2021-12-07
Payer: COMMERCIAL

## 2021-12-07 DIAGNOSIS — M19.019 SHOULDER ARTHRITIS: Primary | ICD-10-CM

## 2021-12-07 PROCEDURE — 97112 NEUROMUSCULAR REEDUCATION: CPT | Performed by: PHYSICAL THERAPIST

## 2021-12-07 PROCEDURE — 97110 THERAPEUTIC EXERCISES: CPT | Performed by: PHYSICAL THERAPIST

## 2021-12-07 PROCEDURE — 97140 MANUAL THERAPY 1/> REGIONS: CPT | Performed by: PHYSICAL THERAPIST

## 2021-12-10 ENCOUNTER — OFFICE VISIT (OUTPATIENT)
Dept: PHYSICAL THERAPY | Facility: CLINIC | Age: 56
End: 2021-12-10
Payer: COMMERCIAL

## 2021-12-10 DIAGNOSIS — M19.019 SHOULDER ARTHRITIS: Primary | ICD-10-CM

## 2021-12-10 PROCEDURE — 97112 NEUROMUSCULAR REEDUCATION: CPT

## 2021-12-10 PROCEDURE — 97140 MANUAL THERAPY 1/> REGIONS: CPT

## 2021-12-10 PROCEDURE — 97110 THERAPEUTIC EXERCISES: CPT

## 2021-12-14 ENCOUNTER — OFFICE VISIT (OUTPATIENT)
Dept: PHYSICAL THERAPY | Facility: CLINIC | Age: 56
End: 2021-12-14
Payer: COMMERCIAL

## 2021-12-14 DIAGNOSIS — M19.019 SHOULDER ARTHRITIS: Primary | ICD-10-CM

## 2021-12-14 PROCEDURE — 97140 MANUAL THERAPY 1/> REGIONS: CPT | Performed by: PHYSICAL THERAPIST

## 2021-12-14 PROCEDURE — 97110 THERAPEUTIC EXERCISES: CPT | Performed by: PHYSICAL THERAPIST

## 2021-12-14 PROCEDURE — 97112 NEUROMUSCULAR REEDUCATION: CPT | Performed by: PHYSICAL THERAPIST

## 2021-12-16 ENCOUNTER — OFFICE VISIT (OUTPATIENT)
Dept: PHYSICAL THERAPY | Facility: CLINIC | Age: 56
End: 2021-12-16
Payer: COMMERCIAL

## 2021-12-16 DIAGNOSIS — M19.019 SHOULDER ARTHRITIS: Primary | ICD-10-CM

## 2021-12-16 PROCEDURE — 97112 NEUROMUSCULAR REEDUCATION: CPT | Performed by: PHYSICAL THERAPIST

## 2021-12-16 PROCEDURE — 97140 MANUAL THERAPY 1/> REGIONS: CPT | Performed by: PHYSICAL THERAPIST

## 2021-12-16 PROCEDURE — 97110 THERAPEUTIC EXERCISES: CPT | Performed by: PHYSICAL THERAPIST

## 2021-12-21 ENCOUNTER — OFFICE VISIT (OUTPATIENT)
Dept: PHYSICAL THERAPY | Facility: CLINIC | Age: 56
End: 2021-12-21
Payer: COMMERCIAL

## 2021-12-21 DIAGNOSIS — M19.019 SHOULDER ARTHRITIS: Primary | ICD-10-CM

## 2021-12-21 DIAGNOSIS — G89.29 CHRONIC RIGHT SHOULDER PAIN: ICD-10-CM

## 2021-12-21 DIAGNOSIS — M25.511 CHRONIC RIGHT SHOULDER PAIN: ICD-10-CM

## 2021-12-21 PROCEDURE — 97140 MANUAL THERAPY 1/> REGIONS: CPT | Performed by: PHYSICAL THERAPIST

## 2021-12-21 PROCEDURE — 97110 THERAPEUTIC EXERCISES: CPT | Performed by: PHYSICAL THERAPIST

## 2021-12-21 PROCEDURE — 97112 NEUROMUSCULAR REEDUCATION: CPT | Performed by: PHYSICAL THERAPIST

## 2021-12-21 RX ORDER — LIDOCAINE 50 MG/G
PATCH TOPICAL
Qty: 30 PATCH | Refills: 0 | Status: SHIPPED | OUTPATIENT
Start: 2021-12-21

## 2021-12-23 ENCOUNTER — EVALUATION (OUTPATIENT)
Dept: PHYSICAL THERAPY | Facility: CLINIC | Age: 56
End: 2021-12-23
Payer: COMMERCIAL

## 2021-12-23 DIAGNOSIS — M19.019 SHOULDER ARTHRITIS: Primary | ICD-10-CM

## 2021-12-23 PROCEDURE — 97112 NEUROMUSCULAR REEDUCATION: CPT | Performed by: PHYSICAL THERAPIST

## 2021-12-23 PROCEDURE — 97110 THERAPEUTIC EXERCISES: CPT | Performed by: PHYSICAL THERAPIST

## 2021-12-23 PROCEDURE — 97140 MANUAL THERAPY 1/> REGIONS: CPT | Performed by: PHYSICAL THERAPIST

## 2021-12-27 ENCOUNTER — OFFICE VISIT (OUTPATIENT)
Dept: PHYSICAL THERAPY | Facility: CLINIC | Age: 56
End: 2021-12-27
Payer: COMMERCIAL

## 2021-12-27 DIAGNOSIS — M19.019 SHOULDER ARTHRITIS: Primary | ICD-10-CM

## 2021-12-27 PROCEDURE — 97112 NEUROMUSCULAR REEDUCATION: CPT | Performed by: PHYSICAL THERAPIST

## 2021-12-27 PROCEDURE — 97140 MANUAL THERAPY 1/> REGIONS: CPT | Performed by: PHYSICAL THERAPIST

## 2021-12-27 PROCEDURE — 97110 THERAPEUTIC EXERCISES: CPT | Performed by: PHYSICAL THERAPIST

## 2021-12-30 ENCOUNTER — OFFICE VISIT (OUTPATIENT)
Dept: PHYSICAL THERAPY | Facility: CLINIC | Age: 56
End: 2021-12-30
Payer: COMMERCIAL

## 2021-12-30 DIAGNOSIS — M19.019 SHOULDER ARTHRITIS: Primary | ICD-10-CM

## 2021-12-30 PROCEDURE — 97140 MANUAL THERAPY 1/> REGIONS: CPT | Performed by: PHYSICAL THERAPIST

## 2021-12-30 PROCEDURE — 97110 THERAPEUTIC EXERCISES: CPT | Performed by: PHYSICAL THERAPIST

## 2021-12-30 PROCEDURE — 97112 NEUROMUSCULAR REEDUCATION: CPT | Performed by: PHYSICAL THERAPIST

## 2022-01-06 ENCOUNTER — OFFICE VISIT (OUTPATIENT)
Dept: PHYSICAL THERAPY | Facility: CLINIC | Age: 57
End: 2022-01-06
Payer: COMMERCIAL

## 2022-01-06 DIAGNOSIS — M19.019 SHOULDER ARTHRITIS: Primary | ICD-10-CM

## 2022-01-06 PROCEDURE — 97112 NEUROMUSCULAR REEDUCATION: CPT | Performed by: PHYSICAL THERAPIST

## 2022-01-06 PROCEDURE — 97110 THERAPEUTIC EXERCISES: CPT | Performed by: PHYSICAL THERAPIST

## 2022-01-06 PROCEDURE — 97140 MANUAL THERAPY 1/> REGIONS: CPT | Performed by: PHYSICAL THERAPIST

## 2022-01-06 NOTE — PROGRESS NOTES
Daily Note     Today's date: 2022  Patient name: Claudia Ochoa  : 1965  MRN: [id number]  Referring provider: Juan Pablo Acosta MD  Dx:   Encounter Diagnosis     ICD-10-CM    1  Shoulder arthritis  M19 019                   Subjective: Pt notes ROM improved but pain remains  He will plan to RT MD       Objective: See treatment diary below      Assessment: Tolerated treatment well  Patient had more IR Stiffness today than previously  Plan: Hold PT until MD f/u     Precautions: HTN, DM type II    Manuals 11/30 12/3 12/7 12/10 12/14 12/16 12/21 12/27 12/30 1/6 11/23 11/26   Right shoulder PROM 5 5 5 5 5 5 8 10 5 10 5 5   Scap mobs 5 5 5 3 3 5   5  5 5   RS balanced position    GH distraction 2  2                       Neuro Re-Ed               Iso  scap ret                 Prone I's               Prone T's                Prone Y's               Prone Row/Ext 2# 20 ea 3# 20 3# 20 3# x 20 3# 20 3# 20 3# 20 3# 20 3# 20 3# 20 2# 20 ea 2# 20 ea   Body Blade :30/3x :03/3 :3 30" x 3 :30/3 :30/3 :30/3 :30/3 :30/3 :303                    Ther Ex               Pulleys 3'/3' 3'/3' 3'/3'  3'/3' 3'/3' 3'/3' 3'/3' 3'/3' 3'/3' 3'/3' 3'/3' 3'/3'   Wall slide flex               Wall slide scap               TB ER Blue :03 20 Blue :03 20 G :03 20 Blue 3" x 20 Blue :03 20 Blue 03 20 Blue :03 20 Blue TB 20x3" Blue  :03 20 Blue :03 20 G :03 20 Blue :03 20   TB IR  Blue :03 20: G :03 20 Blue 3" x 20 Blue :03 20 Blue :03 20 Blue :03 20 Blue TB 20x3" Blue  :03 20 Blue :03 20 G :03 20 Blue :03 20   Sleeper stretch HEP   10" x 10 :10/10 :10/10 :10/10 10x10" :10/10 :10/10 :10/10 :10/10   Sup wand flex HEP              TB Rows/Ext  Black :03 20 G :03 20 ea Blue 3" x 20 each Blue 03 20 ea Blue :03 20 Blue : 20 Blue TB 20x3" ea Blue  : 20 Blue : 20 G :03 20 Blue :03 20   Supine Flexion 3# 20 3# 20 20 3# x 20 3# 20 3# 20 3# 20 20x 20/3# 10 3# 20 ea 0/2# 20 0/2#/ 20 ea   SL Abd 3# 20 3# 20 20 standing 3# x 20 Standing 3# 20 Standing 20 3# stand 20 20x 20/3# 10 3# 20 ea 0/2# 20 0/2# 20 ea   SL ER 3# 20 3# 20 20 3# x 20 3# 20 3# 20 3# 20 20x 20/1# 10 3# 20 ea 0/2# 20 0/2# 20 ea   Corner ER Str :20/6 :20/6  10" x 20 :10/20 :20/6 :20/6 6x20" :20/6 :20/6 :20/6 :20/6   IR Strap Str :10/10 :10/10  10" x 10 :10/10 :10/10 :10/10 10x10" :10/10 :10/10 :10/10 :10/10   Sup wand ER HEP              UBE 4'/4' 4'/4'  5'/5' 5'/5' 5'/5' 5'/5' 5'/5' 5'/5 5'/5' 3'/3' 4'/4'   Wand Ext AAROM  :03 20  3" x 20 :03 20 :03 20 :03 20 :03 20 :03 20 :03 20     Ther Activity                                             Gait Training                                             Modalities

## 2022-01-12 ENCOUNTER — OFFICE VISIT (OUTPATIENT)
Dept: FAMILY MEDICINE CLINIC | Facility: CLINIC | Age: 57
End: 2022-01-12
Payer: COMMERCIAL

## 2022-01-12 VITALS
RESPIRATION RATE: 18 BRPM | BODY MASS INDEX: 29.21 KG/M2 | TEMPERATURE: 98.2 F | SYSTOLIC BLOOD PRESSURE: 138 MMHG | HEIGHT: 69 IN | OXYGEN SATURATION: 98 % | WEIGHT: 197.2 LBS | HEART RATE: 102 BPM | DIASTOLIC BLOOD PRESSURE: 90 MMHG

## 2022-01-12 DIAGNOSIS — M75.41 SUBACROMIAL IMPINGEMENT, RIGHT: Primary | ICD-10-CM

## 2022-01-12 DIAGNOSIS — M75.101 ROTATOR CUFF SYNDROME, RIGHT: ICD-10-CM

## 2022-01-12 PROCEDURE — 3075F SYST BP GE 130 - 139MM HG: CPT | Performed by: FAMILY MEDICINE

## 2022-01-12 PROCEDURE — 20610 DRAIN/INJ JOINT/BURSA W/O US: CPT | Performed by: FAMILY MEDICINE

## 2022-01-12 PROCEDURE — 99213 OFFICE O/P EST LOW 20 MIN: CPT | Performed by: FAMILY MEDICINE

## 2022-01-12 PROCEDURE — 3080F DIAST BP >= 90 MM HG: CPT | Performed by: FAMILY MEDICINE

## 2022-01-12 RX ORDER — LIDOCAINE HYDROCHLORIDE 20 MG/ML
2 INJECTION, SOLUTION INFILTRATION; PERINEURAL
Status: COMPLETED | OUTPATIENT
Start: 2022-01-12 | End: 2022-01-12

## 2022-01-12 RX ORDER — TRIAMCINOLONE ACETONIDE 40 MG/ML
40 INJECTION, SUSPENSION INTRA-ARTICULAR; INTRAMUSCULAR
Status: COMPLETED | OUTPATIENT
Start: 2022-01-12 | End: 2022-01-12

## 2022-01-12 RX ADMIN — TRIAMCINOLONE ACETONIDE 40 MG: 40 INJECTION, SUSPENSION INTRA-ARTICULAR; INTRAMUSCULAR at 10:32

## 2022-01-12 RX ADMIN — LIDOCAINE HYDROCHLORIDE 2 ML: 20 INJECTION, SOLUTION INFILTRATION; PERINEURAL at 10:32

## 2022-01-12 NOTE — PATIENT INSTRUCTIONS
Please follow up to review MRI at sports medicine clinic     Red flags and risks of injection include but are not limited to infection <0 072% as referenced in some sources, nerve or artery penetration, and if steroids are used-skin dimpling <1%, hypo-pigmentation <1%  Recommended no submerging underwater in a tub, pool, ocean, lake, jacuzzi, hot tub, or any other body of water for 1 week until needle wound closes due to risk of infection  May take showers  Clean needle site with soap and water and keep covered at all times with sterile bandage such as a band-aid until fully healed  Educated if any symptoms including fevers, chills, swelling, or worsening symptoms occur then to call office or go to hospital for immediate care if physician unavailable due to possible infection or other complication which is a serious medical problem  Patient expressed understanding and agreed to proceed with procedure

## 2022-01-12 NOTE — ASSESSMENT & PLAN NOTE
History of diabetes well controlled  Failed conservative therapy PO medication and formal PT  Intervention status post subacromial CS injection today   F/u MRI in sports medicine clinic to rule out rotator cuff pathology

## 2022-01-12 NOTE — PROGRESS NOTES
Assessment/Plan:    Subacromial impingement, right  History of diabetes well controlled  Failed conservative therapy PO medication and formal PT  Intervention status post subacromial CS injection today   F/u MRI in sports medicine clinic to rule out rotator cuff pathology     Rotator cuff syndrome, right  History of diabetes well controlled  Failed conservative therapy PO medication and formal PT  Intervention status post subacromial CS injection today   F/u MRI in sports medicine clinic to rule out rotator cuff pathology        Diagnoses and all orders for this visit:    Subacromial impingement, right  -     Large joint arthrocentesis: R subacromial bursa    Rotator cuff syndrome, right  -     MRI shoulder right wo contrast; Future  -     Ambulatory Referral to Sports Medicine; Future          Subjective:      Patient ID: Gordon Alexandre is a 49 y o  male  HPI   The patient presents with a chief complaint of right shoulder pain  The pain began several month(s) ago and is not associated with an acute injury  The patient describes the pain as aching and dull in intensity,  intermittent, awakening patient from sleep in timing, and localizes the pain to the  right subacromial joint  The pain is worse with cold exposure, lying down, overhead work, overuse and raising arm over head and relieved by rest, avoiding the painful activities  The pain is not associated with numbness and tingling  Status post right subacromial injection 02/20218 with relief of symptoms  Failed conservative management  He has taken OTC medication without relief of symptoms as well as attended physical therapy without relief of symptoms  Of note history for diabetes well controlled       The following portions of the patient's history were reviewed and updated as appropriate: allergies, current medications, past family history, past medical history, past social history, past surgical history and problem list     Review of Systems Constitutional: Negative for chills and fever  HENT: Negative for ear pain and sore throat  Respiratory: Negative for cough and shortness of breath  Cardiovascular: Negative for chest pain and palpitations  Gastrointestinal: Negative for abdominal pain and vomiting  Musculoskeletal: Positive for arthralgias  Negative for back pain and neck pain  Skin: Negative for color change and rash  Neurological: Negative for seizures and syncope  All other systems reviewed and are negative  Objective:      /90 (BP Location: Left arm, Patient Position: Sitting, Cuff Size: Standard)   Pulse 102   Temp 98 2 °F (36 8 °C) (Temporal)   Resp 18   Ht 5' 9" (1 753 m)   Wt 89 4 kg (197 lb 3 2 oz)   SpO2 98%   BMI 29 12 kg/m²          Physical Exam  Vitals and nursing note reviewed  Constitutional:       General: He is not in acute distress  Appearance: Normal appearance  He is not ill-appearing  HENT:      Head: Normocephalic and atraumatic  Nose: Nose normal    Eyes:      Conjunctiva/sclera: Conjunctivae normal       Pupils: Pupils are equal, round, and reactive to light  Cardiovascular:      Rate and Rhythm: Normal rate and regular rhythm  Pulses: Normal pulses  Pulmonary:      Effort: Pulmonary effort is normal  No respiratory distress  Skin:     General: Skin is warm and dry  Capillary Refill: Capillary refill takes less than 2 seconds  Neurological:      Mental Status: He is alert and oriented to person, place, and time  Mental status is at baseline  Cranial Nerves: No cranial nerve deficit  Sensory: No sensory deficit  Psychiatric:         Mood and Affect: Mood normal          Behavior: Behavior normal          Imaging reviewed today xray right shoulder 09/13/2021:  Arthritic changes seen in the acromioclavicular joint   No acute osseous abnormalities     RIGHT SHOULDER:  Erythema: no  Swelling: no  Increased Warmth: no    Tenderness: lateral subacromial  Non-tender over AC joint     ROM  Touchdown sign: intact  External Rotation: Intact  Internal Rotation: Intact    Strength  Abduction: 5/5 reproduces pain over posterior supraspinatus and lateral subacromial   ER: 5/5 reproduces pain over infraspinatus   IR: 5/5     Drop-Arm: negative   Emptycan: positive   Belly Press: negative   Lift-off Test: negative     Pritchett: negative  Neer: positive   Cross-Arm: negative       LEFT SHOULDER:  Strength  Abduction: 5/5  ER: 5/5  IR: 5/5    ROM  Touchdown sign: intact    Empty can: negative    Large joint arthrocentesis: R subacromial bursa  Universal Protocol:  Consent: Verbal consent obtained  Risks and benefits: risks, benefits and alternatives were discussed  Consent given by: patient  Time out: Immediately prior to procedure a "time out" was called to verify the correct patient, procedure, equipment, support staff and site/side marked as required  Patient understanding: patient states understanding of the procedure being performed  Patient consent: the patient's understanding of the procedure matches consent given  Site marked: the operative site was marked  Radiology Images displayed and confirmed   If images not available, report reviewed: imaging studies available  Required items: required blood products, implants, devices, and special equipment available  Patient identity confirmed: verbally with patient    Supporting Documentation  Indications: pain   Procedure Details  Location: shoulder - R subacromial bursa  Needle size: 22 G  Ultrasound guidance: no  Approach: lateral  Medications administered: 2 mL lidocaine 2 %; 40 mg triamcinolone acetonide 40 mg/mL    Patient tolerance: patient tolerated the procedure well with no immediate complications  Dressing:  Sterile dressing applied

## 2022-01-14 DIAGNOSIS — Z11.9 ENCOUNTER FOR SCREENING FOR INFECTIOUS AND PARASITIC DISEASES, UNSPECIFIED: Primary | ICD-10-CM

## 2022-01-14 PROCEDURE — U0005 INFEC AGEN DETEC AMPLI PROBE: HCPCS | Performed by: FAMILY MEDICINE

## 2022-01-14 PROCEDURE — U0003 INFECTIOUS AGENT DETECTION BY NUCLEIC ACID (DNA OR RNA); SEVERE ACUTE RESPIRATORY SYNDROME CORONAVIRUS 2 (SARS-COV-2) (CORONAVIRUS DISEASE [COVID-19]), AMPLIFIED PROBE TECHNIQUE, MAKING USE OF HIGH THROUGHPUT TECHNOLOGIES AS DESCRIBED BY CMS-2020-01-R: HCPCS | Performed by: FAMILY MEDICINE

## 2022-02-01 ENCOUNTER — HOSPITAL ENCOUNTER (OUTPATIENT)
Dept: MRI IMAGING | Facility: HOSPITAL | Age: 57
Discharge: HOME | End: 2022-02-01
Payer: COMMERCIAL

## 2022-02-01 DIAGNOSIS — M75.101 ROTATOR CUFF SYNDROME, RIGHT: ICD-10-CM

## 2022-02-01 PROCEDURE — G1004 CDSM NDSC: HCPCS

## 2022-02-01 PROCEDURE — 73221 MRI JOINT UPR EXTREM W/O DYE: CPT

## 2022-02-07 ENCOUNTER — OFFICE VISIT (OUTPATIENT)
Dept: OBGYN CLINIC | Facility: OTHER | Age: 57
End: 2022-02-07
Payer: COMMERCIAL

## 2022-02-07 VITALS
DIASTOLIC BLOOD PRESSURE: 80 MMHG | SYSTOLIC BLOOD PRESSURE: 121 MMHG | WEIGHT: 195 LBS | HEART RATE: 106 BPM | HEIGHT: 69 IN | BODY MASS INDEX: 28.88 KG/M2

## 2022-02-07 DIAGNOSIS — M75.01 ADHESIVE CAPSULITIS OF RIGHT SHOULDER: ICD-10-CM

## 2022-02-07 PROCEDURE — 99244 OFF/OP CNSLTJ NEW/EST MOD 40: CPT | Performed by: ORTHOPAEDIC SURGERY

## 2022-02-07 PROCEDURE — 3079F DIAST BP 80-89 MM HG: CPT | Performed by: ORTHOPAEDIC SURGERY

## 2022-02-07 PROCEDURE — 3074F SYST BP LT 130 MM HG: CPT | Performed by: ORTHOPAEDIC SURGERY

## 2022-02-07 NOTE — PROGRESS NOTES
Assessment  Diagnoses and all orders for this visit:    Adhesive capsulitis of right shoulder    Discussion and Plan:    · Explained to the patient that his examination is consistent with adhesive capsulitis of the right shoulder  His MRI does not show a tear of the rotator cuff or other internal derangement that requires a surgical intervention  He is to continue with stretching exercises in physical therapy/HEP  May repeat cortisone injections every 4-6 months as needed for pain relief  · Follow up on an as needed basis    Subjective:   Patient ID: Gama Graham is a 49 y o  male      The patient presents today for an orthopedic surgery consultation visit at the request of Dr Rylie Jansen on 1/12/2022 with a chief complaint of right shoulder pain  The pain began 6 month(s) ago and is not associated with an acute injury  The patient describes the pain as aching and dull in intensity,  intermittent in timing, and localizes the pain to the  right scapulo-thoracic  The pain is worse with overhead work and overuse and relieved by rest, ice, avoiding the painful activities  The pain is not associated with numbness and tingling  The pain is not associated with constitutional symptoms  The patient is not awoken at night by the pain  The patient has had prior treatment in the form of PT/HEP and a cortisone injection by PCP with benefit  The following portions of the patient's history were reviewed and updated as appropriate: allergies, current medications, past family history, past medical history, past social history, past surgical history and problem list     Objective:  /80   Pulse (!) 106   Ht 5' 9" (1 753 m)   Wt 88 5 kg (195 lb)   BMI 28 80 kg/m²       Right Shoulder Exam     Tenderness   The patient is experiencing no tenderness      Range of Motion   External rotation: 30   Forward flexion: 130   Internal rotation 0 degrees: Sacrum     Muscle Strength   Abduction: 5/5     Tests   Drop arm: negative    Other   Erythema: absent  Sensation: normal  Pulse: present            Physical Exam  Constitutional:       General: He is not in acute distress  Appearance: He is well-developed  Eyes:      Conjunctiva/sclera: Conjunctivae normal       Pupils: Pupils are equal, round, and reactive to light  Cardiovascular:      Rate and Rhythm: Normal rate and regular rhythm  Pulmonary:      Effort: Pulmonary effort is normal       Breath sounds: Normal breath sounds  Abdominal:      General: Bowel sounds are normal       Palpations: Abdomen is soft  Musculoskeletal:      Cervical back: Normal range of motion and neck supple  Skin:     General: Skin is warm and dry  Findings: No erythema or rash  Neurological:      Mental Status: He is alert and oriented to person, place, and time  Deep Tendon Reflexes: Reflexes are normal and symmetric  Psychiatric:         Behavior: Behavior normal        I have personally reviewed pertinent films in PACS and my interpretation is as follows  MRI Right Shoulder 2/1/2022: Mild supraspinatus and infraspinatus tendinosis with small low grade intrasubstance tearing of the infraspinatus tendon       Scribe Attestation    I,:  Mukul Burton am acting as a scribe while in the presence of the attending physician :       I,:  Pola Zelaya MD personally performed the services described in this documentation    as scribed in my presence :

## 2022-02-14 ENCOUNTER — OFFICE VISIT (OUTPATIENT)
Dept: FAMILY MEDICINE CLINIC | Facility: CLINIC | Age: 57
End: 2022-02-14

## 2022-02-14 VITALS
DIASTOLIC BLOOD PRESSURE: 92 MMHG | HEART RATE: 62 BPM | BODY MASS INDEX: 28.88 KG/M2 | TEMPERATURE: 97.5 F | SYSTOLIC BLOOD PRESSURE: 144 MMHG | RESPIRATION RATE: 18 BRPM | OXYGEN SATURATION: 97 % | WEIGHT: 195 LBS | HEIGHT: 69 IN

## 2022-02-14 DIAGNOSIS — M75.01 ADHESIVE CAPSULITIS OF RIGHT SHOULDER: ICD-10-CM

## 2022-02-14 DIAGNOSIS — I10 ESSENTIAL HYPERTENSION: ICD-10-CM

## 2022-02-14 DIAGNOSIS — E11.9 CONTROLLED TYPE 2 DIABETES MELLITUS WITHOUT COMPLICATION, WITHOUT LONG-TERM CURRENT USE OF INSULIN (HCC): Primary | ICD-10-CM

## 2022-02-14 DIAGNOSIS — E03.9 HYPOTHYROIDISM, UNSPECIFIED TYPE: ICD-10-CM

## 2022-02-14 DIAGNOSIS — I10 BENIGN ESSENTIAL HYPERTENSION: ICD-10-CM

## 2022-02-14 DIAGNOSIS — E11.9 TYPE 2 DIABETES MELLITUS WITHOUT COMPLICATION, WITHOUT LONG-TERM CURRENT USE OF INSULIN (HCC): ICD-10-CM

## 2022-02-14 LAB — SL AMB POCT HEMOGLOBIN AIC: 7.5 (ref ?–6.5)

## 2022-02-14 PROCEDURE — 3077F SYST BP >= 140 MM HG: CPT | Performed by: FAMILY MEDICINE

## 2022-02-14 PROCEDURE — 99214 OFFICE O/P EST MOD 30 MIN: CPT | Performed by: FAMILY MEDICINE

## 2022-02-14 PROCEDURE — 3080F DIAST BP >= 90 MM HG: CPT | Performed by: FAMILY MEDICINE

## 2022-02-14 PROCEDURE — 3051F HG A1C>EQUAL 7.0%<8.0%: CPT | Performed by: ORTHOPAEDIC SURGERY

## 2022-02-14 PROCEDURE — 83036 HEMOGLOBIN GLYCOSYLATED A1C: CPT | Performed by: FAMILY MEDICINE

## 2022-02-14 RX ORDER — LEVOTHYROXINE SODIUM 137 UG/1
137 TABLET ORAL DAILY
Qty: 60 TABLET | Refills: 1 | Status: SHIPPED | OUTPATIENT
Start: 2022-02-14

## 2022-02-14 RX ORDER — LISINOPRIL AND HYDROCHLOROTHIAZIDE 12.5; 1 MG/1; MG/1
1 TABLET ORAL DAILY
Qty: 90 TABLET | Refills: 2 | Status: SHIPPED | OUTPATIENT
Start: 2022-02-14

## 2022-02-14 NOTE — PROGRESS NOTES
Assessment/Plan:    Controlled type 2 diabetes mellitus without complication, without long-term current use of insulin ([facility])    Lab Results   Component Value Date    HGBA1C 7 5 (A) 02/14/2022   A1c increased from previous (6 7)    Plan:  · Encouraged compliance with Metformin 1000mg BID; sent refill  · Continue to monitor blood sugar and encouraged dietary adherence  · Ordered CMP, microalbumin/creatine; follow-up labs in 3 months    Hypothyroidism  Currently on levothyroxine 137 mcg daily  Pt does not endorse fatigue, cold intolerance, weight gain, constipation, dry skin, and myalgias  TSH (6/28/21) within normal limits  Continue on current dose  Plan:  · Sent refill for levothyroxine  · Ordered TSH, CMP  · Follow-up labs in 3 months    Benign essential hypertension  BP today 144/92, above goal most likely due to lack of sleep and drinking previous night  Currently on lisinopril hydrochlorothiazide 10-12 5 milligrams  Plan:  · Continue current regimen; sent refill  · Lifestyle modification with diet and exercise  · Follow-up microalbumin/creatinine, CMP, lipid panel in 3 months    Adhesive capsulitis of right shoulder  Improved with physical therapy and steroid injection  Continue physical therapy  Diagnoses and all orders for this visit:    Controlled type 2 diabetes mellitus without complication, without long-term current use of insulin ([facility])  -     POCT hemoglobin A1c    Hypothyroidism, unspecified type  -     TSH, 3rd generation; Future  -     levothyroxine 137 mcg tablet; Take 1 tablet (137 mcg total) by mouth daily    Benign essential hypertension  -     Comprehensive metabolic panel; Future  -     Lipid panel; Future  -     Microalbumin / creatinine urine ratio    Adhesive capsulitis of right shoulder    Essential hypertension  -     lisinopril-hydrochlorothiazide (PRINZIDE,ZESTORETIC) 10-12 5 MG per tablet;  Take 1 tablet by mouth daily    Type 2 diabetes mellitus without complication, without long-term current use of insulin (HCC)  -     metFORMIN (GLUCOPHAGE) 1000 MG tablet; Take 1 tablet (1,000 mg total) by mouth 2 (two) times a day with meals          Subjective:      Patient ID: Elena Valverde is a 49 y o  male  Presents for 3-month follow-up for hypothyroidism, hypertension, and T2DM  Hypothyroidism - Pt denies fatigue, cold intolerance, unexpected wt gain, constipation, dry skin, and myalgia  He is compliant with levothyroxine  HTN - Pt is tolerating lisinopril-HCTZ  He reports going to the gym 2-3 times per work for an hour  However, his diet does consists of traditional [employer] ([facility]) food and meats  He denies lightheadedness, vision changes, headaches, chest pain, and SOB  T2DM - Pt is tolerating metformin, but often forgets to take his evening dose  He denies polyuria and polydipsia  Regarding his right shoulder pain, the pt recently received a steroid injection and is reporting improvement with PT  Pt endorses drinking heavily with extended family members, but has tried to decrease his alcohol intake by exercising instead  The following portions of the patient's history were reviewed and updated as appropriate: allergies, current medications, past family history, past medical history, past social history, past surgical history and problem list     Review of Systems   Constitutional: Negative for activity change, appetite change, chills, fatigue, fever and unexpected weight change  HENT: Negative for rhinorrhea, sore throat and trouble swallowing  Eyes: Negative for visual disturbance  Respiratory: Negative for cough and shortness of breath  Cardiovascular: Negative for chest pain, palpitations and leg swelling  Gastrointestinal: Negative for abdominal distention, abdominal pain, constipation, diarrhea, nausea and vomiting  Endocrine: Negative for cold intolerance, heat intolerance, polydipsia and polyuria  Genitourinary: Negative for difficulty urinating  Musculoskeletal: Positive for arthralgias (right shoulder)  Negative for myalgias  Skin: Negative for color change  Neurological: Negative for dizziness, weakness, light-headedness, numbness and headaches  Psychiatric/Behavioral: Negative for sleep disturbance  Objective:      /92 (BP Location: Left arm, Patient Position: Sitting, Cuff Size: Large)   Pulse 62   Temp 97 5 °F (36 4 °C) (Temporal)   Resp 18   Ht 5' 9" (1 753 m)   Wt 88 5 kg (195 lb)   SpO2 97%   BMI 28 80 kg/m²          Physical Exam  Vitals reviewed  Constitutional:       General: He is not in acute distress  Appearance: Normal appearance  He is not ill-appearing or toxic-appearing  HENT:      Head: Normocephalic  Mouth/Throat:      Mouth: Mucous membranes are moist       Pharynx: Oropharynx is clear  Eyes:      General: No scleral icterus  Extraocular Movements: Extraocular movements intact  Conjunctiva/sclera: Conjunctivae normal       Pupils: Pupils are equal, round, and reactive to light  Cardiovascular:      Rate and Rhythm: Normal rate and regular rhythm  Pulses: Normal pulses  no weak pulses          Dorsalis pedis pulses are 2+ on the right side and 2+ on the left side  Posterior tibial pulses are 2+ on the right side and 2+ on the left side  Heart sounds: Normal heart sounds  Pulmonary:      Effort: Pulmonary effort is normal       Breath sounds: Normal breath sounds  Abdominal:      General: Bowel sounds are normal  There is no distension  Palpations: Abdomen is soft  Tenderness: There is no abdominal tenderness  Musculoskeletal:         General: No swelling  Right shoulder: Decreased range of motion  Left shoulder: Normal range of motion  Cervical back: Neck supple  Feet:      Right foot:      Skin integrity: No ulcer, skin breakdown, erythema, warmth, callus or dry skin        Left foot:      Skin integrity: No ulcer, skin breakdown, erythema, warmth, callus or dry skin  Lymphadenopathy:      Cervical: No cervical adenopathy  Skin:     General: Skin is warm and dry  Capillary Refill: Capillary refill takes less than 2 seconds  Coloration: Skin is not jaundiced  Neurological:      General: No focal deficit present  Mental Status: He is alert and oriented to person, place, and time  Mental status is at baseline  Psychiatric:         Mood and Affect: Mood normal          Behavior: Behavior normal          Thought Content: Thought content normal          Judgment: Judgment normal            Patient's shoes and socks removed  Right Foot/Ankle   Right Foot Inspection  Skin Exam: skin normal and skin intact  No dry skin, no warmth, no callus, no erythema, no maceration, no abnormal color, no pre-ulcer, no ulcer and no callus  Toe Exam: No swelling, no tenderness, erythema and  no right toe deformity    Sensory   Proprioception: intact  Monofilament testing: intact    Vascular  Capillary refills: < 3 seconds  The right DP pulse is 2+  The right PT pulse is 2+  Left Foot/Ankle  Left Foot Inspection  Skin Exam: skin normal and skin intact  No dry skin, no warmth, no erythema, no maceration, normal color, no pre-ulcer, no ulcer and no callus  Toe Exam: No swelling, no tenderness, no erythema and no left toe deformity  Sensory   Proprioception: intact  Monofilament testing: intact    Vascular  Capillary refills: < 3 seconds  The left DP pulse is 2+  The left PT pulse is 2+       Assign Risk Category  No deformity present  No loss of protective sensation  No weak pulses  Risk: 0

## 2022-04-18 ENCOUNTER — APPOINTMENT (OUTPATIENT)
Dept: LAB | Facility: CLINIC | Age: 57
End: 2022-04-18
Payer: COMMERCIAL

## 2022-04-18 DIAGNOSIS — E03.9 HYPOTHYROIDISM, UNSPECIFIED TYPE: ICD-10-CM

## 2022-04-18 DIAGNOSIS — I10 BENIGN ESSENTIAL HYPERTENSION: ICD-10-CM

## 2022-04-18 LAB
ALBUMIN SERPL BCP-MCNC: 3.8 G/DL (ref 3.5–5)
ALP SERPL-CCNC: 89 U/L (ref 46–116)
ALT SERPL W P-5'-P-CCNC: 68 U/L (ref 12–78)
ANION GAP SERPL CALCULATED.3IONS-SCNC: 6 MMOL/L (ref 4–13)
AST SERPL W P-5'-P-CCNC: 49 U/L (ref 5–45)
BILIRUB SERPL-MCNC: 1.14 MG/DL (ref 0.2–1)
BUN SERPL-MCNC: 7 MG/DL (ref 5–25)
CALCIUM SERPL-MCNC: 8.7 MG/DL (ref 8.3–10.1)
CHLORIDE SERPL-SCNC: 99 MMOL/L (ref 100–108)
CHOLEST SERPL-MCNC: 117 MG/DL
CO2 SERPL-SCNC: 29 MMOL/L (ref 21–32)
CREAT SERPL-MCNC: 0.83 MG/DL (ref 0.6–1.3)
CREAT UR-MCNC: 242 MG/DL
GFR SERPL CREATININE-BSD FRML MDRD: 99 ML/MIN/1.73SQ M
GLUCOSE P FAST SERPL-MCNC: 177 MG/DL (ref 65–99)
HDLC SERPL-MCNC: 47 MG/DL
LDLC SERPL CALC-MCNC: 56 MG/DL (ref 0–100)
MICROALBUMIN UR-MCNC: 27.9 MG/L (ref 0–20)
MICROALBUMIN/CREAT 24H UR: 12 MG/G CREATININE (ref 0–30)
NONHDLC SERPL-MCNC: 70 MG/DL
POTASSIUM SERPL-SCNC: 4.4 MMOL/L (ref 3.5–5.3)
PROT SERPL-MCNC: 7.7 G/DL (ref 6.4–8.2)
SODIUM SERPL-SCNC: 134 MMOL/L (ref 136–145)
TRIGL SERPL-MCNC: 69 MG/DL
TSH SERPL DL<=0.05 MIU/L-ACNC: 3.19 UIU/ML (ref 0.45–4.5)

## 2022-04-18 PROCEDURE — 80061 LIPID PANEL: CPT

## 2022-04-18 PROCEDURE — 82043 UR ALBUMIN QUANTITATIVE: CPT | Performed by: FAMILY MEDICINE

## 2022-04-18 PROCEDURE — 82570 ASSAY OF URINE CREATININE: CPT | Performed by: FAMILY MEDICINE

## 2022-04-18 PROCEDURE — 80053 COMPREHEN METABOLIC PANEL: CPT

## 2022-04-18 PROCEDURE — 84443 ASSAY THYROID STIM HORMONE: CPT

## 2022-04-18 PROCEDURE — 36415 COLL VENOUS BLD VENIPUNCTURE: CPT

## 2022-05-23 ENCOUNTER — OFFICE VISIT (OUTPATIENT)
Dept: FAMILY MEDICINE CLINIC | Facility: CLINIC | Age: 57
End: 2022-05-23

## 2022-05-23 VITALS
OXYGEN SATURATION: 99 % | DIASTOLIC BLOOD PRESSURE: 82 MMHG | HEIGHT: 69 IN | HEART RATE: 98 BPM | BODY MASS INDEX: 29.33 KG/M2 | WEIGHT: 198 LBS | SYSTOLIC BLOOD PRESSURE: 133 MMHG | TEMPERATURE: 97.8 F | RESPIRATION RATE: 18 BRPM

## 2022-05-23 DIAGNOSIS — E03.9 HYPOTHYROIDISM, UNSPECIFIED TYPE: ICD-10-CM

## 2022-05-23 DIAGNOSIS — E11.9 CONTROLLED TYPE 2 DIABETES MELLITUS WITHOUT COMPLICATION, WITHOUT LONG-TERM CURRENT USE OF INSULIN (HCC): Primary | ICD-10-CM

## 2022-05-23 DIAGNOSIS — F10.10 ALCOHOL ABUSE: ICD-10-CM

## 2022-05-23 LAB — SL AMB POCT HEMOGLOBIN AIC: 7.4 (ref ?–6.5)

## 2022-05-23 PROCEDURE — 3079F DIAST BP 80-89 MM HG: CPT | Performed by: FAMILY MEDICINE

## 2022-05-23 PROCEDURE — 83036 HEMOGLOBIN GLYCOSYLATED A1C: CPT | Performed by: FAMILY MEDICINE

## 2022-05-23 PROCEDURE — 99214 OFFICE O/P EST MOD 30 MIN: CPT | Performed by: FAMILY MEDICINE

## 2022-05-23 PROCEDURE — 3075F SYST BP GE 130 - 139MM HG: CPT | Performed by: FAMILY MEDICINE

## 2022-05-23 NOTE — ASSESSMENT & PLAN NOTE
Lab Results   Component Value Date    HGBA1C 7 4 (A) 05/23/2022       Controlled  Currently on metformin 1000 mg b i d , patient does state that he misses evening doses sometimes  Encouraged to take metformin regularly  Lifestyle modification with diet and exercise

## 2022-05-23 NOTE — ASSESSMENT & PLAN NOTE
Reviewed recent labs, recent TSH 3 191    Will continue current dose of levothyroxine at 137 mcg daily

## 2022-05-23 NOTE — PROGRESS NOTES
Assessment/Plan     Controlled type 2 diabetes mellitus without complication, without long-term current use of insulin ([facility])    Lab Results   Component Value Date    HGBA1C 7 4 (A) 05/23/2022       Controlled  Currently on metformin 1000 mg b i d , patient does state that he misses evening doses sometimes  Encouraged to take metformin regularly  Lifestyle modification with diet and exercise  Hypothyroidism  Reviewed recent labs, recent TSH 3 191  Will continue current dose of levothyroxine at 137 mcg daily    Alcohol abuse    Patient working on cutting back on alcohol, reviewed CMP which shows elevated bilirubin and AST  counseled  against alcohol use  Patient did have liver ultrasound done in 2019, discussed with patient today, would like to get it done in next 6 months    Diagnoses and all orders for this visit:    Controlled type 2 diabetes mellitus without complication, without long-term current use of insulin [facility]  -     POCT hemoglobin A1c    Hypothyroidism, unspecified type    Alcohol abuse       BMI Counseling: Body mass index is 29 24 kg/m²  The BMI is above normal  Nutrition recommendations include reducing portion sizes and decreasing overall calorie intake  Exercise recommendations include moderate aerobic physical activity for 150 minutes/week  Subjective     Chief Complaint   Patient presents with    Diabetes     Follow-up       Nasir Triplett is a 49year old male presented to office for follow-up of diabetes and hypertension  He has no acute concerns today  He reports that sometimes he skips metformin 1000 mg evening does but otherwise takes it regularly  He is working on his diet  He is also trying to cut back on alcohol consumption  Recently his mother passed away, patient reports he is doing okay  He also wants labs done recently reviewed today        The following portions of the patient's history were reviewed and updated as appropriate: allergies, current medications, past family history, past medical history, past social history, past surgical history and problem list     Review of Systems   Constitutional: Negative for activity change, chills and fever  HENT: Negative for congestion  Respiratory: Negative for shortness of breath  Cardiovascular: Negative for chest pain  Gastrointestinal: Negative for diarrhea, nausea and vomiting  Genitourinary: Negative for difficulty urinating  Neurological: Negative for headaches  Objective     Vitals:Blood pressure 133/82, pulse 98, temperature 97 8 °F (36 6 °C), temperature source Temporal, resp  rate 18, height 5' 9" (1 753 m), weight 89 8 kg (198 lb), SpO2 99 %  Physical Exam:  Physical Exam  Vitals and nursing note reviewed  Constitutional:       Appearance: He is well-developed  HENT:      Head: Normocephalic and atraumatic  Right Ear: External ear normal       Left Ear: External ear normal    Eyes:      Conjunctiva/sclera: Conjunctivae normal       Pupils: Pupils are equal, round, and reactive to light  Cardiovascular:      Rate and Rhythm: Normal rate and regular rhythm  Heart sounds: Normal heart sounds  No murmur heard  No friction rub  Pulmonary:      Effort: Pulmonary effort is normal  No respiratory distress  Breath sounds: Normal breath sounds  No wheezing or rales  Musculoskeletal:         General: Normal range of motion  Cervical back: Normal range of motion and neck supple  Skin:     General: Skin is warm and dry  Neurological:      Mental Status: He is alert and oriented to person, place, and time

## 2022-05-23 NOTE — ASSESSMENT & PLAN NOTE
Patient working on cutting back on alcohol, reviewed CMP which shows elevated bilirubin and AST  counseled  against alcohol use    Patient did have liver ultrasound done in 2019, discussed with patient today, would like to get it done in next 6 months

## 2022-06-06 DIAGNOSIS — E11.9 TYPE 2 DIABETES MELLITUS WITHOUT COMPLICATION, WITHOUT LONG-TERM CURRENT USE OF INSULIN (HCC): ICD-10-CM

## 2022-09-22 DIAGNOSIS — E11.9 TYPE 2 DIABETES MELLITUS WITHOUT COMPLICATION, WITHOUT LONG-TERM CURRENT USE OF INSULIN (HCC): ICD-10-CM

## 2022-10-12 PROBLEM — Z12.11 SCREENING FOR COLON CANCER: Status: RESOLVED | Noted: 2019-01-22 | Resolved: 2022-10-12

## 2022-10-26 ENCOUNTER — TELEPHONE (OUTPATIENT)
Dept: FAMILY MEDICINE CLINIC | Facility: CLINIC | Age: 57
End: 2022-10-26

## 2022-10-26 DIAGNOSIS — E11.9 CONTROLLED TYPE 2 DIABETES MELLITUS WITHOUT COMPLICATION, WITHOUT LONG-TERM CURRENT USE OF INSULIN (HCC): Primary | ICD-10-CM

## 2022-10-26 NOTE — TELEPHONE ENCOUNTER
Patient has MRI scheduled 11/1/22, it was ordered by [facility] ([facility] Neurology  Pt was told he needs blood work prior to having MRI done and is calling here to request order   (BUN/creatinine) Can we order this or does Neuro have to?

## 2022-10-29 ENCOUNTER — APPOINTMENT (OUTPATIENT)
Dept: LAB | Facility: CLINIC | Age: 57
End: 2022-10-29

## 2022-10-29 DIAGNOSIS — E11.9 CONTROLLED TYPE 2 DIABETES MELLITUS WITHOUT COMPLICATION, WITHOUT LONG-TERM CURRENT USE OF INSULIN (HCC): ICD-10-CM

## 2022-10-29 LAB
ANION GAP SERPL CALCULATED.3IONS-SCNC: 6 MMOL/L (ref 4–13)
BUN SERPL-MCNC: 13 MG/DL (ref 5–25)
CALCIUM SERPL-MCNC: 9.7 MG/DL (ref 8.4–10.2)
CHLORIDE SERPL-SCNC: 100 MMOL/L (ref 96–108)
CO2 SERPL-SCNC: 31 MMOL/L (ref 21–32)
CREAT SERPL-MCNC: 0.79 MG/DL (ref 0.6–1.3)
GFR SERPL CREATININE-BSD FRML MDRD: 101 ML/MIN/1.73SQ M
GLUCOSE P FAST SERPL-MCNC: 170 MG/DL (ref 65–99)
POTASSIUM SERPL-SCNC: 4.2 MMOL/L (ref 3.5–5.3)
SODIUM SERPL-SCNC: 137 MMOL/L (ref 135–147)

## 2022-11-01 ENCOUNTER — HOSPITAL ENCOUNTER (OUTPATIENT)
Dept: MRI IMAGING | Facility: HOSPITAL | Age: 57
Discharge: HOME | End: 2022-11-01

## 2022-11-01 ENCOUNTER — TELEPHONE (OUTPATIENT)
Dept: FAMILY MEDICINE CLINIC | Facility: CLINIC | Age: 57
End: 2022-11-01

## 2022-11-01 DIAGNOSIS — M89.9 FRONTAL SKULL LESION: ICD-10-CM

## 2022-11-01 RX ADMIN — GADOBUTROL 8 ML: 604.72 INJECTION INTRAVENOUS at 19:03

## 2022-11-01 NOTE — TELEPHONE ENCOUNTER
----- Message from [place], [place] sent at 10/31/2022  2:30 PM EDT -----  Please schedule patient for follow up of Diabetes  Thank You

## 2022-11-03 ENCOUNTER — TELEPHONE (OUTPATIENT)
Dept: FAMILY MEDICINE CLINIC | Facility: CLINIC | Age: 57
End: 2022-11-03

## 2022-11-04 ENCOUNTER — DOCUMENTATION (OUTPATIENT)
Dept: HEMATOLOGY ONCOLOGY | Facility: CLINIC | Age: 57
End: 2022-11-04

## 2022-11-04 ENCOUNTER — TELEPHONE (OUTPATIENT)
Dept: NEUROSURGERY | Facility: CLINIC | Age: 57
End: 2022-11-04

## 2022-11-04 ENCOUNTER — OFFICE VISIT (OUTPATIENT)
Dept: NEUROSURGERY | Facility: CLINIC | Age: 57
End: 2022-11-04

## 2022-11-04 VITALS
TEMPERATURE: 98.4 F | HEIGHT: 69 IN | BODY MASS INDEX: 29.33 KG/M2 | WEIGHT: 198 LBS | RESPIRATION RATE: 18 BRPM | DIASTOLIC BLOOD PRESSURE: 80 MMHG | HEART RATE: 88 BPM | SYSTOLIC BLOOD PRESSURE: 118 MMHG

## 2022-11-04 DIAGNOSIS — M89.9 FRONTAL SKULL LESION: Primary | ICD-10-CM

## 2022-11-04 NOTE — PROGRESS NOTES
Neurosurgery Office Note  Elle Martinez 49 y o  male MRN: [id number]      Assessment/Plan     Frontal skull lesion  Ongoing follow up for follow-up for left frontal bony lesion  · This was an incidental finding on MRI during a workup for dizziness by his PCP     · Patient's case was previously reviewed at [facility]  Biopsy reasonable, but patient asymptomatic and biopsy deferred  Imaging:  · MRI Brain w wo 11/01/2022:  Enlarging left frontal dural-based extra-axial mass with associated destruction/erosion into the left frontal bone  Retrospectively stable bifrontal cystic encephalomalacia and gliosis along the undersurface of the frontal lobes likely sequelae of prior TBI  Plan:  · Continue to monitor for symptoms  None at this time  · Reviewed imaging with patient  · Unfortunately imaging displays rapid progression compared to prior MRI brain  No evidence of significant progression previously during monitoring from 2020 to 2021  · Imaging was discussed with attending neurosurgeon Dr Ethan Vivar  · Patient remains asymptomatic   · Given size change on MRI imaging, intervention were discussed as the mass/lesion has had a significant change compared to prior imaging  Discussed radiation vs surgical resection  Proceed with discussion for radiation at this time  · Referral placed to radiation oncology for further discussion about possible radiation therapy  · Recommend further investigation to bony changes adjacent to suspected meningioma  · Thin slice CT head to evaluate for erosive/lytic changes in the bone   · MRI brain without contrast for thin slice T2 imaging  This will assist with bony evaluation as well as planning for possible radiation treatment  · Will be used to guide therapy which may need to include radiation to skull as well as dural mass  · Patient will also be reviewed as neuro oncology tumor board next Wednesday  · Will follow up with patient through [facility] clinic    Encouraged to call with further questions or concerns  Diagnoses and all orders for this visit:    Frontal skull lesion  -     Ambulatory Referral to Radiation Oncology; Future  -     CT head without contrast; Future  -     MRI brain without contrast; Future          I spent 30 minutes with the patient today in which >50% of the time was spent counseling/coordination of care regarding diagnosis, imaging review, symptoms and treatment plan  CHIEF COMPLAINT    Chief Complaint   Patient presents with   • Follow-up       HISTORY    History of Present Illness     49y o  year old male with past medical history significant for type 2 diabetes, hypertension, and hyperlipidemia  Patient seen today for ongoing follow-up of left frontal bony lesion  This was an incidental finding on MRI during a workup for dizziness by his PCP  Patient also underwent bone scan, CT chest abdomen and pelvis to evaluate for primary malignancy which were all negative  Patient's case was previously reviewed at [facility]  Biopsy was felt to be reasonable as an option but given patient has been asymptomatic and has not had any further symptoms, no biopsy was recommended at the time  Patient presents now at this time to review his MRI brain completed a few days ago  He reports in the last year no new issues or concerns  Today patient reports he has not had any neurological changes since his last visit  He has no headaches, change in vision, trouble speech, facial weakness, facial numbness, lightheadedness, dizziness, numbness, tingling, weakness in his arms or legs, gait instability  He denies any confusion behavioral changes  See Discussion    REVIEW OF SYSTEMS    Review of Systems   Constitutional: Negative  HENT: Negative  Eyes: Negative  Respiratory: Negative  Cardiovascular: Negative  Gastrointestinal: Negative  Endocrine: Negative  Genitourinary: Negative  Musculoskeletal: Negative  Skin: Negative  Allergic/Immunologic: Negative  Neurological: Negative  Frontal skull lesion   Hematological: Negative  Psychiatric/Behavioral: Negative  All other systems reviewed and are negative  Meds/Allergies     Current Outpatient Medications   Medication Sig Dispense Refill   • atorvastatin (LIPITOR) 40 mg tablet Take 1 tablet (40 mg total) by mouth daily 90 tablet 1   • clotrimazole (LOTRIMIN) 1 % cream Apply topically 2 (two) times a day 30 g 0   • Diclofenac Sodium (VOLTAREN) 1 % Apply 2 g topically 4 (four) times a day 1 Tube 1   • glucose blood (Accu-Chek Guide) test strip Use 1 each 2 (two) times a day Use as instructed 100 each 5   • glucose blood (OneTouch Verio) test strip Use 1 each 2 (two) times a day Use as instructed 100 each 5   • levothyroxine 137 mcg tablet Take 1 tablet (137 mcg total) by mouth daily 60 tablet 1   • lisinopril-hydrochlorothiazide (PRINZIDE,ZESTORETIC) 10-12 5 MG per tablet Take 1 tablet by mouth daily 90 tablet 2   • metFORMIN (GLUCOPHAGE) 1000 MG tablet TAKE 1 TABLET BY MOUTH TWICE DAILY WITH MEALS 180 tablet 0   • lidocaine (LIDODERM) 5 % APPLY ONE PATCH TO AFFTECTED AREA DAILY  REMOVE AND DISCARD PATCH WITHIN 12 HOURS OR AS DIRECTED BY DOCTOR (Patient not taking: Reported on 11/4/2022) 30 patch 0     No current facility-administered medications for this visit  No Known Allergies    PAST HISTORY    Past Medical History:   Diagnosis Date   • Diabetes mellitus ([facility] [address] )    • Elevated LFTs     last assessed 02Nov2015   • Hypertension    • Impaired fasting glucose     last assessed 29Jan2014       Past Surgical History:   Procedure Laterality Date   • APPENDECTOMY         Social History     Tobacco Use   • Smoking status: Never Smoker   • Smokeless tobacco: Never Used   Vaping Use   • Vaping Use: Never used   Substance Use Topics   • Alcohol use:  Yes     Alcohol/week: 3 0 standard drinks     Types: 3 Glasses of wine per week     Comment: drinks daily    • Drug use: No       Family History   Problem Relation Age of Onset   • Diabetes Mother    • Breast cancer Mother    • Diabetes Father          Above history personally reviewed  EXAM    Vitals:Blood pressure 118/80, pulse 88, temperature 98 4 °F (36 9 °C), temperature source Temporal, resp  rate 18, height 5' 9" (1 753 m), weight 89 8 kg (198 lb)  ,Body mass index is 29 24 kg/m²  Physical Exam  Constitutional:       Appearance: Normal appearance  He is well-developed  HENT:      Head: Normocephalic and atraumatic  Eyes:      Extraocular Movements: Extraocular movements intact and EOM normal       Pupils: Pupils are equal, round, and reactive to light  Neck:      Vascular: No JVD  Cardiovascular:      Rate and Rhythm: Normal rate  Pulmonary:      Effort: Pulmonary effort is normal    Musculoskeletal:         General: No deformity  Normal range of motion  Cervical back: Normal range of motion and neck supple  Skin:     General: Skin is warm and dry  Neurological:      Mental Status: He is alert and oriented to person, place, and time  Cranial Nerves: No cranial nerve deficit  Sensory: No sensory deficit  Motor: No weakness  Gait: Gait is intact  Deep Tendon Reflexes: Reflexes are normal and symmetric  Psychiatric:         Speech: Speech normal          Behavior: Behavior normal          Thought Content: Thought content normal          Neurologic Exam     Mental Status   Oriented to person, place, and time  Attention: normal    Speech: speech is normal   Level of consciousness: alert  Knowledge: good  Normal comprehension  Cranial Nerves     CN III, IV, VI   Pupils are equal, round, and reactive to light  Extraocular motions are normal    Right pupil: Size: 3 mm  Shape: regular  Reactivity: brisk  Left pupil: Size: 3 mm  Shape: regular  Reactivity: brisk  Upgaze: normal  Downgaze: normal    CN V   Facial sensation intact       CN VII   Facial expression full, symmetric  CN VIII   CN VIII normal    Hearing: intact    CN XI   CN XI normal    Right trapezius strength: normal  Left trapezius strength: normal    CN XII   CN XII normal    Tongue deviation: none    Motor Exam   Muscle bulk: normal  Right arm tone: normal  Left arm tone: normal  Right leg tone: normal  Left leg tone: normal    Strength   Right deltoid: 5/5  Left deltoid: 5/5  Right biceps: 5/5  Left biceps: 5/5  Right triceps: 5/5  Left triceps: 5/5  Right wrist flexion: 5/5  Left wrist flexion: 5/5  Right wrist extension: 5/5  Left wrist extension: 5/5  Right iliopsoas: 5/5  Left iliopsoas: 5/5  Right quadriceps: 5/5  Left quadriceps: 5/5  Right hamstrin/5  Left hamstrin/5  Right anterior tibial: 5/5  Left anterior tibial: 5/5  Right gastroc: 5/5  Left gastroc: 5/5    Sensory Exam   Light touch normal      Gait, Coordination, and Reflexes     Gait  Gait: normal    Tremor   Resting tremor: absent  Action tremor: absent        MEDICAL DECISION MAKING    Imaging Studies:     MRI brain w wo contrast    Result Date: 11/3/2022  Narrative: MRI BRAIN WITH AND WITHOUT CONTRAST INDICATION: M89 9: Disorder of bone, unspecified  Follow-up left frontal calvarial lesion  COMPARISON:  10/29/2021 TECHNIQUE: Sagittal T1, axial T2, axial FLAIR, axial T1, axial [place], axial diffusion  Sagittal, axial T1 postcontrast   Axial bravo postcontrast with coronal reconstructions  IV Contrast:  8 mL of Gadobutrol injection (SINGLE-DOSE)  IMAGE QUALITY:   Diagnostic  FINDINGS: BRAIN PARENCHYMA:  Dural based extra-axial mass anterior to left frontal lobe invading into the left frontal bone is clearly larger than on the prior study, now 1 9 x 1 2 cm on series 9, image 17 there is local mass effect  No adjacent vasogenic edema  There is no diffusion restriction  No acute intracranial hemorrhage  Cerebellar tonsils are normally positioned    Bifrontal cystic encephalomalacia and gliosis in the undersurface of the frontal lobes bilaterally in the region of the gyrus recti, series 6, image 22 is retrospectively stable  VENTRICLES:  Normal for the patient's age  SELLA AND PITUITARY GLAND:  Normal  ORBITS:  Normal  PARANASAL SINUSES:  Normal  VASCULATURE:  Evaluation of the major intracranial vasculature demonstrates appropriate flow voids  CALVARIUM AND SKULL BASE:  Left frontal bone erosion/destruction by a dural based enhancing left frontal mass which has increased in size from the prior study  EXTRACRANIAL SOFT TISSUES:  Normal      Impression: 1  Enlarging left frontal dural based extra-axial mass with associated destruction/erosion into left frontal bone  Differential considerations include an enlarging meningioma, demonstrating locally invasive/aggressive features  Other mass lesions not excluded  Neurosurgical assessment is advised  2   Retrospectively stable bifrontal cystic encephalomalacia and gliosis along the undersurface of the frontal lobes, most likely sequela of prior traumatic brain injury  Other insult not excluded  3   No acute infarction or intracranial hemorrhage  Workstation performed: [id number]       I have personally reviewed pertinent reports     and I have personally reviewed pertinent films in PACS

## 2022-11-04 NOTE — ASSESSMENT & PLAN NOTE
Ongoing follow up for follow-up for left frontal bony lesion  · This was an incidental finding on MRI during a workup for dizziness by his PCP     · Patient's case was previously reviewed at [facility]  Biopsy reasonable, but patient asymptomatic and biopsy deferred  Imaging:  · MRI Brain w wo 11/01/2022:  Enlarging left frontal dural-based extra-axial mass with associated destruction/erosion into the left frontal bone  Retrospectively stable bifrontal cystic encephalomalacia and gliosis along the undersurface of the frontal lobes likely sequelae of prior TBI  Plan:  · Continue to monitor for symptoms  None at this time  · Reviewed imaging with patient  · Unfortunately imaging displays rapid progression compared to prior MRI brain  No evidence of significant progression previously during monitoring from 2020 to 2021  · Imaging was discussed with attending neurosurgeon Dr Pelon Camacho  · Patient remains asymptomatic   · Given size change on MRI imaging, intervention were discussed as the mass/lesion has had a significant change compared to prior imaging  Discussed radiation vs surgical resection  Proceed with discussion for radiation at this time  · Referral placed to radiation oncology for further discussion about possible radiation therapy  · Recommend further investigation to bony changes adjacent to suspected meningioma  · Thin slice CT head to evaluate for erosive/lytic changes in the bone   · MRI brain without contrast for thin slice T2 imaging  This will assist with bony evaluation as well as planning for possible radiation treatment  · Will be used to guide therapy which may need to include radiation to skull as well as dural mass  · Patient will also be reviewed as neuro oncology tumor board next Wednesday  · Will follow up with patient through [facility] clinic  Encouraged to call with further questions or concerns

## 2022-11-09 ENCOUNTER — DOCUMENTATION (OUTPATIENT)
Dept: RADIATION ONCOLOGY | Facility: CLINIC | Age: 57
End: 2022-11-09

## 2022-11-09 ENCOUNTER — DOCUMENTATION (OUTPATIENT)
Dept: NEUROSURGERY | Facility: CLINIC | Age: 57
End: 2022-11-09

## 2022-11-09 DIAGNOSIS — E03.9 HYPOTHYROIDISM, UNSPECIFIED TYPE: ICD-10-CM

## 2022-11-09 RX ORDER — LEVOTHYROXINE SODIUM 137 UG/1
TABLET ORAL
Qty: 60 TABLET | Refills: 0 | Status: SHIPPED | OUTPATIENT
Start: 2022-11-09

## 2022-11-09 NOTE — PROGRESS NOTES
NEURO ONCOLOGY CASE REVIEW     DATE: 11/9/2022  PRESENTING DOCTOR: Dr Philip Davalos    DIAGNOSIS: Enlarging left frontal dural-based extra-axial mass with associated destruction/erosion into the left frontal bone  · Ashu Hurst is a 49 y o  male who was presented at Neuro Oncology Case Review today  Loyda Caal had an incidental finding on MRI during a workup for dizziness by his PCP  PHYSICIAN RECOMMENDED PLAN: obtain biopsy for pathology before SRS/SRT and Dr Bernie Mcclellan to see patient on 11/16  Imaging Reviewed: MRI Brain w wo 11/01/2022:  Enlarging left frontal dural-based extra-axial mass with associated destruction/erosion into the left frontal bone  Team agreed to plan  NCCN guidelines were readily available for review at this discussion  The final treatment plan will be left at the discretion of the patient and the treating physician  DISCLAIMERS:  TO THE TREATING PHYSICIAN:  This conference is a meeting of clinicians from various specialty areas who evaluate and discuss patients for whom a multidisciplinary treatment approach is being considered  Please note that the above opinion was a consensus of the conference attendees and is intended only to assist in quality care of the discussed patient  The responsibility for follow up on the input given during the conference, along with any final decisions regarding plan of care, is that of the patient and the patient's provider  Accordingly, appointments have only been recommended based on this information; and have NOT been scheduled unless otherwise noted  TO THE PATIENT:  This summary is a brief record of major aspects of your cancer treatment  You may choose to can share a your copy with any of your doctors or nurses  However, this is not a detailed or comprehensive record of your care

## 2022-11-09 NOTE — PROGRESS NOTES
Patient's case was presented this morning at the Neuro Oncology Case Review by Dr Manohar Prince  After review of imaging the group agreed a meningioma is probable however radiation oncology prefers to have pathology prior to treating with SRS/SRT  The final treatment plan will be left at the discretion of the patient and the treating physician  DISCLAIMERS:  TO THE TREATING PHYSICIAN:  This conference is a meeting of clinicians from various specialty areas who evaluate and discuss patients for whom a multidisciplinary treatment approach is being considered  Please note that the above opinion was a consensus of the conference attendees and is intended only to assist in quality care of the discussed patient  The responsibility for follow up on the input given during the conference, along with any final decisions regarding plan of care, is that of the patient and the patient's provider  Accordingly, appointments have only been recommended based on this information; and have NOT been scheduled unless otherwise noted  TO THE PATIENT:  This summary is a brief record of major aspects of your cancer treatment  You may choose to can share a your copy with any of your doctors or nurses  However, this is not a detailed or comprehensive record of your care

## 2022-11-10 PROBLEM — G93.89 FRONTAL MASS OF BRAIN: Status: RESOLVED | Noted: 2022-11-10 | Resolved: 2022-11-10

## 2022-11-10 PROBLEM — G93.89 MASS OF FRONTAL LOBE: Status: ACTIVE | Noted: 2022-11-10

## 2022-11-10 PROBLEM — G93.89 FRONTAL MASS OF BRAIN: Status: ACTIVE | Noted: 2022-11-10

## 2022-11-10 PROBLEM — G93.89 MASS OF FRONTAL LOBE: Status: RESOLVED | Noted: 2022-11-10 | Resolved: 2022-11-10

## 2022-11-13 DIAGNOSIS — I10 ESSENTIAL HYPERTENSION: ICD-10-CM

## 2022-11-14 RX ORDER — LISINOPRIL AND HYDROCHLOROTHIAZIDE 12.5; 1 MG/1; MG/1
TABLET ORAL
Qty: 90 TABLET | Refills: 0 | Status: SHIPPED | OUTPATIENT
Start: 2022-11-14 | End: 2022-11-15

## 2022-11-15 DIAGNOSIS — I10 ESSENTIAL HYPERTENSION: ICD-10-CM

## 2022-11-15 RX ORDER — LISINOPRIL AND HYDROCHLOROTHIAZIDE 12.5; 1 MG/1; MG/1
TABLET ORAL
Qty: 90 TABLET | Refills: 0 | Status: SHIPPED | OUTPATIENT
Start: 2022-11-15

## 2022-11-16 ENCOUNTER — RADIATION ONCOLOGY CONSULT (OUTPATIENT)
Dept: RADIATION ONCOLOGY | Facility: HOSPITAL | Age: 57
End: 2022-11-16
Attending: RADIOLOGY

## 2022-11-16 ENCOUNTER — CLINICAL SUPPORT (OUTPATIENT)
Dept: RADIATION ONCOLOGY | Facility: HOSPITAL | Age: 57
End: 2022-11-16
Attending: RADIOLOGY

## 2022-11-16 VITALS
HEIGHT: 69 IN | DIASTOLIC BLOOD PRESSURE: 82 MMHG | WEIGHT: 193.8 LBS | BODY MASS INDEX: 28.71 KG/M2 | HEART RATE: 100 BPM | SYSTOLIC BLOOD PRESSURE: 144 MMHG | TEMPERATURE: 97.8 F | OXYGEN SATURATION: 98 % | RESPIRATION RATE: 18 BRPM

## 2022-11-16 DIAGNOSIS — M89.9 FRONTAL SKULL LESION: Primary | ICD-10-CM

## 2022-11-16 DIAGNOSIS — M89.9 FRONTAL SKULL LESION: ICD-10-CM

## 2022-11-16 NOTE — PROGRESS NOTES
Consultation - Radiation Oncology      YLU:[id number] : 1965  Encounter: [id number]  Patient Information: [address]  Chief Complaint   Patient presents with   • Consult     Cancer Staging   No matching staging information was found for the patient  History of Present Illness   Carla Lomax 1965 is a 49 y o  male Here to discuss SRS/SRT for his left frontal mass  Referred by Dr J Carlos Mccray  Noted to have incidental MRI finding of left frontal  lesion while completing a workup for dizziness  Case previously discussed at [facility], biopsy was deferred as patient was asymptomatic  Recent MRI findings show an enlarging mass      22  MRI of brain w and wo contrast  IMPRESSION:   1   Enlarging left frontal dural based extra-axial mass with associated destruction/erosion into left frontal bone   Differential considerations include an enlarging meningioma, demonstrating locally invasive/aggressive features   Other mass lesions not excluded   Neurosurgical assessment is advised  2   Retrospectively stable bifrontal cystic encephalomalacia and gliosis along the undersurface of the frontal lobes, most likely sequela of prior traumatic brain injury   Other insult not excluded  3   No acute infarction or intracranial hemorrhage         22  Chavez/Neurosurgery  Recent MRI reviewed  Given changes, discussed surgery vs  Radiation Oncology referral   Elected RT     22 Neuro-Oncology Case Review  PHYSICIAN RECOMMENDED PLAN: obtain biopsy for pathology before SRS/SRT and Dr Earl Ocampo to see patient on                 Oncology History     No history exists            Oncology History    No history exists           Past Medical History:   Diagnosis Date   • Diabetes mellitus ([facility] [address] )    • Elevated LFTs     last assessed 2015   • Hypertension    • Impaired fasting glucose     last assessed 2014     Past Surgical History:   Procedure Laterality Date   • APPENDECTOMY         Family History   Problem Relation Age of Onset   • Diabetes Mother    • Breast cancer Mother    • Diabetes Father        Social History   Social History     Substance and Sexual Activity   Alcohol Use Yes   • Alcohol/week: 3 0 standard drinks   • Types: 3 Glasses of wine per week    Comment: drinks daily      Social History     Substance and Sexual Activity   Drug Use No     Social History     Tobacco Use   Smoking Status Never   Smokeless Tobacco Never         Meds/Allergies     Current Outpatient Medications:   •  atorvastatin (LIPITOR) 40 mg tablet, Take 1 tablet (40 mg total) by mouth daily, Disp: 90 tablet, Rfl: 1  •  glucose blood (Accu-Chek Guide) test strip, Use 1 each 2 (two) times a day Use as instructed, Disp: 100 each, Rfl: 5  •  glucose blood (OneTouch Verio) test strip, Use 1 each 2 (two) times a day Use as instructed, Disp: 100 each, Rfl: 5  •  levothyroxine 137 mcg tablet, Take 1 tablet by mouth once daily, Disp: 60 tablet, Rfl: 0  •  lisinopril-hydrochlorothiazide (PRINZIDE,ZESTORETIC) 10-12 5 MG per tablet, Take 1 tablet by mouth once daily, Disp: 90 tablet, Rfl: 0  •  metFORMIN (GLUCOPHAGE) 1000 MG tablet, TAKE 1 TABLET BY MOUTH TWICE DAILY WITH MEALS, Disp: 180 tablet, Rfl: 0  •  clotrimazole (LOTRIMIN) 1 % cream, Apply topically 2 (two) times a day (Patient not taking: Reported on 11/16/2022), Disp: 30 g, Rfl: 0  •  Diclofenac Sodium (VOLTAREN) 1 %, Apply 2 g topically 4 (four) times a day (Patient not taking: Reported on 11/16/2022), Disp: 1 Tube, Rfl: 1  •  lidocaine (LIDODERM) 5 %, APPLY ONE PATCH TO AFFTECTED AREA DAILY  REMOVE AND DISCARD PATCH WITHIN 12 HOURS OR AS DIRECTED BY DOCTOR (Patient not taking: Reported on 11/4/2022), Disp: 30 patch, Rfl: 0  No Known Allergies      Review of Systems   Constitutional: Negative  HENT: Negative  Eyes: Negative for pain and visual disturbance  Reading glasses    Respiratory: Negative  Cardiovascular: Negative  Gastrointestinal: Negative  Endocrine: Negative  Genitourinary: Negative  Musculoskeletal: Negative  Skin: Negative  Allergic/Immunologic: Negative  Neurological: Positive for headaches  Negative for dizziness, tremors, seizures, syncope, facial asymmetry, speech difficulty, weakness, light-headedness and numbness  Pain over left eye that is a pressure sensation that comes and goes  No eye pain    Hematological: Negative  Psychiatric/Behavioral: Negative for sleep disturbance      OBJECTIVE:   /82   Pulse 100   Temp 97 8 °F (36 6 °C)   Resp 18   Ht 5' 9" (1 753 m)   Wt 87 9 kg (193 lb 12 8 oz)   SpO2 98%   BMI 28 62 kg/m²   Pain Assessment:  0  Performance Status: ECOG/Zubrod/WHO: 0 - Asymptomatic    Physical Exam   There is no significant scalp tenderness in the left frontal region  He is conversing appropriately  His breathing is unlabored  No focal neurologic deficits  RESULTS  Lab Results    Chemistry        Component Value Date/Time     10/26/2015 0900    K 4 2 10/29/2022 0708    K 4 8 05/01/2020 0708     10/29/2022 0708     05/01/2020 0708    CO2 31 10/29/2022 0708    CO2 30 05/01/2020 0708    BUN 13 10/29/2022 0708    BUN 9 05/01/2020 0708    CREATININE 0 79 10/29/2022 0708    CREATININE 0 90 10/26/2015 0900        Component Value Date/Time    CALCIUM 9 7 10/29/2022 0708    CALCIUM 9 6 05/01/2020 0708    ALKPHOS 89 04/18/2022 0631    ALKPHOS 68 05/01/2020 0708    AST 49 (H) 04/18/2022 0631    AST 27 05/01/2020 0708    ALT 68 04/18/2022 0631    ALT 35 05/01/2020 0708    BILITOT 0 97 10/26/2015 0900            Lab Results   Component Value Date    WBC 8 13 12/24/2020    HGB 16 8 12/24/2020    HCT 47 6 12/24/2020    MCV 89 12/24/2020     12/24/2020         Imaging Studies  MRI brain w wo contrast    Result Date: 11/3/2022  Narrative: MRI BRAIN WITH AND WITHOUT CONTRAST INDICATION: M89 9: Disorder of bone, unspecified     Follow-up left frontal calvarial lesion  COMPARISON:  10/29/2021 TECHNIQUE: Sagittal T1, axial T2, axial FLAIR, axial T1, axial Thermal, axial diffusion  Sagittal, axial T1 postcontrast   Axial bravo postcontrast with coronal reconstructions  IV Contrast:  8 mL of Gadobutrol injection (SINGLE-DOSE)  IMAGE QUALITY:   Diagnostic  FINDINGS: BRAIN PARENCHYMA:  Dural based extra-axial mass anterior to left frontal lobe invading into the left frontal bone is clearly larger than on the prior study, now 1 9 x 1 2 cm on series 9, image 17 there is local mass effect  No adjacent vasogenic edema  There is no diffusion restriction  No acute intracranial hemorrhage  Cerebellar tonsils are normally positioned  Bifrontal cystic encephalomalacia and gliosis in the undersurface of the frontal lobes bilaterally in the region of the gyrus recti, series 6, image 22 is retrospectively stable  VENTRICLES:  Normal for the patient's age  SELLA AND PITUITARY GLAND:  Normal  ORBITS:  Normal  PARANASAL SINUSES:  Normal  VASCULATURE:  Evaluation of the major intracranial vasculature demonstrates appropriate flow voids  CALVARIUM AND SKULL BASE:  Left frontal bone erosion/destruction by a dural based enhancing left frontal mass which has increased in size from the prior study  EXTRACRANIAL SOFT TISSUES:  Normal      Impression: 1  Enlarging left frontal dural based extra-axial mass with associated destruction/erosion into left frontal bone  Differential considerations include an enlarging meningioma, demonstrating locally invasive/aggressive features  Other mass lesions not excluded  Neurosurgical assessment is advised  2   Retrospectively stable bifrontal cystic encephalomalacia and gliosis along the undersurface of the frontal lobes, most likely sequela of prior traumatic brain injury  Other insult not excluded  3   No acute infarction or intracranial hemorrhage  Workstation performed: [id number]           ASSESSMENT  1   Frontal skull lesion Ambulatory Referral to Radiation Oncology        Cancer Staging   No matching staging information was found for the patient  PLAN/DISCUSSION  No orders of the defined types were placed in this encounter  Addi Queen is a 49y o  year old male with an enlarging left frontal dural-based extra-axial mass with erosion/destruction in the left frontal bone  He is had imaging over the last 2 years which was felt to likely represent meningioma however has had enlargement over the past 1 year along with more aggressive features noted locally  His case was reviewed in Neuro-Oncology rounds today  We discussed options which would include surgical resection, biopsy followed by SRS, or SRS alone  I reviewed with him that we feel this likely represents a benign process most likely meningioma however the grade of meningioma could only be determined with tissue sampling  In addition tissue sampling to ensure that this does not represent other histology as noted in the radiology report  Should he undergo resection he may not need any further radiation treatment should this returned low grade  I did review procedure for SRS  He understands the goal would be to halt any further progression which has high likelihood  Side effects would be minimal including local inflammation and very low risk of inducing secondary malignancy with radiation  At this time he states he would like to discuss with his wife  He will follow-up with the neurosurgical team to discuss what surgical option would entail prior to making his decision  I requested the neuro nurse navigator to assist with the above appointments  Stephen Wade MD  11/16/2022,11:40 AM      Portions of the record may have been created with voice recognition software  Occasional wrong word or "sound a like" substitutions may have occurred due to the inherent limitations of voice recognition software    Read the chart carefully and recognize, using context, where substitutions have occurred

## 2022-11-16 NOTE — PROGRESS NOTES
Joseph Figueredo 1965 is a 49 y o  male Here to discuss SRS/SRT for his left frontal mass  Referred by Dr Syed Valdes  Noted to have incidental MRI finding of left frontal  lesion while completing a workup for dizziness  Case previously discussed at [facility], biopsy was deferred as patient was asymptomatic  Recent MRI findings show an enlarging mass  11/1/22  MRI of brain w and wo contrast  IMPRESSION:   1   Enlarging left frontal dural based extra-axial mass with associated destruction/erosion into left frontal bone  Differential considerations include an enlarging meningioma, demonstrating locally invasive/aggressive features  Other mass lesions not excluded  Neurosurgical assessment is advised  2   Retrospectively stable bifrontal cystic encephalomalacia and gliosis along the undersurface of the frontal lobes, most likely sequela of prior traumatic brain injury  Other insult not excluded  3   No acute infarction or intracranial hemorrhage  11/4/22  Chavez/Neurosurgery  Recent MRI reviewed  Given changes, discussed surgery vs  Radiation Oncology referral   Elected RT    11/9/22 Neuro-Oncology Case Review  PHYSICIAN RECOMMENDED PLAN: obtain biopsy for pathology before SRS/SRT and Dr Kwong  to see patient on 11/16  Oncology History    No history exists  Review of Systems:  Review of Systems   Constitutional: Negative  HENT: Negative  Eyes: Negative for pain and visual disturbance  Reading glasses    Respiratory: Negative  Cardiovascular: Negative  Gastrointestinal: Negative  Endocrine: Negative  Genitourinary: Negative  Musculoskeletal: Negative  Skin: Negative  Allergic/Immunologic: Negative  Neurological: Positive for headaches  Negative for dizziness, tremors, seizures, syncope, facial asymmetry, speech difficulty, weakness, light-headedness and numbness  Pain over left eye that is a pressure sensation that comes and goes   No eye pain Hematological: Negative  Psychiatric/Behavioral: Negative for sleep disturbance  Clinical Trial: no    Pain assessment: 4/10 above left ey       Prior Radiation no     Teaching completed    MST completed      Implantable Devices (Port, pacemaker, pain stimulator) none    Hip Replacement: no     Covid Vaccine Status vaccinated x 2     Health Maintenance   Topic Date Due   • Dental Periodic Exam  Never done   • Dental X-Ray: Bitewings  Never done   • Dental X-Ray: Full Mouth  Never done   • Dental Prophylaxis  Never done   • Hepatitis A Vaccine (1 of 2 - Risk 2-dose series) Never done   • Pneumococcal Vaccine: Pediatrics (0 to 5 Years) and At-Risk Patients (6 to 59 Years) (1 - PCV) Never done   • DM Eye Exam  12/05/2020   • COVID-19 Vaccine (3 - Booster for Pfizer series) 09/19/2021   • Annual Physical  04/26/2022   • HEMOGLOBIN A1C  08/23/2022   • Influenza Vaccine (1) Never done   • Diabetic Foot Exam  02/14/2023   • Depression Screening  05/23/2023   • BMI: Followup Plan  05/23/2023   • BMI: Adult  11/04/2023   • Colorectal Cancer Screening  06/01/2026   • Hepatitis B Vaccine (1 of 3 - Risk 3-dose series) 11/25/2027   • DTaP,Tdap,and Td Vaccines (2 - Td or Tdap) 01/22/2029   • HIV Screening  Completed   • Hepatitis C Screening  Completed   • HIB Vaccine  Aged Out   • IPV Vaccine  Aged Out   • Meningococcal ACWY Vaccine  Aged Out   • HPV Vaccine  Aged Out       Past Medical History:   Diagnosis Date   • Diabetes mellitus ([facility] [address] )    • Elevated LFTs     last assessed 02Nov2015   • Hypertension    • Impaired fasting glucose     last assessed 29Jan2014       Past Surgical History:   Procedure Laterality Date   • APPENDECTOMY         Family History   Problem Relation Age of Onset   • Diabetes Mother    • Breast cancer Mother    • Diabetes Father        Social History     Tobacco Use   • Smoking status: Never   • Smokeless tobacco: Never   Vaping Use   • Vaping Use: Never used   Substance Use Topics   • Alcohol use: Yes     Alcohol/week: 3 0 standard drinks     Types: 3 Glasses of wine per week     Comment: drinks daily    • Drug use: No          Current Outpatient Medications:   •  atorvastatin (LIPITOR) 40 mg tablet, Take 1 tablet (40 mg total) by mouth daily, Disp: 90 tablet, Rfl: 1  •  clotrimazole (LOTRIMIN) 1 % cream, Apply topically 2 (two) times a day, Disp: 30 g, Rfl: 0  •  Diclofenac Sodium (VOLTAREN) 1 %, Apply 2 g topically 4 (four) times a day, Disp: 1 Tube, Rfl: 1  •  glucose blood (Accu-Chek Guide) test strip, Use 1 each 2 (two) times a day Use as instructed, Disp: 100 each, Rfl: 5  •  glucose blood (OneTouch Verio) test strip, Use 1 each 2 (two) times a day Use as instructed, Disp: 100 each, Rfl: 5  •  levothyroxine 137 mcg tablet, Take 1 tablet by mouth once daily, Disp: 60 tablet, Rfl: 0  •  lidocaine (LIDODERM) 5 %, APPLY ONE PATCH TO AFFTECTED AREA DAILY  REMOVE AND DISCARD PATCH WITHIN 12 HOURS OR AS DIRECTED BY DOCTOR (Patient not taking: Reported on 11/4/2022), Disp: 30 patch, Rfl: 0  •  lisinopril-hydrochlorothiazide (PRINZIDE,ZESTORETIC) 10-12 5 MG per tablet, Take 1 tablet by mouth once daily, Disp: 90 tablet, Rfl: 0  •  metFORMIN (GLUCOPHAGE) 1000 MG tablet, TAKE 1 TABLET BY MOUTH TWICE DAILY WITH MEALS, Disp: 180 tablet, Rfl: 0    No Known Allergies     There were no vitals filed for this visit

## 2022-11-16 NOTE — LETTER
2022     [facility] [address] [address]    Patient: Honey Crane   YOB: 1965   Date of Visit: 2022       Dear Dr Ajit Tarango: Thank you for referring Dayan August to me for evaluation  Below are my notes for this consultation  If you have questions, please do not hesitate to call me  I look forward to following your patient along with you  Sincerely,        Kanchan Jimenez MD        CC: No Recipients  Kanchan Jimenez MD  2022 11:47 AM  Incomplete  Consultation - Radiation Oncology      [id number] : 1965  Encounter: [id number]  Patient Information: [address]  Chief Complaint   Patient presents with   • Consult     Cancer Staging   No matching staging information was found for the patient  History of Present Illness   Honey Crane 1965 is a 49 y o  male Here to discuss SRS/SRT for his left frontal mass  Referred by Dr Ajit Tarango  Noted to have incidental MRI finding of left frontal  lesion while completing a workup for dizziness  Case previously discussed at [facility], biopsy was deferred as patient was asymptomatic  Recent MRI findings show an enlarging mass      22  MRI of brain w and wo contrast  IMPRESSION:   1   Enlarging left frontal dural based extra-axial mass with associated destruction/erosion into left frontal bone   Differential considerations include an enlarging meningioma, demonstrating locally invasive/aggressive features   Other mass lesions not excluded   Neurosurgical assessment is advised  2   Retrospectively stable bifrontal cystic encephalomalacia and gliosis along the undersurface of the frontal lobes, most likely sequela of prior traumatic brain injury   Other insult not excluded  3   No acute infarction or intracranial hemorrhage         22  Chavez/Neurosurgery  Recent MRI reviewed    Given changes, discussed surgery vs  Radiation Oncology referral   Elected RT     11/9/22 Neuro-Oncology Case Review  PHYSICIAN RECOMMENDED PLAN: obtain biopsy for pathology before SRS/SRT and Dr Conor Dudley to see patient on 11/16                Oncology History     No history exists            Oncology History    No history exists           Past Medical History:   Diagnosis Date   • Diabetes mellitus ([facility] [address] )    • Elevated LFTs     last assessed 02Nov2015   • Hypertension    • Impaired fasting glucose     last assessed 29Jan2014     Past Surgical History:   Procedure Laterality Date   • APPENDECTOMY         Family History   Problem Relation Age of Onset   • Diabetes Mother    • Breast cancer Mother    • Diabetes Father        Social History   Social History     Substance and Sexual Activity   Alcohol Use Yes   • Alcohol/week: 3 0 standard drinks   • Types: 3 Glasses of wine per week    Comment: drinks daily      Social History     Substance and Sexual Activity   Drug Use No     Social History     Tobacco Use   Smoking Status Never   Smokeless Tobacco Never         Meds/Allergies      Current Outpatient Medications:   •  atorvastatin (LIPITOR) 40 mg tablet, Take 1 tablet (40 mg total) by mouth daily, Disp: 90 tablet, Rfl: 1  •  glucose blood (Accu-Chek Guide) test strip, Use 1 each 2 (two) times a day Use as instructed, Disp: 100 each, Rfl: 5  •  glucose blood (OneTouch Verio) test strip, Use 1 each 2 (two) times a day Use as instructed, Disp: 100 each, Rfl: 5  •  levothyroxine 137 mcg tablet, Take 1 tablet by mouth once daily, Disp: 60 tablet, Rfl: 0  •  lisinopril-hydrochlorothiazide (PRINZIDE,ZESTORETIC) 10-12 5 MG per tablet, Take 1 tablet by mouth once daily, Disp: 90 tablet, Rfl: 0  •  metFORMIN (GLUCOPHAGE) 1000 MG tablet, TAKE 1 TABLET BY MOUTH TWICE DAILY WITH MEALS, Disp: 180 tablet, Rfl: 0  •  clotrimazole (LOTRIMIN) 1 % cream, Apply topically 2 (two) times a day (Patient not taking: Reported on 11/16/2022), Disp: 30 g, Rfl: 0  •  Diclofenac Sodium (VOLTAREN) 1 %, Apply 2 g topically 4 (four) times a day (Patient not taking: Reported on 11/16/2022), Disp: 1 Tube, Rfl: 1  •  lidocaine (LIDODERM) 5 %, APPLY ONE PATCH TO AFFTECTED AREA DAILY  REMOVE AND DISCARD PATCH WITHIN 12 HOURS OR AS DIRECTED BY DOCTOR (Patient not taking: Reported on 11/4/2022), Disp: 30 patch, Rfl: 0  No Known Allergies      Review of Systems   Constitutional: Negative  HENT: Negative  Eyes: Negative for pain and visual disturbance  Reading glasses    Respiratory: Negative  Cardiovascular: Negative  Gastrointestinal: Negative  Endocrine: Negative  Genitourinary: Negative  Musculoskeletal: Negative  Skin: Negative  Allergic/Immunologic: Negative  Neurological: Positive for headaches  Negative for dizziness, tremors, seizures, syncope, facial asymmetry, speech difficulty, weakness, light-headedness and numbness  Pain over left eye that is a pressure sensation that comes and goes  No eye pain    Hematological: Negative  Psychiatric/Behavioral: Negative for sleep disturbance      OBJECTIVE:   /82   Pulse 100   Temp 97 8 °F (36 6 °C)   Resp 18   Ht 5' 9" (1 753 m)   Wt 87 9 kg (193 lb 12 8 oz)   SpO2 98%   BMI 28 62 kg/m²   Pain Assessment:  0  Performance Status: ECOG/Zubrod/WHO: 0 - Asymptomatic    Physical Exam   There is no significant scalp tenderness in the left frontal region  He is conversing appropriately  His breathing is unlabored  No focal neurologic deficits        RESULTS  Lab Results    Chemistry        Component Value Date/Time     10/26/2015 0900    K 4 2 10/29/2022 0708    K 4 8 05/01/2020 0708     10/29/2022 0708     05/01/2020 0708    CO2 31 10/29/2022 0708    CO2 30 05/01/2020 0708    BUN 13 10/29/2022 0708    BUN 9 05/01/2020 0708    CREATININE 0 79 10/29/2022 0708    CREATININE 0 90 10/26/2015 0900        Component Value Date/Time    CALCIUM 9 7 10/29/2022 0708    CALCIUM 9 6 05/01/2020 0708 ALKPHOS 89 04/18/2022 0631    ALKPHOS 68 05/01/2020 0708    AST 49 (H) 04/18/2022 0631    AST 27 05/01/2020 0708    ALT 68 04/18/2022 0631    ALT 35 05/01/2020 0708    BILITOT 0 97 10/26/2015 0900            Lab Results   Component Value Date    WBC 8 13 12/24/2020    HGB 16 8 12/24/2020    HCT 47 6 12/24/2020    MCV 89 12/24/2020     12/24/2020         Imaging Studies  MRI brain w wo contrast    Result Date: 11/3/2022  Narrative: MRI BRAIN WITH AND WITHOUT CONTRAST INDICATION: M89 9: Disorder of bone, unspecified  Follow-up left frontal calvarial lesion  COMPARISON:  10/29/2021 TECHNIQUE: Sagittal T1, axial T2, axial FLAIR, axial T1, axial [place], axial diffusion  Sagittal, axial T1 postcontrast   Axial bravo postcontrast with coronal reconstructions  IV Contrast:  8 mL of Gadobutrol injection (SINGLE-DOSE)  IMAGE QUALITY:   Diagnostic  FINDINGS: BRAIN PARENCHYMA:  Dural based extra-axial mass anterior to left frontal lobe invading into the left frontal bone is clearly larger than on the prior study, now 1 9 x 1 2 cm on series 9, image 17 there is local mass effect  No adjacent vasogenic edema  There is no diffusion restriction  No acute intracranial hemorrhage  Cerebellar tonsils are normally positioned  Bifrontal cystic encephalomalacia and gliosis in the undersurface of the frontal lobes bilaterally in the region of the gyrus recti, series 6, image 22 is retrospectively stable  VENTRICLES:  Normal for the patient's age  SELLA AND PITUITARY GLAND:  Normal  ORBITS:  Normal  PARANASAL SINUSES:  Normal  VASCULATURE:  Evaluation of the major intracranial vasculature demonstrates appropriate flow voids  CALVARIUM AND SKULL BASE:  Left frontal bone erosion/destruction by a dural based enhancing left frontal mass which has increased in size from the prior study  EXTRACRANIAL SOFT TISSUES:  Normal      Impression: 1    Enlarging left frontal dural based extra-axial mass with associated destruction/erosion into left frontal bone  Differential considerations include an enlarging meningioma, demonstrating locally invasive/aggressive features  Other mass lesions not excluded  Neurosurgical assessment is advised  2   Retrospectively stable bifrontal cystic encephalomalacia and gliosis along the undersurface of the frontal lobes, most likely sequela of prior traumatic brain injury  Other insult not excluded  3   No acute infarction or intracranial hemorrhage  Workstation performed: [id number]           ASSESSMENT  1  Frontal skull lesion  Ambulatory Referral to Radiation Oncology        Cancer Staging   No matching staging information was found for the patient  PLAN/DISCUSSION  No orders of the defined types were placed in this encounter  Axel Fuentes is a 49y o  year old male with an enlarging left frontal dural-based extra-axial mass with erosion/destruction in the left frontal bone  He is had imaging over the last 2 years which was felt to likely represent meningioma however has had enlargement over the past 1 year along with more aggressive features noted locally  His case was reviewed in Neuro-Oncology rounds today  We discussed options which would include surgical resection, biopsy followed by SRS, or SRS alone  I reviewed with him that we feel this likely represents a benign process most likely meningioma however the grade of meningioma could only be determined with tissue sampling  In addition tissue sampling to ensure that this does not represent other histology is noted in the radiology report  Should he undergo resection he may not need any further radiation treatment should this returned low grade  I did review procedure for SRS  He understands the goal would be to halt any further progression which has high likelihood  Side effects would be minimal including local inflammation and very low risk of inducing secondary malignancy with radiation    At this time he states he would like to discuss with his wife  He will follow-up with the neurosurgical team to discuss what surgical option would entail prior to making his decision  I requested the neuro nurse navigator to assist with the above appointments  Phillip Chavarria MD  11/16/2022,11:40 AM      Portions of the record may have been created with voice recognition software  Occasional wrong word or "sound a like" substitutions may have occurred due to the inherent limitations of voice recognition software  Read the chart carefully and recognize, using context, where substitutions have occurred

## 2022-11-21 ENCOUNTER — OFFICE VISIT (OUTPATIENT)
Dept: FAMILY MEDICINE CLINIC | Facility: CLINIC | Age: 57
End: 2022-11-21

## 2022-11-21 VITALS
DIASTOLIC BLOOD PRESSURE: 82 MMHG | SYSTOLIC BLOOD PRESSURE: 134 MMHG | HEIGHT: 69 IN | HEART RATE: 99 BPM | WEIGHT: 196 LBS | OXYGEN SATURATION: 98 % | RESPIRATION RATE: 18 BRPM | TEMPERATURE: 97.8 F | BODY MASS INDEX: 29.03 KG/M2

## 2022-11-21 DIAGNOSIS — I10 BENIGN ESSENTIAL HYPERTENSION: ICD-10-CM

## 2022-11-21 DIAGNOSIS — F10.20 UNCOMPLICATED ALCOHOL DEPENDENCE (HCC): ICD-10-CM

## 2022-11-21 DIAGNOSIS — M89.9 FRONTAL SKULL LESION: ICD-10-CM

## 2022-11-21 DIAGNOSIS — E03.8 OTHER SPECIFIED HYPOTHYROIDISM: ICD-10-CM

## 2022-11-21 DIAGNOSIS — E11.9 TYPE 2 DIABETES MELLITUS WITHOUT COMPLICATION, WITHOUT LONG-TERM CURRENT USE OF INSULIN (HCC): ICD-10-CM

## 2022-11-21 DIAGNOSIS — E11.9 CONTROLLED TYPE 2 DIABETES MELLITUS WITHOUT COMPLICATION, WITHOUT LONG-TERM CURRENT USE OF INSULIN (HCC): Primary | ICD-10-CM

## 2022-11-21 LAB — SL AMB POCT HEMOGLOBIN AIC: 8.1 (ref ?–6.5)

## 2022-11-21 NOTE — PROGRESS NOTES
Name: Radha Shah      : 1965      MRN: [id number]  Encounter Provider: Dilshad Severino MD  Encounter Date: 2022   Encounter department: [address] [place] [address]     1  Controlled type 2 diabetes mellitus without complication, without long-term current use of insulin ([facility])  Assessment & Plan:    Lab Results   Component Value Date    HGBA1C 8 1 (A) 2022     HbA1c now worse from 7 4 to 8 1  Patient is currently on metformin 1000 mg b i d  he reports he often skips nighttime does because he does not want to mix it with alcohol  Cautioned him against use of alcohol with metformin  Also recommended to start Jardiance 10 mg daily to help better controlled diabetes  Continue lifestyle modification with diet and exercise, follow-up in 3 months    Orders:  -     Empagliflozin (Jardiance) 10 MG TABS tablet; Take 1 tablet (10 mg total) by mouth every morning  -     POCT hemoglobin A1c    2  Benign essential hypertension  Assessment & Plan: Within goal   Continue current regimen of lisinopril hydrochlorothiazide 10-12 5 mg       3  Uncomplicated alcohol dependence ([facility] [address] )  Assessment & Plan:  Patient is working on decreasing alcohol consumption  Counseled on alcohol cessation  4  Type 2 diabetes mellitus without complication, without long-term current use of insulin (HCC)  -     metFORMIN (GLUCOPHAGE) 1000 MG tablet; Take 1 tablet (1,000 mg total) by mouth 2 (two) times a day with meals    5  Frontal skull lesion  Assessment & Plan:  Continue follow-up with Neurosurgery  6  Other specified hypothyroidism  Assessment & Plan:  Reviewed recent labs, recent TSH 3 191  Will continue current dose of levothyroxine at 137 mcg daily           Jaciel Ruiz presented office for follow-up of diabetes, hypothyroidism, hypertension    He reports he takes medications regularly, but sometimes does not take metformin at night because he usually drinks  He is trying to cut down on alcohol use and has now limited it only to weekends  He has also been following up with Neurosurgery for frontal skull lesion, which has slightly increased in size over the last year, they are planning on further workup for this lesion  Otherwise he is doing well  He has no other concerns  Review of Systems   Constitutional: Negative for activity change, chills and fever  HENT: Negative for congestion  Respiratory: Negative for shortness of breath  Cardiovascular: Negative for chest pain  Gastrointestinal: Negative for diarrhea, nausea and vomiting  Genitourinary: Negative for difficulty urinating  Neurological: Negative for headaches  Current Outpatient Medications on File Prior to Visit   Medication Sig   • atorvastatin (LIPITOR) 40 mg tablet Take 1 tablet (40 mg total) by mouth daily   • glucose blood (Accu-Chek Guide) test strip Use 1 each 2 (two) times a day Use as instructed   • glucose blood (OneTouch Verio) test strip Use 1 each 2 (two) times a day Use as instructed   • levothyroxine 137 mcg tablet Take 1 tablet by mouth once daily   • lisinopril-hydrochlorothiazide (PRINZIDE,ZESTORETIC) 10-12 5 MG per tablet Take 1 tablet by mouth once daily   • [DISCONTINUED] metFORMIN (GLUCOPHAGE) 1000 MG tablet TAKE 1 TABLET BY MOUTH TWICE DAILY WITH MEALS   • clotrimazole (LOTRIMIN) 1 % cream Apply topically 2 (two) times a day (Patient not taking: Reported on 11/16/2022)   • Diclofenac Sodium (VOLTAREN) 1 % Apply 2 g topically 4 (four) times a day (Patient not taking: Reported on 11/16/2022)   • lidocaine (LIDODERM) 5 % APPLY ONE PATCH TO AFFTECTED AREA DAILY   REMOVE AND DISCARD PATCH WITHIN 12 HOURS OR AS DIRECTED BY DOCTOR (Patient not taking: Reported on 11/4/2022)       Objective     /82 (BP Location: Left arm, Patient Position: Sitting, Cuff Size: Standard)   Pulse 99   Temp 97 8 °F (36 6 °C) (Temporal)   Resp 18   Ht 5' 9" (1 753 m)   [employer] 88 9 kg (196 lb)   SpO2 98%   BMI 28 94 kg/m²     Physical Exam  Constitutional:       Appearance: He is well-developed and well-nourished  HENT:      Head: Normocephalic and atraumatic  Right Ear: External ear normal       Left Ear: External ear normal       Mouth/Throat:      Mouth: Oropharynx is clear and moist    Eyes:      Extraocular Movements: EOM normal       Conjunctiva/sclera: Conjunctivae normal       Pupils: Pupils are equal, round, and reactive to light  Cardiovascular:      Rate and Rhythm: Normal rate and regular rhythm  Pulses: Intact distal pulses  Heart sounds: Normal heart sounds  No murmur heard  No friction rub  Pulmonary:      Effort: Pulmonary effort is normal  No respiratory distress  Breath sounds: Normal breath sounds  No wheezing or rales  Musculoskeletal:         General: Normal range of motion  Cervical back: Normal range of motion and neck supple  Skin:     General: Skin is warm and dry  Neurological:      Mental Status: He is alert and oriented to person, place, and time         Chloe Cisneros MD

## 2022-11-21 NOTE — ASSESSMENT & PLAN NOTE
Lab Results   Component Value Date    HGBA1C 8 1 (A) 11/21/2022     HbA1c now worse from 7 4 to 8 1  Patient is currently on metformin 1000 mg b i d  he reports he often skips nighttime does because he does not want to mix it with alcohol  Cautioned him against use of alcohol with metformin  Also recommended to start Jardiance 10 mg daily to help better controlled diabetes    Continue lifestyle modification with diet and exercise, follow-up in 3 months

## 2022-11-22 ENCOUNTER — HOSPITAL ENCOUNTER (OUTPATIENT)
Dept: CT IMAGING | Facility: HOSPITAL | Age: 57
Discharge: HOME | End: 2022-11-22

## 2022-11-22 DIAGNOSIS — M89.9 FRONTAL SKULL LESION: ICD-10-CM

## 2023-01-03 ENCOUNTER — HOSPITAL ENCOUNTER (OUTPATIENT)
Dept: MRI IMAGING | Facility: HOSPITAL | Age: 58
Discharge: HOME | End: 2023-01-03

## 2023-01-03 DIAGNOSIS — M89.9 FRONTAL SKULL LESION: ICD-10-CM

## 2023-01-06 ENCOUNTER — TELEPHONE (OUTPATIENT)
Dept: NEUROSURGERY | Facility: CLINIC | Age: 58
End: 2023-01-06

## 2023-01-06 NOTE — TELEPHONE ENCOUNTER
Patient prefers [facility] late morning for appointment  Offered him an appt next Wednesday at 10:15am   Patient [place] [facility] with Dr Kendra Burgos  He was appreciative for the call and coordination

## 2023-01-11 ENCOUNTER — APPOINTMENT (OUTPATIENT)
Dept: LAB | Facility: CLINIC | Age: 58
End: 2023-01-11

## 2023-01-11 ENCOUNTER — TRANSCRIBE ORDERS (OUTPATIENT)
Dept: NEUROSURGERY | Facility: CLINIC | Age: 58
End: 2023-01-11

## 2023-01-11 ENCOUNTER — OFFICE VISIT (OUTPATIENT)
Dept: NEUROSURGERY | Facility: CLINIC | Age: 58
End: 2023-01-11

## 2023-01-11 VITALS
HEIGHT: 69 IN | HEART RATE: 108 BPM | WEIGHT: 191.2 LBS | TEMPERATURE: 98.1 F | SYSTOLIC BLOOD PRESSURE: 130 MMHG | BODY MASS INDEX: 28.32 KG/M2 | DIASTOLIC BLOOD PRESSURE: 85 MMHG | RESPIRATION RATE: 16 BRPM

## 2023-01-11 DIAGNOSIS — D49.6 BRAIN TUMOR (HCC): Primary | ICD-10-CM

## 2023-01-11 DIAGNOSIS — R73.09 ELEVATED HEMOGLOBIN A1C: ICD-10-CM

## 2023-01-11 DIAGNOSIS — M89.9 FRONTAL SKULL LESION: ICD-10-CM

## 2023-01-11 DIAGNOSIS — M89.9 FRONTAL SKULL LESION: Primary | ICD-10-CM

## 2023-01-11 LAB
BUN SERPL-MCNC: 12 MG/DL (ref 5–25)
CREAT SERPL-MCNC: 0.84 MG/DL (ref 0.6–1.3)
GFR SERPL CREATININE-BSD FRML MDRD: 98 ML/MIN/1.73SQ M

## 2023-01-11 NOTE — PROGRESS NOTES
Neurosurgery Office Note  Justyn Dey 56 y o  male MRN: [id number]      Assessment/Plan      Diagnoses and all orders for this visit:    Brain tumor [facility])  -     MRI brain w wo contrast; Future  -     CT head wo contrast; Future    Frontal skull lesion  -     MRI brain w wo contrast; Future  -     CT head wo contrast; Future    Elevated hemoglobin A1c        Discussion:    Pain Score: 0-No pain    56year-old seen in follow-up today  He has had a left frontal extra-axial/skull based lesion followed since 2021  It had progressed relatively slowly, but with it's progression, it was recommended for treatment  After discussions, and review at [facility], patient wished to discuss RT as an option, and was referred for this option  However, there were concerns about the actual diagnosis/grading of this presumed meningioma  He was referred back for potential biopsy  Of note, he did have CT CAP and bone scans done 2021 when this was first found to rule out obvious metastatic source  On his new MRI scan thin slice T2, the lesion has actually increased substantially since 11/2022  There is no adjacent cerebral edema, but there is significant more brain displacement  CT scan done 11/2022 shows bony erosion and involvement, that is encroaching upon the border of the left frontal sinus  On follow-up today, the patient remains asymptomatic  He tells me on further consideration he would like to have this treated surgically with resection, which I think is entirely reasonable  He may well need adjuvant therapy depending on the diagnosis/grading  However, he has made plans to travel back to [facility] in 2/2023 to return his mother's ashes, after she passed away last summer  He plans to return early 3/2023 and would like to treat this surgically at that time    I did review with him my concerns that this is growing relatively quickly, and is encroaching upon his frontal sinus, and that delay may significantly affect his care  He is clear that he wishes to handle this after his return  As such, I have ordered a follow-up MRI scan of the brain with contrast and thin slice T2, as well as thin slice CT scan for surgical planning to be done on his return  I will see him after his return and the scans are done to plan for surgery  This will likely involve bony resection as well, which will need to be reconstructed  I also encouraged him to manage his DM as well as possible, to try and improve his HbA1C, which was significantly elevated at last check (8 1 in 11/2022)  01/11/23 Metrics: [id number] 70683=2 000; ; MOCA 21/29      CHIEF COMPLAINT    Chief Complaint   Patient presents with   • Follow-up     f/u after MRI Brain (1/3) solo ok per HDM doesnt have to be [facility] day but can be       HISTORY    History of Present Illness     56y o  year old male     HPI    See Discussion    REVIEW OF SYSTEMS    Review of Systems   Constitutional: Negative  HENT: Negative  Eyes: Negative  Respiratory: Negative  Cardiovascular: Negative  Gastrointestinal: Negative  Endocrine: Negative  Genitourinary: Negative  Musculoskeletal: Negative  Skin: Negative  Allergic/Immunologic: Negative  Neurological: Negative  Frontal skull lesion   Hematological: Negative  Psychiatric/Behavioral: Negative            Meds/Allergies     Current Outpatient Medications   Medication Sig Dispense Refill   • atorvastatin (LIPITOR) 40 mg tablet Take 1 tablet (40 mg total) by mouth daily 90 tablet 1   • Empagliflozin (Jardiance) 10 MG TABS tablet Take 1 tablet (10 mg total) by mouth every morning 90 tablet 1   • glucose blood (Accu-Chek Guide) test strip Use 1 each 2 (two) times a day Use as instructed 100 each 5   • glucose blood (OneTouch Verio) test strip Use 1 each 2 (two) times a day Use as instructed 100 each 5   • levothyroxine 137 mcg tablet Take 1 tablet by mouth once daily 60 tablet 0   • lisinopril-hydrochlorothiazide (PRINZIDE,ZESTORETIC) 10-12 5 MG per tablet Take 1 tablet by mouth once daily 90 tablet 0   • metFORMIN (GLUCOPHAGE) 1000 MG tablet Take 1 tablet (1,000 mg total) by mouth 2 (two) times a day with meals 180 tablet 0   • clotrimazole (LOTRIMIN) 1 % cream Apply topically 2 (two) times a day (Patient not taking: Reported on 11/16/2022) 30 g 0   • Diclofenac Sodium (VOLTAREN) 1 % Apply 2 g topically 4 (four) times a day (Patient not taking: Reported on 11/16/2022) 1 Tube 1   • lidocaine (LIDODERM) 5 % APPLY ONE PATCH TO AFFTECTED AREA DAILY  REMOVE AND DISCARD PATCH WITHIN 12 HOURS OR AS DIRECTED BY DOCTOR (Patient not taking: Reported on 11/4/2022) 30 patch 0     No current facility-administered medications for this visit  No Known Allergies    PAST HISTORY    Past Medical History:   Diagnosis Date   • [facility]    • Elevated LFTs     last assessed 02Nov2015   • Hypertension    • Impaired fasting glucose     last assessed 29Jan2014       Past Surgical History:   Procedure Laterality Date   • APPENDECTOMY         Social History     Tobacco Use   • Smoking status: Never   • Smokeless tobacco: Never   Vaping Use   • Vaping Use: Never used   Substance Use Topics   • Alcohol use: Yes     Alcohol/week: 3 0 standard drinks     Types: 3 Glasses of wine per week     Comment: drinks daily    • Drug use: No       Family History   Problem Relation Age of Onset   • Diabetes Mother    • Breast cancer Mother    • Diabetes Father          The following portions of the patient's history were reviewed in this encounter and updated as appropriate: Past medical, surgical, family, and social history, as well as medications, allergies, and review of systems  EXAM    Vitals:Blood pressure 130/85, pulse (!) 108, temperature 98 1 °F (36 7 °C), resp  rate 16, height 5' 9" (1 753 m), weight 86 7 kg (191 lb 3 2 oz)  ,Body mass index is 28 24 kg/m²       Physical Exam    Neurologic Exam      MEDICAL DECISION MAKING    Imaging Studies:     MRI brain without contrast    Result Date: 1/6/2023  Narrative: MRI BRAIN WITHOUT CONTRAST INDICATION: M89 9: Disorder of bone, unspecified  thin slice T2  Per Dr Von Lechuga- only do Stealth T2 prop and Bravo sequences- He noted this form ordering notes as well  COMPARISON: CT head without contrast November 22, 2022  MRI brain with and without contrast November 1, 2022  TECHNIQUE:  Multiplanar, multisequence imaging of the brain was performed which only includes axial T2 prop stealth, axial bravo, and coronal reformats  IMAGE QUALITY:  Diagnostic  FINDINGS: BRAIN PARENCHYMA: 1 9 x 1 8 cm extra-axial lesion in left frontal region with left frontal osseous calvarial invasion (3:51), appears larger than prior exam   Left frontal calvarial osseous invasion involves the inner table, diploic space, and outer table of left frontal calvarium  Unchanged chronic encephalomalacia and gliosis in bilateral parasagittal anterior-inferior frontal lobes, likely sequela of remote trauma  There is no discrete intra-axial mass, mass effect or midline shift  No intracranial hemorrhage on these limited T1 and T2 sequences  VENTRICLES:  Normal for the patient's age  SELLA AND PITUITARY GLAND:  Normal  ORBITS:  Normal  PARANASAL SINUSES:  Mild mucosal thickening in bilateral ethmoid (right worse than left), right ethmoid, and bilateral maxillary sinuses (right worse than left)  VASCULATURE:  Evaluation of the major intracranial vasculature demonstrates appropriate flow voids  CALVARIUM AND SKULL BASE:  Please see above discussion regarding left frontal calvarial osseous invasion  EXTRACRANIAL SOFT TISSUES:  Normal      Impression: Limited examination given axial T2 stealth, axial bravo, and coronal T1 images   -1 9 x 1 8 cm extra-axial lesion in left frontal region with left frontal osseous calvarial invasion (inner to outer table), appears larger than prior exam   Differential includes meningioma with atypical features given local osseous invasion, dural-based metastasis, solitary fibrous tumor, among other differentials  The study was marked in EPIC for significant notification  Workstation performed: [id number]       I have personally reviewed pertinent reports  and I have personally reviewed pertinent films in PACS      PLEASE NOTE:  This encounter may have been completed utilizing the [employer] Direct Speech Voice Recognition Software  Grammatical errors, random word insertions, pronoun errors and incomplete sentences are occasional consequences of the system due to software limitations, ambient noise and hardware issues  These may be missed by proof reading prior to affixing electronic signature  Any questions or concerns about the content, text or information contained within the body of this dictation should be directly addressed to the advanced practitioner or physician for clarification

## 2023-01-23 ENCOUNTER — HOSPITAL ENCOUNTER (INPATIENT)
Facility: HOSPITAL | Age: 58
LOS: 3 days | Discharge: HOME | End: 2023-01-26
Attending: EMERGENCY MEDICINE | Admitting: INTERNAL MEDICINE

## 2023-01-23 ENCOUNTER — APPOINTMENT (EMERGENCY)
Dept: CT IMAGING | Facility: HOSPITAL | Age: 58
End: 2023-01-23

## 2023-01-23 ENCOUNTER — TELEPHONE (OUTPATIENT)
Dept: NEUROSURGERY | Facility: CLINIC | Age: 58
End: 2023-01-23

## 2023-01-23 DIAGNOSIS — M89.9 FRONTAL SKULL LESION: Primary | ICD-10-CM

## 2023-01-23 LAB
ANION GAP SERPL CALCULATED.3IONS-SCNC: 8 MMOL/L (ref 4–13)
BASOPHILS # BLD AUTO: 0.05 THOUSANDS/ÂΜL (ref 0–0.1)
BASOPHILS NFR BLD AUTO: 0 % (ref 0–1)
BUN SERPL-MCNC: 20 MG/DL (ref 5–25)
CALCIUM SERPL-MCNC: 10.3 MG/DL (ref 8.4–10.2)
CHLORIDE SERPL-SCNC: 97 MMOL/L (ref 96–108)
CO2 SERPL-SCNC: 28 MMOL/L (ref 21–32)
CREAT SERPL-MCNC: 0.97 MG/DL (ref 0.6–1.3)
EOSINOPHIL # BLD AUTO: 0.17 THOUSAND/ÂΜL (ref 0–0.61)
EOSINOPHIL NFR BLD AUTO: 2 % (ref 0–6)
ERYTHROCYTE [DISTWIDTH] IN BLOOD BY AUTOMATED COUNT: 12.5 % (ref 11.6–15.1)
GFR SERPL CREATININE-BSD FRML MDRD: 87 ML/MIN/1.73SQ M
GLUCOSE SERPL-MCNC: 112 MG/DL (ref 65–140)
GLUCOSE SERPL-MCNC: 195 MG/DL (ref 65–140)
HCT VFR BLD AUTO: 47.9 % (ref 36.5–49.3)
HGB BLD-MCNC: 16.5 G/DL (ref 12–17)
IMM GRANULOCYTES # BLD AUTO: 0.04 THOUSAND/UL (ref 0–0.2)
IMM GRANULOCYTES NFR BLD AUTO: 0 % (ref 0–2)
LYMPHOCYTES # BLD AUTO: 3.95 THOUSANDS/ÂΜL (ref 0.6–4.47)
LYMPHOCYTES NFR BLD AUTO: 35 % (ref 14–44)
MCH RBC QN AUTO: 31.4 PG (ref 26.8–34.3)
MCHC RBC AUTO-ENTMCNC: 34.4 G/DL (ref 31.4–37.4)
MCV RBC AUTO: 91 FL (ref 82–98)
MONOCYTES # BLD AUTO: 0.76 THOUSAND/ÂΜL (ref 0.17–1.22)
MONOCYTES NFR BLD AUTO: 7 % (ref 4–12)
NEUTROPHILS # BLD AUTO: 6.47 THOUSANDS/ÂΜL (ref 1.85–7.62)
NEUTS SEG NFR BLD AUTO: 56 % (ref 43–75)
NRBC BLD AUTO-RTO: 0 /100 WBCS
PLATELET # BLD AUTO: 181 THOUSANDS/UL (ref 149–390)
PMV BLD AUTO: 9.8 FL (ref 8.9–12.7)
POTASSIUM SERPL-SCNC: 4 MMOL/L (ref 3.5–5.3)
RBC # BLD AUTO: 5.26 MILLION/UL (ref 3.88–5.62)
SODIUM SERPL-SCNC: 133 MMOL/L (ref 135–147)
TSH SERPL DL<=0.05 MIU/L-ACNC: 3.08 UIU/ML (ref 0.45–4.5)
WBC # BLD AUTO: 11.44 THOUSAND/UL (ref 4.31–10.16)

## 2023-01-23 RX ORDER — INSULIN LISPRO 100 [IU]/ML
1-6 INJECTION, SOLUTION INTRAVENOUS; SUBCUTANEOUS
Status: DISCONTINUED | OUTPATIENT
Start: 2023-01-23 | End: 2023-01-26 | Stop reason: HOSPADM

## 2023-01-23 RX ORDER — ACETAMINOPHEN 325 MG/1
975 TABLET ORAL EVERY 8 HOURS SCHEDULED
Status: DISCONTINUED | OUTPATIENT
Start: 2023-01-23 | End: 2023-01-26 | Stop reason: HOSPADM

## 2023-01-23 RX ORDER — ACETAMINOPHEN 325 MG/1
975 TABLET ORAL ONCE
Status: COMPLETED | OUTPATIENT
Start: 2023-01-23 | End: 2023-01-23

## 2023-01-23 RX ORDER — ACETAMINOPHEN 325 MG/1
975 TABLET ORAL EVERY 6 HOURS SCHEDULED
Status: DISCONTINUED | OUTPATIENT
Start: 2023-01-24 | End: 2023-01-23

## 2023-01-23 RX ORDER — HYDROCHLOROTHIAZIDE 12.5 MG/1
12.5 TABLET ORAL DAILY
Status: DISCONTINUED | OUTPATIENT
Start: 2023-01-24 | End: 2023-01-26 | Stop reason: HOSPADM

## 2023-01-23 RX ORDER — IBUPROFEN 400 MG/1
400 TABLET ORAL EVERY 6 HOURS PRN
Status: DISCONTINUED | OUTPATIENT
Start: 2023-01-23 | End: 2023-01-26 | Stop reason: HOSPADM

## 2023-01-23 RX ORDER — INSULIN LISPRO 100 [IU]/ML
1-6 INJECTION, SOLUTION INTRAVENOUS; SUBCUTANEOUS
Status: DISCONTINUED | OUTPATIENT
Start: 2023-01-24 | End: 2023-01-26 | Stop reason: HOSPADM

## 2023-01-23 RX ORDER — LISINOPRIL 10 MG/1
10 TABLET ORAL DAILY
Status: DISCONTINUED | OUTPATIENT
Start: 2023-01-24 | End: 2023-01-26 | Stop reason: HOSPADM

## 2023-01-23 RX ADMIN — ACETAMINOPHEN 975 MG: 325 TABLET, FILM COATED ORAL at 23:07

## 2023-01-23 RX ADMIN — ACETAMINOPHEN 975 MG: 325 TABLET, FILM COATED ORAL at 18:32

## 2023-01-23 NOTE — TELEMEDICINE
e-Consult (IPC)     Inpatient consult to Neurosurgery  Consult performed by: TERESA Mancia  Consult ordered by: Stephanie Spears MD           Contacted by Ramakrishna Romero via TT at [address] 56 y o  male MRN: [id number]  Unit/Bed#: ED-16 Encounter: [id number]    Reason for Consult    Per provider report, patient presented with 2 day severe aching to heavy pain in the area of known mass which is constant and causing him to be unable to sleep despite taking Motrin  Patient known to our service for left frontal extra-axial/skull base lesion followed since 2021  He last saw Dr Bartolo Colvin on 1/11/2023  Most recent MRI demonstrated that the lesion has increased substantially since November 2022  CT scan from November showed bony erosion and involvement that is encroaching upon the border of left frontal sinus  At that time patient was asymptomatic  Patient wanted this treated surgically with resection  Per chart review patient plans on traveling to [facility] in February and plans to return early in March surgery scheduled for March at that time  Available past medical history,social history, surgical history, medication list, drug allergies and review of systems were reviewed  /80 (BP Location: Right arm)   Pulse (!) 106   Temp 98 6 °F (37 °C) (Oral)   Resp 18   SpO2 99%      Clinical exam per provider report, no neurodeficits on exam   Patient does have tenderness to palpation of area where mass is located with mild swelling  No new imaging to review  Assessment and Recommendations    1  Recommend MRI brain with and without contrast to assess for any changes or progression  2  Recommend CT head without to assess bony quality  3   Admit under internal medicine for pain control  4  Recommend multimodal pain regimen  5  Continue frequent neurological checks  6   STAT CT head with any neurological decline including drop GCS of 2pts within 1 hr    7  Recommend SBP <160mmHg  8   Full consult to follow tomorrow, call with any further questions or concerns  All questions answered  Provider is in agreement with the course of action  11-20 minutes, >50% of the total time devoted to medical consultative verbal/EMR discussion between providers  Written report will be generated in the EMR

## 2023-01-23 NOTE — LETTER
Thank you for allowing us to participate in the care of your patient, Isela Faith, who was hospitalized from 1/24/23 through 1/26/2023 with the admitting diagnosis of Frontal Skull Lesion  He has a known lesion on the left frontal aspect of his dura/skull  He came in with pain and swelling above the left orbit  MRI with and without contrast showed "Enhancing dural based lesion along the left frontal convexity increased in size compared to 11/1/2022  The lesion is unchanged compared to the recent noncontrast examination  There is again involvement of the left frontal calvarium with associated soft tissue component along the left frontal scalp  Differential diagnosis again includes enlarging atypical meningioma, solitary fibrous tumor, dural metastatic focus, or other aggressive lesion " We also performed a CT chest/abdomen/pelvis with contrast to evaluate for primary lesion or metastatic lesions, and this test did not show any abnormalities  He was evaluated by his neurosurgeon, Dr Fernie Pacheco  They agreed to perform surgery after Jolanta Eli returns from [facility] in early March  His blood glucose was also seen to be over 200 a few time, particularly in the morning  We discharged him home on his home medications of Metformin 1000mg bid and Jardiance 10mg daily  Thank you,      Khadar Jordan DO PGY-1  [facility], [place]

## 2023-01-23 NOTE — TELEPHONE ENCOUNTER
Received a call from patient requesting a call back regarding pain he is having  Returned call to patient who stated over the last 2 days he has been experiencing a headache on the left side of his head  He reports the pain is not consistent and he has not yet taken medications  Patient reports he is still planning to travel on 2/8 to [facility]  Advised he trial tylenol or ibuprofen for the pain  Also stated this RN will route to Dr Janel Deluna to inform him of the new symptom  Patient was appreciative of the call back

## 2023-01-24 ENCOUNTER — APPOINTMENT (INPATIENT)
Dept: MRI IMAGING | Facility: HOSPITAL | Age: 58
End: 2023-01-24

## 2023-01-24 ENCOUNTER — TELEPHONE (OUTPATIENT)
Dept: NEUROSURGERY | Facility: CLINIC | Age: 58
End: 2023-01-24

## 2023-01-24 LAB
ANION GAP SERPL CALCULATED.3IONS-SCNC: 10 MMOL/L (ref 4–13)
BUN SERPL-MCNC: 20 MG/DL (ref 5–25)
CALCIUM SERPL-MCNC: 9.2 MG/DL (ref 8.4–10.2)
CHLORIDE SERPL-SCNC: 101 MMOL/L (ref 96–108)
CO2 SERPL-SCNC: 24 MMOL/L (ref 21–32)
CREAT SERPL-MCNC: 0.71 MG/DL (ref 0.6–1.3)
ERYTHROCYTE [DISTWIDTH] IN BLOOD BY AUTOMATED COUNT: 12.6 % (ref 11.6–15.1)
GFR SERPL CREATININE-BSD FRML MDRD: 105 ML/MIN/1.73SQ M
GLUCOSE SERPL-MCNC: 142 MG/DL (ref 65–140)
GLUCOSE SERPL-MCNC: 157 MG/DL (ref 65–140)
GLUCOSE SERPL-MCNC: 157 MG/DL (ref 65–140)
GLUCOSE SERPL-MCNC: 163 MG/DL (ref 65–140)
GLUCOSE SERPL-MCNC: 176 MG/DL (ref 65–140)
HCT VFR BLD AUTO: 46.1 % (ref 36.5–49.3)
HGB BLD-MCNC: 16.3 G/DL (ref 12–17)
MCH RBC QN AUTO: 32.3 PG (ref 26.8–34.3)
MCHC RBC AUTO-ENTMCNC: 35.4 G/DL (ref 31.4–37.4)
MCV RBC AUTO: 92 FL (ref 82–98)
PLATELET # BLD AUTO: 155 THOUSANDS/UL (ref 149–390)
PMV BLD AUTO: 10.4 FL (ref 8.9–12.7)
POTASSIUM SERPL-SCNC: 3.9 MMOL/L (ref 3.5–5.3)
RBC # BLD AUTO: 5.04 MILLION/UL (ref 3.88–5.62)
SODIUM SERPL-SCNC: 135 MMOL/L (ref 135–147)
WBC # BLD AUTO: 7.71 THOUSAND/UL (ref 4.31–10.16)

## 2023-01-24 RX ADMIN — LISINOPRIL 10 MG: 10 TABLET ORAL at 08:47

## 2023-01-24 RX ADMIN — LEVOTHYROXINE SODIUM 137 MCG: 25 TABLET ORAL at 08:46

## 2023-01-24 RX ADMIN — INSULIN LISPRO 1 UNITS: 100 INJECTION, SOLUTION INTRAVENOUS; SUBCUTANEOUS at 18:31

## 2023-01-24 RX ADMIN — INSULIN LISPRO 1 UNITS: 100 INJECTION, SOLUTION INTRAVENOUS; SUBCUTANEOUS at 08:47

## 2023-01-24 RX ADMIN — ACETAMINOPHEN 975 MG: 325 TABLET, FILM COATED ORAL at 14:47

## 2023-01-24 RX ADMIN — ACETAMINOPHEN 975 MG: 325 TABLET, FILM COATED ORAL at 22:49

## 2023-01-24 RX ADMIN — GADOBUTROL 8 ML: 604.72 INJECTION INTRAVENOUS at 16:02

## 2023-01-24 RX ADMIN — HYDROCHLOROTHIAZIDE 12.5 MG: 12.5 TABLET ORAL at 08:47

## 2023-01-24 NOTE — ASSESSMENT & PLAN NOTE
· Pt presented with left frontal forehead pain over the last 2-3 days  · Pt with known enlarging left frontal convexity and skull lesion possibly atypical meningoma vs other aggressive lesion, noted to have increased from prior imaging in Nov 2022  · Imaging reviewed personally and by attending  Final results as below  · MRI brain w/wo 1/24/23:  Enhancing dural based lesion along the left frontal convexity increased in size compared to 11/1/2022  The lesion is unchanged compared to the recent noncontrast examination  There is again involvement of the left frontal calvarium with associated soft tissue component along the left frontal scalp  Differential diagnosis again includes enlarging atypical meningioma, solitary fibrous tumor, dural metastatic focus, or other aggressive lesion  Plan  · Continue regular neurologic checks  · Ongoing medical management per primary team    · Pain control per primary team    · Eval and mobilize per PT/OT  · DVT PPX: SCDs   · Dr Radha Aceves had previously discussed with patient during the last visit on 1/11/23 that given the substancial interval growth of the left frontal dural based brain lesion, patient should have treatment for it done soon  At the time, pt was asymptomatic and had wanted to go to [facility] with his mother's ashes then return for surgical intervention in March 2023  · Briefly reviewed with patient updated MRI brain 1/24/23 results  Given that pt is symptomatic at this time with pain in the left frontal region where the tumor is encroaching on the left frontal sinus, at this time, pt seems more amenable to having surgical intervention for resection of the tumor and travel to [facility] later after treatment  Also discussed with pt that after surgical intervention, based on pathology results, he may need adjuvant therapy such as radiation therapy  Dr Radha Aceves made aware of patient's admission and updated imaging   Will further discuss with patient and determine timing for the procedure  · Neurosurgery will continue to follow closely  Please call with any questions or concerns

## 2023-01-24 NOTE — ED PROVIDER NOTES
History  Chief Complaint   Patient presents with   • Mass     Pt arrives c/o pain in L forehead x 2 days, palpable raised area noted  Per pt, MRI done in Nov showed "something that tracked through his bone"  Denies fever  No outward signs of infection noted  Pt denies injury  Mr Vika Lam is a 56 yom with history of left frontal mass with extension through frontal bone, pending resection and biopsy by Dr Ricardo Contreras in March 2023, who presents with left forehead swelling and pain at site of intracranial mass x 2 days  Was previously asymptomatic  Notes gradual onset of pain two days ago with noticeable bump, pain is aching to "heavy", constant but worse when he touches it, and has not been able to sleep the last two nights due to severe pain only minimally relieved by ibuprofen, currently 9/10  Denies inciting injury  Denies fever, chills, lightheadedness, dizziness, vision changes, hearing loss, numbness, tingling, dysarthria, dysphagia, nasal drainage, chest pain, shortness of breath, nausea, or vomiting  Prior to Admission Medications   Prescriptions Last Dose Informant Patient Reported? Taking?    Diclofenac Sodium (VOLTAREN) 1 %   No No   Sig: Apply 2 g topically 4 (four) times a day   Patient not taking: Reported on 11/16/2022   Empagliflozin (Jardiance) 10 MG TABS tablet   No No   Sig: Take 1 tablet (10 mg total) by mouth every morning   atorvastatin (LIPITOR) 40 mg tablet   No No   Sig: Take 1 tablet (40 mg total) by mouth daily   clotrimazole (LOTRIMIN) 1 % cream   No No   Sig: Apply topically 2 (two) times a day   Patient not taking: Reported on 11/16/2022   glucose blood (Accu-Chek Guide) test strip   No No   Sig: Use 1 each 2 (two) times a day Use as instructed   glucose blood (OneTouch Verio) test strip   No No   Sig: Use 1 each 2 (two) times a day Use as instructed   levothyroxine 137 mcg tablet   No No   Sig: Take 1 tablet by mouth once daily   lidocaine (LIDODERM) 5 %   No No   Sig: APPLY ONE PATCH TO AFFTECTED AREA DAILY  REMOVE AND DISCARD PATCH WITHIN 12 HOURS OR AS DIRECTED BY DOCTOR   Patient not taking: Reported on 11/4/2022   lisinopril-hydrochlorothiazide (PRINZIDE,ZESTORETIC) 10-12 5 MG per tablet   No No   Sig: Take 1 tablet by mouth once daily   metFORMIN (GLUCOPHAGE) 1000 MG tablet   No No   Sig: Take 1 tablet (1,000 mg total) by mouth 2 (two) times a day with meals      Facility-Administered Medications: None       Past Medical History:   Diagnosis Date   • Diabetes mellitus ([facility] [address] )    • Elevated LFTs     last assessed 02Nov2015   • Hypertension    • Impaired fasting glucose     last assessed 29Jan2014       Past Surgical History:   Procedure Laterality Date   • APPENDECTOMY         Family History   Problem Relation Age of Onset   • Diabetes Mother    • Breast cancer Mother    • Diabetes Father      I have reviewed and agree with the history as documented  E-Cigarette/Vaping   • E-Cigarette Use Never User      E-Cigarette/Vaping Substances   • Nicotine No    • THC No    • CBD No    • Flavoring No    • Other No    • Unknown No      Social History     Tobacco Use   • Smoking status: Never   • Smokeless tobacco: Never   Vaping Use   • Vaping Use: Never used   Substance Use Topics   • Alcohol use: Yes     Alcohol/week: 3 0 standard drinks     Types: 3 Glasses of wine per week     Comment: drinks daily    • Drug use: No        Review of Systems   Constitutional: Negative  Negative for appetite change, chills, diaphoresis, fatigue, fever and unexpected weight change  HENT: Positive for facial swelling  Negative for congestion, ear discharge, ear pain, hearing loss, mouth sores, nosebleeds, postnasal drip, rhinorrhea, sinus pressure, sinus pain, sneezing, trouble swallowing and voice change  Left frontal forehead swelling and tenderness  Eyes: Negative for photophobia, pain, discharge, redness and visual disturbance  Respiratory: Negative    Negative for cough and shortness of breath  Cardiovascular: Negative  Negative for chest pain, palpitations and leg swelling  Gastrointestinal: Negative  Negative for abdominal pain, nausea and vomiting  Endocrine: Negative  Negative for cold intolerance, heat intolerance, polydipsia and polyuria  Genitourinary: Negative  Musculoskeletal: Negative  Negative for neck pain and neck stiffness  Skin: Negative  Allergic/Immunologic: Negative  Neurological: Negative  Negative for dizziness, tremors, seizures, syncope, facial asymmetry, speech difficulty, weakness, light-headedness, numbness and headaches  Hematological: Negative  Psychiatric/Behavioral: Negative  Negative for confusion  All other systems reviewed and are negative  Physical Exam  ED Triage Vitals [01/23/23 1718]   Temperature Pulse Respirations Blood Pressure SpO2   98 6 °F (37 °C) (!) 106 18 142/80 99 %      Temp Source Heart Rate Source Patient Position - Orthostatic VS BP Location FiO2 (%)   Oral Monitor Sitting Right arm --      Pain Score       --             Orthostatic Vital Signs  Vitals:    01/23/23 1718 01/23/23 2039   BP: 142/80 130/78   Pulse: (!) 106 97   Patient Position - Orthostatic VS: Sitting        Physical Exam  Vitals and nursing note reviewed  Exam conducted with a chaperone present  Constitutional:       Appearance: Normal appearance  He is not diaphoretic  Comments: Appears to be in pain  HENT:      Head:      Comments: 2cm area of swelling over the left eyebrow, tender to palpation, mild underlying bony prominence without crepitus or obvious deformity  Right Ear: External ear normal       Left Ear: External ear normal       Nose: Nose normal  No congestion or rhinorrhea  Mouth/Throat:      Mouth: Mucous membranes are moist       Pharynx: Oropharynx is clear  No oropharyngeal exudate or posterior oropharyngeal erythema  Eyes:      General: No visual field deficit or scleral icterus          Right eye: No discharge  Left eye: No discharge  Extraocular Movements: Extraocular movements intact  Conjunctiva/sclera: Conjunctivae normal       Pupils: Pupils are equal, round, and reactive to light  Cardiovascular:      Rate and Rhythm: Normal rate and regular rhythm  Pulses: Normal pulses  Heart sounds: Normal heart sounds  Pulmonary:      Effort: Pulmonary effort is normal  No respiratory distress  Breath sounds: Normal breath sounds  Abdominal:      General: Abdomen is flat  Musculoskeletal:         General: Normal range of motion  Cervical back: Normal range of motion and neck supple  No tenderness  Skin:     General: Skin is warm and dry  Capillary Refill: Capillary refill takes less than 2 seconds  Neurological:      General: No focal deficit present  Mental Status: He is alert and oriented to person, place, and time  Cranial Nerves: No cranial nerve deficit  Sensory: No sensory deficit  Motor: No weakness        Coordination: Coordination normal       Gait: Gait normal    Psychiatric:         Mood and Affect: Mood normal          Behavior: Behavior normal          ED Medications  Medications   acetaminophen (TYLENOL) tablet 975 mg (975 mg Oral Given 1/23/23 1832)       Diagnostic Studies  Results Reviewed     Procedure Component Value Units Date/Time    Basic metabolic panel [[id number]]  (Abnormal) Collected: 01/23/23 1839    Lab Status: Final result Specimen: Blood from Arm, Right Updated: 01/23/23 1916     Sodium 133 mmol/L      Potassium 4 0 mmol/L      Chloride 97 mmol/L      CO2 28 mmol/L      ANION GAP 8 mmol/L      BUN 20 mg/dL      Creatinine 0 97 mg/dL      Glucose 195 mg/dL      Calcium 10 3 mg/dL      eGFR 87 ml/min/1 73sq m     Narrative:      [place] guidelines for Chronic Kidney Disease (CKD):   •  Stage 1 with normal or high GFR (GFR > 90 mL/min/1 73 square meters)  •  Stage 2 Mild CKD (GFR = 60-89 mL/min/1 73 square meters)  •  Stage 3A Moderate CKD (GFR = 45-59 mL/min/1 73 square meters)  •  Stage 3B Moderate CKD (GFR = 30-44 mL/min/1 73 square meters)  •  Stage 4 Severe CKD (GFR = 15-29 mL/min/1 73 square meters)  •  Stage 5 End Stage CKD (GFR <15 mL/min/1 73 square meters)  Note: GFR calculation is accurate only with a steady state creatinine    CBC and differential [[id number]]  (Abnormal) Collected: 01/23/23 1839    Lab Status: Final result Specimen: Blood from Arm, Right Updated: 01/23/23 1859     WBC 11 44 Thousand/uL      RBC 5 26 Million/uL      Hemoglobin 16 5 g/dL      Hematocrit 47 9 %      MCV 91 fL      MCH 31 4 pg      MCHC 34 4 g/dL      RDW 12 5 %      MPV 9 8 fL      Platelets 049 Thousands/uL      nRBC 0 /100 WBCs      Neutrophils Relative 56 %      Immat GRANS % 0 %      Lymphocytes Relative 35 %      Monocytes Relative 7 %      Eosinophils Relative 2 %      Basophils Relative 0 %      Neutrophils Absolute 6 47 Thousands/µL      Immature Grans Absolute 0 04 Thousand/uL      Lymphocytes Absolute 3 95 Thousands/µL      Monocytes Absolute 0 76 Thousand/µL      Eosinophils Absolute 0 17 Thousand/µL      Basophils Absolute 0 05 Thousands/µL                  CT head without contrast   Final Result by Marilu Malone DO (01/23 1958)   No significant interval change by CT  Redemonstration of erosive change involving the left frontal calvarium with an associated extra-axial lesion seen to better advantage on recent MRI  Differential diagnosis is unchanged including meningioma versus other neoplastic etiologies  Recommend repeat CT with gadolinium given the change in symptoms  The study was marked in [facility] for immediate notification              Workstation performed: [id number]               Procedures  Procedures      ED Course  ED Course as of 01/23/23 2106 Mon Jan 23, 2023   7812 Pt is a 56 yom with history of left frontal mass, 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, pending excision and biopsy in 3/3023, was previously symptomatic until two days ago when he developed swelling and pain in the area, not relieved by motrin, which is causing him to not be able to sleep  No neuro deficits on exam, does have mild left frontal swelling  Will contact neurosurg team for recommendations on imaging  Pt adamantly refuses any further pain medications at this time  1800 Per Alla Dover, neurosurgery AP, to obtain non con head CT tonight, admit for MRI, repeat neuro checks, pain control, and neurosurg eval in am     1900 WBC(!): 11 44  Mild leukocytosis in absence of infectious symptoms  2003 CT head without contrast  IMPRESSION:  No significant interval change by CT      Redemonstration of erosive change involving the left frontal calvarium with an associated extra-axial lesion seen to better advantage on recent MRI  Differential diagnosis is unchanged including meningioma versus other neoplastic etiologies      Recommend repeat CT with gadolinium given the change in symptoms  2016 Pain improved with tylenol  Medical Decision Making  See ED course for MDM  Neuro exam reassessed periodically, WNL throughout ED course  Frontal skull lesion: self-limited or minor problem  Amount and/or Complexity of Data Reviewed  Labs: ordered  Decision-making details documented in ED Course  Radiology: ordered  Decision-making details documented in ED Course  Risk  OTC drugs  Decision regarding hospitalization              Disposition  Final diagnoses:   Frontal skull lesion     Time reflects when diagnosis was documented in both MDM as applicable and the Disposition within this note     Time User Action Codes Description Comment    1/23/2023  6:20 PM Jaciel Walton Add [M89 9] Frontal skull lesion       ED Disposition     ED Disposition   Admit    Condition   Stable    Date/Time   Mon Jan 23, 2023  8:21 PM    Comment   Discussed with [facility] resident, will admit to Dr  's service  Follow-up Information    None         Patient's Medications   Discharge Prescriptions    No medications on file     No discharge procedures on file  PDMP Review     None           ED Provider  Attending physically available and evaluated Jabier Hillman I managed the patient along with the ED Attending      Electronically Signed by              Brenda Stroud DO  01/29/23 6111

## 2023-01-24 NOTE — CONSULTS
[address] 1965, 56 y o  male MRN: [id number]  Unit/Bed#: S -01 Encounter: [id number]  Primary Care Provider: Alisson Tran MD   Date and time admitted to hospital: 1/23/2023  5:25 PM    Consults    * Frontal skull lesion  Assessment & Plan  · Pt presented with left frontal forehead pain over the last 2-3 days  · Pt with known enlarging left frontal convexity dural based lesion possibly atypical meningoma vs other aggressive lesion, noted to have increased from prior imaging in Nov 2022  · Imaging reviewed personally and by attending  Final results as below  · MRI brain w/wo 1/24/23:  Enhancing dural based lesion along the left frontal convexity increased in size compared to 11/1/2022  The lesion is unchanged compared to the recent noncontrast examination  There is again involvement of the left frontal calvarium with associated soft tissue component along the left frontal scalp  Differential diagnosis again includes enlarging atypical meningioma, solitary fibrous tumor, dural metastatic focus, or other aggressive lesion  Plan  · Continue regular neurologic checks  · Ongoing medical management per primary team    · Pain control per primary team    · Eval and mobilize per PT/OT  · DVT PPX: SCDs   · Dr Christa Arriola had previously discussed with patient during the last visit on 1/11/23 that given the substancial interval growth of the left frontal dural based brain lesion which encroaches on the frontal sinus, while not emergent, patient should have treatment for it done soon  At the time, pt was asymptomatic and had wanted to go to [facility] to return his mother's ashes in 02/2023 then return for follow up and surgical discussion in 03/2023  · Reviewed with patient updated MRI brain 1/24/23 results  Patient would like further discussion with Dr Christa Arriola  · Dr Christa Arriola will further discuss with patient plan of care   Please contact nsx with any questions or concerns  History of Present Illness   History, ROS and PFSH obtained from pt and chart review  HPI: Roberto Juarez is a 56 y o  male with PMH including DM,  Hypertension know to neurosurgery for hx of left frontal dural based lesion concerning for possible atypical meningioma recently seen at neurosurgery office on 1/11/23 who presented to the ED with complain of new pain in the left frontal/forehead region with mild swelling for which neurosurgery was consulted given presence of left frontal skull based lesion encroaching on the left frontal sinus  Patient reports that he began to experience pain over the weekend that was not relieved with over the counter medications  He reports that given persistence of symptoms he presented to the ED for further evaluation given his hx of the left frontal skull based lesion  Pt denies having chronic pain or headaches in the past  He reports that he has not had pain like this before and this is all new  Pt denies nasal congestion or upper respiratory symptoms  Pt denies dizziness at this time  Patient denies visual disturbance  Patient denies any new numbness, tingling or weakness  Pt denies changes in gait or balance  Pt denies nausea or vomiting  Pt was accompanied by his wife at bedside during this visit  His daughter was available by phone during the visit  Review of Systems   Constitutional: Negative for activity change, chills and fever  HENT: Negative for hearing loss  Eyes: Negative for photophobia and pain  Respiratory: Negative for chest tightness and shortness of breath  Cardiovascular: Negative for chest pain and palpitations  Gastrointestinal: Negative for abdominal pain, nausea and vomiting  Genitourinary: Negative for difficulty urinating and dysuria  Musculoskeletal: Negative for neck pain and neck stiffness  Skin: Negative for color change, rash and wound     Neurological: Negative for dizziness, seizures, speech difficulty, weakness and light-headedness  Pain in the left frontal forehead region  Psychiatric/Behavioral: Negative for confusion and decreased concentration  Historical Information   Past Medical History:   Diagnosis Date   • Diabetes mellitus ([address] )    • Elevated LFTs     last assessed 02Nov2015   • Hypertension    • Impaired fasting glucose     last assessed 29Jan2014     Past Surgical History:   Procedure Laterality Date   • APPENDECTOMY       Social History     Substance and Sexual Activity   Alcohol Use Yes   • Alcohol/week: 3 0 standard drinks   • Types: 3 Glasses of wine per week    Comment: drinks daily      Social History     Substance and Sexual Activity   Drug Use No     Social History     Tobacco Use   Smoking Status Never   Smokeless Tobacco Never     Family History   Problem Relation Age of Onset   • Diabetes Mother    • Breast cancer Mother    • Diabetes Father        Meds/Allergies   all current active meds have been reviewed, current meds:   Current Facility-Administered Medications   Medication Dose Route Frequency   • acetaminophen (TYLENOL) tablet 975 mg  975 mg Oral Q8H [address]   • lisinopril (ZESTRIL) tablet 10 mg  10 mg Oral Daily    And   • hydrochlorothiazide (HYDRODIURIL) tablet 12 5 mg  12 5 mg Oral Daily   • ibuprofen (MOTRIN) tablet 400 mg  400 mg Oral Q6H PRN   • insulin lispro (HumaLOG) 100 units/mL subcutaneous injection 1-6 Units  1-6 Units Subcutaneous TID AC   • insulin lispro (HumaLOG) 100 units/mL subcutaneous injection 1-6 Units  1-6 Units Subcutaneous HS   • levothyroxine tablet 137 mcg  137 mcg Oral Daily    and PTA meds:   Prior to Admission Medications   Prescriptions Last Dose Informant Patient Reported? Taking?    Empagliflozin (Jardiance) 10 MG TABS tablet 1/23/2023  No Yes   Sig: Take 1 tablet (10 mg total) by mouth every morning   atorvastatin (LIPITOR) 40 mg tablet Past Week  No Yes   Sig: Take 1 tablet (40 mg total) by mouth daily   glucose blood (Accu-Chek Guide) test strip Past Week  No Yes   Sig: Use 1 each 2 (two) times a day Use as instructed   glucose blood (OneTouch Verio) test strip Past Week  No Yes   Sig: Use 1 each 2 (two) times a day Use as instructed   levothyroxine 137 mcg tablet 1/23/2023  No Yes   Sig: Take 1 tablet by mouth once daily   lidocaine (LIDODERM) 5 % Not Taking  No No   Sig: APPLY ONE PATCH TO AFFTECTED AREA DAILY  REMOVE AND DISCARD PATCH WITHIN 12 HOURS OR AS DIRECTED BY DOCTOR   Patient not taking: Reported on 1/23/2023   lisinopril-hydrochlorothiazide (PRINZIDE,ZESTORETIC) 10-12 5 MG per tablet 1/23/2023  No Yes   Sig: Take 1 tablet by mouth once daily   metFORMIN (GLUCOPHAGE) 1000 MG tablet 1/23/2023  No Yes   Sig: Take 1 tablet (1,000 mg total) by mouth 2 (two) times a day with meals      Facility-Administered Medications: None     No Known Allergies    Objective   I/O       01/22 0701 01/23 0700 01/23 0701 01/24 0700 01/24 0701 01/25 0700    P  O    480    Total Intake   480    Net   +480                 Physical Exam  Constitutional:       Appearance: He is well-developed  HENT:      Head:      Comments: Mild left frontal forehead swelling  Eyes:      General: No scleral icterus  Conjunctiva/sclera: Conjunctivae normal       Pupils: Pupils are equal, round, and reactive to light  Neck:      Trachea: No tracheal deviation  Cardiovascular:      Rate and Rhythm: Normal rate  Pulmonary:      Effort: Pulmonary effort is normal    Abdominal:      Palpations: Abdomen is soft  Tenderness: There is no abdominal tenderness  There is no guarding  Musculoskeletal:      Cervical back: Neck supple  Skin:     General: Skin is warm and dry  Coloration: Skin is not pale  Findings: No rash  Neurological:      Mental Status: He is alert and oriented to person, place, and time        Comments: GCS 15, Awake, Alert, speech is clear and fluent, Oriented x 3    Motor: LEIGH, strength 5/5 throughout    Sensation: intact to LT X 4     Reflexes: 2+ and symmetric  Coordination: no drift bilateral upper extremities     Psychiatric:         Behavior: Behavior normal        Neurologic Exam     Mental Status   Oriented to person, place, and time  Cranial Nerves     CN III, IV, VI   Pupils are equal, round, and reactive to light  Vitals:Blood pressure 132/79, pulse 82, temperature 97 9 °F (36 6 °C), temperature source Oral, resp  rate 18, SpO2 96 %  ,There is no height or weight on file to calculate BMI  Lab Results:   Results from last 7 days   Lab Units 01/25/23  0438 01/24/23  0644 01/23/23  1839   WBC Thousand/uL 7 42 7 71 11 44*   HEMOGLOBIN g/dL 17 3* 16 3 16 5   HEMATOCRIT % 48 5 46 1 47 9   PLATELETS Thousands/uL 161 155 181   NEUTROS PCT % 47  --  56   MONOS PCT % 9  --  7     Results from last 7 days   Lab Units 01/25/23 0438 01/24/23  0644 01/23/23  1839   POTASSIUM mmol/L 4 0 3 9 4 0   CHLORIDE mmol/L 100 101 97   CO2 mmol/L 25 24 28   BUN mg/dL 17 20 20   CREATININE mg/dL 0 79 0 71 0 97   CALCIUM mg/dL 9 3 9 2 10 3*                   Imaging Studies: I have personally reviewed pertinent reports  and I have personally reviewed pertinent films in PACS    EKG, Pathology, and Other Studies: I have personally reviewed pertinent reports  VTE Prophylaxis: Sequential compression device [place] Alex)     Code Status: Level 1 - Full Code  Advance Directive and Living Will:      Power of :    POLST:      Counseling / Coordination of Care  I spent 45 minutes with the patient  PLEASE NOTE:  This encounter may have been completed utilizing the [email or web address] Direct Speech Voice Recognition Software  Grammatical errors, random word insertions, pronoun errors and incomplete sentences are occasional consequences of the system due to software limitations, ambient noise and hardware issues  These may be missed even after proof reading prior to affixing electronic signature   Please do not hesitate to contact me directly if you have any questions or concerns about the content, text or information contained within the body of this dictation

## 2023-01-24 NOTE — H&P
[facility]  H&P- Ivan Gonsales 1965, 56 y o  male MRN: [id number]  Unit/Bed#: S -01 Encounter: [id number]  Primary Care Provider: Stephanie Alberts MD   Date and time admitted to hospital: 1/23/2023  5:25 PM    * Frontal skull lesion  Assessment & Plan  Patient has a left frontal mass which was found on imaging in 2021, and had been having follow up MRI every 6 for monitoring and the mass has been stable in size until 11/22 which showed enlarging left frontal dural based extra-axial mass with associated destruction/erosion into the left frontal bone  He follows with Dr Dwaine Powers outpatient  Patient is scheduled to have mass biopsy/removal in March 2023 (as he has plans to visit [facility] in February)  For the past 2 days he has been experiencing constant pain on the left forehead about 2 5 cm area just above the eyebrows, the area is tender to touch, pain does not radiate anywhere  Repeat CT scan today does not show any significant change in the mass size  Neurosurgery consulted: current recommendations via E consult are for MRI brain with and without contrast and neurochecks overnight  Full consult to follow tomorrow  Patient is currently ok with earlier treatment/resection of mass if MRI shows progression in size  Plan:  Tylenol Q8 scheduled  Ibuprofen 400 mg every 6 as needed  MRI brain with and without contrast  Neurochecks every shift      Controlled type 2 diabetes mellitus without complication, without long-term current use of insulin (HCC)  Assessment & Plan  Lab Results   Component Value Date    HGBA1C 8 1 (A) 11/21/2022       No results for input(s): POCGLU in the last 72 hours  Blood Sugar Average: Last 72 hrs:  Patient is on metformin and Jardiance outpatient  Insulin sliding scale with meals and QHS at bedtime      Benign essential hypertension  Assessment & Plan  BP is in acceptable range     -Continue lisinopril 10 mg and hydrochlorothiazide 12 5 mg q daily  Other specified hypothyroidism  Assessment & Plan  Continue levothyroxine 137 mcg    VTE Pharmacologic Prophylaxis: VTE Score: 2 Low Risk (Score 0-2) - Encourage Ambulation  Code Status: Level 1 - Full Code Patient  Discussion with family: Updated  (wife and daughter) at bedside  Anticipated Length of Stay: Patient will be admitted on an inpatient basis with an anticipated length of stay of greater than 2 midnights secondary to Frontal skull lesion  Chief Complaint: Constant forehead pain    History of Present Illness:  Iman Rock is a 56 y o  male with a PMH of diabetes mellitus type 2, hypertension, hypothyroidism who presents with constant pain over his left forehead for the past 2 days  Patient has a history significant of a left frontal mass since 2021 which has increased in size as noted on CT in 11/22  Patient has been asymptomatic until about 2 days ago at which time he began experiencing constant pain on his left forehead, as per patient pain is localized in about 2 5 cm region on his forehead and is tender to palpation  Patient denies any  vision changes, confusion, or memory deficits  Patient denies any fevers, chills, chest pain, nausea, vomiting  Patient will be admitted for MRI brain to evaluate any change in the size of the frontal mass  Review of Systems:  Review of Systems   Constitutional: Negative for activity change, appetite change, chills and fever  HENT: Positive for facial swelling ( and pain localized to the left forehead)  Negative for congestion, postnasal drip, rhinorrhea, sinus pain, sneezing and sore throat  Respiratory: Negative for cough, choking, chest tightness and shortness of breath  Cardiovascular: Negative for chest pain, palpitations and leg swelling  Gastrointestinal: Negative for abdominal distention, abdominal pain, blood in stool, constipation, diarrhea, nausea and vomiting     Genitourinary: Negative for dysuria, frequency and hematuria  Musculoskeletal: Negative for arthralgias, back pain, joint swelling and myalgias  Neurological: Negative for dizziness, tremors, seizures, facial asymmetry and weakness  Headaches: Localized to the left forehead  Psychiatric/Behavioral: Negative for agitation, behavioral problems and confusion  Past Medical and Surgical History:   Past Medical History:   Diagnosis Date   • Diabetes mellitus ([facility] [address] )    • Elevated LFTs     last assessed 02Nov2015   • Hypertension    • Impaired fasting glucose     last assessed 29Jan2014       Past Surgical History:   Procedure Laterality Date   • APPENDECTOMY         Meds/Allergies:  Prior to Admission medications    Medication Sig Start Date End Date Taking? Authorizing Provider   atorvastatin (LIPITOR) 40 mg tablet Take 1 tablet (40 mg total) by mouth daily 9/10/21  Yes Andrzej Oconnor MD   Empagliflozin (Jardiance) 10 MG TABS tablet Take 1 tablet (10 mg total) by mouth every morning 11/21/22  Yes Bishnu Mejia MD   glucose blood (Accu-Chek Guide) test strip Use 1 each 2 (two) times a day Use as instructed 12/7/20  Yes Bishnu Mejia MD   glucose blood (OneTouch Verio) test strip Use 1 each 2 (two) times a day Use as instructed 9/10/21  Yes Andrzej Oconnor MD   levothyroxine 137 mcg tablet Take 1 tablet by mouth once daily 11/9/22  Yes Bishnu Mejia MD   lisinopril-hydrochlorothiazide (PRINZIDE,ZESTORETIC) 10-12 5 MG per tablet Take 1 tablet by mouth once daily 11/15/22  Yes Bishnu Mejia MD   metFORMIN (GLUCOPHAGE) 1000 MG tablet Take 1 tablet (1,000 mg total) by mouth 2 (two) times a day with meals 11/21/22  Yes Bishnu Mejia MD   lidocaine (LIDODERM) 5 % APPLY ONE PATCH TO AFFTECTED AREA DAILY  REMOVE AND DISCARD PATCH WITHIN 12 HOURS OR AS DIRECTED BY DOCTOR  Patient not taking: Reported on 1/23/2023 12/21/21   Bishnu Mejia MD     I have reviewed home medications with patient personally      Allergies: No Known Allergies    Social History:  Marital Status: /Civil Union   Occupation: Non contriburatory  Patient Pre-hospital Living Situation: Home  Patient Pre-hospital Level of Mobility: walks  Patient Pre-hospital Diet Restrictions: Carb controlled  Substance Use History:   Social History     Substance and Sexual Activity   Alcohol Use Yes   • Alcohol/week: 3 0 standard drinks   • Types: 3 Glasses of wine per week    Comment: drinks daily      Social History     Tobacco Use   Smoking Status Never   Smokeless Tobacco Never     Social History     Substance and Sexual Activity   Drug Use No       Family History:  Family History   Problem Relation Age of Onset   • Diabetes Mother    • Breast cancer Mother    • Diabetes Father        Physical Exam:     Vitals:   Blood Pressure: 112/78 (01/23/23 2212)  Pulse: 91 (01/23/23 2212)  Temperature: 97 5 °F (36 4 °C) (01/23/23 2212)  Temp Source: Oral (01/23/23 1718)  Respirations: 18 (01/23/23 2039)  SpO2: 95 % (01/23/23 2212)    Physical Exam  Constitutional:       General: He is not in acute distress  Appearance: Normal appearance  He is not ill-appearing  HENT:      Head: Normocephalic and atraumatic  Comments: Patient has some swelling and tenderness hyperpigmentation of about a 2 5 cm region on his left forehead right above his left eyebrow  Nose: Nose normal  No congestion or rhinorrhea  Mouth/Throat:      Mouth: Mucous membranes are moist       Pharynx: No oropharyngeal exudate or posterior oropharyngeal erythema  Eyes:      General: No scleral icterus  Extraocular Movements: Extraocular movements intact  Pupils: Pupils are equal, round, and reactive to light  Cardiovascular:      Rate and Rhythm: Normal rate and regular rhythm  Pulses: Normal pulses  Heart sounds: Normal heart sounds  No murmur heard  Pulmonary:      Effort: Pulmonary effort is normal  No respiratory distress  Breath sounds: Normal breath sounds  No stridor  No wheezing  Abdominal:      General: Abdomen is flat  There is no distension  Palpations: Abdomen is soft  There is no mass  Tenderness: There is no abdominal tenderness  Hernia: No hernia is present  Musculoskeletal:         General: No swelling or tenderness  Normal range of motion  Right lower leg: No edema  Left lower leg: No edema  Skin:     General: Skin is warm and dry  Coloration: Skin is not pale  Findings: No bruising or erythema  Neurological:      General: No focal deficit present  Mental Status: He is alert and oriented to person, place, and time  Mental status is at baseline  Gait: Gait normal    Psychiatric:         Mood and Affect: Mood normal          Behavior: Behavior normal          Thought Content: Thought content normal          Judgment: Judgment normal           Additional Data:     Lab Results:  Results from last 7 days   Lab Units 01/23/23  1839   WBC Thousand/uL 11 44*   HEMOGLOBIN g/dL 16 5   HEMATOCRIT % 47 9   PLATELETS Thousands/uL 181   NEUTROS PCT % 56   LYMPHS PCT % 35   MONOS PCT % 7   EOS PCT % 2     Results from last 7 days   Lab Units 01/23/23  1839   SODIUM mmol/L 133*   POTASSIUM mmol/L 4 0   CHLORIDE mmol/L 97   CO2 mmol/L 28   BUN mg/dL 20   CREATININE mg/dL 0 97   ANION GAP mmol/L 8   CALCIUM mg/dL 10 3*   GLUCOSE RANDOM mg/dL 195*         Results from last 7 days   Lab Units 01/23/23  2217   POC GLUCOSE mg/dl 112               Lines/Drains:  Invasive Devices     Peripheral Intravenous Line  Duration           Peripheral IV 01/23/23 Distal;Right;Upper;Ventral (anterior) Arm <1 day                    Imaging: Reviewed radiology reports from this admission including: abdominal/pelvic CT  CT head without contrast   Final Result by Kahlil Goldsmith DO (01/23 1958)   No significant interval change by CT        Redemonstration of erosive change involving the left frontal calvarium with an associated extra-axial lesion seen to better advantage on recent MRI  Differential diagnosis is unchanged including meningioma versus other neoplastic etiologies  Recommend repeat CT with gadolinium given the change in symptoms  The study was marked in [facility] for immediate notification  Workstation performed: [id number]         MRI inpatient order    (Results Pending)       EKG and Other Studies Reviewed on Admission:   · EKG: No EKG obtained  ** Please Note: This note has been constructed using a voice recognition system   **

## 2023-01-24 NOTE — ASSESSMENT & PLAN NOTE
Patient has a left frontal mass which was found on imaging in 2021, and had been having follow up MRI every 6 for monitoring and the mass has been stable in size until 11/22 which showed enlarging left frontal dural based extra-axial mass with associated destruction/erosion into the left frontal bone  He follows with Dr Betzaida King outpatient  Patient is scheduled to have mass biopsy/removal in March 2023 (as he has plans to visit [facility] in February)  For the past 2 days he has been experiencing constant pain on the left forehead about 2 5 cm area just above the eyebrows, the area is tender to touch, pain does not radiate anywhere  Repeat CT scan today does not show any significant change in the mass size  Neurosurgery consulted: current recommendations via E consult are for MRI brain with and without contrast and neurochecks overnight  Full consult to follow tomorrow  Patient is currently ok with earlier treatment/resection of mass if MRI shows progression in size      Plan:  Tylenol Q8 scheduled  Ibuprofen 400 mg every 6 as needed  MRI brain with and without contrast  Neurochecks every shift

## 2023-01-24 NOTE — UTILIZATION REVIEW
Initial Clinical Review    Admission: Date/Time/Statement:   Admission Orders (From admission, onward)     Ordered        01/23/23 2034  INPATIENT ADMISSION  Once                      Orders Placed This Encounter   Procedures   • INPATIENT ADMISSION     Standing Status:   Standing     Number of Occurrences:   1     Order Specific Question:   Level of Care     Answer:   Med Surg [16]     Order Specific Question:   Estimated length of stay     Answer:   More than 2 Midnights     Order Specific Question:   Certification     Answer:   I certify that inpatient services are medically necessary for this patient for a duration of greater than two midnights  See H&P and MD Progress Notes for additional information about the patient's course of treatment  ED Arrival Information     Expected   -    Arrival   1/23/2023 17:02    Acuity   Urgent            Means of arrival   Walk-In    Escorted by   Self    Service   Hospitalist    Admission type   Emergency            Arrival complaint   lump in head/no fall-painful           Chief Complaint   Patient presents with   • Mass     Pt arrives c/o pain in L forehead x 2 days, palpable raised area noted  Per pt, MRI done in Nov showed "something that tracked through his bone"  Denies fever  No outward signs of infection noted  Pt denies injury  Initial Presentation: 56 y o  male with PMH of DM, HTN, hypothyroidism who presents with constant pain over his left forehead for the past 2 days  Patient has a history significant of a left frontal mass since 2021 which has increased in size as noted on CT in 11/22  Patient has been asymptomatic until about 2 days ago at which time he began experiencing constant pain on his left forehead, as per patient pain is localized in about 2 5 cm region on his forehead and is tender to palpation  Patient denies any vision changes, confusion, or memory deficits   Plan: Inpatient admission for evaluation and treatment of frontal skull lesion, DM, HTN: Tylenol q 8 scheduled, Ibuprofen prn, MRI brain, neuro checks q shift, SSI, continue lisinopril and HCTZ, continue levothyroxine  Neurology consult: MRI brain, CT head, neuro checks, pain management  Date: 1/24   Day 2:     Neurosurgery consult: Briefly reviewed with patient updated MRI brain 1/24/23 results  Given that pt is symptomatic at this time with pain in the left frontal region where the tumor is encroaching on the left frontal sinus, at this time, pt seems more amenable to having surgical intervention for resection of the tumor and travel to [facility] later after treatment  Also discussed with pt that after surgical intervention, based on pathology results, he may need adjuvant therapy such as radiation therapy  Dr Shana Montano made aware of patient's admission and updated imaging  Will further discuss with patient and determine timing for the procedure  ED Triage Vitals   Temperature Pulse Respirations Blood Pressure SpO2   01/23/23 1718 01/23/23 1718 01/23/23 1718 01/23/23 1718 01/23/23 1718   98 6 °F (37 °C) (!) 106 18 142/80 99 %      Temp Source Heart Rate Source Patient Position - Orthostatic VS BP Location FiO2 (%)   01/23/23 1718 01/23/23 1718 01/23/23 1718 01/23/23 1718 --   Oral Monitor Sitting Right arm       Pain Score       01/23/23 2222       5          Wt Readings from Last 1 Encounters:   01/11/23 86 7 kg (191 lb 3 2 oz)     Additional Vital Signs:     Date/Time Temp Pulse Resp BP MAP (mmHg) SpO2 O2 Device   01/24/23 0900 -- -- -- -- -- 98 % None (Room air)   01/24/23 08:17:52 98 2 °F (36 8 °C) 89 16 123/83 96 96 % --   01/23/23 22:12:19 97 5 °F (36 4 °C) 91 -- 112/78 89 95 % --   01/23/23 2039 -- 97 18 130/78 -- 98 % None (Room air)     Pertinent Labs/Diagnostic Test Results:   CT head without contrast   Final Result by Canelo Shay DO (01/23 1958)   No significant interval change by CT        Redemonstration of erosive change involving the left frontal calvarium with an associated extra-axial lesion seen to better advantage on recent MRI  Differential diagnosis is unchanged including meningioma versus other neoplastic etiologies  Recommend repeat CT with gadolinium given the change in symptoms  The study was marked in [facility] for immediate notification  Workstation performed: [id number]           1/24 MRI brain:   IMPRESSION:     1  Enhancing dural based lesion along the left frontal convexity increased in size compared to 11/1/2022  The lesion is unchanged compared to the recent noncontrast examination  There is again involvement of the left frontal calvarium with associated   soft tissue component along the left frontal scalp  Differential diagnosis again includes enlarging atypical meningioma, solitary fibrous tumor, dural metastatic focus, or other aggressive lesion      2  No acute infarction, edema, or pathologic intra-axial enhancement      3  Mild chronic microangiopathic ischemic changes        Results from last 7 days   Lab Units 01/24/23  0644 01/23/23  1839   WBC Thousand/uL 7 71 11 44*   HEMOGLOBIN g/dL 16 3 16 5   HEMATOCRIT % 46 1 47 9   PLATELETS Thousands/uL 155 181   NEUTROS ABS Thousands/µL  --  6 47         Results from last 7 days   Lab Units 01/24/23  0644 01/23/23  1839   SODIUM mmol/L 135 133*   POTASSIUM mmol/L 3 9 4 0   CHLORIDE mmol/L 101 97   CO2 mmol/L 24 28   ANION GAP mmol/L 10 8   BUN mg/dL 20 20   CREATININE mg/dL 0 71 0 97   EGFR ml/min/1 73sq m 105 87   CALCIUM mg/dL 9 2 10 3*         Results from last 7 days   Lab Units 01/24/23  1117 01/24/23  0819 01/23/23  2217   POC GLUCOSE mg/dl 157* 157* 112     Results from last 7 days   Lab Units 01/24/23  0644 01/23/23  1839   GLUCOSE RANDOM mg/dL 142* 195*           Results from last 7 days   Lab Units 01/23/23  1839   TSH 3RD GENERATON uIU/mL 3 075       ED Treatment:   Medication Administration from 01/23/2023 1702 to 01/23/2023 2147       Date/Time Order Dose Route Action 01/23/2023 1832 EST acetaminophen (TYLENOL) tablet 975 mg 975 mg Oral Given        Past Medical History:   Diagnosis Date   • Diabetes mellitus ([facility] [address] )    • Elevated LFTs     last assessed 02Nov2015   • Hypertension    • Impaired fasting glucose     last assessed 29Jan2014     Present on Admission:  • Benign essential hypertension  • Controlled type 2 diabetes mellitus without complication, without long-term current use of insulin (HCC)  • Other specified hypothyroidism  • Frontal skull lesion      Admitting Diagnosis: Frontal skull lesion [M89 9]  Age/Sex: 56 y o  male  Admission Orders:  Scheduled Medications:  acetaminophen, 975 mg, Oral, Q8H [facility]  lisinopril, 10 mg, Oral, Daily   And  hydrochlorothiazide, 12 5 mg, Oral, Daily  insulin lispro, 1-6 Units, Subcutaneous, TID AC  insulin lispro, 1-6 Units, Subcutaneous, HS  levothyroxine, 137 mcg, Oral, Daily      Continuous IV Infusions:     PRN Meds:  ibuprofen, 400 mg, Oral, Q6H PRN        IP CONSULT TO NEUROSURGERY    Network Utilization Review Department  ATTENTION: Please call with any questions or concerns to [phone] and carefully listen to the prompts so that you are directed to the right person  All voicemails are confidential   Betzy Zavaleta all requests for admission clinical reviews, approved or denied determinations and any other requests to dedicated fax number below belonging to the campus where the patient is receiving treatment   List of dedicated fax numbers for the Facilities:  [address] DENIALS (Administrative/Medical Necessity) [phone]   [address] (Maternity/NICU/Pediatrics) No [facility] [phone]   [facility] 77 [phone]   [address] 310 [facility] [place] 134 658 [facility] [phone]

## 2023-01-24 NOTE — ASSESSMENT & PLAN NOTE
Lab Results   Component Value Date    HGBA1C 8 1 (A) 11/21/2022       No results for input(s): POCGLU in the last 72 hours  Blood Sugar Average: Last 72 hrs:  Patient is on metformin and Jardiance outpatient  Insulin sliding scale with meals and QHS at bedtime

## 2023-01-24 NOTE — ASSESSMENT & PLAN NOTE
· Pt presented with left frontal forehead pain over the last 2-3 days  · Pt with known enlarging left frontal convexity dural based lesion possibly atypical meningoma vs other aggressive lesion, noted to have increased from prior imaging in Nov 2022  · Imaging reviewed personally and by attending  Final results as below  · MRI brain w/wo 1/24/23:  Enhancing dural based lesion along the left frontal convexity increased in size compared to 11/1/2022  The lesion is unchanged compared to the recent noncontrast examination  There is again involvement of the left frontal calvarium with associated soft tissue component along the left frontal scalp  Differential diagnosis again includes enlarging atypical meningioma, solitary fibrous tumor, dural metastatic focus, or other aggressive lesion  · Initially when the dural based lesion was discovered pt had CT CAP done in 2021 that was negative for other malignancy  Pt will have repeat CT CAP to rule out any interval changes or malignancy  Plan  · Continue regular neurologic checks  · Ongoing medical management per primary team    · Pain control per primary team    · Eval and mobilize per PT/OT  · DVT PPX: SCDs   · Dr Honey Husain had previously discussed with patient during the last visit on 1/11/23 that given the substancial interval growth of the left frontal dural based brain lesion which encroaches on the frontal sinus, while not emergent, patient should have treatment for it done soon  At the time, pt was asymptomatic and had wanted to go to [facility] to return his mother's ashes in 02/2023 then return for follow up and surgical discussion in 03/2023   · Dr Honey Husain had further discussion with pt on this admission  Pt's left frontal forehead pain is improving  Patient will discharge home  He still plans to travel to [facility] as he had previously planned   Dr Honey Husain made arrangements with the neurosurgery office and had pt's prior appointment moved up so pt can see Dr Jaswant Urena as soon as he returns to the [address] [place] [address] Rd[address]  · Pt was made aware to contact neurosurgery with any questions/ concerns, new or worsening symptoms  · Pt will discharge home and follow up outpt with neurosurgery as scheduled

## 2023-01-24 NOTE — TELEPHONE ENCOUNTER
01/24/2023-PT [address]  03/16/2023 CT HEAD  03/16/2023 MRI BRAIN  03/24/2023 APT Rickey Agrawal [place]

## 2023-01-25 ENCOUNTER — APPOINTMENT (INPATIENT)
Dept: CT IMAGING | Facility: HOSPITAL | Age: 58
End: 2023-01-25

## 2023-01-25 LAB
ANION GAP SERPL CALCULATED.3IONS-SCNC: 10 MMOL/L (ref 4–13)
BASOPHILS # BLD AUTO: 0.05 THOUSANDS/ÂΜL (ref 0–0.1)
BASOPHILS NFR BLD AUTO: 1 % (ref 0–1)
BUN SERPL-MCNC: 17 MG/DL (ref 5–25)
CALCIUM SERPL-MCNC: 9.3 MG/DL (ref 8.4–10.2)
CHLORIDE SERPL-SCNC: 100 MMOL/L (ref 96–108)
CO2 SERPL-SCNC: 25 MMOL/L (ref 21–32)
CREAT SERPL-MCNC: 0.79 MG/DL (ref 0.6–1.3)
EOSINOPHIL # BLD AUTO: 0.25 THOUSAND/ÂΜL (ref 0–0.61)
EOSINOPHIL NFR BLD AUTO: 3 % (ref 0–6)
ERYTHROCYTE [DISTWIDTH] IN BLOOD BY AUTOMATED COUNT: 12.2 % (ref 11.6–15.1)
GFR SERPL CREATININE-BSD FRML MDRD: 101 ML/MIN/1.73SQ M
GLUCOSE SERPL-MCNC: 156 MG/DL (ref 65–140)
GLUCOSE SERPL-MCNC: 164 MG/DL (ref 65–140)
GLUCOSE SERPL-MCNC: 239 MG/DL (ref 65–140)
GLUCOSE SERPL-MCNC: 241 MG/DL (ref 65–140)
GLUCOSE SERPL-MCNC: 291 MG/DL (ref 65–140)
HCT VFR BLD AUTO: 48.5 % (ref 36.5–49.3)
HGB BLD-MCNC: 17.3 G/DL (ref 12–17)
IMM GRANULOCYTES # BLD AUTO: 0.02 THOUSAND/UL (ref 0–0.2)
IMM GRANULOCYTES NFR BLD AUTO: 0 % (ref 0–2)
LYMPHOCYTES # BLD AUTO: 2.94 THOUSANDS/ÂΜL (ref 0.6–4.47)
LYMPHOCYTES NFR BLD AUTO: 40 % (ref 14–44)
MCH RBC QN AUTO: 32.5 PG (ref 26.8–34.3)
MCHC RBC AUTO-ENTMCNC: 35.7 G/DL (ref 31.4–37.4)
MCV RBC AUTO: 91 FL (ref 82–98)
MONOCYTES # BLD AUTO: 0.67 THOUSAND/ÂΜL (ref 0.17–1.22)
MONOCYTES NFR BLD AUTO: 9 % (ref 4–12)
NEUTROPHILS # BLD AUTO: 3.49 THOUSANDS/ÂΜL (ref 1.85–7.62)
NEUTS SEG NFR BLD AUTO: 47 % (ref 43–75)
NRBC BLD AUTO-RTO: 0 /100 WBCS
PLATELET # BLD AUTO: 161 THOUSANDS/UL (ref 149–390)
PMV BLD AUTO: 10.2 FL (ref 8.9–12.7)
POTASSIUM SERPL-SCNC: 4 MMOL/L (ref 3.5–5.3)
RBC # BLD AUTO: 5.32 MILLION/UL (ref 3.88–5.62)
SODIUM SERPL-SCNC: 135 MMOL/L (ref 135–147)
WBC # BLD AUTO: 7.42 THOUSAND/UL (ref 4.31–10.16)

## 2023-01-25 RX ADMIN — INSULIN LISPRO 4 UNITS: 100 INJECTION, SOLUTION INTRAVENOUS; SUBCUTANEOUS at 22:09

## 2023-01-25 RX ADMIN — LEVOTHYROXINE SODIUM 137 MCG: 25 TABLET ORAL at 08:28

## 2023-01-25 RX ADMIN — INSULIN LISPRO 1 UNITS: 100 INJECTION, SOLUTION INTRAVENOUS; SUBCUTANEOUS at 08:28

## 2023-01-25 RX ADMIN — IOHEXOL 100 ML: 350 INJECTION, SOLUTION INTRAVENOUS at 16:22

## 2023-01-25 RX ADMIN — LISINOPRIL 10 MG: 10 TABLET ORAL at 08:28

## 2023-01-25 RX ADMIN — INSULIN LISPRO 3 UNITS: 100 INJECTION, SOLUTION INTRAVENOUS; SUBCUTANEOUS at 12:28

## 2023-01-25 RX ADMIN — HYDROCHLOROTHIAZIDE 12.5 MG: 12.5 TABLET ORAL at 08:28

## 2023-01-25 RX ADMIN — INSULIN LISPRO 3 UNITS: 100 INJECTION, SOLUTION INTRAVENOUS; SUBCUTANEOUS at 17:40

## 2023-01-25 NOTE — ASSESSMENT & PLAN NOTE
Lab Results   Component Value Date    HGBA1C 8 1 (A) 11/21/2022       Recent Labs     01/24/23  1117 01/24/23  1637 01/24/23 2058 01/25/23  0718   POCGLU 157* 163* 176* 164*       Blood Sugar Average: Last 72 hrs:  (P) 403 [id number]   Outpatient: Metformin 1000mg BID, Jardiance 10mg, Atorvastatin 40mg daily      Plan:  · Sliding scale insulin  · POC glucose before meals and at bedtime  · Hypoglycemia protocol

## 2023-01-25 NOTE — ASSESSMENT & PLAN NOTE
· Outpatient: Lisinopril 10mg daily, HCTZ 12 5mg daily  · BP is in acceptable range      Plan:  · Continue home meds

## 2023-01-25 NOTE — ASSESSMENT & PLAN NOTE
· Left frontal mass found on imaging in 2021, has been having follow up MRI every 6 months for monitoring and the mass has been stable in size until 11/22 which showed enlarging left frontal dural based extra-axial mass with associated destruction/erosion into the left frontal bone  He follows with Dr Lorena Juan outpatient  Patient is scheduled to have mass biopsy/removal in March 2023 (as he has plans to visit [facility] in February)  · For the past 2 days prior to admission he has been experiencing constant pain on the left forehead about 2 5 cm area just above the eyebrows, the area is tender to touch, pain does not radiate anywhere  Repeat CT scan today does not show any significant change in the mass size  · MRI brain w/wo contrast: Enhancing dural based lesion along the left frontal convexity increased in size compared to 11/1/2022  The lesion is unchanged compared to the recent noncontrast examination  There is again involvement of the left frontal calvarium with associated soft tissue component along the left frontal scalp  Differential diagnosis again includes enlarging atypical meningioma, solitary fibrous tumor, dural metastatic focus, or other aggressive lesion  · No neurological deficits on exam  · CT chest/abdomen/pelvis with contrast showed no other masses or lesions  · Plan is for patient to receive surgery in early March after he returns home from [facility]

## 2023-01-25 NOTE — PROGRESS NOTES
[facility]  Progress Note - Gasper Said 1965, 56 y o  male MRN: [id number]  Unit/Bed#: S -01 Encounter: [id number]  Primary Care Provider: Amna Hartman MD   Date and time admitted to hospital: 1/23/2023  5:25 PM    * Frontal skull lesion  Assessment & Plan  · Pt presented with left frontal forehead pain over the last 2-3 days  · Pt with known enlarging left frontal convexity dural based lesion possibly atypical meningoma vs other aggressive lesion, noted to have increased from prior imaging in Nov 2022  · Imaging reviewed personally and by attending  Final results as below  · MRI brain w/wo 1/24/23:  Enhancing dural based lesion along the left frontal convexity increased in size compared to 11/1/2022  The lesion is unchanged compared to the recent noncontrast examination  There is again involvement of the left frontal calvarium with associated soft tissue component along the left frontal scalp  Differential diagnosis again includes enlarging atypical meningioma, solitary fibrous tumor, dural metastatic focus, or other aggressive lesion  · Initially when the dural based lesion was discovered pt had CT CAP done in 2021 that was negative for other malignancy  Pt will have repeat CT CAP to rule out any interval changes or malignancy  Plan  · Continue regular neurologic checks  · Ongoing medical management per primary team    · Pain control per primary team    · Eval and mobilize per PT/OT  · DVT PPX: SCDs   · Dr Tavo Mejía had previously discussed with patient during the last visit on 1/11/23 that given the substancial interval growth of the left frontal dural based brain lesion which encroaches on the frontal sinus, while not emergent, patient should have treatment for it done soon   At the time, pt was asymptomatic and had wanted to go to [facility] to return his mother's ashes in 02/2023 then return for follow up and surgical discussion in 03/2023   · Dr Tavo Mejía had further discussion with pt on this admission  Pt's left frontal forehead pain is improving  Patient will discharge home  He still plans to travel to [facility] as he had previously planned  Dr Rhiannon Cisneros made arrangements with the neurosurgery office and had pt's prior appointment moved up so pt can see Dr Rhiannon Cisneros as soon as he returns to the [address] [facility]  · Pt was made aware to contact neurosurgery with any questions/ concerns, new or worsening symptoms  · Pt will discharge home and follow up outpt with neurosurgery as scheduled  Subjective/Objective     Chief Complaint: "I don't have pain today"    Subjective: Pt denied having left frontal forehead pain today  He reported mild 2-3/10 pain overnight  Patient denies anydizziness, lightheadedness or visual disturbance  Patient denies any new numbness, tingling or weakness  Pt denies changes in gait or balance  Pt denies nausea or vomiting  Objective: Alert and awake, No acute distress  I/O       01/23 0701 01/24 0700 01/24 0701 01/25 0700 01/25 0701 01/26 0700    P  O   480 240    Total Intake  480 240    Net  +480 +240                 Invasive Devices     Peripheral Intravenous Line  Duration           Peripheral IV 01/25/23 Distal;Left;Upper;Ventral (anterior) Arm <1 day                Physical Exam:  Vitals: Blood pressure 132/79, pulse 82, temperature 97 9 °F (36 6 °C), temperature source Oral, resp  rate 18, SpO2 96 %  ,There is no height or weight on file to calculate BMI  General appearance:  Appears stated age  Head:  Atraumatic, left frontal forehead with very slight palpable bump     Eyes: EOMI, PERRL  Neck: supple, symmetrical, trachea midline   Lungs: non labored breathing  Heart: regular heart rate  Neurologic:   Mental status: Alert, oriented , thought content appropriate  Cranial nerves: grossly intact (Cranial nerves II-XII)  Sensory: normal to LT X 4  Motor: moving all extremities without focal weakness   Coordination: no drift in bilateral upper extremities      Lab Results:  Results from last 7 days   Lab Units 01/25/23  0438 01/24/23  0644 01/23/23  1839   WBC Thousand/uL 7 42 7 71 11 44*   HEMOGLOBIN g/dL 17 3* 16 3 16 5   HEMATOCRIT % 48 5 46 1 47 9   PLATELETS Thousands/uL 161 155 181   NEUTROS PCT % 47  --  56   MONOS PCT % 9  --  7     Results from last 7 days   Lab Units 01/25/23  0438 01/24/23  0644 01/23/23  1839   POTASSIUM mmol/L 4 0 3 9 4 0   CHLORIDE mmol/L 100 101 97   CO2 mmol/L 25 24 28   BUN mg/dL 17 20 20   CREATININE mg/dL 0 79 0 71 0 97   CALCIUM mg/dL 9 3 9 2 10 3*                   Imaging Studies: I have personally reviewed pertinent reports and I have personally reviewed pertinent films in PACS    EKG, Pathology, and Other Studies: I have personally reviewed pertinent reports  PLEASE NOTE:  This encounter may have been completed utilizing the [employer] Direct Speech Voice Recognition Software  Grammatical errors, random word insertions, pronoun errors and incomplete sentences are occasional consequences of the system due to software limitations, ambient noise and hardware issues  These may be missed by proof reading prior to affixing electronic signature  Any questions or concerns about the content, text or information contained within the body of this dictation should be directly addressed to the advanced practitioner or physician for clarification

## 2023-01-25 NOTE — PROGRESS NOTES
[facility]  Progress Note - Fernando Ernst 1965, 56 y o  male MRN: [id number]  Unit/Bed#: S -01 Encounter: [id number]  Primary Care Provider: Winifred Mcginnis MD   Date and time admitted to hospital: 1/23/2023  5:25 PM    * Frontal skull lesion  Assessment & Plan  · Left frontal mass found on imaging in 2021, has been having follow up MRI every 6 months for monitoring and the mass has been stable in size until 11/22 which showed enlarging left frontal dural based extra-axial mass with associated destruction/erosion into the left frontal bone  He follows with Dr Radha Aceves outpatient  Patient is scheduled to have mass biopsy/removal in March 2023 (as he has plans to visit [facility] in February)  · For the past 2 days prior to admission he has been experiencing constant pain on the left forehead about 2 5 cm area just above the eyebrows, the area is tender to touch, pain does not radiate anywhere  Repeat CT scan today does not show any significant change in the mass size  · MRI brain w/wo contrast: Enhancing dural based lesion along the left frontal convexity increased in size compared to 11/1/2022  The lesion is unchanged compared to the recent noncontrast examination  There is again involvement of the left frontal calvarium with associated soft tissue component along the left frontal scalp  Differential diagnosis again includes enlarging atypical meningioma, solitary fibrous tumor, dural metastatic focus, or other aggressive lesion  · No neurological deficits on exam  · Will obtain CT chest abdomen pelvis with contrast to ensure no other masses or lesions    Plan:   · Neurosurgery consulted, appreciate recommendations  · Patient is currently ok with earlier treatment/resection of mass if MRI shows progression in size    · Tylenol Q8 scheduled  · Ibuprofen 400 mg every 6 as needed  · Neurochecks every shift      Controlled type 2 diabetes mellitus without complication, without long-term current use of insulin [facility])  Assessment & Plan  Lab Results   Component Value Date    HGBA1C 8 1 (A) 2022       Recent Labs     23  1117 23  1637 238 23  0718   POCGLU 157* 163* 176* 164*       Blood Sugar Average: Last 72 hrs:  (P) 241 [id number]   Outpatient: Metformin 1000mg BID, Jardiance 10mg, Atorvastatin 40mg daily  Plan:  · Sliding scale insulin  · POC glucose before meals and at bedtime  · Hypoglycemia protocol    Benign essential hypertension  Assessment & Plan  · Outpatient: Lisinopril 10mg daily, HCTZ 12 5mg daily  · BP is in acceptable range  Plan:  · Continue home meds    Other specified hypothyroidism  Assessment & Plan  Continue levothyroxine 137 mcg      VTE Pharmacologic Prophylaxis: VTE Score: 2 Low Risk (Score 0-2) - Encourage Ambulation  Patient Centered Rounds: I performed bedside rounds with nursing staff today  Discussions with Specialists or Other Care Team Provider: Neurosurgery    Education and Discussions with Family / Patient: Patient declined call to   Current Length of Stay: 2 day(s)  Current Patient Status: Inpatient   Discharge Plan: Anticipate discharge in 24-48 hrs to home  Code Status: Level 1 - Full Code    Subjective:   Patient feeling well today  His forehead pain is reduced since admission  He denies any weakness, numbness, sensation changes, vision changes  Says he is eating well  Objective:     Vitals:   Temp (24hrs), Av 5 °F (36 4 °C), Min:97 2 °F (36 2 °C), Max:97 9 °F (36 6 °C)    Temp:  [97 2 °F (36 2 °C)-97 9 °F (36 6 °C)] 97 9 °F (36 6 °C)  HR:  [79-88] 82  Resp:  [16-18] 18  BP: (124-132)/(75-79) 132/79  SpO2:  [95 %-96 %] 96 %  There is no height or weight on file to calculate BMI  Input and Output Summary (last 24 hours):      Intake/Output Summary (Last 24 hours) at 2023 1033  Last data filed at 2023 0900  Gross per 24 hour   Intake 720 ml   Output --   Net 720 ml       Physical Exam:   Physical Exam  Constitutional:       General: He is not in acute distress  Appearance: He is normal weight  He is not ill-appearing  HENT:      Mouth/Throat:      Mouth: Mucous membranes are moist       Pharynx: Oropharynx is clear  Eyes:      Extraocular Movements: Extraocular movements intact  Pupils: Pupils are equal, round, and reactive to light  Cardiovascular:      Rate and Rhythm: Normal rate and regular rhythm  Heart sounds: No murmur heard  Pulmonary:      Effort: Pulmonary effort is normal  No respiratory distress  Breath sounds: Normal breath sounds  No wheezing  Abdominal:      General: Abdomen is flat  There is no distension  Palpations: Abdomen is soft  Tenderness: There is no abdominal tenderness  Musculoskeletal:      Right lower leg: No edema  Left lower leg: No edema  Skin:     General: Skin is warm and dry  Neurological:      General: No focal deficit present  Mental Status: He is alert and oriented to person, place, and time  Cranial Nerves: No cranial nerve deficit  Motor: No weakness  Gait: Gait normal    Psychiatric:         Mood and Affect: Mood normal          Behavior: Behavior normal          Thought Content:  Thought content normal          Judgment: Judgment normal           Additional Data:     Labs:  Results from last 7 days   Lab Units 01/25/23  0438   WBC Thousand/uL 7 42   HEMOGLOBIN g/dL 17 3*   HEMATOCRIT % 48 5   PLATELETS Thousands/uL 161   NEUTROS PCT % 47   LYMPHS PCT % 40   MONOS PCT % 9   EOS PCT % 3     Results from last 7 days   Lab Units 01/25/23  0438   SODIUM mmol/L 135   POTASSIUM mmol/L 4 0   CHLORIDE mmol/L 100   CO2 mmol/L 25   BUN mg/dL 17   CREATININE mg/dL 0 79   ANION GAP mmol/L 10   CALCIUM mg/dL 9 3   GLUCOSE RANDOM mg/dL 156*         Results from last 7 days   Lab Units 01/25/23  0718 01/24/23  2058 01/24/23  1637 01/24/23  1117 01/24/23  0819 01/23/23  2217   POC GLUCOSE mg/dl 164* 176* 163* 157* 157* 112               Lines/Drains:  Invasive Devices     Peripheral Intravenous Line  Duration           Peripheral IV 01/23/23 Distal;Right;Upper;Ventral (anterior) Arm 1 day                      Imaging: Reviewed radiology reports from this admission including: MRI brain    Recent Cultures (last 7 days):         Last 24 Hours Medication List:   Current Facility-Administered Medications   Medication Dose Route Frequency Provider Last Rate   • acetaminophen  975 mg Oral [facility] Ly Daly MD     • lisinopril  10 mg Oral Daily Henry Small MD      And   • hydrochlorothiazide  12 5 mg Oral Daily Henry Small MD     • ibuprofen  400 mg Oral Q6H PRN Henry Small MD     • insulin lispro  1-6 Units Subcutaneous TID AC Henry Small MD     • insulin lispro  1-6 Units Subcutaneous HS Henry Small MD     • levothyroxine  137 mcg Oral Daily Henry Small MD          Today, Patient Was Seen By: Emilia Hooker DO    **Please Note: This note may have been constructed using a voice recognition system  **

## 2023-01-25 NOTE — ASSESSMENT & PLAN NOTE
Lab Results   Component Value Date    HGBA1C 8 1 (A) 11/21/2022       Recent Labs     01/24/23  1117 01/24/23  1637 01/24/23 2058 01/25/23  0718   POCGLU 157* 163* 176* 164*       Blood Sugar Average: Last 72 hrs:  (P) 944 [id number]     Outpatient: Metformin 1000mg BID, Jardiance 10mg, Atorvastatin 40mg daily  · Resume home meds on discharge

## 2023-01-25 NOTE — ASSESSMENT & PLAN NOTE
· Left frontal mass found on imaging in 2021, has been having follow up MRI every 6 months for monitoring and the mass has been stable in size until 11/22 which showed enlarging left frontal dural based extra-axial mass with associated destruction/erosion into the left frontal bone  He follows with Dr Justen Perez outpatient  Patient is scheduled to have mass biopsy/removal in March 2023 (as he has plans to visit [facility] in February)  · For the past 2 days prior to admission he has been experiencing constant pain on the left forehead about 2 5 cm area just above the eyebrows, the area is tender to touch, pain does not radiate anywhere  Repeat CT scan today does not show any significant change in the mass size  · MRI brain w/wo contrast: Enhancing dural based lesion along the left frontal convexity increased in size compared to 11/1/2022  The lesion is unchanged compared to the recent noncontrast examination  There is again involvement of the left frontal calvarium with associated soft tissue component along the left frontal scalp  Differential diagnosis again includes enlarging atypical meningioma, solitary fibrous tumor, dural metastatic focus, or other aggressive lesion  · No neurological deficits on exam    Plan:   · Neurosurgery consulted, appreciate recommendations  · Patient is currently ok with earlier treatment/resection of mass if MRI shows progression in size    · Tylenol Q8 scheduled  · Ibuprofen 400 mg every 6 as needed  · Neurochecks every shift

## 2023-01-25 NOTE — ASSESSMENT & PLAN NOTE
· Pt presented with left frontal forehead pain over the last 2-3 days  · Pt with known enlarging left frontal convexity dural based lesion possibly atypical meningoma vs other aggressive lesion, noted to have increased from prior imaging in Nov 2022  · Imaging reviewed personally and by attending  Final results as below  · MRI brain w/wo 1/24/23:  Enhancing dural based lesion along the left frontal convexity increased in size compared to 11/1/2022  The lesion is unchanged compared to the recent noncontrast examination  There is again involvement of the left frontal calvarium with associated soft tissue component along the left frontal scalp  Differential diagnosis again includes enlarging atypical meningioma, solitary fibrous tumor, dural metastatic focus, or other aggressive lesion  · Initially when the dural based lesion was discovered pt had CT CAP done in 2021 that was negative for other malignancy  Pt will have repeat CT CAP to rule out any interval changes or malignancy  Plan  · Continue regular neurologic checks  · Ongoing medical management per primary team    · Pain control per primary team    · Eval and mobilize per PT/OT  · DVT PPX: SCDs   · Dr Fernie Pacheco had previously discussed with patient during the last visit on 1/11/23 that given the substancial interval growth of the left frontal dural based brain lesion which encroaches on the frontal sinus, while not emergent, patient should have treatment for it done soon  At the time, pt was asymptomatic and had wanted to go to [facility] to return his mother's ashes in 02/2023 then return for follow up and surgical discussion in 03/2023   · Dr Fernie Pacheco had further discussion with pt on this admission  Pt's left frontal forehead pain is improving  Patient will discharge home  He still plans to travel to [facility] as he had previously planned   Dr Fernie Pacheco made arrangements with the neurosurgery office and had pt's prior appointment moved up so pt can see Dr Sarah Monet as soon as he returns to the [address] [place] [address] Rd[address]  · Pt was made aware to contact neurosurgery with any questions/ concerns, new or worsening symptoms  · Pt will discharge home and follow up outpt with neurosurgery as scheduled

## 2023-01-25 NOTE — DISCHARGE INSTR - AVS FIRST PAGE
Dear Gasper Said,     It was our pleasure to care for you here at [facility]  It is our hope that we were always able to exceed the expected standards for your care during your stay  You were hospitalized due to pain at site of lesion  You were cared for on the third floor by Vinnie Montes DO under the service of Elis Johnson MD with [facility] who covers for your primary care physician (PCP), Amna Hartman MD, while you were hospitalized  If you have any questions or concerns related to this hospitalization, you may contact us at [phone]  For follow up as well as any medication refills, we recommend that you follow up with your primary care physician  A registered nurse will reach out to you by phone within a few days after your discharge to answer any additional questions that you may have after going home  However, at this time we provide for you here, the most important instructions / recommendations at discharge:     Notable Medication Adjustments -   None  Testing Required after Discharge -   None  Important follow up information -   Please follow-up with your primary care provider within 1 week  Please follow-up with your neurosurgeon as discussed  Other Instructions -   Please return to the ED if you have worsening symptoms, increased dizziness, vomiting, diarrhea, feel faint or is if you may pass out, develop a headache that does not resolve  Failure to follow up with your neurosurgeon may result in delay of treatment, worsening of your tumor, or possibly death  Please review this entire after visit summary as additional general instructions including medication list, appointments, activity, diet, any pertinent wound care, and other additional recommendations from your care team that may be provided for you        Sincerely,     Vinnie Montes DO

## 2023-01-25 NOTE — TELEPHONE ENCOUNTER
1/27/23:   DISCHARGED HOME  SPOKE TO: [facility] / Galindo Bunn / Lillie Fiore / IMAGING - CHANGES    CONFIRMED: CT HEAD SCHEDULED 1/30/23 AT [facility]  Provider (TEJAS @ OFFICE CALLED TO R/S THIS APPT FOR SOMETHING SOONER BECAUSE PT IS LEAVING THE [place] FOR A L[place]LE WHILE)    1/26/23: INPATIENT    1/25/23: INPATIENT    *PER HDM *  6 WK OVFU DATE WAS CHANGED FOR PATIENT TO BE SEEN EARLIER  CONFIRMED:   3/10/23 / 10:Astrid / [address]ina [address]

## 2023-01-26 VITALS
DIASTOLIC BLOOD PRESSURE: 86 MMHG | RESPIRATION RATE: 16 BRPM | HEART RATE: 82 BPM | SYSTOLIC BLOOD PRESSURE: 129 MMHG | TEMPERATURE: 98.1 F | OXYGEN SATURATION: 97 %

## 2023-01-26 DIAGNOSIS — D49.6 BRAIN TUMOR (HCC): Primary | ICD-10-CM

## 2023-01-26 LAB
ANION GAP SERPL CALCULATED.3IONS-SCNC: 7 MMOL/L (ref 4–13)
BASOPHILS # BLD AUTO: 0.05 THOUSANDS/ÂΜL (ref 0–0.1)
BASOPHILS NFR BLD AUTO: 1 % (ref 0–1)
BUN SERPL-MCNC: 15 MG/DL (ref 5–25)
CALCIUM SERPL-MCNC: 9.3 MG/DL (ref 8.4–10.2)
CHLORIDE SERPL-SCNC: 100 MMOL/L (ref 96–108)
CO2 SERPL-SCNC: 26 MMOL/L (ref 21–32)
CREAT SERPL-MCNC: 0.7 MG/DL (ref 0.6–1.3)
EOSINOPHIL # BLD AUTO: 0.22 THOUSAND/ÂΜL (ref 0–0.61)
EOSINOPHIL NFR BLD AUTO: 2 % (ref 0–6)
ERYTHROCYTE [DISTWIDTH] IN BLOOD BY AUTOMATED COUNT: 12.3 % (ref 11.6–15.1)
GFR SERPL CREATININE-BSD FRML MDRD: 106 ML/MIN/1.73SQ M
GLUCOSE SERPL-MCNC: 244 MG/DL (ref 65–140)
GLUCOSE SERPL-MCNC: 254 MG/DL (ref 65–140)
HCT VFR BLD AUTO: 48.8 % (ref 36.5–49.3)
HGB BLD-MCNC: 17.5 G/DL (ref 12–17)
IMM GRANULOCYTES # BLD AUTO: 0.03 THOUSAND/UL (ref 0–0.2)
IMM GRANULOCYTES NFR BLD AUTO: 0 % (ref 0–2)
LYMPHOCYTES # BLD AUTO: 2.94 THOUSANDS/ÂΜL (ref 0.6–4.47)
LYMPHOCYTES NFR BLD AUTO: 32 % (ref 14–44)
MCH RBC QN AUTO: 32.1 PG (ref 26.8–34.3)
MCHC RBC AUTO-ENTMCNC: 35.9 G/DL (ref 31.4–37.4)
MCV RBC AUTO: 89 FL (ref 82–98)
MONOCYTES # BLD AUTO: 0.83 THOUSAND/ÂΜL (ref 0.17–1.22)
MONOCYTES NFR BLD AUTO: 9 % (ref 4–12)
NEUTROPHILS # BLD AUTO: 5.04 THOUSANDS/ÂΜL (ref 1.85–7.62)
NEUTS SEG NFR BLD AUTO: 56 % (ref 43–75)
NRBC BLD AUTO-RTO: 0 /100 WBCS
PLATELET # BLD AUTO: 166 THOUSANDS/UL (ref 149–390)
PMV BLD AUTO: 9.8 FL (ref 8.9–12.7)
POTASSIUM SERPL-SCNC: 3.8 MMOL/L (ref 3.5–5.3)
RBC # BLD AUTO: 5.46 MILLION/UL (ref 3.88–5.62)
SODIUM SERPL-SCNC: 133 MMOL/L (ref 135–147)
WBC # BLD AUTO: 9.11 THOUSAND/UL (ref 4.31–10.16)

## 2023-01-26 RX ADMIN — LEVOTHYROXINE SODIUM 137 MCG: 25 TABLET ORAL at 08:49

## 2023-01-26 RX ADMIN — HYDROCHLOROTHIAZIDE 12.5 MG: 12.5 TABLET ORAL at 08:49

## 2023-01-26 RX ADMIN — INSULIN LISPRO 3 UNITS: 100 INJECTION, SOLUTION INTRAVENOUS; SUBCUTANEOUS at 08:50

## 2023-01-26 RX ADMIN — LISINOPRIL 10 MG: 10 TABLET ORAL at 08:49

## 2023-01-26 NOTE — DISCHARGE SUMMARY
[facility]  Discharge- Jabier Hillman 1965, 56 y o  male MRN: [id number]  Unit/Bed#: S -01 Encounter: [id number]  Primary Care Provider: Sherryle Glenn, MD   Date and time admitted to hospital: 1/23/2023  5:25 PM    * Frontal skull lesion  Assessment & Plan  · Left frontal mass found on imaging in 2021, has been having follow up MRI every 6 months for monitoring and the mass has been stable in size until 11/22 which showed enlarging left frontal dural based extra-axial mass with associated destruction/erosion into the left frontal bone  He follows with Dr Janel Deluna outpatient  Patient is scheduled to have mass biopsy/removal in March 2023 (as he has plans to visit [facility] in February)  · For the past 2 days prior to admission he has been experiencing constant pain on the left forehead about 2 5 cm area just above the eyebrows, the area is tender to touch, pain does not radiate anywhere  Repeat CT scan today does not show any significant change in the mass size  · MRI brain w/wo contrast: Enhancing dural based lesion along the left frontal convexity increased in size compared to 11/1/2022  The lesion is unchanged compared to the recent noncontrast examination  There is again involvement of the left frontal calvarium with associated soft tissue component along the left frontal scalp  Differential diagnosis again includes enlarging atypical meningioma, solitary fibrous tumor, dural metastatic focus, or other aggressive lesion  · No neurological deficits on exam  · CT chest/abdomen/pelvis with contrast showed no other masses or lesions  · Plan is for patient to receive surgery in early March after he returns home from [facility]          Controlled type 2 diabetes mellitus without complication, without long-term current use of insulin [facility])  Assessment & Plan  Lab Results   Component Value Date    HGBA1C 8 1 (A) 11/21/2022       Recent Labs     01/24/23  1117 01/24/23  1637 01/24/23 2058 01/25/23  0718   POCGLU 157* 163* 176* 164*       Blood Sugar Average: Last 72 hrs:  (P) 520 [id number]     Outpatient: Metformin 1000mg BID, Jardiance 10mg, Atorvastatin 40mg daily  · Resume home meds on discharge  Benign essential hypertension  Assessment & Plan  · Outpatient: Lisinopril 10mg daily, HCTZ 12 5mg daily  · BP is in acceptable range  Plan:  · Continue home meds    Other specified hypothyroidism  Assessment & Plan  Continue levothyroxine 137 mcg      Medical Problems     Resolved Problems  Date Reviewed: 1/26/2023   None       Discharging Resident: Melvi Mcclellan DO  Discharging Attending: Dwaine Mnotoya MD  PCP: Kori Diaz MD  Admission Date:   Admission Orders (From admission, onward)     Ordered        01/23/23 2034  INPATIENT ADMISSION  Once                      Discharge Date: 01/26/23  Discharge Date: 01/26/23    Consultations During Hospital Stay:  · Neurosurgery    Procedures Performed:   · MRI brain w wo contrast: Enhancing dural based lesion along the left frontal convexity increase in size compared to 11/1/2022  Unchanged since recent noncontrast exam   There is associated soft tissue component along the left frontal scalp  · CT CAP with contrast: No evidence of primary malignancy or metastatic disease  Significant Findings / Test Results:   · As above    Incidental Findings:   · None    Test Results Pending at Discharge (will require follow up): · None     Outpatient Tests Requested:  · None    Complications:  None    Reason for Admission: Forehead pain, skull lesion    Hospital Course:   Robin Ely is a 56 y o  male patient who originally presented to the hospital on 1/23/2023 due to left forehead pain  He has a known lesion on the left side of his skull in the frontal sinus/frontal lobe area  He had been experiencing increased swelling and pain in that area for 2 days prior to admission  MRI with results as above    CT scan with contrast of chest abdomen pelvis was done to evaluate for other lesions or masses results are as above  He was seen by neurosurgery and they discussed the plan for surgery in the near future  He was stable for home discharge  Condition at Discharge: good    Discharge Day Visit / Exam:   Subjective:  Patient feeling well  He has no complaints  He is eager to go home so he can prepare for his upcoming trip to [facility]  Vitals: Blood Pressure: 129/86 (01/26/23 0813)  Pulse: 82 (01/25/23 0713)  Temperature: 98 1 °F (36 7 °C) (01/26/23 0813)  Temp Source: Oral (01/25/23 0713)  Respirations: 16 (01/26/23 0813)  SpO2: 97 % (01/25/23 2127)  Exam:   Physical Exam  Constitutional:       General: He is not in acute distress  Appearance: He is normal weight  He is not ill-appearing  HENT:      Mouth/Throat:      Mouth: Mucous membranes are moist       Pharynx: Oropharynx is clear  Eyes:      Extraocular Movements: Extraocular movements intact  Cardiovascular:      Rate and Rhythm: Normal rate and regular rhythm  Heart sounds: No murmur heard  Pulmonary:      Effort: Pulmonary effort is normal  No respiratory distress  Breath sounds: Normal breath sounds  Abdominal:      General: Abdomen is flat  There is no distension  Palpations: Abdomen is soft  Tenderness: There is no abdominal tenderness  Musculoskeletal:      Right lower leg: No edema  Left lower leg: No edema  Skin:     General: Skin is warm and dry  Neurological:      General: No focal deficit present  Mental Status: He is alert and oriented to person, place, and time  Mental status is at baseline  Cranial Nerves: No cranial nerve deficit  Motor: No weakness  Comments: No pronator drift   Psychiatric:         Mood and Affect: Mood normal          Behavior: Behavior normal          Thought Content:  Thought content normal          Judgment: Judgment normal           Discussion with Family: Patient declined call to   Discharge instructions/Information to patient and family:   See after visit summary for information provided to patient and family  Provisions for Follow-Up Care:  See after visit summary for information related to follow-up care and any pertinent home health orders  Disposition:   Home    Planned Readmission: No    Discharge Medications:  See after visit summary for reconciled discharge medications provided to patient and/or family        **Please Note: This note may have been constructed using a voice recognition system**

## 2023-01-26 NOTE — PLAN OF CARE
Problem: NEUROSENSORY - ADULT  Goal: Achieves stable or improved neurological status  Description: INTERVENTIONS  - Monitor and report changes in neurological status  - Monitor vital signs such as temperature, blood pressure, glucose, and any other labs ordered   - Initiate measures to prevent increased intracranial pressure  - Monitor for seizure activity and implement precautions if appropriate      Outcome: Progressing  Goal: Remains free of injury related to seizures activity  Description: INTERVENTIONS  - Maintain airway, patient safety  and administer oxygen as ordered  - Monitor patient for seizure activity, document and report duration and description of seizure to physician/advanced practitioner  - If seizure occurs,  ensure patient safety during seizure  - Reorient patient post seizure  - Seizure pads on all 4 side rails  - Instruct patient/family to notify RN of any seizure activity including if an aura is experienced  - Instruct patient/family to call for assistance with activity based on nursing assessment  - Administer anti-seizure medications if ordered    Outcome: Progressing  Goal: Achieves maximal functionality and self care  Description: INTERVENTIONS  - Monitor swallowing and airway patency with patient fatigue and changes in neurological status  - Encourage and assist patient to increase activity and self care     - Encourage visually impaired, hearing impaired and aphasic patients to use assistive/communication devices  Outcome: Progressing     Problem: PAIN - ADULT  Goal: Verbalizes/displays adequate comfort level or baseline comfort level  Description: Interventions:  - Encourage patient to monitor pain and request assistance  - Assess pain using appropriate pain scale  - Administer analgesics based on type and severity of pain and evaluate response  - Implement non-pharmacological measures as appropriate and evaluate response  - Consider cultural and social influences on pain and pain management  - Notify physician/advanced practitioner if interventions unsuccessful or patient reports new pain  Outcome: Progressing

## 2023-01-26 NOTE — PLAN OF CARE
Problem: NEUROSENSORY - ADULT  Goal: Achieves stable or improved neurological status  Description: INTERVENTIONS  - Monitor and report changes in neurologicaltus  - Monitor vital signs such as temperature, blood pressure, glucose, and any other labs ordered   - Initiate measures to prevent increased intracranial pressure  - Monitor for seizure activity and implement precautions if appropriate      Outcome: Completed  Goal: Remains free of injury related to seizures activity  Description: INTERVENTIONS  - Maintain airway, patient safety  and administer oxygen as ordered  - Monitor patient for seizure activity, document and report duration and description of seizure to physician/advanced practitioner  - If seizure occurs,  ensure patient safety during seizure  - Reorient patient post seizure  - Seizure pads on all 4 side rails  - Instruct patient/family to notify RN of any seizure activity including if an aura is experienced  - Instruct patient/family to call for assistance with activity based on nursing assessment  - Administer anti-seizure medications if ordered    Outcome: Completed  Goal: Achieves maximal functionality and self care  Description: INTERVENTIONS  - Monitor swallowing and airway patency with patient fatigue and changes in neurological status  - Encourage and assist patient to increase activity and self care     - Encourage visually impaired, hearing impaired and aphasic patients to use assistive/communication devices  Outcome: Completed     Problem: PAIN - ADULT  Goal: Verbalizes/displays adequate comfort level or baseline comfort level  Description: Interventions:  - Encourage patient to monitor pain and request assistance  - Assess pain using appropriate pain scale  - Administer analgesics based on type and severity of pain and evaluate response  - Implement non-pharmacological measures as appropriate and evaluate response  - Consider cultural and social influences on pain and pain management  - Notify physician/advanced practitioner if interventions unsuccessful or patient reports new pain  Outcome: Completed

## 2023-01-27 ENCOUNTER — TRANSITIONAL CARE MANAGEMENT (OUTPATIENT)
Dept: FAMILY MEDICINE CLINIC | Facility: CLINIC | Age: 58
End: 2023-01-27

## 2023-01-27 DIAGNOSIS — E11.9 CONTROLLED TYPE 2 DIABETES MELLITUS WITHOUT COMPLICATION, WITHOUT LONG-TERM CURRENT USE OF INSULIN (HCC): ICD-10-CM

## 2023-01-27 DIAGNOSIS — I10 ESSENTIAL HYPERTENSION: ICD-10-CM

## 2023-01-28 RX ORDER — LISINOPRIL AND HYDROCHLOROTHIAZIDE 12.5; 1 MG/1; MG/1
1 TABLET ORAL DAILY
Qty: 90 TABLET | Refills: 1 | Status: SHIPPED | OUTPATIENT
Start: 2023-01-28

## 2023-02-03 ENCOUNTER — HOSPITAL ENCOUNTER (OUTPATIENT)
Dept: RADIOLOGY | Age: 58
Discharge: HOME | End: 2023-02-03

## 2023-02-03 DIAGNOSIS — D49.6 BRAIN TUMOR (HCC): ICD-10-CM

## 2023-02-04 DIAGNOSIS — E03.9 HYPOTHYROIDISM, UNSPECIFIED TYPE: ICD-10-CM

## 2023-02-05 DIAGNOSIS — N48.1 BALANITIS: ICD-10-CM

## 2023-02-06 DIAGNOSIS — N48.1 BALANITIS: ICD-10-CM

## 2023-02-06 RX ORDER — CLOTRIMAZOLE 1 %
CREAM (GRAM) TOPICAL
Qty: 30 G | Refills: 0 | Status: SHIPPED | OUTPATIENT
Start: 2023-02-06 | End: 2023-02-06

## 2023-02-06 RX ORDER — LEVOTHYROXINE SODIUM 137 UG/1
TABLET ORAL
Qty: 60 TABLET | Refills: 0 | Status: SHIPPED | OUTPATIENT
Start: 2023-02-06

## 2023-02-06 RX ORDER — CLOTRIMAZOLE 1 %
CREAM (GRAM) TOPICAL
Qty: 30 G | Refills: 0 | Status: SHIPPED | OUTPATIENT
Start: 2023-02-06

## 2023-02-06 NOTE — TELEPHONE ENCOUNTER
Good Morning Dr Syed Moon  After chart review on 01/23/2023 patient was admitted at ED [facility], at that time ED provider DC Clotrimazole medication  Please review and refill if appropriate   Thanks

## 2023-02-07 NOTE — ED ATTENDING ATTESTATION
1/23/2023  IHakeem MD, saw and evaluated the patient  I have discussed the patient with the resident/non-physician practitioner and agree with the resident's/non-physician practitioner's findings, Plan of Care, and MDM as documented in the resident's/non-physician practitioner's note, except where noted  All available labs and Radiology studies were reviewed  I was present for key portions of any procedure(s) performed by the resident/non-physician practitioner and I was immediately available to provide assistance  At this point I agree with the current assessment done in the Emergency Department    I have conducted an independent evaluation of this patient a history and physical is as follows:see h and p -agree with er resident tx plan/ dispo    ED Course  ED Course as of 02/06/23 2025 Mon Jan 23, 2023 1811 - 11/21         Critical Care Time  Procedures

## 2023-02-08 ENCOUNTER — TELEPHONE (OUTPATIENT)
Dept: FAMILY MEDICINE CLINIC | Facility: CLINIC | Age: 58
End: 2023-02-08

## 2023-03-10 ENCOUNTER — OFFICE VISIT (OUTPATIENT)
Dept: NEUROSURGERY | Facility: CLINIC | Age: 58
End: 2023-03-10

## 2023-03-10 VITALS
TEMPERATURE: 98.2 F | SYSTOLIC BLOOD PRESSURE: 139 MMHG | BODY MASS INDEX: 27.88 KG/M2 | DIASTOLIC BLOOD PRESSURE: 90 MMHG | HEIGHT: 69 IN | RESPIRATION RATE: 16 BRPM | WEIGHT: 188.2 LBS | HEART RATE: 105 BPM

## 2023-03-10 DIAGNOSIS — H10.9 CONJUNCTIVITIS: Primary | ICD-10-CM

## 2023-03-10 DIAGNOSIS — D49.6 BRAIN TUMOR (HCC): ICD-10-CM

## 2023-03-10 DIAGNOSIS — E11.9 CONTROLLED TYPE 2 DIABETES MELLITUS WITHOUT COMPLICATION, WITHOUT LONG-TERM CURRENT USE OF INSULIN (HCC): ICD-10-CM

## 2023-03-10 DIAGNOSIS — M89.9 FRONTAL SKULL LESION: ICD-10-CM

## 2023-03-10 DIAGNOSIS — R73.09 ELEVATED HEMOGLOBIN A1C: ICD-10-CM

## 2023-03-10 DIAGNOSIS — M54.50 LEFT LOW BACK PAIN: ICD-10-CM

## 2023-03-10 RX ORDER — DIPHENOXYLATE HYDROCHLORIDE AND ATROPINE SULFATE 2.5; .025 MG/1; MG/1
1 TABLET ORAL EVERY OTHER DAY
COMMUNITY

## 2023-03-10 RX ORDER — TOBRAMYCIN AND DEXAMETHASONE 3; 1 MG/ML; MG/ML
1 SUSPENSION/ DROPS OPHTHALMIC
Qty: 5 ML | Refills: 0 | Status: SHIPPED | OUTPATIENT
Start: 2023-03-10 | End: 2023-03-17

## 2023-03-10 NOTE — PROGRESS NOTES
Neurosurgery Office Note  Ashely Queen 56 y o  male MRN: [id number]      Assessment/Plan      Diagnoses and all orders for this visit:    Conjunctivitis  -     tobramycin-dexamethasone (TOBRADEX) ophthalmic suspension; Administer 1 drop to both eyes every 4 (four) hours while awake for 7 days    Controlled type 2 diabetes mellitus without complication, without long-term current use of insulin (HCC)  -     HEMOGLOBIN A1C W/ EAG ESTIMATION; Future    Frontal skull lesion    Brain tumor (HCC)    Elevated hemoglobin A1c    Left low back pain    Other orders  -     multivitamin (THERAGRAN) TABS; Take 1 tablet by mouth every other day        Discussion:    Pain Score: 0-No pain      56year-old seen in follow-up after his recent travels to [facility]  His left frontal skull mass is not presently giving him pain  However he does mention some discharge from his left eye, and scleral injection  He does have some mild photophobia  On exam this is also present on the right although not as profound  It may well be conjunctivitis  I will prescribe him TobraDex to treat this, and ask our office to follow-up with him early next week to see if it is being effective  He states his sugars at home have been in the 160s, we will reassess what his hemoglobin A1c is, I have written for that lab study  Furthermore, he has his MRI scan follow-up scheduled for 3/16  We will use that to see the whether his extra-axial/skull lesion has progressed significantly since his last study  I will see him back in about 2 weeks to synthesize all his information and discuss timing of his surgery  We have already worked on creating a customized synthetic bone flap for him  If his sugar is not well controlled, and the mass is relatively stable, we may postpone surgery to achieve better control and minimize his risk of infection    Conversely if the mass is continuing to progress rapidly, we may need to proceed regardless of his A1c status  Ideally, we will see his sugars well controlled and we can plan to proceed  Regardless I will help formulate this plan with him on his follow-up in 2 weeks  Lastly, the patient did mention he has had some low back pain into the left thigh but not below the knee with his recent travels, sitting on plans for in excess of 20 hours etc   He was prescribed some painkillers while in [facility], but has progressed to not require these any further  He can continue them as needed, he is doing well with stretching etc     Prior history:    62-year-old seen in follow-up today  He has had a left frontal extra-axial/skull based lesion followed since 2021  It had progressed relatively slowly, but with it's progression, it was recommended for treatment  After discussions, and review at [facility], patient wished to discuss RT as an option, and was referred for this option  However, there were concerns about the actual diagnosis/grading of this presumed meningioma  He was referred back for potential biopsy  Of note, he did have CT CAP and bone scans done 2021 when this was first found to rule out obvious metastatic source  On his new MRI scan thin slice T2, the lesion has actually increased substantially since 11/2022  There is no adjacent cerebral edema, but there is significant more brain displacement  CT scan done 11/2022 shows bony erosion and involvement, that is encroaching upon the border of the left frontal sinus  On follow-up today, the patient remains asymptomatic  He tells me on further consideration he would like to have this treated surgically with resection, which I think is entirely reasonable  He may well need adjuvant therapy depending on the diagnosis/grading  However, he has made plans to travel back to [facility] in 2/2023 to return his mother's ashes, after she passed away last summer  He plans to return early 3/2023 and would like to treat this surgically at that time    I did review with him my concerns that this is growing relatively quickly, and is encroaching upon his frontal sinus, and that delay may significantly affect his care  He is clear that he wishes to handle this after his return  As such, I have ordered a follow-up MRI scan of the brain with contrast and thin slice T2, as well as thin slice CT scan for surgical planning to be done on his return  I will see him after his return and the scans are done to plan for surgery  This will likely involve bony resection as well, which will need to be reconstructed  I also encouraged him to manage his DM as well as possible, to try and improve his HbA1C, which was significantly elevated at last check (8 1 in 11/2022)  01/11/23 Metrics: EQ5D5L 53137=2 000; ; MOCA 25/30    03/10/23 Metrics: EQ5D5L 88557=1 000;           CHIEF COMPLAINT    Chief Complaint   Patient presents with   • Follow-up     PER [facility]  HSP FOLLOW TO DISCUSS SURGERY       HISTORY    History of Present Illness     56y o  year old male     HPI    See Discussion    REVIEW OF SYSTEMS    Review of Systems   Constitutional: Negative  HENT: Negative  Eyes: Negative  Left eye issue at night time when sleep will wake up and can't open due to dry but also a lot of watery eyes, especially with bright lights will cause increase watery eyes   Respiratory: Negative  Cardiovascular: Negative  Gastrointestinal: Negative  Endocrine: Negative  Genitourinary: Negative  Musculoskeletal: Positive for back pain (left side lower back)  Skin: Negative  Allergic/Immunologic: Negative  Neurological: Negative  Frontal skull lesion   Hematological: Negative  Psychiatric/Behavioral: Negative            Meds/Allergies     Current Outpatient Medications   Medication Sig Dispense Refill   • atorvastatin (LIPITOR) 40 mg tablet Take 1 tablet (40 mg total) by mouth daily 90 tablet 1   • clotrimazole (LOTRIMIN) 1 % cream APPLY  CREAM EXTERNALLY TWICE DAILY 30 g 0   • Empagliflozin (Jardiance) 10 MG TABS tablet Take 1 tablet (10 mg total) by mouth every morning 90 tablet 1   • glucose blood (Accu-Chek Guide) test strip Use 1 each 2 (two) times a day Use as instructed 100 each 5   • glucose blood (OneTouch Verio) test strip Use 1 each 2 (two) times a day Use as instructed 100 each 5   • levothyroxine 137 mcg tablet Take 1 tablet by mouth once daily 60 tablet 0   • lisinopril-hydrochlorothiazide (PRINZIDE,ZESTORETIC) 10-12 5 MG per tablet Take 1 tablet by mouth daily 90 tablet 1   • metFORMIN (GLUCOPHAGE) 1000 MG tablet Take 1 tablet (1,000 mg total) by mouth 2 (two) times a day with meals 180 tablet 0   • multivitamin (THERAGRAN) TABS Take 1 tablet by mouth every other day     • tobramycin-dexamethasone (TOBRADEX) ophthalmic suspension Administer 1 drop to both eyes every 4 (four) hours while awake for 7 days 5 mL 0     No current facility-administered medications for this visit  No Known Allergies    PAST HISTORY    Past Medical History:   Diagnosis Date   • [facility]    • Elevated LFTs     last assessed 02Nov2015   • Hypertension    • Impaired fasting glucose     last assessed 29Jan2014       Past Surgical History:   Procedure Laterality Date   • APPENDECTOMY         Social History     Tobacco Use   • Smoking status: Never   • Smokeless tobacco: Never   Vaping Use   • Vaping Use: Never used   Substance Use Topics   • Alcohol use: Yes     Alcohol/week: 3 0 standard drinks     Types: 3 Glasses of wine per week     Comment: drinks daily    • Drug use: No       Family History   Problem Relation Age of Onset   • Diabetes Mother    • Breast cancer Mother    • Diabetes Father          The following portions of the patient's history were reviewed in this encounter and updated as appropriate: Past medical, surgical, family, and social history, as well as medications, allergies, and review of systems          EXAM    Vitals:Blood pressure 139/90, pulse 105, temperature 98 2 °F (36 8 °C), resp  rate 16, height 5' 9" (1 753 m), weight 85 4 kg (188 lb 3 2 oz)  ,Body mass index is 27 79 kg/m²  Physical Exam    Neurologic Exam      MEDICAL DECISION MAKING          PLEASE NOTE:  This encounter may have been completed utilizing the [employer] Direct Speech Voice Recognition Software  Grammatical errors, random word insertions, pronoun errors and incomplete sentences are occasional consequences of the system due to software limitations, ambient noise and hardware issues  These may be missed by proof reading prior to affixing electronic signature  Any questions or concerns about the content, text or information contained within the body of this dictation should be directly addressed to the advanced practitioner or physician for clarification

## 2023-03-13 ENCOUNTER — TELEPHONE (OUTPATIENT)
Dept: NEUROSURGERY | Facility: CLINIC | Age: 58
End: 2023-03-13

## 2023-03-13 NOTE — TELEPHONE ENCOUNTER
Received prior auth request for tobramycin-dexamethasone eye drops  Filled out and submit form on [email or web address]

## 2023-03-16 ENCOUNTER — APPOINTMENT (OUTPATIENT)
Dept: LAB | Facility: CLINIC | Age: 58
End: 2023-03-16

## 2023-03-16 ENCOUNTER — APPOINTMENT (OUTPATIENT)
Dept: CT IMAGING | Facility: HOSPITAL | Age: 58
End: 2023-03-16
Attending: NEUROLOGICAL SURGERY

## 2023-03-16 ENCOUNTER — HOSPITAL ENCOUNTER (OUTPATIENT)
Dept: MRI IMAGING | Facility: HOSPITAL | Age: 58
Discharge: HOME | End: 2023-03-16
Attending: NEUROLOGICAL SURGERY

## 2023-03-16 DIAGNOSIS — H10.9 CONJUNCTIVITIS: Primary | ICD-10-CM

## 2023-03-16 DIAGNOSIS — E11.9 CONTROLLED TYPE 2 DIABETES MELLITUS WITHOUT COMPLICATION, WITHOUT LONG-TERM CURRENT USE OF INSULIN (HCC): ICD-10-CM

## 2023-03-16 DIAGNOSIS — M89.9 FRONTAL SKULL LESION: ICD-10-CM

## 2023-03-16 DIAGNOSIS — D49.6 BRAIN TUMOR (HCC): ICD-10-CM

## 2023-03-16 RX ADMIN — GADOBUTROL 8 ML: 604.72 INJECTION INTRAVENOUS at 15:18

## 2023-03-16 NOTE — TELEPHONE ENCOUNTER
Unable to obtain prior authorization for tobramycin-dexamethasone in a timely manner to treat patient bilateral conjunctivitis  [address] [facility], OOP price is $80 for generic, pharmacist recommended changing prescription to neomycin polymixin dexamethasone as it will likely be covered under his plan, and if not covered, OOP will only be $9 00  Order placed

## 2023-03-17 LAB
EST. AVERAGE GLUCOSE BLD GHB EST-MCNC: 148 MG/DL
HBA1C MFR BLD: 6.8 %

## 2023-03-17 RX ORDER — NEOMYCIN SULFATE, POLYMYXIN B SULFATE AND DEXAMETHASONE 3.5; 10000; 1 MG/ML; [USP'U]/ML; MG/ML
SUSPENSION/ DROPS OPHTHALMIC
Qty: 5 ML | Refills: 0 | Status: SHIPPED | OUTPATIENT
Start: 2023-03-17 | End: 2023-03-23

## 2023-03-17 NOTE — TELEPHONE ENCOUNTER
Called patient to notify him that we were unable to obtain prior authorization on his original prescription so we have changed his prescription  No answer, left a detailed voicemail with callback number

## 2023-03-20 ENCOUNTER — OFFICE VISIT (OUTPATIENT)
Dept: FAMILY MEDICINE CLINIC | Facility: CLINIC | Age: 58
End: 2023-03-20

## 2023-03-20 VITALS
BODY MASS INDEX: 28.08 KG/M2 | TEMPERATURE: 98 F | RESPIRATION RATE: 18 BRPM | OXYGEN SATURATION: 99 % | DIASTOLIC BLOOD PRESSURE: 80 MMHG | HEIGHT: 69 IN | SYSTOLIC BLOOD PRESSURE: 126 MMHG | HEART RATE: 94 BPM | WEIGHT: 189.6 LBS

## 2023-03-20 DIAGNOSIS — M54.50 LEFT-SIDED LOW BACK PAIN WITHOUT SCIATICA, UNSPECIFIED CHRONICITY: Primary | ICD-10-CM

## 2023-03-20 RX ORDER — CYCLOBENZAPRINE HCL 5 MG
5 TABLET ORAL 3 TIMES DAILY PRN
Qty: 21 TABLET | Refills: 0 | Status: SHIPPED | OUTPATIENT
Start: 2023-03-20 | End: 2023-03-31

## 2023-03-20 RX ORDER — LIDOCAINE 50 MG/G
1 PATCH TOPICAL DAILY
Qty: 15 PATCH | Refills: 0 | Status: SHIPPED | OUTPATIENT
Start: 2023-03-20 | End: 2023-04-04

## 2023-03-20 NOTE — TELEPHONE ENCOUNTER
Called patient to make sure that he picked up his eye drops  Patient states he picked them up from the pharmacy yesterday and has been using them every 4 hours as instructed  All questions answered at this time, he was appreciative of the call

## 2023-03-20 NOTE — PROGRESS NOTES
Name: Natividad De La Cruz      : 1965      MRN: [id number]  Encounter Provider: Markus Santana MD  Encounter Date: 3/20/2023   Encounter department: [address] [place] [address]  Left-sided low back pain without sciatica, unspecified chronicity  Assessment & Plan:  -Patient with left sided low back pain without sciatica x 1 month s/p trip to [facility]  -Hx of left frontal skull lesion, following with Neurosurgery  -Pain radiates down to mid thigh but not past ankle, and wraps around to L abdomen  -Some relief with Ibuprofen, Icy Hot type cream, and Naproxen  -Exam showing some hypertonicity in left lower lumbar muscle  No bony tenderness  -No red flags: fevers/chills, weight changes, night pain, weakness, urinary symptoms or incontinence, saddle anesthesia  -Likely musculoskeletal     Plan:  -Low suspicion of metastasis but will order XR of lumbar spine to r/o due to history of frontal lesion  -Advised patient to use Ibuprofen and Tylenol for pain control, Flexeril muscle relaxant, and lidocaine patch  -ED precautions reviewed  -Patient to follow with Neurosurgery in 2 days and PCP in 1 week as previously scheduled    Orders:  -     XR spine lumbar minimum 4 views non injury; Future; Expected date: 2023  -     cyclobenzaprine (FLEXERIL) 5 mg tablet; Take 1 tablet (5 mg total) by mouth 3 (three) times a day as needed for muscle spasms for up to 7 days  -     lidocaine (Lidoderm) 5 %; Apply 1 patch topically over 12 hours daily for 15 days Remove & Discard patch within 12 hours or as directed by MD         Tiffany Gonzales is a 56y o  year old male with PMHx of left frontal skull lesion (followed by Neurosurgery) here for evaluation of left sided lower back pain  Patient reports >1 month of left sided lower back pain which is worsening   Recent trip to [facility] last month: long flight, carrying heavy bags of luggage, and sleeping on harder mattresses while there  Pain is radiating down left buttock to back of thigh but does not radiate to knee or below  He feels that the pain is now radiating to his left abdomen  He feels as though there is something there, like a "mass"  Pain is waxing and waning, rated as 10/10  He has attempted: topical IcyHot type of cream, Ibuprofen, ice, all with some relief  Last night he tried one dose of Naproxen 500 mg with some relief  He has not attempted muscle relaxant  Pain worsens when standing up and after hot shower  No trauma to area or sick contacts  Denies fevers/chills, unexpected weight changes, pain worsening at night, sensory or motor weakness/changes, urinary symptoms, urinary or fecal incontinence, saddle anesthesia, CP, SOB  Review of Systems   Constitutional: Negative for chills, fever and unexpected weight change  Respiratory: Negative for cough and shortness of breath  Cardiovascular: Negative for chest pain  Genitourinary: Negative for difficulty urinating, dysuria, frequency, hematuria and urgency  Musculoskeletal: Positive for back pain  Skin: Negative for rash  Neurological: Negative for weakness and numbness  All other systems reviewed and are negative        Current Outpatient Medications on File Prior to Visit   Medication Sig   • atorvastatin (LIPITOR) 40 mg tablet Take 1 tablet (40 mg total) by mouth daily   • clotrimazole (LOTRIMIN) 1 % cream APPLY  CREAM EXTERNALLY TWICE DAILY   • Empagliflozin (Jardiance) 10 MG TABS tablet Take 1 tablet (10 mg total) by mouth every morning   • glucose blood (Accu-Chek Guide) test strip Use 1 each 2 (two) times a day Use as instructed   • glucose blood (OneTouch Verio) test strip Use 1 each 2 (two) times a day Use as instructed   • levothyroxine 137 mcg tablet Take 1 tablet by mouth once daily   • lisinopril-hydrochlorothiazide (PRINZIDE,ZESTORETIC) 10-12 5 MG per tablet Take 1 tablet by mouth daily   • metFORMIN (GLUCOPHAGE) 1000 MG tablet Take 1 tablet (1,000 mg total) by mouth 2 (two) times a day with meals   • multivitamin (THERAGRAN) TABS Take 1 tablet by mouth every other day   • neomycin-polymyxin-dexamethasone (MAXITROL) ophthalmic suspension 1 drop in both eyes every 4 hours while awake  • tobramycin-dexamethasone (TOBRADEX) ophthalmic suspension Administer 1 drop to both eyes every 4 (four) hours while awake for 7 days       Objective     /80 (BP Location: Left arm, Patient Position: Sitting, Cuff Size: Large)   Pulse 94   Temp 98 °F (36 7 °C) (Temporal)   Resp 18   Ht 5' 9" (1 753 m)   Wt 86 kg (189 lb 9 6 oz)   SpO2 99%   BMI 28 00 kg/m²     Physical Exam  Vitals reviewed  Constitutional:       General: He is not in acute distress  Appearance: Normal appearance  He is not ill-appearing, toxic-appearing or diaphoretic  HENT:      Right Ear: External ear normal       Left Ear: External ear normal       Nose: Nose normal    Eyes:      General:         Right eye: No discharge  Left eye: No discharge  Cardiovascular:      Rate and Rhythm: Normal rate and regular rhythm  Pulses: Normal pulses  Heart sounds: Normal heart sounds  No murmur heard  Pulmonary:      Effort: Pulmonary effort is normal  No respiratory distress  Breath sounds: Normal breath sounds  No wheezing  Abdominal:      General: Bowel sounds are normal  There is no distension  Palpations: Abdomen is soft  Tenderness: There is no abdominal tenderness  There is no right CVA tenderness or left CVA tenderness  Musculoskeletal:         General: Normal range of motion  Cervical back: Normal range of motion  No bony tenderness  Thoracic back: No bony tenderness  Lumbar back: No bony tenderness  Negative right straight leg raise test and negative left straight leg raise test       Comments: Lumbar: left lumbar back with increased muscle tone and TTP compared to left side  No bony tenderness  No mass palpated  Skin:     General: Skin is warm and dry  Neurological:      General: No focal deficit present  Mental Status: He is alert     Psychiatric:         Mood and Affect: Mood normal          Behavior: Behavior normal        Maxwell Willams MD yes

## 2023-03-20 NOTE — ASSESSMENT & PLAN NOTE
-Patient with left sided low back pain without sciatica x 1 month s/p trip to [facility]  -Hx of left frontal skull lesion, following with Neurosurgery  -Pain radiates down to mid thigh but not past ankle, and wraps around to L abdomen  -Some relief with Ibuprofen, Icy Hot type cream, and Naproxen  -Exam showing some hypertonicity in left lower lumbar muscle   No bony tenderness  -No red flags: fevers/chills, weight changes, night pain, weakness, urinary symptoms or incontinence, saddle anesthesia  -Likely musculoskeletal     Plan:  -Low suspicion of metastasis but will order XR of lumbar spine to r/o due to history of frontal lesion  -Advised patient to use Ibuprofen and Tylenol for pain control, Flexeril muscle relaxant, and lidocaine patch  -ED precautions reviewed  -Patient to follow with Neurosurgery in 2 days and PCP in 1 week as previously scheduled

## 2023-03-21 ENCOUNTER — HOSPITAL ENCOUNTER (OUTPATIENT)
Dept: RADIOLOGY | Facility: HOSPITAL | Age: 58
Discharge: HOME | End: 2023-03-21

## 2023-03-21 DIAGNOSIS — M54.50 LEFT-SIDED LOW BACK PAIN WITHOUT SCIATICA, UNSPECIFIED CHRONICITY: ICD-10-CM

## 2023-03-22 ENCOUNTER — OFFICE VISIT (OUTPATIENT)
Dept: NEUROSURGERY | Facility: CLINIC | Age: 58
End: 2023-03-22

## 2023-03-22 VITALS
DIASTOLIC BLOOD PRESSURE: 83 MMHG | BODY MASS INDEX: 27.99 KG/M2 | HEART RATE: 97 BPM | WEIGHT: 189 LBS | RESPIRATION RATE: 16 BRPM | HEIGHT: 69 IN | TEMPERATURE: 98.7 F | SYSTOLIC BLOOD PRESSURE: 131 MMHG

## 2023-03-22 DIAGNOSIS — M89.8X8 SKULL MASS: ICD-10-CM

## 2023-03-22 DIAGNOSIS — D49.6 BRAIN TUMOR (HCC): Primary | ICD-10-CM

## 2023-03-22 RX ORDER — CEFAZOLIN SODIUM 2 G/50ML
2000 SOLUTION INTRAVENOUS ONCE
OUTPATIENT
Start: 2023-03-22 | End: 2023-03-22

## 2023-03-22 RX ORDER — CHLORHEXIDINE GLUCONATE 0.12 MG/ML
15 RINSE ORAL ONCE
OUTPATIENT
Start: 2023-03-22 | End: 2023-03-22

## 2023-03-22 NOTE — PROGRESS NOTES
Neurosurgery Office Note  Bry Grimm 56 y o  male MRN: [id number]      Assessment/Plan      Diagnoses and all orders for this visit:    Brain tumor [facility])  -     Case request operating room: IMAGE-GUIDED LEFT FRONTAL CRANIOTOMY FOR RESECTION OF EXTRA-AXIAL & SKULL MASS, WITH CRANIOPLASTY, EXENTERATION/OBLITERATION OF FRONTAL SINUS; Standing  -     Case request operating room: IMAGE-GUIDED LEFT FRONTAL CRANIOTOMY FOR RESECTION OF EXTRA-AXIAL & SKULL MASS, WITH CRANIOPLASTY, EXENTERATION/OBLITERATION OF FRONTAL SINUS    Skull mass  -     Case request operating room: IMAGE-GUIDED LEFT FRONTAL CRANIOTOMY FOR RESECTION OF EXTRA-AXIAL & SKULL MASS, WITH CRANIOPLASTY, EXENTERATION/OBLITERATION OF FRONTAL SINUS; Standing  -     Case request operating room: IMAGE-GUIDED LEFT FRONTAL CRANIOTOMY FOR RESECTION OF EXTRA-AXIAL & SKULL MASS, WITH CRANIOPLASTY, EXENTERATION/OBLITERATION OF FRONTAL SINUS    Other orders  -     Diet NPO; Sips with meds; Standing  -     Nursing Communication [address] [place] [address] Interventions Implemented; Standing  -     Nursing Communication [facility] bath, have staff wash entire body (neck down) per pre-op bathing protocol  Routine, evening prior to, and day of surgery ; Standing  -     Nursing Communication Swab both nares with Povidone-Iodine solution, EXCLUDE if patient has shellfish/Iodine allergy, and replace with nasal alcohol swabstick  Routine, day of surgery, on call to OR; Standing  -     chlorhexidine (PERIDEX) 0 12 % oral rinse 15 mL  -     Void on call to OR; Standing  -     Insert peripheral IV; Standing  -     ceFAZolin (ANCEF) IVPB (premix in dextrose) 2,000 mg 50 mL        Discussion:    Pain Score:   8 (right side low back)    56year-old seen in follow-up after his recent travels to [facility]  His left frontal skull mass is not presently giving him pain  However at last visit he did mention some discharge from his left eye, and scleral injection  He did have some mild photophobia    On exam this was also present on the right although not as profound  I prescribed him drops, but unfortunately he only managed to  this prescription, after substitution, a few days ago  His eyes do appear better today, and he feels there better subjectively  [place] His updated hemoglobin A1c was 6 8  His MRI scan follow-up was completed 3/16, and shows continued enlargement progression, in particular the extra-axial, intracranial portion of his mass  I reviewed with him that I again feel it is appropriate and timely for him to have this mass removed presently  We have generated a custom cranioplasty flap for him, which is presently in manufacturing  I discussed with him the surgical plan, with expected benefits  I also discussed with him attendant risks including but not limited to infection, bleeding, VTE, risks of anesthesia, CSF leak, mucocele, seizure, hydrocephalus, transient or permanent neurologic dysfunction, etc   He would like to proceed and written consent obtained  I did review with him expected length of stay (and in particular that I expect his left eye to swell shut postoperatively, and that this should resolve in the subsequent weeks), recovery time, etc   We discussed return to work which I expect will be close to 6 weeks postop  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  Surgical plan as delineated above  The patient does again mention low back pain today  Can radiate into his anterior left lower quadrant/groin  Is not radicular  He will continue with stretching, over-the-counter medications etc       Prior history:    65-year-old seen in follow-up today  He has had a left frontal extra-axial/skull based lesion followed since 2021  It had progressed relatively slowly, but with it's progression, it was recommended for treatment  After discussions, and review at [facility], patient wished to discuss RT as an option, and was referred for this option   However, there were concerns about the actual diagnosis/grading of this presumed meningioma  He was referred back for potential biopsy  Of note, he did have CT CAP and bone scans done 2021 when this was first found to rule out obvious metastatic source  On his new MRI scan thin slice T2, the lesion has actually increased substantially since 11/2022  There is no adjacent cerebral edema, but there is significant more brain displacement  CT scan done 11/2022 shows bony erosion and involvement, that is encroaching upon the border of the left frontal sinus  On follow-up today, the patient remains asymptomatic  He tells me on further consideration he would like to have this treated surgically with resection, which I think is entirely reasonable  He may well need adjuvant therapy depending on the diagnosis/grading  However, he has made plans to travel back to [facility] in 2/2023 to return his mother's ashes, after she passed away last summer  He plans to return early 3/2023 and would like to treat this surgically at that time  I did review with him my concerns that this is growing relatively quickly, and is encroaching upon his frontal sinus, and that delay may significantly affect his care  He is clear that he wishes to handle this after his return  As such, I have ordered a follow-up MRI scan of the brain with contrast and thin slice T2, as well as thin slice CT scan for surgical planning to be done on his return  I will see him after his return and the scans are done to plan for surgery  This will likely involve bony resection as well, which will need to be reconstructed  I also encouraged him to manage his DM as well as possible, to try and improve his HbA1C, which was significantly elevated at last check (8 1 in 11/2022)        01/11/23 Metrics: EQ5D5L 20337=7 000; ; MOCA 25/30    03/10/23 Metrics: EQ5D5L 65827=2 000;     03/22/23 Metrics: EQ5D5L 68073=7 000;           CHIEF COMPLAINT    Chief Complaint Patient presents with   • Follow-up     FOLLOW UP AFTER MRI BRAIN 3/16/23       HISTORY    History of Present Illness     56y o  year old male     HPI    See Discussion    REVIEW OF SYSTEMS    Review of Systems   Constitutional: Negative  HENT: Negative  Eyes: Negative  Left eye issue at night time when sleep will wake up and can't open due to dry but also a lot of watery eyes, especially with bright lights will cause increase watery eyes   Respiratory: Negative  Cardiovascular: Negative  Gastrointestinal: Negative  Endocrine: Negative  Genitourinary: Negative  Musculoskeletal: Positive for back pain (left side lower back sometimes radiates to front in lower abdomen/pelvis area)  Skin: Negative  Allergic/Immunologic: Negative  Neurological: Negative  Frontal skull lesion   Hematological: Negative  Psychiatric/Behavioral: Negative            Meds/Allergies     Current Outpatient Medications   Medication Sig Dispense Refill   • atorvastatin (LIPITOR) 40 mg tablet Take 1 tablet (40 mg total) by mouth daily 90 tablet 1   • clotrimazole (LOTRIMIN) 1 % cream APPLY  CREAM EXTERNALLY TWICE DAILY 30 g 0   • cyclobenzaprine (FLEXERIL) 5 mg tablet Take 1 tablet (5 mg total) by mouth 3 (three) times a day as needed for muscle spasms for up to 7 days 21 tablet 0   • Empagliflozin (Jardiance) 10 MG TABS tablet Take 1 tablet (10 mg total) by mouth every morning 90 tablet 1   • glucose blood (Accu-Chek Guide) test strip Use 1 each 2 (two) times a day Use as instructed 100 each 5   • glucose blood (OneTouch Verio) test strip Use 1 each 2 (two) times a day Use as instructed 100 each 5   • levothyroxine 137 mcg tablet Take 1 tablet by mouth once daily 60 tablet 0   • lidocaine (Lidoderm) 5 % Apply 1 patch topically over 12 hours daily for 15 days Remove & Discard patch within 12 hours or as directed by MD 15 patch 0   • lisinopril-hydrochlorothiazide (PRINZIDE,ZESTORETIC) 10-12 5 MG per tablet Take 1 tablet by mouth daily 90 tablet 1   • metFORMIN (GLUCOPHAGE) 1000 MG tablet Take 1 tablet (1,000 mg total) by mouth 2 (two) times a day with meals 180 tablet 0   • multivitamin (THERAGRAN) TABS Take 1 tablet by mouth every other day     • neomycin-polymyxin-dexamethasone (MAXITROL) ophthalmic suspension 1 drop in both eyes every 4 hours while awake  5 mL 0   • tobramycin-dexamethasone (TOBRADEX) ophthalmic suspension Administer 1 drop to both eyes every 4 (four) hours while awake for 7 days 5 mL 0     No current facility-administered medications for this visit  No Known Allergies    PAST HISTORY    Past Medical History:   Diagnosis Date   • Diabetes mellitus ([facility] [address] )    • Elevated LFTs     last assessed 02Nov2015   • Hypertension    • Impaired fasting glucose     last assessed 29Jan2014       Past Surgical History:   Procedure Laterality Date   • APPENDECTOMY         Social History     Tobacco Use   • Smoking status: Never   • Smokeless tobacco: Never   Vaping Use   • Vaping Use: Never used   Substance Use Topics   • Alcohol use: Yes     Alcohol/week: 3 0 standard drinks     Types: 3 Glasses of wine per week     Comment: drinks daily    • Drug use: No       Family History   Problem Relation Age of Onset   • Diabetes Mother    • Breast cancer Mother    • Diabetes Father          The following portions of the patient's history were reviewed in this encounter and updated as appropriate: Past medical, surgical, family, and social history, as well as medications, allergies, and review of systems  EXAM    Vitals:Blood pressure 131/83, pulse 97, temperature 98 7 °F (37 1 °C), resp  rate 16, height 5' 9" (1 753 m), weight 85 7 kg (189 lb)  ,Body mass index is 27 91 kg/m²  Physical Exam  Vitals reviewed  Constitutional:       Appearance: Normal appearance  He is well-developed  HENT:      Head: Normocephalic and atraumatic        Comments: Mild supraorbital fullness on left, over site of bony involvement  Eyes:      General: No scleral icterus  Right eye: No discharge  Left eye: No discharge  Comments: Minimal scleral injection, improved from prior   Cardiovascular:      Rate and Rhythm: Normal rate  Pulmonary:      Effort: Pulmonary effort is normal    Musculoskeletal:      Cervical back: Neck supple  Skin:     General: Skin is warm and dry  Neurological:      Mental Status: He is alert and oriented to person, place, and time  Sensory: No sensory deficit  Psychiatric:         Speech: Speech normal          Behavior: Behavior normal          Neurologic Exam     Mental Status   Oriented to person, place, and time  Attention: normal    Speech: speech is normal   Level of consciousness: alert    Cranial Nerves     CN VII   Facial expression full, symmetric  Motor Exam   Muscle bulk: normal  Overall muscle tone: normal  Moves all extremities, grossly normal     Gait, Coordination, and Reflexes     Tremor   Resting tremor: absent  Intention tremor: absent  Action tremor: absent  No aids  MEDICAL DECISION MAKING    Imaging Studies:     MRI brain w wo contrast    Result Date: 3/20/2023  Narrative: MRI BRAIN WITH AND WITHOUT CONTRAST INDICATION: M89 9: Disorder of bone, unspecified D49 6: Neoplasm of unspecified behavior of brain  COMPARISON:  MRI brain 1/24/2023 TECHNIQUE: Multiplanar, multisequence imaging of the brain was performed before and after gadolinium administration  IV Contrast:  8 mL of Gadobutrol injection (SINGLE-DOSE)  IMAGE QUALITY:   Diagnostic  FINDINGS: BRAIN PARENCHYMA:  Again noted is an enhancing dural based lesion along the left frontal convexity measuring 2 5 x 1 9 x 2 2 cm (AP x TRV x CC) (previous 1 9 x 1 7 x 2 2 cm)  There is again involvement of the left frontal calvarial with marrow edema, and enhancement and expansion of the calvarial table  There is adjacent dural thickening and enhancement    There is associated thickening of the overlying pericranial measuring up to 0 3 cm  A small left frontal scalp lesion is again noted measuring up to 0 6 cm  There is increased mass effect along the anterior left frontal lobe without associated vasogenic edema There is no intracranial hemorrhage  Normal posterior fossa  Diffusion imaging is unremarkable  There are no white matter changes in the cerebral hemispheres  Postcontrast imaging of the brain demonstrates no pathologic intra-axial enhancement  VENTRICLES:  Normal for the patient's age  SELLA AND PITUITARY GLAND:  Normal  ORBITS:  Normal  PARANASAL SINUSES: Left greater than right maxillary sinus mucosal thickening  The remainder of the visualized paranasal sinus cavities and mastoid air cells are clear VASCULATURE:  Evaluation of the major intracranial vasculature demonstrates appropriate flow voids  CALVARIUM AND SKULL BASE:  Normal  EXTRACRANIAL SOFT TISSUES:  Normal      Impression: 1  Enhancing, dural based left frontal convexity lesion, increased in size compared to 1/24/2023  There is increased mass effect along the anterior left frontal lobe with no associated vasogenic edema  The lesion again invades the left frontal calvarium  with associated involvement of the overlying pericranium and scalp  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 as well as other etiologies previously noted  2  No acute infarction, edema, or pathologic intra-axial enhancement  Workstation performed: [id number]       I have personally reviewed pertinent reports  and I have personally reviewed pertinent films in PACS      PLEASE NOTE:  This encounter may have been completed utilizing the [employer] Direct Speech Voice Recognition Software  Grammatical errors, random word insertions, pronoun errors and incomplete sentences are occasional consequences of the system due to software limitations, ambient noise and hardware issues  These may be missed by proof reading prior to affixing electronic signature  Any questions or concerns about the content, text or information contained within the body of this dictation should be directly addressed to the advanced practitioner or physician for clarification

## 2023-03-27 ENCOUNTER — OFFICE VISIT (OUTPATIENT)
Dept: FAMILY MEDICINE CLINIC | Facility: CLINIC | Age: 58
End: 2023-03-27

## 2023-03-27 ENCOUNTER — TELEPHONE (OUTPATIENT)
Dept: FAMILY MEDICINE CLINIC | Facility: CLINIC | Age: 58
End: 2023-03-27

## 2023-03-27 VITALS
OXYGEN SATURATION: 98 % | WEIGHT: 191.4 LBS | DIASTOLIC BLOOD PRESSURE: 82 MMHG | TEMPERATURE: 98.6 F | SYSTOLIC BLOOD PRESSURE: 131 MMHG | RESPIRATION RATE: 18 BRPM | BODY MASS INDEX: 28.26 KG/M2 | HEART RATE: 108 BPM

## 2023-03-27 DIAGNOSIS — M54.50 LEFT-SIDED LOW BACK PAIN WITHOUT SCIATICA, UNSPECIFIED CHRONICITY: Primary | ICD-10-CM

## 2023-03-27 DIAGNOSIS — R00.0 TACHYCARDIA: ICD-10-CM

## 2023-03-27 DIAGNOSIS — E11.9 CONTROLLED TYPE 2 DIABETES MELLITUS WITHOUT COMPLICATION, WITHOUT LONG-TERM CURRENT USE OF INSULIN (HCC): ICD-10-CM

## 2023-03-27 DIAGNOSIS — I10 BENIGN ESSENTIAL HYPERTENSION: ICD-10-CM

## 2023-03-27 DIAGNOSIS — M89.9 FRONTAL SKULL LESION: ICD-10-CM

## 2023-03-27 DIAGNOSIS — E03.8 OTHER SPECIFIED HYPOTHYROIDISM: ICD-10-CM

## 2023-03-27 NOTE — ASSESSMENT & PLAN NOTE
Lab Results   Component Value Date    HGBA1C 6 8 (H) 03/16/2023   - Most recent A1c below goal of <7%     - Disease control: well controlled  - Current medications: Metformin 1000 mg Bid and Jardiance 10 mg QD   - Pt reports no hypoglycemia episodes, denies complications   - Continue lifestyle modification with diet and exercise   - Will continue current regimen at this time, and repeat A1c in 3 months

## 2023-03-27 NOTE — PROGRESS NOTES
Name: Janessa Galindo      : 1965      MRN: [id number]  Encounter Provider: Neha Costello MD  Encounter Date: 3/27/2023   Encounter department: [address] [place] [address]     1  Left-sided low back pain without sciatica, unspecified chronicity  Assessment & Plan:  Pain improved on Flexeril and Lidoderm patch  Pt not interested in PT at this time  Lumbar xray showed degenerative changes - no mets or fractures  - Continue use of Flexeril and Lidoderm patch PRN  - Avoid NSAIDs due to upcoming surgery   - Can take Tylenol as needed up to 2 pills TID (3000 mg max daily)      2  Controlled type 2 diabetes mellitus without complication, without long-term current use of insulin (HCC)  Assessment & Plan:    Lab Results   Component Value Date    HGBA1C 6 8 (H) 2023   - Most recent A1c below goal of <7%  - Disease control: well controlled  - Current medications: Metformin 1000 mg Bid and Jardiance 10 mg QD   - Pt reports no hypoglycemia episodes, denies complications   - Continue lifestyle modification with diet and exercise   - Will continue current regimen at this time, and repeat A1c in 3 months      3  Benign essential hypertension  Assessment & Plan:  Controlled on lisinopril hydrochlorothiazide 10-12 5 mg    - Continue current regimen      4  Frontal skull lesion  Assessment & Plan:  Follows with neurosurgery  Surgery set for 23       5  Other specified hypothyroidism  Assessment & Plan:  Controlled on 137 mcg levothyroxine  Last TSH in 23 was 3 075  - Will recheck TSH     Orders:  -     TSH, 3rd generation; Future    6  Tachycardia  -     TSH, 3rd generation; Future          Subjective      Chief Complaint   Patient presents with   • Diabetes     HPI   Patient is a 56 y o  male presenting for 3 month follow up  Pt has been having L low back pain x1 5 months after lifting heavy objects while on trip in [facility]   Pt seen 7 days ago and prescribed Lidoderm patch and Flexeril  Pt notes improvement since last week  Notes pain in L low back - extends up back and down lateral glute/thigh and when straightening back feels a strain in the LLQ  Pt takes Tylenol occasionally for pain and does stretches  Pt's diabetes is well controlled on current regimen  He reports no issues  Following a diabetic diet and walks and does yoga for exercise  Pt is cutting back alcohol consumption - currently drinking 2-3 shots 3-4 days a week  Pt has no other complaints  Review of Systems   Constitutional: Negative for chills, fatigue and fever  HENT: Negative for congestion, ear pain and trouble swallowing  Eyes: Negative for pain and visual disturbance  Respiratory: Negative for cough and shortness of breath  Cardiovascular: Negative for chest pain, palpitations and leg swelling  Gastrointestinal: Negative for abdominal pain, constipation, diarrhea, nausea and vomiting  Genitourinary: Negative for dysuria and frequency  Musculoskeletal: Positive for back pain  Negative for arthralgias and myalgias  Skin: Negative for rash  Neurological: Negative for seizures, syncope and headaches  Psychiatric/Behavioral: Negative for dysphoric mood  The patient is not nervous/anxious  All other systems reviewed and are negative        Current Outpatient Medications on File Prior to Visit   Medication Sig   • atorvastatin (LIPITOR) 40 mg tablet Take 1 tablet (40 mg total) by mouth daily   • clotrimazole (LOTRIMIN) 1 % cream APPLY  CREAM EXTERNALLY TWICE DAILY   • cyclobenzaprine (FLEXERIL) 5 mg tablet Take 1 tablet (5 mg total) by mouth 3 (three) times a day as needed for muscle spasms for up to 7 days   • Empagliflozin (Jardiance) 10 MG TABS tablet Take 1 tablet (10 mg total) by mouth every morning   • glucose blood (Accu-Chek Guide) test strip Use 1 each 2 (two) times a day Use as instructed   • glucose blood (OneTouch Verio) test strip Use 1 each 2 (two) times a day Use as instructed   • levothyroxine 137 mcg tablet Take 1 tablet by mouth once daily   • lidocaine (Lidoderm) 5 % Apply 1 patch topically over 12 hours daily for 15 days Remove & Discard patch within 12 hours or as directed by MD   • lisinopril-hydrochlorothiazide (PRINZIDE,ZESTORETIC) 10-12 5 MG per tablet Take 1 tablet by mouth daily   • metFORMIN (GLUCOPHAGE) 1000 MG tablet Take 1 tablet (1,000 mg total) by mouth 2 (two) times a day with meals   • multivitamin (THERAGRAN) TABS Take 1 tablet by mouth every other day   • neomycin-polymyxin-dexamethasone (MAXITROL) ophthalmic suspension 1 drop in both eyes every 4 hours while awake  • tobramycin-dexamethasone (TOBRADEX) ophthalmic suspension Administer 1 drop to both eyes every 4 (four) hours while awake for 7 days       Objective     /82   Pulse (!) 108   Temp 98 6 °F (37 °C)   Resp 18   Wt 86 8 kg (191 lb 6 4 oz)   SpO2 98%   BMI 28 26 kg/m²     Physical Exam  Vitals reviewed  Constitutional:       General: He is not in acute distress  Appearance: Normal appearance  He is not ill-appearing, toxic-appearing or diaphoretic  HENT:      Head: Normocephalic and atraumatic  Right Ear: External ear normal       Left Ear: External ear normal       Nose: Nose normal  No congestion or rhinorrhea  Mouth/Throat:      Mouth: Mucous membranes are moist    Eyes:      General:         Right eye: No discharge  Left eye: No discharge  Conjunctiva/sclera: Conjunctivae normal    Cardiovascular:      Rate and Rhythm: Regular rhythm  Tachycardia present  Pulses: Normal pulses  Heart sounds: No murmur heard  Pulmonary:      Effort: Pulmonary effort is normal  No respiratory distress  Breath sounds: No wheezing, rhonchi or rales  Abdominal:      General: There is no distension  Tenderness: There is no abdominal tenderness  Musculoskeletal:         General: No swelling or deformity  Normal range of motion  Cervical back: Normal range of motion  Right lower leg: No edema  Left lower leg: No edema  Comments: MSK pain in L low back and LLQ of abdomen   Skin:     General: Skin is warm  Coloration: Skin is not jaundiced  Neurological:      General: No focal deficit present  Mental Status: He is alert     Psychiatric:         Mood and Affect: Mood normal          Behavior: Behavior normal        Raji Mccormack MD

## 2023-03-27 NOTE — ASSESSMENT & PLAN NOTE
Pain improved on Flexeril and Lidoderm patch  Pt not interested in PT at this time  Lumbar xray showed degenerative changes - no mets or fractures     - Continue use of Flexeril and Lidoderm patch PRN  - Avoid NSAIDs due to upcoming surgery   - Can take Tylenol as needed up to 2 pills TID (3000 mg max daily)

## 2023-03-27 NOTE — TELEPHONE ENCOUNTER
Called patient and discussed x-ray lumbar spine results with the patient  No osseous abnormality, degenerative changes noted

## 2023-03-29 ENCOUNTER — LAB REQUISITION (OUTPATIENT)
Dept: LAB | Facility: HOSPITAL | Age: 58
End: 2023-03-29

## 2023-03-29 ENCOUNTER — APPOINTMENT (OUTPATIENT)
Dept: LAB | Facility: CLINIC | Age: 58
End: 2023-03-29

## 2023-03-29 DIAGNOSIS — D49.6 BRAIN TUMOR (HCC): ICD-10-CM

## 2023-03-29 DIAGNOSIS — E03.8 OTHER SPECIFIED HYPOTHYROIDISM: ICD-10-CM

## 2023-03-29 DIAGNOSIS — R00.0 TACHYCARDIA: ICD-10-CM

## 2023-03-29 DIAGNOSIS — D49.6 NEOPLASM OF UNSPECIFIED BEHAVIOR OF BRAIN (HCC): ICD-10-CM

## 2023-03-29 DIAGNOSIS — M89.8X8 SKULL MASS: ICD-10-CM

## 2023-03-29 LAB
ABO GROUP BLD: NORMAL
ALBUMIN SERPL BCP-MCNC: 4.3 G/DL (ref 3.5–5)
ALP SERPL-CCNC: 65 U/L (ref 34–104)
ALT SERPL W P-5'-P-CCNC: 48 U/L (ref 7–52)
ANION GAP SERPL CALCULATED.3IONS-SCNC: 5 MMOL/L (ref 4–13)
APTT PPP: 30 SECONDS (ref 23–37)
AST SERPL W P-5'-P-CCNC: 33 U/L (ref 13–39)
BACTERIA UR QL AUTO: NORMAL /HPF
BASOPHILS # BLD AUTO: 0.04 THOUSANDS/ÂΜL (ref 0–0.1)
BASOPHILS NFR BLD AUTO: 1 % (ref 0–1)
BILIRUB SERPL-MCNC: 1.31 MG/DL (ref 0.2–1)
BILIRUB UR QL STRIP: NEGATIVE
BLD GP AB SCN SERPL QL: NEGATIVE
BUN SERPL-MCNC: 13 MG/DL (ref 5–25)
CALCIUM SERPL-MCNC: 9.8 MG/DL (ref 8.4–10.2)
CHLORIDE SERPL-SCNC: 100 MMOL/L (ref 96–108)
CLARITY UR: CLEAR
CO2 SERPL-SCNC: 31 MMOL/L (ref 21–32)
COLOR UR: ABNORMAL
CREAT SERPL-MCNC: 0.75 MG/DL (ref 0.6–1.3)
EOSINOPHIL # BLD AUTO: 0.31 THOUSAND/ÂΜL (ref 0–0.61)
EOSINOPHIL NFR BLD AUTO: 4 % (ref 0–6)
ERYTHROCYTE [DISTWIDTH] IN BLOOD BY AUTOMATED COUNT: 13.1 % (ref 11.6–15.1)
GFR SERPL CREATININE-BSD FRML MDRD: 103 ML/MIN/1.73SQ M
GLUCOSE P FAST SERPL-MCNC: 126 MG/DL (ref 65–99)
GLUCOSE UR STRIP-MCNC: ABNORMAL MG/DL
HCT VFR BLD AUTO: 50.1 % (ref 36.5–49.3)
HGB BLD-MCNC: 17.3 G/DL (ref 12–17)
HGB UR QL STRIP.AUTO: NEGATIVE
IMM GRANULOCYTES # BLD AUTO: 0.02 THOUSAND/UL (ref 0–0.2)
IMM GRANULOCYTES NFR BLD AUTO: 0 % (ref 0–2)
INR PPP: 1.08 (ref 0.84–1.19)
KETONES UR STRIP-MCNC: NEGATIVE MG/DL
LEUKOCYTE ESTERASE UR QL STRIP: ABNORMAL
LYMPHOCYTES # BLD AUTO: 2.85 THOUSANDS/ÂΜL (ref 0.6–4.47)
LYMPHOCYTES NFR BLD AUTO: 39 % (ref 14–44)
MCH RBC QN AUTO: 32 PG (ref 26.8–34.3)
MCHC RBC AUTO-ENTMCNC: 34.5 G/DL (ref 31.4–37.4)
MCV RBC AUTO: 93 FL (ref 82–98)
MONOCYTES # BLD AUTO: 0.61 THOUSAND/ÂΜL (ref 0.17–1.22)
MONOCYTES NFR BLD AUTO: 8 % (ref 4–12)
NEUTROPHILS # BLD AUTO: 3.48 THOUSANDS/ÂΜL (ref 1.85–7.62)
NEUTS SEG NFR BLD AUTO: 48 % (ref 43–75)
NITRITE UR QL STRIP: NEGATIVE
NON-SQ EPI CELLS URNS QL MICRO: NORMAL /HPF
NRBC BLD AUTO-RTO: 0 /100 WBCS
PH UR STRIP.AUTO: 6 [PH]
PLATELET # BLD AUTO: 184 THOUSANDS/UL (ref 149–390)
PMV BLD AUTO: 9.9 FL (ref 8.9–12.7)
POTASSIUM SERPL-SCNC: 4.6 MMOL/L (ref 3.5–5.3)
PROT SERPL-MCNC: 7.3 G/DL (ref 6.4–8.4)
PROT UR STRIP-MCNC: NEGATIVE MG/DL
PROTHROMBIN TIME: 14.2 SECONDS (ref 11.6–14.5)
RBC # BLD AUTO: 5.41 MILLION/UL (ref 3.88–5.62)
RBC #/AREA URNS AUTO: NORMAL /HPF
RH BLD: NEGATIVE
SODIUM SERPL-SCNC: 136 MMOL/L (ref 135–147)
SP GR UR STRIP.AUTO: 1.03 (ref 1–1.03)
SPECIMEN EXPIRATION DATE: NORMAL
TSH SERPL DL<=0.05 MIU/L-ACNC: 2.74 UIU/ML (ref 0.45–4.5)
UROBILINOGEN UR STRIP-ACNC: <2 MG/DL
WBC # BLD AUTO: 7.31 THOUSAND/UL (ref 4.31–10.16)
WBC #/AREA URNS AUTO: NORMAL /HPF

## 2023-04-26 ENCOUNTER — DOCUMENTATION (OUTPATIENT)
Dept: NEUROSURGERY | Facility: CLINIC | Age: 58
End: 2023-04-26

## 2023-04-26 NOTE — PROGRESS NOTES
The patient was presented this morning 4/26/2023 at the Neuro Oncology Case Review by Dr Danita Aceves  After review, the team agreed that possible radiation therapy may be appropriate if the patient is agreeable  The patient will see Dr Danita Aceves in clinic on 5/1/2023 for surgical follow up  The final treatment plan will be left at the discretion of the patient and the treating physician  DISCLAIMERS:  TO THE TREATING PHYSICIAN:  This conference is a meeting of clinicians from various specialty areas who evaluate and discuss patients for whom a multidisciplinary treatment approach is being considered  Please note that the above opinion was a consensus of the conference attendees and is intended only to assist in quality care of the discussed patient  The responsibility for follow up on the input given during the conference, along with any final decisions regarding plan of care, is that of the patient and the patient's provider  Accordingly, appointments have only been recommended based on this information; and have NOT been scheduled unless otherwise noted  TO THE PATIENT:  This summary is a brief record of major aspects of your cancer treatment  You may choose to can share a your copy with any of your doctors or nurses  However, this is not a detailed or comprehensive record of your care

## 2023-05-01 ENCOUNTER — OFFICE VISIT (OUTPATIENT)
Dept: NEUROSURGERY | Facility: CLINIC | Age: 58
End: 2023-05-01

## 2023-05-01 VITALS
SYSTOLIC BLOOD PRESSURE: 116 MMHG | RESPIRATION RATE: 16 BRPM | BODY MASS INDEX: 27.4 KG/M2 | DIASTOLIC BLOOD PRESSURE: 76 MMHG | WEIGHT: 185 LBS | HEART RATE: 105 BPM | TEMPERATURE: 97.9 F | HEIGHT: 69 IN

## 2023-05-01 DIAGNOSIS — Z98.890 S/P CRANIOTOMY: ICD-10-CM

## 2023-05-01 DIAGNOSIS — E11.9 TYPE 2 DIABETES MELLITUS WITHOUT COMPLICATION, WITHOUT LONG-TERM CURRENT USE OF INSULIN (HCC): ICD-10-CM

## 2023-05-01 DIAGNOSIS — D42.0 ATYPICAL MENINGIOMA OF BRAIN (HCC): Primary | ICD-10-CM

## 2023-05-01 NOTE — PROGRESS NOTES
Neurosurgery Office Note  Moustapha Barnett 56 y o  male MRN: [id number]      Assessment/Plan      Diagnoses and all orders for this visit:    Atypical meningioma of brain [facility])    S/P craniotomy        Discussion:    Pain Score: 0-No pain    Status post left/semicoronal craniotomy with craniectomy and orbital osteotomy with resection of extradural mass left frontal, with exenteration/obliteration frontal sinus and custom synthetic cranioplasty on 4/11/2023  Pathology who grade 2 meningioma    Doing well, with minimal headaches, taking Tylenol, rare oxycodone at bedtime  Mentions some mild blurry vision inferior left temporal field towards the end of the day, not diplopia  No profound swelling or issues with his incision, fluid collections etc ,  I e  no sign of mucocele, CSF leak, etc  He has weaned off Decadron and AED without event  I relaxed some of his lifting restrictions  I removed medication restrictions  I have agreed to let him drive short distances etc , but he should refrain from work for now  His incision is healing well  He has a slight frontalis weakness on the left, but not profound  This may well improve over the coming weeks and months  I gave him instructions on how to remove the residual ExoFin, and that the Monocryl will slough over the next weeks  He will contact us with any new redness, induration, drainage, swelling etc     I did review with the patient, his wife, and his daughter by phone the significance of grade 2 meningioma  I do think there is some risk that this may recur, although I would state this was a Key grade 1 resection with full margin of dura not to mention bony removal   That said, there is always concern for recurrence  I mentioned upfront IMRT versus delayed as needed focused pinpoint radiation  I did not however going to the logistics of these, but rather introduced the idea as a decision point    We can certainly make a final determination at 6 weeks postop whether he feels strongly 1 way or the other  I will plan to perform a follow-up MRI scan of the brain at 3 months postop, with surveillance tentatively at 3, 6, 12 months and then every 6-12-months per NCCN guidelines  I will add that he has no signs of sclera erythema today, and I suggested he discontinue using the drops we prescribed  Furthermore, he had some back pain preop, but it would appear that steroid pulse that came with surgery has resolved that for now  01/11/23 Metrics: EQ5D5L 33010=2 000; ; MOCA 25/30    03/10/23 Metrics: EQ5D5L 70757=7 000;     03/22/23 Metrics: EQ5D5L 73814=9 000;     05/01/23 Metrics: EQ5D5L 51134=5 000;           CHIEF COMPLAINT    Chief Complaint   Patient presents with    Post-op     2 WK POV,  IMAGE-GUIDED LEFT FRONTAL CRANIOTOMY FOR RESECTION OF EXTRA-AXIAL & SKULL MASS, WITH CRANIOPLASTY, EXENTERATION/OBLITERATION OF FRONTAL SINUS       HISTORY    History of Present Illness     56y o  year old male     HPI    See Discussion    REVIEW OF SYSTEMS    Review of Systems   Constitutional: Positive for fatigue  HENT: Negative  Eyes: Positive for pain (left eye since surgery) and visual disturbance (left eye blurry vision since surgery)  Left eye issue at night time when sleep will wake up and can't open due to dry but also a lot of watery eyes, especially with bright lights will cause increase watery eyes   Respiratory: Negative  Cardiovascular: Negative  Gastrointestinal: Negative  Endocrine: Negative  Genitourinary: Negative  Musculoskeletal: Negative for back pain (resolved since last visit, left side lower back sometimes radiates to front in lower abdomen/pelvis area)  Skin: Negative  Allergic/Immunologic: Negative      Neurological: Positive for weakness (generalized), numbness (right side back of head radiates to top of head numbness) and headaches (and more pressure then pain, left side head (temple/ear/left side of head))  Negative for dizziness, seizures and syncope  Frontal skull lesion   Hematological: Negative  Psychiatric/Behavioral: Negative  Meds/Allergies     Current Outpatient Medications   Medication Sig Dispense Refill    acetaminophen (TYLENOL) 325 mg tablet Take 3 tablets (975 mg total) by mouth every 8 (eight) hours (Patient taking differently: Take 650 mg by mouth if needed)  0    atorvastatin (LIPITOR) 40 mg tablet Take 1 tablet (40 mg total) by mouth daily 90 tablet 1    Empagliflozin (Jardiance) 10 MG TABS tablet Take 1 tablet (10 mg total) by mouth every morning 90 tablet 1    glucose blood (Accu-Chek Guide) test strip Use 1 each 2 (two) times a day Use as instructed 100 each 5    glucose blood (OneTouch Verio) test strip Use 1 each 2 (two) times a day Use as instructed 100 each 5    levothyroxine 137 mcg tablet Take 1 tablet by mouth once daily 60 tablet 0    lisinopril-hydrochlorothiazide (PRINZIDE,ZESTORETIC) 10-12 5 MG per tablet Take 1 tablet by mouth daily 90 tablet 1    metFORMIN (GLUCOPHAGE) 1000 MG tablet TAKE 1 TABLET BY MOUTH TWICE DAILY WITH MEALS 180 tablet 0    tobramycin-dexamethasone (TOBRADEX) ophthalmic suspension Administer 1 drop to both eyes every 4 (four) hours while awake for 7 days 5 mL 0    docusate sodium (COLACE) 100 mg capsule Take 1 capsule (100 mg total) by mouth 2 (two) times a day (Patient not taking: Reported on 5/1/2023)  0    levETIRAcetam (KEPPRA) 500 mg tablet Take 1 tablet (500 mg total) by mouth every 12 (twelve) hours for 8 doses (Patient not taking: Reported on 5/1/2023) 8 tablet 0    lidocaine (Lidoderm) 5 % Apply 1 patch topically over 12 hours daily for 15 days Remove & Discard patch within 12 hours or as directed by MD (Patient not taking: Reported on 5/1/2023) 15 patch 0    neomycin-polymyxin-dexamethasone (MAXITROL) ophthalmic suspension 1 drop in both eyes every 4 hours while awake   (Patient not taking: "Reported on 5/1/2023) 5 mL 0     No current facility-administered medications for this visit  No Known Allergies    PAST HISTORY    Past Medical History:   Diagnosis Date    Diabetes mellitus ([address] )     Elevated LFTs     last assessed 02Nov2015    Hyperlipidemia     Hypertension     Impaired fasting glucose     last assessed 29Jan2014       Past Surgical History:   Procedure Laterality Date    APPENDECTOMY      COLONOSCOPY      RI CRANIEC TREPHINE BONE FLP BRAIN TUMOR SUPRTENTOR Left 4/11/2023    Procedure: IMAGE-GUIDED LEFT FRONTAL CRANIOTOMY FOR RESECTION OF EXTRA-AXIAL & SKULL MASS, WITH CRANIOPLASTY, EXENTERATION/OBLITERATION OF FRONTAL SINUS; ORBITAL OSTEOTOMY;  Surgeon: Lizbet Montalvo MD;  Location: [facility];  Service: Neurosurgery       Social History     Tobacco Use    Smoking status: Never    Smokeless tobacco: Never   Vaping Use    Vaping Use: Never used   Substance Use Topics    Alcohol use: Yes     Alcohol/week: 3 0 standard drinks     Types: 3 Glasses of wine per week     Comment: drinks daily     Drug use: Never       Family History   Problem Relation Age of Onset    Diabetes Mother     Breast cancer Mother     Diabetes Father          The following portions of the patient's history were reviewed in this encounter and updated as appropriate: Past medical, surgical, family, and social history, as well as medications, allergies, and review of systems  EXAM    Vitals:Blood pressure 116/76, pulse 105, temperature 97 9 °F (36 6 °C), resp  rate 16, height 5' 9\" (1 753 m), weight 83 9 kg (185 lb)  ,Body mass index is 27 32 kg/m²       Physical Exam    Neurologic Exam      MEDICAL DECISION MAKING    Imaging Studies:     CT head wo contrast    Result Date: 4/13/2023  Narrative: CT BRAIN - WITHOUT CONTRAST INDICATION:   Craniotomy, post-op s/p  IMAGE-GUIDED LEFT FRONTAL CRANIOTOMY FOR RESECTION OF EXTRA-AXIAL & SKULL MASS, WITH CRANIOPLASTY, EXENTERATION/OBLITERATION OF FRONTAL " SINUS  COMPARISON:  MRI from the day before  TECHNIQUE:  CT examination of the brain was performed  Multiplanar 2D reformatted images were created from the source data  Radiation dose length product (DLP) for this visit:  878 65 mGy-cm   This examination, like all CT scans performed in the [facility], was performed utilizing techniques to minimize radiation dose exposure, including the use of iterative  reconstruction and automated exposure control  IMAGE QUALITY:  Diagnostic  FINDINGS: PARENCHYMA:  Patient is status post left frontal craniotomy and cranioplasty for the resection of a previously seen extra-axial mass involving the calvarium  There is a stable thin collection subjacent to the craniotomy site measuring 3 mm  There is edema and a small amount of hemorrhage within the left frontal lobe, stable  Degree of hemorrhage measures 8 x 7 mm  There is a small amount of pneumocephalus  There is no midline shift  There is no CT evidence for a large acute vascular distribution infarct  The basilar cisterns are patent  There is a small amount of encephalomalacia and gliosis along the inferior frontal lobe/gyrus recti, unchanged  VENTRICLES AND EXTRA-AXIAL SPACES:  Normal for the patient's age  VISUALIZED ORBITS: Normal visualized orbits  PARANASAL SINUSES: Postsurgical changes in the left frontal sinus  Status post resection of a portion of the left orbital roof  CALVARIUM AND EXTRACRANIAL SOFT TISSUES:  Please see above  Impression: Status post resection of a left frontal extra-axial/calvarial mass  Small amount of hemorrhage and edema within the left frontal lobe is unchanged since prior MRI  Workstation performed: [id number]     MRI brain w wo contrast    Result Date: 4/12/2023  Narrative: MRI BRAIN WITH AND WITHOUT CONTRAST INDICATION: Postoperative evaluation  COMPARISON: 1/24/2023 and 3/16/2023   TECHNIQUE: Multiplanar, multisequence imaging of the brain was performed before and after gadolinium administration  IV Contrast:  8 mL of Gadobutrol injection (SINGLE-DOSE)  IMAGE QUALITY:   Diagnostic  FINDINGS: BRAIN PARENCHYMA: Expected postoperative change from resection of left anterior frontal extra-axial mass  Minimal blood products in the resection cavity  Restricted diffusion along the margins of the resection cavity could indicate mild ischemic injury  Mild surrounding vasogenic edema  No abnormal enhancement to suggest residual tumor  VENTRICLES:  No hydrocephalus  SELLA AND PITUITARY GLAND:  No sellar enlargement  ORBITS:  Intact globes  No retro-orbital inflammation or proptosis  PARANASAL SINUSES:  Exenteration of the left frontal sinus  VASCULATURE:  Evaluation of the major intracranial vasculature demonstrates appropriate flow voids  CALVARIUM AND SKULL BASE:  Postsurgical change from left frontoparietal craniotomy  EXTRACRANIAL SOFT TISSUES:  No evidence of subgaleal hematoma or fluid collection  Impression: Expected postsurgical change from resection of left frontal mass  No evidence of residual tumor  Workstation performed: [id number]       I have personally reviewed pertinent reports  and I have personally reviewed pertinent films in PACS      PLEASE NOTE:  This encounter may have been completed utilizing the [employer] Direct Speech Voice Recognition Software  Grammatical errors, random word insertions, pronoun errors and incomplete sentences are occasional consequences of the system due to software limitations, ambient noise and hardware issues  These may be missed by proof reading prior to affixing electronic signature  Any questions or concerns about the content, text or information contained within the body of this dictation should be directly addressed to the advanced practitioner or physician for clarification

## 2023-05-08 ENCOUNTER — HOSPITAL ENCOUNTER (INPATIENT)
Facility: HOSPITAL | Age: 58
LOS: 5 days | Discharge: HOME | End: 2023-05-13
Attending: EMERGENCY MEDICINE | Admitting: NEUROLOGICAL SURGERY

## 2023-05-08 ENCOUNTER — APPOINTMENT (EMERGENCY)
Dept: RADIOLOGY | Facility: HOSPITAL | Age: 58
End: 2023-05-08

## 2023-05-08 ENCOUNTER — APPOINTMENT (INPATIENT)
Dept: RADIOLOGY | Facility: HOSPITAL | Age: 58
End: 2023-05-08

## 2023-05-08 DIAGNOSIS — D32.9 MENINGIOMA (HCC): ICD-10-CM

## 2023-05-08 DIAGNOSIS — Z98.890 STATUS POST CRANIOTOMY: ICD-10-CM

## 2023-05-08 DIAGNOSIS — G93.89 PNEUMOCEPHALUS: ICD-10-CM

## 2023-05-08 DIAGNOSIS — E11.9 CONTROLLED TYPE 2 DIABETES MELLITUS WITHOUT COMPLICATION, WITHOUT LONG-TERM CURRENT USE OF INSULIN (HCC): ICD-10-CM

## 2023-05-08 DIAGNOSIS — R22.0 FACIAL SWELLING: Primary | ICD-10-CM

## 2023-05-08 DIAGNOSIS — E11.9 TYPE 2 DIABETES MELLITUS WITHOUT COMPLICATION, WITHOUT LONG-TERM CURRENT USE OF INSULIN (HCC): ICD-10-CM

## 2023-05-08 LAB
ALBUMIN SERPL BCP-MCNC: 3.7 G/DL (ref 3.5–5)
ALP SERPL-CCNC: 73 U/L (ref 46–116)
ALT SERPL W P-5'-P-CCNC: 33 U/L (ref 12–78)
ANION GAP SERPL CALCULATED.3IONS-SCNC: 8 MMOL/L (ref 4–13)
APTT PPP: 24 SECONDS (ref 23–37)
AST SERPL W P-5'-P-CCNC: 16 U/L (ref 5–45)
ATRIAL RATE: 103 BPM
BACTERIA UR QL AUTO: NORMAL /HPF
BASOPHILS # BLD AUTO: 0.03 THOUSANDS/ÂΜL (ref 0–0.1)
BASOPHILS NFR BLD AUTO: 0 % (ref 0–1)
BILIRUB SERPL-MCNC: 0.96 MG/DL (ref 0.2–1)
BILIRUB UR QL STRIP: NEGATIVE
BUN SERPL-MCNC: 21 MG/DL (ref 5–25)
CALCIUM SERPL-MCNC: 9.3 MG/DL (ref 8.3–10.1)
CHLORIDE SERPL-SCNC: 101 MMOL/L (ref 96–108)
CLARITY UR: CLEAR
CO2 SERPL-SCNC: 25 MMOL/L (ref 21–32)
COLOR UR: COLORLESS
CREAT SERPL-MCNC: 0.87 MG/DL (ref 0.6–1.3)
EOSINOPHIL # BLD AUTO: 0.07 THOUSAND/ÂΜL (ref 0–0.61)
EOSINOPHIL NFR BLD AUTO: 1 % (ref 0–6)
ERYTHROCYTE [DISTWIDTH] IN BLOOD BY AUTOMATED COUNT: 12.2 % (ref 11.6–15.1)
GFR SERPL CREATININE-BSD FRML MDRD: 97 ML/MIN/1.73SQ M
GLUCOSE SERPL-MCNC: 211 MG/DL (ref 65–140)
GLUCOSE SERPL-MCNC: 247 MG/DL (ref 65–140)
GLUCOSE SERPL-MCNC: 323 MG/DL (ref 65–140)
GLUCOSE SERPL-MCNC: 330 MG/DL (ref 65–140)
GLUCOSE SERPL-MCNC: 351 MG/DL (ref 65–140)
GLUCOSE UR STRIP-MCNC: ABNORMAL MG/DL
HCT VFR BLD AUTO: 44 % (ref 36.5–49.3)
HGB BLD-MCNC: 15.7 G/DL (ref 12–17)
HGB UR QL STRIP.AUTO: NEGATIVE
IMM GRANULOCYTES # BLD AUTO: 0.02 THOUSAND/UL (ref 0–0.2)
IMM GRANULOCYTES NFR BLD AUTO: 0 % (ref 0–2)
INR PPP: 1.04 (ref 0.84–1.19)
KETONES UR STRIP-MCNC: ABNORMAL MG/DL
LACTATE SERPL-SCNC: 2.8 MMOL/L (ref 0.5–2)
LACTATE SERPL-SCNC: 3.4 MMOL/L (ref 0.5–2)
LACTATE SERPL-SCNC: 3.5 MMOL/L (ref 0.5–2)
LACTATE SERPL-SCNC: 3.5 MMOL/L (ref 0.5–2)
LEUKOCYTE ESTERASE UR QL STRIP: ABNORMAL
LYMPHOCYTES # BLD AUTO: 2.37 THOUSANDS/ÂΜL (ref 0.6–4.47)
LYMPHOCYTES NFR BLD AUTO: 27 % (ref 14–44)
MCH RBC QN AUTO: 31.9 PG (ref 26.8–34.3)
MCHC RBC AUTO-ENTMCNC: 35.7 G/DL (ref 31.4–37.4)
MCV RBC AUTO: 89 FL (ref 82–98)
MONOCYTES # BLD AUTO: 0.63 THOUSAND/ÂΜL (ref 0.17–1.22)
MONOCYTES NFR BLD AUTO: 7 % (ref 4–12)
NEUTROPHILS # BLD AUTO: 5.73 THOUSANDS/ÂΜL (ref 1.85–7.62)
NEUTS SEG NFR BLD AUTO: 65 % (ref 43–75)
NITRITE UR QL STRIP: NEGATIVE
NON-SQ EPI CELLS URNS QL MICRO: NORMAL /HPF
NRBC BLD AUTO-RTO: 0 /100 WBCS
P AXIS: 73 DEGREES
PH UR STRIP.AUTO: 6 [PH]
PLATELET # BLD AUTO: 174 THOUSANDS/UL (ref 149–390)
PLATELET # BLD AUTO: 178 THOUSANDS/UL (ref 149–390)
PMV BLD AUTO: 9.4 FL (ref 8.9–12.7)
PMV BLD AUTO: 9.5 FL (ref 8.9–12.7)
POTASSIUM SERPL-SCNC: 3.5 MMOL/L (ref 3.5–5.3)
PR INTERVAL: 160 MS
PROCALCITONIN SERPL-MCNC: 0.08 NG/ML
PROT SERPL-MCNC: 7.2 G/DL (ref 6.4–8.4)
PROT UR STRIP-MCNC: NEGATIVE MG/DL
PROTHROMBIN TIME: 13.8 SECONDS (ref 11.6–14.5)
QRS AXIS: 77 DEGREES
QRSD INTERVAL: 64 MS
QT INTERVAL: 308 MS
QTC INTERVAL: 403 MS
RBC # BLD AUTO: 4.92 MILLION/UL (ref 3.88–5.62)
RBC #/AREA URNS AUTO: NORMAL /HPF
SODIUM SERPL-SCNC: 134 MMOL/L (ref 135–147)
SP GR UR STRIP.AUTO: 1.03 (ref 1–1.03)
T WAVE AXIS: 32 DEGREES
UROBILINOGEN UR STRIP-ACNC: <2 MG/DL
VENTRICULAR RATE: 103 BPM
WBC # BLD AUTO: 8.85 THOUSAND/UL (ref 4.31–10.16)
WBC #/AREA URNS AUTO: NORMAL /HPF

## 2023-05-08 RX ORDER — INSULIN LISPRO 100 [IU]/ML
2-12 INJECTION, SOLUTION INTRAVENOUS; SUBCUTANEOUS
Status: DISCONTINUED | OUTPATIENT
Start: 2023-05-08 | End: 2023-05-10

## 2023-05-08 RX ORDER — LISINOPRIL 10 MG/1
10 TABLET ORAL DAILY
Status: DISCONTINUED | OUTPATIENT
Start: 2023-05-09 | End: 2023-05-13 | Stop reason: HOSPADM

## 2023-05-08 RX ORDER — DOCUSATE SODIUM 100 MG/1
100 CAPSULE, LIQUID FILLED ORAL 2 TIMES DAILY
Status: DISCONTINUED | OUTPATIENT
Start: 2023-05-08 | End: 2023-05-11

## 2023-05-08 RX ORDER — METRONIDAZOLE 500 MG/1
500 TABLET ORAL EVERY 8 HOURS SCHEDULED
Status: DISCONTINUED | OUTPATIENT
Start: 2023-05-08 | End: 2023-05-09

## 2023-05-08 RX ORDER — ACETAMINOPHEN 325 MG/1
650 TABLET ORAL EVERY 6 HOURS PRN
Status: DISCONTINUED | OUTPATIENT
Start: 2023-05-08 | End: 2023-05-09

## 2023-05-08 RX ORDER — CALCIUM CARBONATE 200(500)MG
1000 TABLET,CHEWABLE ORAL DAILY PRN
Status: DISCONTINUED | OUTPATIENT
Start: 2023-05-08 | End: 2023-05-13 | Stop reason: HOSPADM

## 2023-05-08 RX ORDER — SODIUM CHLORIDE, SODIUM GLUCONATE, SODIUM ACETATE, POTASSIUM CHLORIDE, MAGNESIUM CHLORIDE, SODIUM PHOSPHATE, DIBASIC, AND POTASSIUM PHOSPHATE .53; .5; .37; .037; .03; .012; .00082 G/100ML; G/100ML; G/100ML; G/100ML; G/100ML; G/100ML; G/100ML
125 INJECTION, SOLUTION INTRAVENOUS CONTINUOUS
Status: DISCONTINUED | OUTPATIENT
Start: 2023-05-08 | End: 2023-05-09

## 2023-05-08 RX ORDER — ONDANSETRON 2 MG/ML
4 INJECTION INTRAMUSCULAR; INTRAVENOUS EVERY 6 HOURS PRN
Status: DISCONTINUED | OUTPATIENT
Start: 2023-05-08 | End: 2023-05-13 | Stop reason: HOSPADM

## 2023-05-08 RX ORDER — ATORVASTATIN CALCIUM 40 MG/1
40 TABLET, FILM COATED ORAL DAILY
Status: DISCONTINUED | OUTPATIENT
Start: 2023-05-09 | End: 2023-05-13 | Stop reason: HOSPADM

## 2023-05-08 RX ORDER — ATORVASTATIN CALCIUM 40 MG/1
40 TABLET, FILM COATED ORAL DAILY
Status: DISCONTINUED | OUTPATIENT
Start: 2023-05-08 | End: 2023-05-08

## 2023-05-08 RX ORDER — POTASSIUM CHLORIDE 20 MEQ/1
40 TABLET, EXTENDED RELEASE ORAL ONCE
Status: COMPLETED | OUTPATIENT
Start: 2023-05-08 | End: 2023-05-08

## 2023-05-08 RX ORDER — HYDROCHLOROTHIAZIDE 12.5 MG/1
12.5 TABLET ORAL DAILY
Status: DISCONTINUED | OUTPATIENT
Start: 2023-05-09 | End: 2023-05-08

## 2023-05-08 RX ORDER — SODIUM CHLORIDE, SODIUM GLUCONATE, SODIUM ACETATE, POTASSIUM CHLORIDE, MAGNESIUM CHLORIDE, SODIUM PHOSPHATE, DIBASIC, AND POTASSIUM PHOSPHATE .53; .5; .37; .037; .03; .012; .00082 G/100ML; G/100ML; G/100ML; G/100ML; G/100ML; G/100ML; G/100ML
1000 INJECTION, SOLUTION INTRAVENOUS ONCE
Status: COMPLETED | OUTPATIENT
Start: 2023-05-08 | End: 2023-05-08

## 2023-05-08 RX ORDER — INSULIN GLARGINE 100 [IU]/ML
10 INJECTION, SOLUTION SUBCUTANEOUS
Status: DISCONTINUED | OUTPATIENT
Start: 2023-05-08 | End: 2023-05-09

## 2023-05-08 RX ORDER — VANCOMYCIN HYDROCHLORIDE 1 G/200ML
1000 INJECTION, SOLUTION INTRAVENOUS EVERY 12 HOURS
Status: DISCONTINUED | OUTPATIENT
Start: 2023-05-09 | End: 2023-05-09 | Stop reason: DRUGHIGH

## 2023-05-08 RX ORDER — INSULIN LISPRO 100 [IU]/ML
1-6 INJECTION, SOLUTION INTRAVENOUS; SUBCUTANEOUS
Status: DISCONTINUED | OUTPATIENT
Start: 2023-05-08 | End: 2023-05-08

## 2023-05-08 RX ORDER — HYDROCHLOROTHIAZIDE 12.5 MG/1
12.5 TABLET ORAL DAILY
Status: DISCONTINUED | OUTPATIENT
Start: 2023-05-08 | End: 2023-05-08

## 2023-05-08 RX ORDER — ENOXAPARIN SODIUM 100 MG/ML
40 INJECTION SUBCUTANEOUS DAILY
Status: DISCONTINUED | OUTPATIENT
Start: 2023-05-08 | End: 2023-05-13 | Stop reason: HOSPADM

## 2023-05-08 RX ORDER — SODIUM CHLORIDE, SODIUM GLUCONATE, SODIUM ACETATE, POTASSIUM CHLORIDE, MAGNESIUM CHLORIDE, SODIUM PHOSPHATE, DIBASIC, AND POTASSIUM PHOSPHATE .53; .5; .37; .037; .03; .012; .00082 G/100ML; G/100ML; G/100ML; G/100ML; G/100ML; G/100ML; G/100ML
125 INJECTION, SOLUTION INTRAVENOUS CONTINUOUS
Status: DISCONTINUED | OUTPATIENT
Start: 2023-05-08 | End: 2023-05-08

## 2023-05-08 RX ORDER — LISINOPRIL 10 MG/1
10 TABLET ORAL DAILY
Status: DISCONTINUED | OUTPATIENT
Start: 2023-05-08 | End: 2023-05-08

## 2023-05-08 RX ADMIN — SODIUM CHLORIDE, SODIUM GLUCONATE, SODIUM ACETATE, POTASSIUM CHLORIDE, MAGNESIUM CHLORIDE, SODIUM PHOSPHATE, DIBASIC, AND POTASSIUM PHOSPHATE 125 ML/HR: .53; .5; .37; .037; .03; .012; .00082 INJECTION, SOLUTION INTRAVENOUS at 14:02

## 2023-05-08 RX ADMIN — SODIUM CHLORIDE, SODIUM GLUCONATE, SODIUM ACETATE, POTASSIUM CHLORIDE, MAGNESIUM CHLORIDE, SODIUM PHOSPHATE, DIBASIC, AND POTASSIUM PHOSPHATE 125 ML/HR: .53; .5; .37; .037; .03; .012; .00082 INJECTION, SOLUTION INTRAVENOUS at 16:27

## 2023-05-08 RX ADMIN — INSULIN LISPRO 10 UNITS: 100 INJECTION, SOLUTION INTRAVENOUS; SUBCUTANEOUS at 21:27

## 2023-05-08 RX ADMIN — METRONIDAZOLE 500 MG: 500 TABLET ORAL at 14:22

## 2023-05-08 RX ADMIN — POTASSIUM CHLORIDE 40 MEQ: 1500 TABLET, EXTENDED RELEASE ORAL at 14:22

## 2023-05-08 RX ADMIN — INSULIN LISPRO 8 UNITS: 100 INJECTION, SOLUTION INTRAVENOUS; SUBCUTANEOUS at 17:23

## 2023-05-08 RX ADMIN — SODIUM CHLORIDE, SODIUM GLUCONATE, SODIUM ACETATE, POTASSIUM CHLORIDE, MAGNESIUM CHLORIDE, SODIUM PHOSPHATE, DIBASIC, AND POTASSIUM PHOSPHATE 125 ML/HR: .53; .5; .37; .037; .03; .012; .00082 INJECTION, SOLUTION INTRAVENOUS at 23:13

## 2023-05-08 RX ADMIN — INSULIN LISPRO 2 UNITS: 100 INJECTION, SOLUTION INTRAVENOUS; SUBCUTANEOUS at 14:02

## 2023-05-08 RX ADMIN — VANCOMYCIN HYDROCHLORIDE 2000 MG: 10 INJECTION, POWDER, LYOPHILIZED, FOR SOLUTION INTRAVENOUS at 16:03

## 2023-05-08 RX ADMIN — DOCUSATE SODIUM 100 MG: 100 CAPSULE, LIQUID FILLED ORAL at 17:23

## 2023-05-08 RX ADMIN — CALCIUM CARBONATE (ANTACID) CHEW TAB 500 MG 1000 MG: 500 CHEW TAB at 22:07

## 2023-05-08 RX ADMIN — CEFEPIME 2000 MG: 2 INJECTION, POWDER, FOR SOLUTION INTRAVENOUS at 14:16

## 2023-05-08 RX ADMIN — SODIUM CHLORIDE, SODIUM GLUCONATE, SODIUM ACETATE, POTASSIUM CHLORIDE, MAGNESIUM CHLORIDE, SODIUM PHOSPHATE, DIBASIC, AND POTASSIUM PHOSPHATE 1000 ML: .53; .5; .37; .037; .03; .012; .00082 INJECTION, SOLUTION INTRAVENOUS at 15:07

## 2023-05-08 RX ADMIN — METRONIDAZOLE 500 MG: 500 TABLET ORAL at 21:27

## 2023-05-08 RX ADMIN — SODIUM CHLORIDE 1000 ML: 0.9 INJECTION, SOLUTION INTRAVENOUS at 23:12

## 2023-05-08 RX ADMIN — GADOBUTROL 9 ML: 604.72 INJECTION INTRAVENOUS at 22:33

## 2023-05-08 RX ADMIN — INSULIN GLARGINE 10 UNITS: 100 INJECTION, SOLUTION SUBCUTANEOUS at 21:27

## 2023-05-08 RX ADMIN — ENOXAPARIN SODIUM 40 MG: 40 INJECTION SUBCUTANEOUS at 14:22

## 2023-05-08 NOTE — ASSESSMENT & PLAN NOTE
· S/p left total craniotomy with craniectomy and orbital osteotomy with resection of extradural mass  Exenteration/obliteration of left frontal sinus and custom synthetic cranioplasty on 4/11/2023 with Dr Leslie Fay  · Presenting with enlarging L frontal supraorbital collection   · Reported jello like discharge from nose 5/7 and again 5/8 with sneezing episode 5/7    Imaging:   · CT head 5/8/2023: Increase in small extra-axial fluid collection with worsening pneumocephalus resulting in mild compressive mass effect on the left anterior frontal lobe status post resection of atypical who grade 2 meningioma  Markedly worsened subgaleal air superficial left frontal synthetic cranioplasty flap  Persistent mild vasogenic edema in the left frontal lobe with interval resolution of small acute intraparenchymal hemorrhage  Plan:   · Continue close neurological monitoring  · CT imaging showing increased amount of intracranial pneumocephalus communicating both subgaleal and epidural through synthetic cranioplasty flap  · Patient without any obvious signs of infection on exam     · Lactic acid slightly elevated 2 8  · WBC normal   · Neuro critical care consultation for assistance with medical management  · Patient to be managed in SD1 level of care at this time to ensure no further progression and tension pneumocphalus  · Home medication reordered  · Patient placed on broad-spectrum antibiotics preventatively given likely communication with sinus and intracranial space  · MRI brain with and without contrast ordered  · CT sinus with thin slices ordered  · DVT ppx: SCD's and lovenox  · Neurosurgery will continue to monitor as primary team  Contact with any further needs

## 2023-05-08 NOTE — Clinical Note
Case was discussed with Neurosurgery and the patient's admission status was agreed to be Admission Status: inpatient status

## 2023-05-08 NOTE — UTILIZATION REVIEW
NOTIFICATION OF INPATIENT ADMISSION   AUTHORIZATION REQUEST   SERVICING FACILITY:   [facility]  Address: [address] [place] [address], [address] [place] [address]  Tax ID: [id number]  NPI: [id number] ATTENDING PROVIDER:  Attending Name and NPI#: Masoud Rodriguez [[id number]]  Address: [address] [place] [address], [address] [place] [id number]  Phone: [phone]   ADMISSION INFORMATION:  Place of Service: Inpatient [address] [facility]  [address]  Place of Service Code: 21  Inpatient Admission Date/Time: 5/8/23 11:07 AM  Discharge Date/Time: No discharge date for patient encounter  Admitting Diagnosis Code/Description:  Pneumocephalus [G93 89]  Meningioma ([address] ) [D32 9]  Facial swelling [R22 0]  Controlled type 2 diabetes mellitus without complication, without long-term current use of insulin ([address] ) [E11 9]     UTILIZATION REVIEW CONTACT:  Lyanne Felty, Utilization   Network Utilization Review Department  Phone: [phone]  Fax: [phone]  Email: [email or web address]  Contact for approvals/pending authorizations, clinical reviews, and discharge  PHYSICIAN ADVISORY SERVICES:  Medical Necessity Denial & Zcmf-kg-Izzs Review  Phone: [phone]  Fax: [phone]  Email: [email or web address]

## 2023-05-08 NOTE — ED PROVIDER NOTES
History  Chief Complaint   Patient presents with   • Head Swelling     Pt c/o increased left forehead swelling x1 day  4/11 brain surgery for tumor removal  Pt denies headache and dizziness  Pt A&O x4     Patient is a 51-year-old male, with a history significant for meningioma status post craniotomy/craniectomy/orbital osteotomy on 4/11, who presents to the ED today due to a one day history of left forehead swelling  Patient's symptoms started yesterday morning when he developed yellow rhinorrhea; after this began, the swelling started and has progressively worsened  There is associated diaphoresis but patient denies fever as well as chills  Patient denies associated trauma, new weakness or numbness, chest pain, dyspnea, vision change, dysphagia  Unlike what triage note states, patient denies, multiple times, headache and lightheadedness  No clear exacerbating or remitting factors  Patient is without other concerns at this time  Prior to Admission Medications   Prescriptions Last Dose Informant Patient Reported? Taking?    Empagliflozin (Jardiance) 10 MG TABS tablet   No No   Sig: Take 1 tablet (10 mg total) by mouth every morning   acetaminophen (TYLENOL) 325 mg tablet   No No   Sig: Take 3 tablets (975 mg total) by mouth every 8 (eight) hours   Patient taking differently: Take 650 mg by mouth if needed   atorvastatin (LIPITOR) 40 mg tablet   No No   Sig: Take 1 tablet (40 mg total) by mouth daily   docusate sodium (COLACE) 100 mg capsule   No No   Sig: Take 1 capsule (100 mg total) by mouth 2 (two) times a day   Patient not taking: Reported on 5/1/2023   glucose blood (Accu-Chek Guide) test strip   No No   Sig: Use 1 each 2 (two) times a day Use as instructed   glucose blood (OneTouch Verio) test strip   No No   Sig: Use 1 each 2 (two) times a day Use as instructed   levETIRAcetam (KEPPRA) 500 mg tablet   No No   Sig: Take 1 tablet (500 mg total) by mouth every 12 (twelve) hours for 8 doses   Patient not taking: Reported on 2023   levothyroxine 137 mcg tablet   No No   Sig: Take 1 tablet by mouth once daily   lidocaine (Lidoderm) 5 %   No No   Sig: Apply 1 patch topically over 12 hours daily for 15 days Remove & Discard patch within 12 hours or as directed by MD   Patient not taking: Reported on 2023   lisinopril-hydrochlorothiazide (PRINZIDE,ZESTORETIC) 10-12 5 MG per tablet   No No   Sig: Take 1 tablet by mouth daily   metFORMIN (GLUCOPHAGE) 1000 MG tablet   No No   Sig: TAKE 1 TABLET BY MOUTH TWICE DAILY WITH MEALS   neomycin-polymyxin-dexamethasone (MAXITROL) ophthalmic suspension   No No   Si drop in both eyes every 4 hours while awake  Patient not taking: Reported on 2023   tobramycin-dexamethasone (TOBRADEX) ophthalmic suspension   No No   Sig: Administer 1 drop to both eyes every 4 (four) hours while awake for 7 days      Facility-Administered Medications: None       Past Medical History:   Diagnosis Date   • Diabetes mellitus ([facility] [address] )    • Elevated LFTs     last assessed    • Hyperlipidemia    • Hypertension    • Impaired fasting glucose     last assessed 2014       Past Surgical History:   Procedure Laterality Date   • APPENDECTOMY     • COLONOSCOPY     • FL CRANIEC TREPHINE BONE FLP BRAIN TUMOR SUPRTENTOR Left 2023    Procedure: IMAGE-GUIDED LEFT FRONTAL CRANIOTOMY FOR RESECTION OF EXTRA-AXIAL & SKULL MASS, WITH CRANIOPLASTY, EXENTERATION/OBLITERATION OF FRONTAL SINUS; ORBITAL OSTEOTOMY;  Surgeon: Vj Purcell MD;  Location: [facility];  Service: Neurosurgery       Family History   Problem Relation Age of Onset   • Diabetes Mother    • Breast cancer Mother    • Diabetes Father      I have reviewed and agree with the history as documented      E-Cigarette/Vaping   • E-Cigarette Use Never User      E-Cigarette/Vaping Substances   • Nicotine No    • THC No    • CBD No    • Flavoring No    • Other No    • Unknown No      Social History     Tobacco Use   • Smoking status: Never   • Smokeless tobacco: Never   Vaping Use   • Vaping Use: Never used   Substance Use Topics   • Alcohol use: Yes     Alcohol/week: 3 0 standard drinks     Types: 3 Glasses of wine per week     Comment: drinks daily    • Drug use: Never        Review of Systems   Constitutional: Positive for diaphoresis  Negative for chills and fever  HENT: Positive for rhinorrhea  Eyes: Negative for visual disturbance  Respiratory: Negative for shortness of breath  Cardiovascular: Negative for chest pain  Gastrointestinal: Negative for abdominal pain  Endocrine: Negative for polyuria  Genitourinary: Negative for dysuria  Musculoskeletal: Negative for gait problem  Allergic/Immunologic: Negative for environmental allergies  Neurological: Negative for weakness and numbness  Hematological: Negative for adenopathy  Psychiatric/Behavioral: Negative for confusion  All other systems reviewed and are negative  Physical Exam  ED Triage Vitals [05/08/23 0849]   Temperature Pulse Respirations Blood Pressure SpO2   (!) 97 4 °F (36 3 °C) (!) 119 18 (!) 161/102 98 %      Temp Source Heart Rate Source Patient Position - Orthostatic VS BP Location FiO2 (%)   Temporal Monitor Sitting Left arm --      Pain Score       No Pain             Orthostatic Vital Signs  Vitals:    05/08/23 0849 05/08/23 0901   BP: (!) 161/102 138/80   Pulse: (!) 119 (!) 111   Patient Position - Orthostatic VS: Sitting        Physical Exam  Vitals and nursing note reviewed  Constitutional:       General: He is not in acute distress  Appearance: He is not toxic-appearing or diaphoretic  Comments: Patient appears comfortable during my evaluation    HENT:      Head: Normocephalic  Comments: Left forehead mass, fluctuant, non-erythematous      Right Ear: External ear normal       Left Ear: External ear normal       Nose: Nose normal  No rhinorrhea        Mouth/Throat:      Mouth: Mucous membranes are moist  Pharynx: Oropharynx is clear  No oropharyngeal exudate or posterior oropharyngeal erythema  Comments: Uvula midline  No oropharyngeal or submandibular mass/swelling  Eyes:      General: No scleral icterus  Right eye: No discharge  Left eye: No discharge  Conjunctiva/sclera: Conjunctivae normal       Pupils: Pupils are equal, round, and reactive to light  Neck:      Comments: Patient is spontaneously rotating their neck to the left and right during the history and physical exam interaction without difficulty or apparent discomfort    Cardiovascular:      Rate and Rhythm: Normal rate and regular rhythm  Pulses: Normal pulses  Heart sounds: Normal heart sounds  No murmur heard  No friction rub  No gallop  Comments: 2+ Radial  Pulmonary:      Effort: Pulmonary effort is normal  No respiratory distress  Breath sounds: Normal breath sounds  No stridor  No wheezing, rhonchi or rales  Abdominal:      General: Abdomen is flat  There is no distension  Palpations: Abdomen is soft  Tenderness: There is no abdominal tenderness  There is no right CVA tenderness, left CVA tenderness, guarding or rebound  Musculoskeletal:         General: No deformity  Cervical back: Neck supple  No tenderness  No muscular tenderness  Lymphadenopathy:      Cervical: No cervical adenopathy  Skin:     General: Skin is warm and dry  Capillary Refill: Capillary refill takes less than 2 seconds  Neurological:      Mental Status: He is alert  Comments: Cranial nerves 2-12 intact  5/5 strength in all four extremities  Sensation to light touch intact in all four extremities  Patient able to ambulate in the ED  Patient is speaking clearly in complete sentences  Patient is answering appropriately and able follow commands     Psychiatric:         Mood and Affect: Mood normal          Behavior: Behavior normal          ED Medications  Medications - No data to display    Diagnostic Studies  Results Reviewed     Procedure Component Value Units Date/Time    Procalcitonin [[id number]]  (Normal) Collected: 05/08/23 0922    Lab Status: Final result Specimen: Blood from Arm, Right Updated: 05/08/23 1048     Procalcitonin 0 08 ng/ml     UA w Reflex to Microscopic w Reflex to Culture [[id number]]  (Abnormal) Collected: 05/08/23 0957    Lab Status: Final result Specimen: Urine, Clean Catch Updated: 05/08/23 1029     Color, UA Colorless     Clarity, UA Clear     Specific Gravity, UA 1 027     pH, UA 6 0     Leukocytes, UA Elevated glucose may cause decreased leukocyte values  See urine microscopic for [facility] result/     Nitrite, UA Negative     Protein, UA Negative mg/dl      Glucose, UA >=1000 (1%) mg/dl      Ketones, UA Trace mg/dl      Urobilinogen, UA <2 0 mg/dl      Bilirubin, UA Negative     Occult Blood, UA Negative    Urine Microscopic [[id number]] Collected: 05/08/23 0957    Lab Status: In process Specimen: Urine, Clean Catch Updated: 05/08/23 1028    Lactic acid [[id number]]  (Abnormal) Collected: 05/08/23 0922    Lab Status: Final result Specimen: Blood from Arm, Right Updated: 05/08/23 1012     LACTIC ACID 2 8 mmol/L     Narrative:      Result may be elevated if tourniquet was used during collection      Lactic acid 2 Hours [[id number]]     Lab Status: No result Specimen: Blood     Protime-INR [[id number]]  (Normal) Collected: 05/08/23 0922    Lab Status: Final result Specimen: Blood from Arm, Right Updated: 05/08/23 1007     Protime 13 8 seconds      INR 1 04    APTT [[id number]]  (Normal) Collected: 05/08/23 0922    Lab Status: Final result Specimen: Blood from Arm, Right Updated: 05/08/23 1007     PTT 24 seconds     Comprehensive metabolic panel [[id number]]  (Abnormal) Collected: 05/08/23 0922    Lab Status: Final result Specimen: Blood from Arm, Right Updated: 05/08/23 1004     Sodium 134 mmol/L      Potassium 3 5 mmol/L      Chloride 101 mmol/L      CO2 25 mmol/L      ANION GAP 8 mmol/L      BUN 21 mg/dL      Creatinine 0 87 mg/dL      Glucose 330 mg/dL      Calcium 9 3 mg/dL      AST 16 U/L      ALT 33 U/L      Alkaline Phosphatase 73 U/L      Total Protein 7 2 g/dL      Albumin 3 7 g/dL      Total Bilirubin 0 96 mg/dL      eGFR 97 ml/min/1 73sq m     Narrative:      National Kid[employer] Disease Foundation guidelines for Chronic Kidney Disease (CKD):   •  Stage 1 with normal or high GFR (GFR > 90 mL/min/1 73 square meters)  •  Stage 2 Mild CKD (GFR = 60-89 mL/min/1 73 square meters)  •  Stage 3A Moderate CKD (GFR = 45-59 mL/min/1 73 square meters)  •  Stage 3B Moderate CKD (GFR = 30-44 mL/min/1 73 square meters)  •  Stage 4 Severe CKD (GFR = 15-29 mL/min/1 73 square meters)  •  Stage 5 End Stage CKD (GFR <15 mL/min/1 73 square meters)  Note: GFR calculation is accurate only with a steady state creatinine    CBC and differential [[id number]] Collected: 05/08/23 0922    Lab Status: Final result Specimen: Blood from Arm, Right Updated: 05/08/23 0945     WBC 8 85 Thousand/uL      RBC 4 92 Million/uL      Hemoglobin 15 7 g/dL      Hematocrit 44 0 %      MCV 89 fL      MCH 31 9 pg      MCHC 35 7 g/dL      RDW 12 2 %      MPV 9 5 fL      Platelets 327 Thousands/uL      nRBC 0 /100 WBCs      Neutrophils Relative 65 %      Immat GRANS % 0 %      Lymphocytes Relative 27 %      Monocytes Relative 7 %      Eosinophils Relative 1 %      Basophils Relative 0 %      Neutrophils Absolute 5 73 Thousands/µL      Immature Grans Absolute 0 02 Thousand/uL      Lymphocytes Absolute 2 37 Thousands/µL      Monocytes Absolute 0 63 Thousand/µL      Eosinophils Absolute 0 07 Thousand/µL      Basophils Absolute 0 03 Thousands/µL     Blood culture #2 [[id number]] Collected: 05/08/23 9794    Lab Status: In process Specimen: Blood from Arm, Right Updated: 05/08/23 0932    Blood culture #1 [[id number]] Collected: 05/08/23 0922    Lab Status:  In process Specimen: Blood from Arm, Left Updated: 05/08/23 0932                 CT head without contrast   Final Result by Cecil Paulino MD (05/08 1035)      Interval increase in small extra-axial fluid collection with worsened pneumocephalus resulting in mild compressive mass effect on left anterior frontal lobe status post resection of left frontal atypical 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)  Markedly worsened    subgaleal air superficial to left frontal synthetic cranioplasty status post removal of left subgaleal drain  Findings are concerning CSF leak communicating with left frontal sinus +/- infection  Recommend neurosurgical consultation for further    evaluation  Persistent mild vasogenic edema in left frontal lobe with interval resolution of small acute intraparenchymal parenchymal hemorrhage in left frontal lobe  Sinus disease, severe in left frontal sinus  I personally discussed this study with Sallie Shields on 5/8/2023 10:32 AM                               Workstation performed: [id number]               Procedures  Procedures      ED Course  ED Course as of 05/08/23 1108   Mon May 08, 2023   0921 I personally discussed case with ZOIE Nick from neurosurgery   1013 LACTIC ACID(!!): 2 8  High   1013 WBC: 8 85  WNL   1040 Per Berback from NS: Hold on antibiotics for now                             SBIRT Villegas's Most Recent Value   Initial Alcohol Screen: US AUDIT-C     1  How often do you have a drink containing alcohol? 0 Filed at: 05/08/2023 0852   2  How many drinks containing alcohol do you have on a typical day you are drinking? 0 Filed at: 05/08/2023 0852   3a  Male UNDER 65: How often do you have five or more drinks on one occasion? 0 Filed at: 05/08/2023 0852   3b  FEMALE Any Age, or MALE 65+: How often do you have 4 or more drinks on one occassion? 0 Filed at: 05/08/2023 0852   Audit-C Score 0 Filed at: 05/08/2023 5684   PEPE: How many times in the past year have you        Used an illegal drug or used a prescription medication for non-medical reasons? Never Filed at: 05/08/2023 9640                Medical Decision Making  Patient is a 72-year-old male, with a history significant for meningioma status post craniotomy/craniectomy/orbital osteotomy on 4/11, who presents to the ED today due to a one day history of left forehead swelling  Patient's symptoms started yesterday morning when he developed yellow rhinorrhea; after this began, the swelling started and has progressively worsened  There is associated diaphoresis but patient denies fever as well as chills  Patient denies associated trauma, new weakness or numbness, chest pain, dyspnea, vision change, dysphagia  Unlike what triage note states, patient denies, multiple times, headache and lightheadedness  Patient is currently afebrile, tachycardic, otherwise stable  His physical exam is notable for fluctuant left forehead swelling that is not warm and not erythematous, clear heart and lungs, no focal neurologic deficits  This presentation is concerning for: CSF leak  I also considered open skull defect  Will investigate with sepsis panel order set, CT head  Will consult with neurosurgery  Will manage based on work-up/recommendations     - No language barrier    - History obtained from patient  - There are no limitations to the history obtained  - External record review performed of previous charting was performed by me  - I independently reviewed and interpreted ordered labs from this encounter   - Patient's medication regimen reviewed  - Patient's comorbidities factored into diagnosis considerations and disposition planning    - I discussed the patient's need for admission with Dr Tyshawn Ely  Amount and/or Complexity of Data Reviewed  Labs: ordered  Decision-making details documented in ED Course  Radiology: ordered  Risk  Decision regarding hospitalization              Disposition  Final diagnoses:   Facial swelling   Pneumocephalus     Time reflects when diagnosis was documented in both MDM as applicable and the Disposition within this note     Time User Action Codes Description Comment    5/8/2023  9:14 AM Maple Lancaster A Add [R22 0] Facial swelling     5/8/2023 11:05 AM Maple Lancaster A Add [G93 [id number]] Pneumocephalus       ED Disposition     ED Disposition   Admit    Condition   Stable    Date/Time   Mon May 8, 2023 11:07 AM    Comment   Case was discussed with Neurosurgery and the patient's admission status was agreed to be Admission Status: inpatient status to the service of Dr Blayne Murrieta    None         Patient's Medications   Discharge Prescriptions    No medications on file     No discharge procedures on file  PDMP Review     None           ED Provider  Attending physically available and evaluated Candiss Moritz I managed the patient along with the ED Attending      Electronically Signed by         Elda Paz MD  05/08/23 0710

## 2023-05-08 NOTE — CONSULTS
"Consult - Marcial 56 y o  male MRN: [id number]  Unit/Bed#: ED 08 Encounter: [id number]      -------------------------------------------------------------------------------------------------------------  Chief Complaint: left frontal swelling and yellow leakage from nostril     History of Present Illness     Israel Meyer is a 56 y o  male is status post craniotomy for brain mass resection in April 2033 who started having sneezing often on Sunday along with a \"light yellow jello\" fluid coming out of his left nostril  Mentioned he took an allergy medication that apparently stopped the sneezing but this early morning, noticed a fluctuant swelling on his left forehead that has been progressively growing  Denies pain around the swelling and denies headaches  Some numbness in the left sinus  No change in vision and hearing  Denies fevers/chills, mentions mild diaphoresis  Denies chest pain, dyspnea, abdominal pain, swelling      History obtained from the patient   -------------------------------------------------------------------------------------------------------------  Assessment and Plan:    Neuro:   • Diagnosis: 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  o Plan:   - CT sinus w/ contrast Dubach study complete- confirms new pneumocephalus with CSF leak communicating with left frontal sinus +- infection and \"Internval rupture of extra-axial fluid collection within differential\"  - Ordered MRI head w/ & w/o contrast  - Potential procedure to repair leak if needed  - CSF collection if nostril secretions occurs again  - Start IV vancomycin 2000 mg+IV cefepime 2000 mg+PO Flagyl 500 mg  - 2 hour neuro checks    CV:   • Diagnosis: Hypertension  o Plan:   - Continue home med lisinopril 10 mg daily  - Holding HCTZ  - Reassess blood pressure for possible additional therapy  • Diagnosis: Hyperlipidemia  o Plan: Continue home med atorvastatin 40 mg " daily      Pulm:  • Diagnosis: No active issues      GI:   • Diagnosis: No active issues      :   • Diagnosis: No active issues      F/E/N:   • Plan:   o Fluids- IV Isolyte continuous 125ml/hr for rising lactic acid  o Electrolytes:  - Potassium chloride tablets 40 mEq  o Nutrition- diabetic diet      Heme/Onc:  • Diagnosis: DVT prophylaxis  o Plan: Lovenox subcutaneous injection 40 mg       Endo:   • Diagnosis: Hypothyroidism  o Plan: Continue home med levothyroxine 136 mcg oral daily  • Diagnosis: Type 2 diabetes mellitus  o Plan:   - Currently on insulin sliding scale  - Start Lantus nighttime dose at 10 units  - Continue to monitor glucose levels    ID: No active issue      MSK/Skin: No active issue    Disposition: Continue Stepdown Level 1 level of care   Code Status: Prior  --------------------------------------------------------------------------------------------------------------  Review of Systems   Constitutional: Positive for diaphoresis (Mild)  Negative for chills and fever  HENT: Positive for facial swelling (Left forehead)  Negative for hearing loss, rhinorrhea (Clear nostrils currently), sinus pain and sneezing  Eyes: Negative for redness  Respiratory: Negative for cough and shortness of breath  Cardiovascular: Negative for chest pain and leg swelling  Gastrointestinal: Negative for abdominal distention and abdominal pain  Skin: Negative for color change  Neurological: Positive for numbness (Mild numbness around left sinus)  Negative for dizziness, weakness and headaches  Psychiatric/Behavioral: The patient is not nervous/anxious  A 12-point, complete review of systems was reviewed and negative except as stated above     Physical Exam  Constitutional:       General: He is not in acute distress  Appearance: He is not ill-appearing  HENT:      Head: Normocephalic  Nose: Nose normal  No rhinorrhea        Mouth/Throat:      Mouth: Mucous membranes are moist  Pharynx: Oropharynx is clear  Eyes:      General:         Right eye: No discharge  Left eye: No discharge  Extraocular Movements: Extraocular movements intact  Conjunctiva/sclera: Conjunctivae normal    Cardiovascular:      Rate and Rhythm: Regular rhythm  Tachycardia present  Heart sounds: Normal heart sounds  No murmur heard  No gallop  Pulmonary:      Effort: Pulmonary effort is normal  No respiratory distress  Breath sounds: Normal breath sounds  No wheezing  Abdominal:      General: Abdomen is flat  Bowel sounds are normal  There is no distension  Palpations: Abdomen is soft  Tenderness: There is no abdominal tenderness  Musculoskeletal:      Cervical back: Normal range of motion and neck supple  Right lower leg: No edema  Left lower leg: No edema  Skin:     General: Skin is warm and dry  Capillary Refill: Capillary refill takes less than 2 seconds  Neurological:      General: No focal deficit present  Mental Status: He is alert  Cranial Nerves: Cranial nerves 2-12 are intact  Sensory: Sensation is intact  Motor: Motor function is intact  Coordination: Coordination is intact  Finger-Nose-Finger Test normal    Psychiatric:         Mood and Affect: Mood normal          Behavior: Behavior normal          --------------------------------------------------------------------------------------------------------------  Vitals:   Vitals:    05/08/23 0849 05/08/23 0901 05/08/23 1127   BP: (!) 161/102 138/80 138/80   BP Location: Left arm  Right arm   Pulse: (!) 119 (!) 111 100   Resp: 18  20   Temp: (!) 97 4 °F (36 3 °C)     TempSrc: Temporal     SpO2: 98%  96%     Temp  Min: 97 4 °F (36 3 °C)  Max: 97 4 °F (36 3 °C)        There is no height or weight on file to calculate BMI    N/A    Laboratory and Diagnostics:  Results from last 7 days   Lab Units 05/08/23  0922   WBC Thousand/uL 8 85   HEMOGLOBIN g/dL 15 7   HEMATOCRIT % 44 0 PLATELETS Thousands/uL 174   NEUTROS PCT % 65   MONOS PCT % 7     Results from last 7 days   Lab Units 05/08/23  0922   SODIUM mmol/L 134*   POTASSIUM mmol/L 3 5   CHLORIDE mmol/L 101   CO2 mmol/L 25   ANION GAP mmol/L 8   BUN mg/dL 21   CREATININE mg/dL 0 87   CALCIUM mg/dL 9 3   GLUCOSE RANDOM mg/dL 330*   ALT U/L 33   AST U/L 16   ALK PHOS U/L 73   ALBUMIN g/dL 3 7   TOTAL BILIRUBIN mg/dL 0 96          Results from last 7 days   Lab Units 05/08/23  0922   INR  1 04   PTT seconds 24          Results from last 7 days   Lab Units 05/08/23  0922   LACTIC ACID mmol/L 2 8*     ABG:    VBG:    Results from last 7 days   Lab Units 05/08/23  0922   PROCALCITONIN ng/ml 0 08       Micro:  Results from last 7 days   Lab Units 05/08/23  0745   BLOOD CULTURE  Received in Microbiology Lab  Culture in Progress  Received in Microbiology Lab  Culture in Progress  EKG:   Imaging: I have personally reviewed pertinent reports        Historical Information   Past Medical History:   Diagnosis Date   • Diabetes mellitus ([facility] [address] )    • Elevated LFTs     last assessed 02Nov2015   • Hyperlipidemia    • Hypertension    • Impaired fasting glucose     last assessed 29Jan2014     Past Surgical History:   Procedure Laterality Date   • APPENDECTOMY     • COLONOSCOPY     • OK CRANIEC TREPHINE BONE FLP BRAIN TUMOR SUPRTENTOR Left 4/11/2023    Procedure: IMAGE-GUIDED LEFT FRONTAL CRANIOTOMY FOR RESECTION OF EXTRA-AXIAL & SKULL MASS, WITH CRANIOPLASTY, EXENTERATION/OBLITERATION OF FRONTAL SINUS; ORBITAL OSTEOTOMY;  Surgeon: Donna Salinas MD;  Location: [facility];  Service: Neurosurgery     Social History   Social History     Substance and Sexual Activity   Alcohol Use Yes   • Alcohol/week: 3 0 standard drinks   • Types: 3 Glasses of wine per week    Comment: drinks daily      Social History     Substance and Sexual Activity   Drug Use Never     Social History     Tobacco Use   Smoking Status Never   Smokeless Tobacco Never     Exercise History:   Family History:   Family History   Problem Relation Age of Onset   • Diabetes Mother    • Breast cancer Mother    • Diabetes Father      I have reviewed this patient's family history and commented on sigificant items within the HPI      Medications:  Current Facility-Administered Medications   Medication Dose Route Frequency   • insulin lispro (HumaLOG) 100 units/mL subcutaneous injection 1-6 Units  1-6 Units Subcutaneous 4x Daily (AC & HS)     Home medications:  Prior to Admission Medications   Prescriptions Last Dose Informant Patient Reported? Taking?    Empagliflozin (Jardiance) 10 MG TABS tablet   No No   Sig: Take 1 tablet (10 mg total) by mouth every morning   acetaminophen (TYLENOL) 325 mg tablet Unknown  No No   Sig: Take 3 tablets (975 mg total) by mouth every 8 (eight) hours   Patient taking differently: Take 650 mg by mouth if needed   atorvastatin (LIPITOR) 40 mg tablet 5/8/2023  No Yes   Sig: Take 1 tablet (40 mg total) by mouth daily   docusate sodium (COLACE) 100 mg capsule   No No   Sig: Take 1 capsule (100 mg total) by mouth 2 (two) times a day   Patient not taking: Reported on 5/1/2023   glucose blood (Accu-Chek Guide) test strip   No No   Sig: Use 1 each 2 (two) times a day Use as instructed   glucose blood (OneTouch Verio) test strip   No No   Sig: Use 1 each 2 (two) times a day Use as instructed   levETIRAcetam (KEPPRA) 500 mg tablet   No No   Sig: Take 1 tablet (500 mg total) by mouth every 12 (twelve) hours for 8 doses   Patient not taking: Reported on 5/1/2023   levothyroxine 137 mcg tablet   No No   Sig: Take 1 tablet by mouth once daily   lidocaine (Lidoderm) 5 %   No No   Sig: Apply 1 patch topically over 12 hours daily for 15 days Remove & Discard patch within 12 hours or as directed by MD   Patient not taking: Reported on 5/1/2023   lisinopril-hydrochlorothiazide (PRINZIDE,ZESTORETIC) 10-12 5 MG per tablet   No No   Sig: Take 1 tablet by mouth daily   metFORMIN (GLUCOPHAGE) "1000 MG tablet   No No   Sig: TAKE 1 TABLET BY MOUTH TWICE DAILY WITH MEALS   neomycin-polymyxin-dexamethasone (MAXITROL) ophthalmic suspension   No No   Si drop in both eyes every 4 hours while awake  Patient not taking: Reported on 2023   tobramycin-dexamethasone (TOBRADEX) ophthalmic suspension   No No   Sig: Administer 1 drop to both eyes every 4 (four) hours while awake for 7 days      Facility-Administered Medications: None     Allergies:  No Known Allergies  ------------------------------------------------------------------------------------------------------------  Advance Directive and Living Will:      Power of :    POLST:    ------------------------------------------------------------------------------------------------------------  Anticipated Length of Stay is > 2 midnights    Care Time Delivered:   No Critical Care time spent       [facility]      Portions of the record may have been created with voice recognition software  Occasional wrong word or \"sound a like\" substitutions may have occurred due to the inherent limitations of voice recognition software    Read the chart carefully and recognize, using context, where substitutions have occurred  "

## 2023-05-08 NOTE — ED ATTENDING ATTESTATION
5/8/2023  I, Morgan Clemons MD, saw and evaluated the patient  I have discussed the patient with the resident/non-physician practitioner and agree with the resident's/non-physician practitioner's findings, Plan of Care, and MDM as documented in the resident's/non-physician practitioner's note, except where noted  All available labs and Radiology studies were reviewed  I was present for key portions of any procedure(s) performed by the resident/non-physician practitioner and I was immediately available to provide assistance  At this point I agree with the current assessment done in the Emergency Department  I have conducted an independent evaluation of this patient a history and physical is as follows:    ED Course     Patient presents for evaluation due to 1 day of increasing left forehead swelling  Patient underwent meningioma resection with craniotomy/cranioplasty on 4/11  Yesterday, patient reports noticing some rhinorrhea after which she started noticing some swelling in the area  He denies any headache, numbness, or weakness  No trauma  No additional complaints  A/P: Left forehead swelling, concern for postop complication  Will CT head to evaluate for CSF leak or other postoperative complication and discussed with neurosurgery      Critical Care Time  Procedures

## 2023-05-08 NOTE — H&P
[facility]  H&P  Name: Jessica Elias 56 y o  male I MRN: [id number]  Unit/Bed#: ED 08 I Date of Admission: 5/8/2023   Date of Service: 5/8/2023 I Hospital Day: 0      Assessment/Plan   Meningioma [facility])  Assessment & Plan  · S/p left total craniotomy with craniectomy and orbital osteotomy with resection of extradural mass  Exenteration/obliteration of left frontal sinus and custom synthetic cranioplasty on 4/11/2023 with Dr Haydee Garland  · Presenting with enlarging L frontal supraorbital collection   · Reported jello like discharge from nose 5/7 and again 5/8 with sneezing episode 5/7    Imaging:   · CT head 5/8/2023: Increase in small extra-axial fluid collection with worsening pneumocephalus resulting in mild compressive mass effect on the left anterior frontal lobe status post resection of atypical who grade 2 meningioma  Markedly worsened subgaleal air superficial left frontal synthetic cranioplasty flap  Persistent mild vasogenic edema in the left frontal lobe with interval resolution of small acute intraparenchymal hemorrhage  Plan:   · Continue close neurological monitoring  · CT imaging showing increased amount of intracranial pneumocephalus communicating both subgaleal and epidural through synthetic cranioplasty flap    · Patient without any obvious signs of infection on exam     · Lactic acid slightly elevated 2 8  · WBC normal   · Neuro critical care consultation for assistance with medical management  · Patient to be managed in SD1 level of care at this time to ensure no further progression and tension pneumocphalus  · Home medication reordered  · Patient placed on broad-spectrum antibiotics preventatively given likely communication with sinus and intracranial space  · MRI brain with and without contrast ordered  · CT sinus with thin slices ordered  · DVT ppx: SCD's and lovenox  · Neurosurgery will continue to monitor as primary team  Contact with any further needs          History of Present Illness   HPI: Arpit Kent is a 56 y o  male with PMH including diabetes, hyperlipidemia, hypertension, meningioma status post craniotomy 4/11 who presents with complaint of facial swelling for couple hours  Patient underwent left frontal craniectomy, exoneration of left frontal sinus and cranioplasty for who grade 2 meningioma  Patient was seen postoperatively on 5/1 and was recovering very well without any acute issues or concerns  Unfortunately yesterday morning on 5/7 patient noted some drainage from his nose he noted the drainage to be small amount of jellylike consistency that then resolved spontaneously  He noted no other active continuous drainage  Later in the day he had an episode of sneezing  Upon awakening this morning he noted again some gel-like consistency drainage from his nose around 5 AM   He also noted to have a little bit of swelling over his left eye at this time  After allowing it several hours he then noted the collection to be larger prompting his presentation to the ER  CT imaging demonstrates significant pneumocephalus both superficial and deep to the cranioplasty site with some mild mass effect on the adjacent underlying brain  Concern for breach of previous left frontal sinus closure  Patient currently is neurologically intact he has no focal deficits  He has a fluctuant collection above his left eye that is nontender in nature  He denies any altered mental status or changes in speech  Review of Systems   Constitutional: Negative for activity change, appetite change and fatigue  HENT: Positive for facial swelling (L forehead just above the L eye )  Eyes: Negative for visual disturbance  Respiratory: Negative  Cardiovascular: Negative  Gastrointestinal: Negative  Genitourinary: Negative  Musculoskeletal: Negative  Neurological: Negative  Hematological: Negative  Psychiatric/Behavioral: Negative  Historical Information   Past Medical History:   Diagnosis Date   • Diabetes mellitus ([facility] [address] )    • Elevated LFTs     last assessed 02Nov2015   • Hyperlipidemia    • Hypertension    • Impaired fasting glucose     last assessed 29Jan2014     Past Surgical History:   Procedure Laterality Date   • APPENDECTOMY     • COLONOSCOPY     • CT CRANIEC TREPHINE BONE FLP BRAIN TUMOR SUPRTENTOR Left 4/11/2023    Procedure: IMAGE-GUIDED LEFT FRONTAL CRANIOTOMY FOR RESECTION OF EXTRA-AXIAL & SKULL MASS, WITH CRANIOPLASTY, EXENTERATION/OBLITERATION OF FRONTAL SINUS; ORBITAL OSTEOTOMY;  Surgeon: Kassie Araiza MD;  Location: [facility];  Service: Neurosurgery     Social History     Substance and Sexual Activity   Alcohol Use Yes   • Alcohol/week: 3 0 standard drinks   • Types: 3 Glasses of wine per week    Comment: drinks daily      Social History     Substance and Sexual Activity   Drug Use Never     Social History     Tobacco Use   Smoking Status Never   Smokeless Tobacco Never     Family History   Problem Relation Age of Onset   • Diabetes Mother    • Breast cancer Mother    • Diabetes Father        Meds/Allergies   all current active meds have been reviewed, current meds:   No current facility-administered medications for this encounter  and PTA meds:   Prior to Admission Medications   Prescriptions Last Dose Informant Patient Reported? Taking?    Empagliflozin (Jardiance) 10 MG TABS tablet   No No   Sig: Take 1 tablet (10 mg total) by mouth every morning   acetaminophen (TYLENOL) 325 mg tablet   No No   Sig: Take 3 tablets (975 mg total) by mouth every 8 (eight) hours   Patient taking differently: Take 650 mg by mouth if needed   atorvastatin (LIPITOR) 40 mg tablet   No No   Sig: Take 1 tablet (40 mg total) by mouth daily   docusate sodium (COLACE) 100 mg capsule   No No   Sig: Take 1 capsule (100 mg total) by mouth 2 (two) times a day   Patient not taking: Reported on 5/1/2023   glucose blood (Accu-Chek Guide) test strip   No No   Sig: Use 1 each 2 (two) times a day Use as instructed   glucose blood (OneTouch Verio) test strip   No No   Sig: Use 1 each 2 (two) times a day Use as instructed   levETIRAcetam (KEPPRA) 500 mg tablet   No No   Sig: Take 1 tablet (500 mg total) by mouth every 12 (twelve) hours for 8 doses   Patient not taking: Reported on 2023   levothyroxine 137 mcg tablet   No No   Sig: Take 1 tablet by mouth once daily   lidocaine (Lidoderm) 5 %   No No   Sig: Apply 1 patch topically over 12 hours daily for 15 days Remove & Discard patch within 12 hours or as directed by MD   Patient not taking: Reported on 2023   lisinopril-hydrochlorothiazide (PRINZIDE,ZESTORETIC) 10-12 5 MG per tablet   No No   Sig: Take 1 tablet by mouth daily   metFORMIN (GLUCOPHAGE) 1000 MG tablet   No No   Sig: TAKE 1 TABLET BY MOUTH TWICE DAILY WITH MEALS   neomycin-polymyxin-dexamethasone (MAXITROL) ophthalmic suspension   No No   Si drop in both eyes every 4 hours while awake  Patient not taking: Reported on 2023   tobramycin-dexamethasone (TOBRADEX) ophthalmic suspension   No No   Sig: Administer 1 drop to both eyes every 4 (four) hours while awake for 7 days      Facility-Administered Medications: None     No Known Allergies    Objective   I/O     None          Physical Exam  Constitutional:       Appearance: Normal appearance  He is well-developed  HENT:      Head: Atraumatic  Comments: Large area os swelling noted superior to the L orbit without overlying erythema or ecchymosis  Collection soft and blottable in nature  Nose:      Comments: Patient trailed by leaning forward in a seated position without spontaneous nasal drainage  Eyes:      Extraocular Movements: Extraocular movements intact and EOM normal       Pupils: Pupils are equal, round, and reactive to light  Neck:      Vascular: No JVD  Cardiovascular:      Rate and Rhythm: Normal rate and regular rhythm     Pulmonary:      Effort: Pulmonary effort is normal    Abdominal:      General: Abdomen is flat  There is no distension  Musculoskeletal:         General: No tenderness or deformity  Normal range of motion  Cervical back: Normal range of motion and neck supple  No tenderness  Skin:     General: Skin is warm and dry  Neurological:      Mental Status: He is alert and oriented to person, place, and time  Cranial Nerves: No cranial nerve deficit  Sensory: No sensory deficit  Motor: Motor strength is normal  No weakness  Deep Tendon Reflexes: Reflexes are normal and symmetric  Psychiatric:         Speech: Speech normal          Behavior: Behavior normal          Thought Content: Thought content normal        Neurologic Exam     Mental Status   Oriented to person, place, and time  Attention: normal  Concentration: normal    Speech: speech is normal   Level of consciousness: alert  Knowledge: good and consistent with education  Able to repeat  Normal comprehension  Cranial Nerves     CN III, IV, VI   Pupils are equal, round, and reactive to light  Extraocular motions are normal    CN III: no CN III palsy  CN VI: no CN VI palsy  Nystagmus: none   Diplopia: none  Upgaze: normal  Downgaze: normal    CN V   Facial sensation intact  CN VII   Facial expression full, symmetric  CN VIII   CN VIII normal      CN XII   CN XII normal    Tongue: not atrophic  Tongue deviation: none    Motor Exam   Muscle bulk: normal  Overall muscle tone: normal  Right arm tone: normal  Left arm tone: normal  Right leg tone: normal  Left leg tone: normal    Strength   Strength 5/5 throughout  Sensory Exam   Light touch normal      Gait, Coordination, and Reflexes     Tremor   Resting tremor: absent  Action tremor: absent      Vitals:Blood pressure 138/80, pulse (!) 111, temperature (!) 97 4 °F (36 3 °C), temperature source Temporal, resp  rate 18, SpO2 98 %  ,There is no height or weight on file to calculate BMI       Lab Results:   Results from last 7 days   Lab Units 05/08/23  0922   WBC Thousand/uL 8 85   HEMOGLOBIN g/dL 15 7   HEMATOCRIT % 44 0   PLATELETS Thousands/uL 174   NEUTROS PCT % 65   MONOS PCT % 7     Results from last 7 days   Lab Units 05/08/23  0922   POTASSIUM mmol/L 3 5   CHLORIDE mmol/L 101   CO2 mmol/L 25   BUN mg/dL 21   CREATININE mg/dL 0 87   CALCIUM mg/dL 9 3   ALK PHOS U/L 73   ALT U/L 33   AST U/L 16             Results from last 7 days   Lab Units 05/08/23  0922   INR  1 04   PTT seconds 24     No results found for: TROPONINT  ABG:No results found for: PHART, XGY2TXF, PO2ART, OUR9HRW, K9LRNYBM, BEART, SOURCE    Imaging Studies: I have personally reviewed pertinent reports  and I have personally reviewed pertinent films in PACS    CT head without contrast    Result Date: 5/8/2023  Impression: Interval increase in small extra-axial fluid collection with worsened pneumocephalus resulting in mild compressive mass effect on left anterior frontal lobe status post resection of left frontal atypical 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)  Markedly worsened subgaleal air superficial to left frontal synthetic cranioplasty status post removal of left subgaleal drain  Findings are concerning CSF leak communicating with left frontal sinus +/- infection  Recommend neurosurgical consultation for further evaluation  Persistent mild vasogenic edema in left frontal lobe with interval resolution of small acute intraparenchymal parenchymal hemorrhage in left frontal lobe  Sinus disease, severe in left frontal sinus  I personally discussed this study with Anastasia Ortiz on 5/8/2023 10:32 AM  Workstation performed: [id number]       EKG, Pathology, and Other Studies: I have personally reviewed pertinent reports        VTE Prophylaxis: Sequential compression device (Venodyne)  and Enoxaparin (Lovenox)    Code Status: Prior  Advance Directive and Living Will:      Power of :    POLST:      Counseling / Coordination of Care  I spent 45 minutes with the patient

## 2023-05-08 NOTE — PLAN OF CARE
Problem: INFECTION - ADULT  Goal: Absence or prevention of progression during hospitalization  Description: INTERVENTIONS:  - Assess and monitor for signs and symptoms of infection  - Monitor lab/diagnostic results  - Monitor all insertion sites, i e  indwelling lines, tubes, and drains  - Monitor endotracheal if appropriate and nasal secretions for changes in amount and color  - [place] appropriate cooling/warming therapies per order  - Administer medications as ordered  - Instruct and encourage patient and family to use good hand hygiene technique  - Identify and instruct in appropriate isolation precautions for identified infection/condition  Outcome: Progressing  Goal: Absence of fever/infection during neutropenic period  Description: INTERVENTIONS:  - Monitor WBC    Outcome: Progressing     Problem: SAFETY ADULT  Goal: Patient will remain free of falls  Description: INTERVENTIONS:  - Educate patient/family on patient safety including physical limitations  - Instruct patient to call for assistance with activity   - Consult OT/PT to assist with strengthening/mobility   - Keep Call bell within reach  - Keep bed low and locked with side rails adjusted as appropriate  - Keep care items and personal belongings within reach  - Initiate and maintain comfort rounds  - Make Fall Risk Sign visible to staff  - Offer Toileting every 2 Hours, in advance of need  - Initiate/Maintain bed alarm  - Obtain necessary fall risk management equipment: non skid footwear  - Apply yellow socks and bracelet for high fall risk patients  - Consider moving patient to room near nurses station  Outcome: Progressing  Goal: Maintain or return to baseline ADL function  Description: INTERVENTIONS:  -  Assess patient's ability to carry out ADLs; assess patient's baseline for ADL function and identify physical deficits which impact ability to perform ADLs (bathing, care of mouth/teeth, toileting, grooming, dressing, etc )  - Assess/evaluate cause of self-care deficits   - Assess range of motion  - Assess patient's mobility; develop plan if impaired  - Assess patient's need for assistive devices and provide as appropriate  - Encourage maximum independence but intervene and supervise when necessary  - Involve family in performance of ADLs  - Assess for home care needs following discharge   - Consider OT consult to assist with ADL evaluation and planning for discharge  - Provide patient education as appropriate  Outcome: Progressing  Goal: Maintains/Returns to pre admission functional level  Description: INTERVENTIONS:  - Perform BMAT or MOVE assessment daily    - Set and communicate daily mobility goal to care team and patient/family/caregiver  - Collaborate with rehabilitation services on mobility goals if consulted  - Perform Range of Motion 3 times a day  - Reposition patient every 2 hours    - Dangle patient 3 times a day  - Stand patient 3 times a day  - Ambulate patient 3 times a day  - Out of bed to chair 2 times a day   - Out of bed for meals 2 times a day  - Out of bed for toileting  - Record patient progress and toleration of activity level   Outcome: Progressing     Problem: DISCHARGE PLANNING  Goal: Discharge to home or other facility with appropriate resources  Description: INTERVENTIONS:  - Identify barriers to discharge w/patient and caregiver  - Arrange for needed discharge resources and transportation as appropriate  - Identify discharge learning needs (meds, wound care, etc )  - Arrange for interpretive services to assist at discharge as needed  - Refer to Case Management Department for coordinating discharge planning if the patient needs post-hospital services based on physician/advanced practitioner order or complex needs related to functional status, cognitive ability, or social support system  Outcome: Progressing     Problem: Knowledge Deficit  Goal: Patient/family/caregiver demonstrates understanding of disease process, treatment plan, medications, and discharge instructions  Description: Complete learning assessment and assess knowledge base    Interventions:  - Provide teaching at level of understanding  - Provide teaching via preferred learning methods  Outcome: Progressing

## 2023-05-09 ENCOUNTER — TELEPHONE (OUTPATIENT)
Dept: NEUROSURGERY | Facility: CLINIC | Age: 58
End: 2023-05-09

## 2023-05-09 PROBLEM — Z98.890 STATUS POST CRANIOTOMY: Status: ACTIVE | Noted: 2023-05-09

## 2023-05-09 LAB
ANION GAP SERPL CALCULATED.3IONS-SCNC: 4 MMOL/L (ref 4–13)
BASOPHILS # BLD AUTO: 0.03 THOUSANDS/ÂΜL (ref 0–0.1)
BASOPHILS NFR BLD AUTO: 0 % (ref 0–1)
BUN SERPL-MCNC: 14 MG/DL (ref 5–25)
CALCIUM SERPL-MCNC: 8.3 MG/DL (ref 8.3–10.1)
CHLORIDE SERPL-SCNC: 104 MMOL/L (ref 96–108)
CO2 SERPL-SCNC: 26 MMOL/L (ref 21–32)
CREAT SERPL-MCNC: 0.6 MG/DL (ref 0.6–1.3)
EOSINOPHIL # BLD AUTO: 0.08 THOUSAND/ÂΜL (ref 0–0.61)
EOSINOPHIL NFR BLD AUTO: 1 % (ref 0–6)
ERYTHROCYTE [DISTWIDTH] IN BLOOD BY AUTOMATED COUNT: 12.2 % (ref 11.6–15.1)
GFR SERPL CREATININE-BSD FRML MDRD: 113 ML/MIN/1.73SQ M
GLUCOSE SERPL-MCNC: 189 MG/DL (ref 65–140)
GLUCOSE SERPL-MCNC: 219 MG/DL (ref 65–140)
GLUCOSE SERPL-MCNC: 224 MG/DL (ref 65–140)
GLUCOSE SERPL-MCNC: 236 MG/DL (ref 65–140)
GLUCOSE SERPL-MCNC: 280 MG/DL (ref 65–140)
HCT VFR BLD AUTO: 39.7 % (ref 36.5–49.3)
HGB BLD-MCNC: 14.2 G/DL (ref 12–17)
IMM GRANULOCYTES # BLD AUTO: 0.02 THOUSAND/UL (ref 0–0.2)
IMM GRANULOCYTES NFR BLD AUTO: 0 % (ref 0–2)
LACTATE SERPL-SCNC: 2 MMOL/L (ref 0.5–2)
LYMPHOCYTES # BLD AUTO: 2.52 THOUSANDS/ÂΜL (ref 0.6–4.47)
LYMPHOCYTES NFR BLD AUTO: 35 % (ref 14–44)
MAGNESIUM SERPL-MCNC: 2.2 MG/DL (ref 1.6–2.6)
MCH RBC QN AUTO: 32.1 PG (ref 26.8–34.3)
MCHC RBC AUTO-ENTMCNC: 35.8 G/DL (ref 31.4–37.4)
MCV RBC AUTO: 90 FL (ref 82–98)
MONOCYTES # BLD AUTO: 0.55 THOUSAND/ÂΜL (ref 0.17–1.22)
MONOCYTES NFR BLD AUTO: 8 % (ref 4–12)
NEUTROPHILS # BLD AUTO: 4.02 THOUSANDS/ÂΜL (ref 1.85–7.62)
NEUTS SEG NFR BLD AUTO: 56 % (ref 43–75)
NRBC BLD AUTO-RTO: 0 /100 WBCS
PHOSPHATE SERPL-MCNC: 2.8 MG/DL (ref 2.7–4.5)
PLATELET # BLD AUTO: 145 THOUSANDS/UL (ref 149–390)
PMV BLD AUTO: 9.4 FL (ref 8.9–12.7)
POTASSIUM SERPL-SCNC: 3.4 MMOL/L (ref 3.5–5.3)
RBC # BLD AUTO: 4.42 MILLION/UL (ref 3.88–5.62)
SODIUM SERPL-SCNC: 134 MMOL/L (ref 135–147)
WBC # BLD AUTO: 7.22 THOUSAND/UL (ref 4.31–10.16)

## 2023-05-09 RX ORDER — INSULIN LISPRO 100 [IU]/ML
5 INJECTION, SOLUTION INTRAVENOUS; SUBCUTANEOUS
Status: DISCONTINUED | OUTPATIENT
Start: 2023-05-09 | End: 2023-05-11

## 2023-05-09 RX ORDER — INSULIN GLARGINE 100 [IU]/ML
14 INJECTION, SOLUTION SUBCUTANEOUS
Status: DISCONTINUED | OUTPATIENT
Start: 2023-05-09 | End: 2023-05-11

## 2023-05-09 RX ORDER — POTASSIUM CHLORIDE 20 MEQ/1
40 TABLET, EXTENDED RELEASE ORAL ONCE
Status: COMPLETED | OUTPATIENT
Start: 2023-05-09 | End: 2023-05-09

## 2023-05-09 RX ORDER — ACETAMINOPHEN 325 MG/1
650 TABLET ORAL EVERY 6 HOURS PRN
Status: DISCONTINUED | OUTPATIENT
Start: 2023-05-09 | End: 2023-05-13 | Stop reason: HOSPADM

## 2023-05-09 RX ADMIN — INSULIN LISPRO 2 UNITS: 100 INJECTION, SOLUTION INTRAVENOUS; SUBCUTANEOUS at 08:11

## 2023-05-09 RX ADMIN — SODIUM CHLORIDE, SODIUM GLUCONATE, SODIUM ACETATE, POTASSIUM CHLORIDE, MAGNESIUM CHLORIDE, SODIUM PHOSPHATE, DIBASIC, AND POTASSIUM PHOSPHATE 125 ML/HR: .53; .5; .37; .037; .03; .012; .00082 INJECTION, SOLUTION INTRAVENOUS at 09:16

## 2023-05-09 RX ADMIN — LEVOTHYROXINE SODIUM 137 MCG: 25 TABLET ORAL at 06:39

## 2023-05-09 RX ADMIN — INSULIN LISPRO 5 UNITS: 100 INJECTION, SOLUTION INTRAVENOUS; SUBCUTANEOUS at 18:14

## 2023-05-09 RX ADMIN — AMPICILLIN SODIUM AND SULBACTAM SODIUM 3 G: 2; 1 INJECTION, POWDER, FOR SOLUTION INTRAMUSCULAR; INTRAVENOUS at 23:22

## 2023-05-09 RX ADMIN — INSULIN GLARGINE 14 UNITS: 100 INJECTION, SOLUTION SUBCUTANEOUS at 22:32

## 2023-05-09 RX ADMIN — AMPICILLIN SODIUM AND SULBACTAM SODIUM 3 G: 2; 1 INJECTION, POWDER, FOR SOLUTION INTRAMUSCULAR; INTRAVENOUS at 11:41

## 2023-05-09 RX ADMIN — INSULIN LISPRO 6 UNITS: 100 INJECTION, SOLUTION INTRAVENOUS; SUBCUTANEOUS at 11:23

## 2023-05-09 RX ADMIN — ACETAMINOPHEN 650 MG: 325 TABLET ORAL at 16:35

## 2023-05-09 RX ADMIN — INSULIN LISPRO 4 UNITS: 100 INJECTION, SOLUTION INTRAVENOUS; SUBCUTANEOUS at 16:37

## 2023-05-09 RX ADMIN — ACETAMINOPHEN 650 MG: 325 TABLET ORAL at 22:32

## 2023-05-09 RX ADMIN — DOCUSATE SODIUM 100 MG: 100 CAPSULE, LIQUID FILLED ORAL at 08:09

## 2023-05-09 RX ADMIN — ATORVASTATIN CALCIUM 40 MG: 40 TABLET, FILM COATED ORAL at 08:10

## 2023-05-09 RX ADMIN — VANCOMYCIN HYDROCHLORIDE 1000 MG: 1 INJECTION, SOLUTION INTRAVENOUS at 01:28

## 2023-05-09 RX ADMIN — ENOXAPARIN SODIUM 40 MG: 40 INJECTION SUBCUTANEOUS at 08:11

## 2023-05-09 RX ADMIN — AMPICILLIN SODIUM AND SULBACTAM SODIUM 3 G: 2; 1 INJECTION, POWDER, FOR SOLUTION INTRAMUSCULAR; INTRAVENOUS at 16:35

## 2023-05-09 RX ADMIN — INSULIN LISPRO 4 UNITS: 100 INJECTION, SOLUTION INTRAVENOUS; SUBCUTANEOUS at 22:32

## 2023-05-09 RX ADMIN — DOCUSATE SODIUM 100 MG: 100 CAPSULE, LIQUID FILLED ORAL at 18:14

## 2023-05-09 RX ADMIN — CEFEPIME 2000 MG: 2 INJECTION, POWDER, FOR SOLUTION INTRAVENOUS at 01:27

## 2023-05-09 RX ADMIN — METRONIDAZOLE 500 MG: 500 TABLET ORAL at 06:40

## 2023-05-09 RX ADMIN — POTASSIUM CHLORIDE 40 MEQ: 1500 TABLET, EXTENDED RELEASE ORAL at 08:09

## 2023-05-09 RX ADMIN — LISINOPRIL 10 MG: 10 TABLET ORAL at 08:10

## 2023-05-09 NOTE — CONSULTS
Vancomycin IV Pharmacy-to-Dose Consultation    Susan Merida is a 56 y o  male who was receiving Vancomycin IV with management by the Pharmacy Consult service  The patient’s Vancomycin therapy has been discontinued  Thank you for allowing us to take part in this patient's care  Pharmacy will sign-off now; please call or re-consult if there are any questions          Imtiaz Ponce, PharmD, [address] Clinical Pharmacist  [phone]

## 2023-05-09 NOTE — PLAN OF CARE
Problem: INFECTION - ADULT  Goal: Absence or prevention of progression during hospitalization  Description: INTERVENTIONS:  - Assess and monitor for signs and symptoms of infection  - Monitor lab/diagnostic results  - Monitor all insertion sites, i e  indwelling lines, tubes, and drains  - Monitor endotracheal if appropriate and nasal secretions for changes in amount and color  - [place] appropriate cooling/warming therapies per order  - Administer medications as ordered  - Instruct and encourage patient and family to use good hand hygiene technique  - Identify and instruct in appropriate isolation precautions for identified infection/condition  5/9/2023 1050 by Kb Pimentel RN  Outcome: Progressing  5/9/2023 1049 by Kb Pimentel RN  Outcome: Progressing     Problem: SAFETY ADULT  Goal: Patient will remain free of falls  Description: INTERVENTIONS:  - Educate patient/family on patient safety including physical limitations  - Instruct patient to call for assistance with activity   - Consult OT/PT to assist with strengthening/mobility   - Keep Call bell within reach  - Keep bed low and locked with side rails adjusted as appropriate  - Keep care items and personal belongings within reach  - Initiate and maintain comfort rounds  - Make Fall Risk Sign visible to staff  - Offer Toileting every 2 Hours, in advance of need  - Initiate/Maintain bed alarm  - Obtain necessary fall risk management equipment: non skid footwear  - Apply yellow socks and bracelet for high fall risk patients  - Consider moving patient to room near nurses station  5/9/2023 1050 by Kb Pimentel RN  Outcome: Progressing  5/9/2023 1049 by Kb Pimentel RN  Outcome: Progressing  Goal: Maintain or return to baseline ADL function  Description: INTERVENTIONS:  -  Assess patient's ability to carry out ADLs; assess patient's baseline for ADL function and identify physical deficits which impact ability to perform ADLs (bathing, care of mouth/teeth, toileting, grooming, dressing, etc )  - Assess/evaluate cause of self-care deficits   - Assess range of motion  - Assess patient's mobility; develop plan if impaired  - Assess patient's need for assistive devices and provide as appropriate  - Encourage maximum independence but intervene and supervise when necessary  - Involve family in performance of ADLs  - Assess for home care needs following discharge   - Consider OT consult to assist with ADL evaluation and planning for discharge  - Provide patient education as appropriate  Outcome: Progressing     Problem: MOBILITY - ADULT  Goal: Maintain or return to baseline ADL function  Description: INTERVENTIONS:  -  Assess patient's ability to carry out ADLs; assess patient's baseline for ADL function and identify physical deficits which impact ability to perform ADLs (bathing, care of mouth/teeth, toileting, grooming, dressing, etc )  - Assess/evaluate cause of self-care deficits   - Assess range of motion  - Assess patient's mobility; develop plan if impaired  - Assess patient's need for assistive devices and provide as appropriate  - Encourage maximum independence but intervene and supervise when necessary  - Involve family in performance of ADLs  - Assess for home care needs following discharge   - Consider OT consult to assist with ADL evaluation and planning for discharge  - Provide patient education as appropriate  Outcome: Progressing     Problem: NEUROSENSORY - ADULT  Goal: Achieves stable or improved neurological status  Description: INTERVENTIONS  - Monitor and report changes in neurological status  - Monitor vital signs such as temperature, blood pressure, glucose, and any other labs ordered   - Initiate measures to prevent increased intracranial pressure  - Monitor for seizure activity and implement precautions if appropriate      Outcome: Progressing     Problem: METABOLIC, FLUID AND ELECTROLYTES - ADULT  Goal: Electrolytes maintained within normal limits  Description: INTERVENTIONS:  - Monitor labs and assess patient for signs and symptoms of electrolyte imbalances  - Administer electrolyte replacement as ordered  - Monitor response to electrolyte replacements, including repeat lab results as appropriate  - Instruct patient on fluid and nutrition as appropriate  Outcome: Progressing  Goal: Fluid balance maintained  Description: INTERVENTIONS:  - Monitor labs   - Monitor I/O and WT  - Instruct patient on fluid and nutrition as appropriate  - Assess for signs & symptoms of volume excess or deficit  Outcome: Progressing  Goal: Glucose maintained within target range  Description: INTERVENTIONS:  - Monitor Blood Glucose as ordered  - Assess for signs and symptoms of hyperglycemia and hypoglycemia  - Administer ordered medications to maintain glucose within target range  - Assess nutritional intake and initiate nutrition service referral as needed  Outcome: Progressing

## 2023-05-09 NOTE — CONSULTS
Vancomycin IV Pharmacy-to-Dose Consultation    Yves Simms is a 56 y o  male who was receiving Vancomycin IV with management by the Pharmacy Consult service for treatment of CNS infection (goal -600, trough 15-20)    The patient's Vancomycin therapy has been completed / discontinued  Thank you for allowing us to take part in this patient's care  Pharmacy will sign-off now; please call or re-consult if there are any questions  Contreras TAYLOR Ph , Pharmacist, [phone]

## 2023-05-09 NOTE — PLAN OF CARE
Problem: INFECTION - ADULT  Goal: Absence or prevention of progression during hospitalization  Description: INTERVENTIONS:  - Assess and monitor for signs and symptoms of infection  - Monitor lab/diagnostic results  - Monitor all insertion sites, i e  indwelling lines, tubes, and drains  - Monitor endotracheal if appropriate and nasal secretions for changes in amount and color  - [place] appropriate cooling/warming therapies per order  - Administer medications as ordered  - Instruct and encourage patient and family to use good hand hygiene technique  - Identify and instruct in appropriate isolation precautions for identified infection/condition  Outcome: Progressing  Goal: Absence of fever/infection during neutropenic period  Description: INTERVENTIONS:  - Monitor WBC    Outcome: Progressing     Problem: SAFETY ADULT  Goal: Patient will remain free of falls  Description: INTERVENTIONS:  - Educate patient/family on patient safety including physical limitations  - Instruct patient to call for assistance with activity   - Consult OT/PT to assist with strengthening/mobility   - Keep Call bell within reach  - Keep bed low and locked with side rails adjusted as appropriate  - Keep care items and personal belongings within reach  - Initiate and maintain comfort rounds  - Make Fall Risk Sign visible to staff  - Offer Toileting every 2 Hours, in advance of need  - Initiate/Maintain bed alarm  - Obtain necessary fall risk management equipment: non skid footwear  - Apply yellow socks and bracelet for high fall risk patients  - Consider moving patient to room near nurses station  Outcome: Progressing  Goal: Maintain or return to baseline ADL function  Description: INTERVENTIONS:  -  Assess patient's ability to carry out ADLs; assess patient's baseline for ADL function and identify physical deficits which impact ability to perform ADLs (bathing, care of mouth/teeth, toileting, grooming, dressing, etc )  - Assess/evaluate cause of self-care deficits   - Assess range of motion  - Assess patient's mobility; develop plan if impaired  - Assess patient's need for assistive devices and provide as appropriate  - Encourage maximum independence but intervene and supervise when necessary  - Involve family in performance of ADLs  - Assess for home care needs following discharge   - Consider OT consult to assist with ADL evaluation and planning for discharge  - Provide patient education as appropriate  Outcome: Progressing  Goal: Maintains/Returns to pre admission functional level  Description: INTERVENTIONS:  - Perform BMAT or MOVE assessment daily    - Set and communicate daily mobility goal to care team and patient/family/caregiver  - Collaborate with rehabilitation services on mobility goals if consulted  - Perform Range of Motion 3 times a day  - Reposition patient every 2 hours    - Dangle patient 3 times a day  - Stand patient 3 times a day  - Ambulate patient 3 times a day  - Out of bed to chair 2 times a day   - Out of bed for meals 2 times a day  - Out of bed for toileting  - Record patient progress and toleration of activity level   Outcome: Progressing     Problem: DISCHARGE PLANNING  Goal: Discharge to home or other facility with appropriate resources  Description: INTERVENTIONS:  - Identify barriers to discharge w/patient and caregiver  - Arrange for needed discharge resources and transportation as appropriate  - Identify discharge learning needs (meds, wound care, etc )  - Arrange for interpretive services to assist at discharge as needed  - Refer to Case Management Department for coordinating discharge planning if the patient needs post-hospital services based on physician/advanced practitioner order or complex needs related to functional status, cognitive ability, or social support system  Outcome: Progressing     Problem: Knowledge Deficit  Goal: Patient/family/caregiver demonstrates understanding of disease process, treatment plan, medications, and discharge instructions  Description: Complete learning assessment and assess knowledge base  Interventions:  - Provide teaching at level of understanding  - Provide teaching via preferred learning methods  Outcome: Progressing     Problem: MOBILITY - ADULT  Goal: Maintain or return to baseline ADL function  Description: INTERVENTIONS:  -  Assess patient's ability to carry out ADLs; assess patient's baseline for ADL function and identify physical deficits which impact ability to perform ADLs (bathing, care of mouth/teeth, toileting, grooming, dressing, etc )  - Assess/evaluate cause of self-care deficits   - Assess range of motion  - Assess patient's mobility; develop plan if impaired  - Assess patient's need for assistive devices and provide as appropriate  - Encourage maximum independence but intervene and supervise when necessary  - Involve family in performance of ADLs  - Assess for home care needs following discharge   - Consider OT consult to assist with ADL evaluation and planning for discharge  - Provide patient education as appropriate  Outcome: Progressing  Goal: Maintains/Returns to pre admission functional level  Description: INTERVENTIONS:  - Perform BMAT or MOVE assessment daily    - Set and communicate daily mobility goal to care team and patient/family/caregiver  - Collaborate with rehabilitation services on mobility goals if consulted  - Perform Range of Motion 3 times a day  - Reposition patient every 2 hours    - Dangle patient 3 times a day  - Stand patient 3 times a day  - Ambulate patient 3 times a day  - Out of bed to chair 2 times a day   - Out of bed for meals 2 times a day  - Out of bed for toileting  - Record patient progress and toleration of activity level   Outcome: Progressing

## 2023-05-09 NOTE — PROGRESS NOTES
Thais Clark is a 56 y o  male who is currently ordered Vancomycin IV with management by the Pharmacy Consult service  Relevant clinical data and objective / subjective history reviewed  Vancomycin Assessment:  Indication and Goal AUC/Trough: Soft tissue (goal -600, trough >10), -600, trough >10  Clinical Status: stable  Micro:     Renal Function:  SCr: 0 6 mg/dL (was 0 87 mg/dL)  CrCl: 156 6 mL/min (was 95 6 mL/min  Renal replacement: Not on dialysis  Days of Therapy: 2  Current Dose: 1000 mg q 12  hr     Vancomycin Plan: due to change in renal function and predicted AUC to be < 400, dose to be adjusted starting at 1400 today  New Dosin mg iv q12hrs  Estimated AUC: (new) 464 mcg hr/mL  Estimated Trough: 13 9 mcg/mL  Next Level: 5/10 0600  Renal Function Monitoring: Daily BMP and [place] will continue to follow closely for s/sx of nephrotoxicity, infusion reactions and appropriateness of therapy  BMP and CBC will be ordered per protocol  We will continue to follow the patient’s culture results and clinical progress daily  Eleni Bueno, R Ph , Pharmacist

## 2023-05-09 NOTE — UTILIZATION REVIEW
Initial Clinical Review    Admission: Date/Time/Statement:   Admission Orders (From admission, onward)     Ordered        05/08/23 1107  [address] [facility] [place] [place]  Once                      Orders Placed This Encounter   Procedures   • INPATIENT ADMISSION     Standing Status:   Standing     Number of Occurrences:   1     Order Specific Question:   Level of Care     Answer:   Level 1 Stepdown [13]     Order Specific Question:   Estimated length of stay     Answer:   More than 2 Midnights     Order Specific Question:   Certification     Answer:   I certify that inpatient services are medically necessary for this patient for a duration of greater than two midnights  See H&P and MD Progress Notes for additional information about the patient's course of treatment  ED Arrival Information     Expected   -    Arrival   5/8/2023 08:42    Acuity   Urgent            Means of arrival   Walk-In    Escorted by   Self    Service   Neurosurgery    Admission type   Emergency            Arrival complaint   forehead pain, post surgical           Chief Complaint   Patient presents with   • Head Swelling     Pt c/o increased left forehead swelling x1 day  4/11 brain surgery for tumor removal  Pt denies headache and dizziness  Pt A&O x4       Initial Presentation: 56 y o  male with PMH of DM, HLD, HTN, meningioma s/p craniotomy 04/11 presented to the ED from home w/ facial swelling for a couple of hrs  Pt reports that he noted small amount of jellylike consistency drainage from his nose yesterday that then resolved spontaneously  He had an episode of sneezing later in the day  Nex morales when he woke up he again noted some gel-like consistency drainage from his nose and noted swelling over L eye  He noted collection became large after several hrs prompting him to go to the ED  In the ED, , /102  LA 2 8-> 3 5-> 3 4   CT imaging demonstrates significant pneumocephalus both superficial and deep to the cranioplasty site with some mild mass effect on the adjacent underlying brain  Concern for breach of previous left frontal sinus closure  On exam, neuro intact, no focal deficits, fluctuant collection above L eye, non tender  Given IV Cefepime, po flagyl  Admitted as Inpatient for new subgaleal and epidural pneumocephalus  Plan: Cont close neuro monitoring  Sterile urine cup at bedside to collect CSF as necessary if starts dripping  Neuro critical care consult  Cont home meds  MRI brain with and without contrast ordered  CT sinus with thin slices ordered  DVT ppx     05/09 Neurocritical Care Consult: 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: MRI head pending  Potential procedure to repair leak if needed  CSF collection if nostril secretions occurs again  Start IV vancomycin 2000 mg+IV cefepime 2000 mg+PO Flagyl 500 mg  2 hour neuro checks  IVF  Rpt lactate  Hold HCTZ  Replete K  Glycemic control  DVT ppx  Date: 05/09  Day 2:   Neurocritical Care Notes: Pt's lactate improve to 2 after giving additional 1 5 L NS  Reports having congestion of nostrils along with some sneezing  On exam, alert, no focal deficit  MRI w/ & w/o contrast completed, waiting on neurosurgery plan  Potential procedure to repair leak  Nostril discharge sent for CSF analysis  Dc'd vanco, cefepime, flagyl  Sarted on IV Unasyn  Cont q2h neuro check  Cont to mon electrolytes, replete as needed  DVT ppx  Cont glucose monitoring  Neurosurg Notes: Pt still w/ swollen L forehead since yesterday  Reports mild nasal drainage but not much today  Tolerating oral intake  reports residual numbness on his posterior scalp pin site after his surgery  Beta 2 transferrin lab for small nasal discharge pending  On IV Unasyn  Pt considering tx options including monitor if self resolve, do bedside aspiration and lastly consider surgical intervention for repair      ED Triage Vitals [05/08/23 0849]   Temperature Pulse Respirations Blood Pressure SpO2   (!) 97 4 °F (36 3 °C) (!) 119 18 (!) 161/102 98 %      Temp Source Heart Rate Source Patient Position - Orthostatic VS BP Location FiO2 (%)   Temporal Monitor Sitting Left arm --      Pain Score       No Pain          Wt Readings from Last 1 Encounters:   05/08/23 93 kg (205 lb 0 4 oz)     Additional Vital Signs:   Date/Time Temp Pulse Resp BP MAP (mmHg) SpO2 O2 Device Patient Position - Orthostatic VS   05/09/23 1012 98 2 °F (36 8 °C) 89 15 126/76 -- 98 % None (Room air) Lying   05/09/23 0810 -- -- -- 126/70 -- -- -- --   05/09/23 0748 -- 93 16 131/87 105 99 % None (Room air) Sitting   05/09/23 0405 -- 81 16 114/73 88 96 % -- --   05/09/23 0030 -- 82 16 118/68 88 96 % None (Room air) --   05/08/23 2000 -- 101 16 104/62 76 97 % None (Room air) --   05/08/23 1800 -- 103 16 119/74 91 -- -- --   05/08/23 1600 -- 104 16 120/68 88 -- -- --   05/08/23 15:47:58 98 3 °F (36 8 °C) 105 18 115/65 84 -- -- Lying   05/08/23 1127 -- 100 20 138/80 -- 96 % None (Room air) Lying   05/08/23 0926 -- -- -- -- -- -- None (Room air) --   05/08/23 0901 -- 111 Abnormal  -- 138/80 102 -- -- --       Pertinent Labs/Diagnostic Test Results:   MRI brain w wo contrast   Final Result by E Garr Councilman, MD (05/09 1324)   Addendum (preliminary) 1 of 1 by E Garr Councilman, MD (05/09 1198)   ADDENDUM:      Stable encephalomalacia in the inferior frontal lobes likely sequela of    remote trauma  Stable mild chronic microangiopathy  Final      Redemonstration of postsurgical changes status post left frontal craniectomy and cranioplasty for resection of meningioma  Mild increased smooth dural enhancement may be postoperative  No nodular enhancement to suspect recurrent disease  Recommend    continued surveillance  Enhancement of evolving late subacute left frontal infarct   Stable small amount of epidural fluid/residual blood products subjacent to craniotomy extending into the obliterated left frontal sinus     Left frontal subgaleal and epidural air similar to CT from earlier today but increased from initial postoperative studies stable mild mass effect on the left frontal lobe      Increased left frontal parafalcine extra-axial air now extends anterior to the left frontal lobe with new right frontal extra-axial pneumocephalus  Additional new foci of pneumocephalus extending posteriorly along the left frontal and parietal    convexities favored to be subarachnoid  Again there is concern for CSF communication that may be due to incomplete obliteration of the left frontal sinus  Workstation performed: [id number]         CT sinus wo contrast   Final Result by Myrtle Barrow MD (05/08 1419)      Similar extra-axial fluid collection in left frontal region with associated debris and air communicating with left frontal sinus, similar subgaleal air superficial to left frontal synthetic cranioplasty, new pneumocephalus along left parafalcine region,    with similar mild compressive mass effect on left anterior frontal lobe  Findings are concerning for CSF leak communicating with left frontal sinus +/- infection as seen on earlier same day CT head  Given new pneumocephalus along left parafalcine region    interval rupture of extra-axial fluid collection is within the differential       Sinus disease (severe in left frontal sinus), as detailed above  The study was marked in [facility] for immediate notification  Workstation performed: [id number]         CT head without contrast   Final Result by Myrtle Barrow MD (05/08 1035)      Interval increase in small extra-axial fluid collection with worsened pneumocephalus resulting in mild compressive mass effect on left anterior frontal lobe status post resection of left frontal atypical 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)  Markedly worsened    subgaleal air superficial to left frontal synthetic cranioplasty status post removal of left subgaleal drain   Findings are concerning CSF leak communicating with left frontal sinus +/- infection  Recommend neurosurgical consultation for further    evaluation  Persistent mild vasogenic edema in left frontal lobe with interval resolution of small acute intraparenchymal parenchymal hemorrhage in left frontal lobe  Sinus disease, severe in left frontal sinus           I personally discussed this study with [address] on 5/8/2023 10:32 AM                               Workstation performed: [id number]           05/08 EKG result:    Sinus tachycardia  Possible Left atrial enlargement    Date and Time Eye Opening Best Verbal Response Best Motor Response Shayne Coma Scale Score   05/09/23 1000 4 5 6 15   05/09/23 0800 4 5 6 15   05/09/23 0600 4 5 6 15   05/09/23 0400 4 5 6 15   05/09/23 0200 4 5 6 15   05/09/23 0000 4 5 6 15   05/08/23 2200 4 5 6 15   05/08/23 2000 4 5 6 15   05/08/23 1530 4 5 6 15   05/08/23 1400 4 5 6 15   05/08/23 0925 4 5 6 15       Results from last 7 days   Lab Units 05/09/23  0511 05/08/23  1753 05/08/23  0922   WBC Thousand/uL 7 22  --  8 85   HEMOGLOBIN g/dL 14 2  --  15 7   HEMATOCRIT % 39 7  --  44 0   PLATELETS Thousands/uL 145* 178 174   NEUTROS ABS Thousands/µL 4 02  --  5 73         Results from last 7 days   Lab Units 05/09/23  0511 05/08/23  0922   SODIUM mmol/L 134* 134*   POTASSIUM mmol/L 3 4* 3 5   CHLORIDE mmol/L 104 101   CO2 mmol/L 26 25   ANION GAP mmol/L 4 8   BUN mg/dL 14 21   CREATININE mg/dL 0 60 0 87   EGFR ml/min/1 73sq m 113 97   CALCIUM mg/dL 8 3 9 3   MAGNESIUM mg/dL 2 2  --    PHOSPHORUS mg/dL 2 8  --      Results from last 7 days   Lab Units 05/08/23  0922   AST U/L 16   ALT U/L 33   ALK PHOS U/L 73   TOTAL PROTEIN g/dL 7 2   ALBUMIN g/dL 3 7   TOTAL BILIRUBIN mg/dL 0 96     Results from last 7 days   Lab Units 05/09/23  0609 05/08/23  2055 05/08/23  1644 05/08/23  1359 05/08/23  1204   POC GLUCOSE mg/dl 189* 351* 323* 211* 247*     Results from last 7 days   Lab Units 05/09/23  0511 05/08/23  0922   GLUCOSE RANDOM mg/dL 224* 330*       Results from last 7 days   Lab Units 05/08/23  0922   PROTIME seconds 13 8   INR  1 04   PTT seconds 24         Results from last 7 days   Lab Units 05/08/23  0922   PROCALCITONIN ng/ml 0 08     Results from last 7 days   Lab Units 05/09/23  0112 05/08/23  2104 05/08/23  1753 05/08/23  1206 05/08/23  0922   LACTIC ACID mmol/L 2 0 3 4* 3 5* 3 5* 2 8*           Results from last 7 days   Lab Units 05/08/23  0957   CLARITY UA  Clear   COLOR UA  Colorless   SPEC GRAV UA  1 027   PH UA  6 0   GLUCOSE UA mg/dl >=1000 (1%)*   KETONES UA mg/dl Trace*   BLOOD UA  Negative   PROTEIN UA mg/dl Negative   NITRITE UA  Negative   BILIRUBIN UA  Negative   UROBILINOGEN UA (BE) mg/dl <2 0   LEUKOCYTES UA  Elevated glucose may cause decreased leukocyte values  See urine microscopic for [facility] result/*   WBC UA /hpf None Seen   RBC UA /hpf None Seen   BACTERIA UA /hpf None Seen   EPITHELIAL CELLS WET PREP /hpf None Seen         Results from last 7 days   Lab Units 05/08/23  5607   BLOOD CULTURE  Received in Microbiology Lab  Culture in Progress  Received in Microbiology Lab  Culture in Progress                 ED Treatment:   Medication Administration from 05/08/2023 0842 to 05/08/2023 1450       Date/Time Order Dose Route Action     05/08/2023 1422 EDT enoxaparin (LOVENOX) subcutaneous injection 40 mg 40 mg Subcutaneous Given     05/08/2023 1416 EDT cefepime (MAXIPIME) 2 g/50 mL dextrose IVPB 2,000 mg Intravenous New Bag     05/08/2023 1422 EDT metroNIDAZOLE (FLAGYL) tablet 500 mg 500 mg Oral Given     05/08/2023 1402 EDT insulin lispro (HumaLOG) 100 units/mL subcutaneous injection 1-6 Units 2 Units Subcutaneous Given     05/08/2023 1422 EDT potassium chloride (K-DUR,KLOR-CON) CR tablet 40 mEq 40 mEq Oral Given     05/08/2023 1445 EDT multi-electrolyte (PLASMALYTE-A/ISOLYTE-S PH 7 4) IV solution 0 mL/hr Intravenous Stopped     05/08/2023 1402 EDT multi-electrolyte (PLASMALYTE-A/ISOLYTE-S PH 7 4) IV solution 125 mL/hr Intravenous New Bag        Past Medical History:   Diagnosis Date   • Diabetes mellitus ([facility] [address] )    • Elevated LFTs     last assessed 02Nov2015   • Hyperlipidemia    • Hypertension    • Impaired fasting glucose     last assessed 29Jan2014     Present on Admission:  • Benign essential hypertension  • Controlled type 2 diabetes mellitus without complication, without long-term current use of insulin (HCC)      Admitting Diagnosis: Pneumocephalus [G93 89]  Meningioma (HCC) [D32 9]  Facial swelling [R22 0]  Controlled type 2 diabetes mellitus without complication, without long-term current use of insulin (HCC) [E11 9]  Age/Sex: 56 y o  male  Admission Orders:  SCD  Neuro checks    Scheduled Medications:  ampicillin-sulbactam, 3 g, Intravenous, Q6H  atorvastatin, 40 mg, Oral, Daily  docusate sodium, 100 mg, Oral, BID  enoxaparin, 40 mg, Subcutaneous, Daily  insulin glargine, 14 Units, Subcutaneous, HS  insulin lispro, 2-12 Units, Subcutaneous, 4x Daily (AC & HS)  levothyroxine, 137 mcg, Oral, Early Morning  lisinopril, 10 mg, Oral, Daily  vancomycin (VANCOCIN) IVPB (premix in dextrose) 1,000 mg 200 mL  Dose: 1,000 mg  Freq: Every 12 hours Route: IV  Last Dose: Stopped (05/09/23 0401)  Start: 05/09/23 0200 End: 05/09/23 0818  metroNIDAZOLE (FLAGYL) tablet 500 mg  Dose: 500 mg  Freq: Every 8 hours scheduled Route: PO  Start: 05/08/23 1400 End: 05/09/23 1040  cefepime (MAXIPIME) 2 g/50 mL dextrose IVPB  Dose: 2,000 mg  Freq: Every 12 hours Route: IV  Last Dose: Stopped (05/09/23 0401)  Start: 05/08/23 1400 End: 05/09/23 1013    Continuous IV Infusions:  multi-electrolyte (PLASMALYTE-A/ISOLYTE-S PH 7 4) IV solution  Rate: 125 mL/hr Dose: 125 mL/hr  Freq: Continuous Route: IV  Indications of Use: IV Hydration  Last Dose: Stopped (05/09/23 1026)  Start: 05/08/23 1400 End: 05/09/23 1016     PRN Meds:  acetaminophen, 650 mg, Oral, Q6H PRN  calcium carbonate, 1,000 mg, Oral, Daily PRN 05/08 x 1  ondansetron, 4 mg, Intravenous, Q6H PRN        IP CONSULT TO NEUROCRITICAL CARE  IP CONSULT TO CASE MANAGEMENT  IP CONSULT TO PHARMACY    Network Utilization Review Department  ATTENTION: Please call with any questions or concerns to [phone] and carefully listen to the prompts so that you are directed to the right person  All voicemails are confidential   Sera Frees all requests for admission clinical reviews, approved or denied determinations and any other requests to dedicated fax number below belonging to the campus where the patient is receiving treatment   List of dedicated fax numbers for the Facilities:  [address] DENIALS (Administrative/Medical Necessity) [phone]   [address] (Maternity/NICU/Pediatrics) [phone]   [address] [phone]   [address]  [phone]   [address] [address] [address] [phone]   [address] Jenniffer Morrison [facility] [address] 8[address] [facility] [phone]

## 2023-05-09 NOTE — ASSESSMENT & PLAN NOTE
· S/p left total craniotomy with craniectomy and orbital osteotomy with resection of extradural mass  Exenteration/obliteration of left frontal sinus and custom synthetic cranioplasty on 4/11/2023 with Dr Clint Silva  · Presenting with enlarging L frontal supraorbital air/ pneumocephalus with small fluid collection  · Reported sneezing episode 5/7 with discharge from nose 5/7 and again 5/8  Imaging:   · MRI brain w wo 5/8/23: Redemonstration of postsurgical changes status post left frontal craniectomy and cranioplasty for resection of meningioma  Mild increased smooth dural enhancement may be postoperative  No nodular enhancement to suspect recurrent disease  Enhancement of evolving late subacute left frontal infarct  Stable small amount of epidural fluid/residual blood products subjacent to craniotomy extending into the obliterated left frontal sinus  Left frontal subgaleal and epidural air similar to CT from earlier today but increased from initial postoperative studies stable mild mass effect on the left frontal lobe  Increased left frontal parafalcine extra-axial air now extends anterior to the left frontal lobe with new right frontal extra-axial pneumocephalus  Additional new foci of pneumocephalus extending posteriorly along the left frontal and parietal convexities favored to be subarachnoid  Again there is concern for CSF communication that may be due to incomplete obliteration of the left frontal sinus  · CT head 5/8/2023: Increase in small extra-axial fluid collection with worsening pneumocephalus resulting in mild compressive mass effect on the left anterior frontal lobe status post resection of atypical who grade 2 meningioma  Markedly worsened subgaleal air superficial left frontal synthetic cranioplasty flap  Persistent mild vasogenic edema in the left frontal lobe with interval resolution of small acute intraparenchymal hemorrhage      Plan:   · Continue close neurological monitoring  · DVT ppx: SCD's and lovenox  · Patient without any obvious signs of infection on exam     · Pt remains afebrile  · Lactic acid slightly elevated 2 8  · WBC normal   · Beta 2 transferrin lab was sent from small nasal discharge- results pending  · Patient placed on IV antibiotics preventatively given likely communication with sinus and intracranial space  Pt currently on IV Unasyn  · Imaging showing increased amount of intracranial pneumocephalus communicating both subgaleal and epidural through synthetic cranioplasty flap  · Dr Dominga Calderon discussed with patient options for management of the air/pneumocephalus including monitor if self resolve, do bedside aspiration and lastly consider surgical intervention for repair  Pt will consider options and will further discussion with Dr Dominga Calderon today for further management  · Neurosurgery will continue to monitor as primary team  Please contact neurosurgery with any questions or concerns

## 2023-05-09 NOTE — PROGRESS NOTES
[facility]  Progress Note  Name: Austen Bhakta  MRN: [id number]  Unit/Bed#: [facility] 720-01 I Date of Admission: 5/8/2023   Date of Service: 5/9/2023 I Hospital Day: 1    Assessment/Plan   Status post craniotomy and cranioplasty  Assessment & Plan  · S/p left total craniotomy with craniectomy and orbital osteotomy with resection of extradural mass  Exenteration/obliteration of left frontal sinus and custom synthetic cranioplasty on 4/11/2023 with Dr Nafisa Black  · Presenting with enlarging L frontal supraorbital air/ pneumocephalus with small fluid collection  · Reported sneezing episode 5/7 with discharge from nose 5/7 and again 5/8  Imaging:   · MRI brain w wo 5/8/23: Redemonstration of postsurgical changes status post left frontal craniectomy and cranioplasty for resection of meningioma  Mild increased smooth dural enhancement may be postoperative  No nodular enhancement to suspect recurrent disease  Enhancement of evolving late subacute left frontal infarct  Stable small amount of epidural fluid/residual blood products subjacent to craniotomy extending into the obliterated left frontal sinus  Left frontal subgaleal and epidural air similar to CT from earlier today but increased from initial postoperative studies stable mild mass effect on the left frontal lobe  Increased left frontal parafalcine extra-axial air now extends anterior to the left frontal lobe with new right frontal extra-axial pneumocephalus  Additional new foci of pneumocephalus extending posteriorly along the left frontal and parietal convexities favored to be subarachnoid  Again there is concern for CSF communication that may be due to incomplete obliteration of the left frontal sinus    · CT head 5/8/2023: Increase in small extra-axial fluid collection with worsening pneumocephalus resulting in mild compressive mass effect on the left anterior frontal lobe status post resection of atypical who grade "2 meningioma  Markedly worsened subgaleal air superficial left frontal synthetic cranioplasty flap  Persistent mild vasogenic edema in the left frontal lobe with interval resolution of small acute intraparenchymal hemorrhage  Plan:   · Continue close neurological monitoring  · DVT ppx: SCD's and lovenox  · Patient without any obvious signs of infection on exam     · Pt remains afebrile  · Lactic acid slightly elevated 2 8  · WBC normal   · Beta 2 transferrin lab was sent from small nasal discharge- results pending  · Patient placed on IV antibiotics preventatively given likely communication with sinus and intracranial space  Pt currently on IV Unasyn  · Imaging showing increased amount of intracranial pneumocephalus communicating both subgaleal and epidural through synthetic cranioplasty flap  · Dr Charleen Araujo discussed with patient options for management of the air/pneumocephalus including monitor if self resolve, do bedside aspiration and lastly consider surgical intervention for repair  Pt will consider options and will further discussion with Dr Charleen Araujo today for further management  · Neurosurgery will continue to monitor as primary team  Please contact neurosurgery with any questions or concerns  Meningioma [facility])  Assessment & Plan  · S/p left total craniotomy with craniectomy and orbital osteotomy with resection of extradural mass  Exenteration/obliteration of left frontal sinus and custom synthetic cranioplasty on 4/11/2023 with Dr Charleen Araujo  · Pathology: Grade 2 Meningioma  · See plan above  Pneumocephalus  Assessment & Plan  See plan above  Subjective/Objective     Chief Complaint: \"My left forehead is swollen\"    Subjective: Pt reports left forehead swollen since yesterday morning  Patient reports that yesterday he had mild nasal drainage but not much today  He denies any headache, dizziness, lightheadedness, double or blurry vision   He reports occasional tearing from " "the left eye  He denies fever, chills or malaise  He reports he is tolerating oral intake without nausea or vomiting  He denies any numbness, tingling or weakness on his face or extremities  He reports residual numbness on his posterior scalp pin site after his surgery  Objective: Alert and awake, No acute distress  I/O       05/07 0701  05/08 0700 05/08 0701  05/09 0700 05/09 0701  05/10 0700    P  O    592    I V  (mL/kg)  1431 3 (15 4) 857 1 (9 2)    IV Piggyback  300 100    Total Intake(mL/kg)  1731 3 (18 6) 1549 1 (16 7)    Net  +1731 3 +1549 1           Unmeasured Urine Occurrence  2 x 1 x    Unmeasured Stool Occurrence   1 x          Invasive Devices     Peripheral Intravenous Line  Duration           Peripheral IV 05/08/23 Right Antecubital 1 day    Peripheral IV 05/08/23 Distal;Left;Ventral (anterior) Wrist <1 day                Physical Exam:  Vitals: Blood pressure 128/78, pulse 85, temperature 98 4 °F (36 9 °C), temperature source Oral, resp  rate 17, height 5' 9\" (1 753 m), weight 93 kg (205 lb 0 4 oz), SpO2 100 %  ,Body mass index is 30 28 kg/m²  General appearance:  Appears stated age  Head: Left forehead swelling, no erythema, no drainage  Scalp Incision clean and dry  Eyes: EOMI, PERRL  Neck: supple, symmetrical, trachea midline   Nose: No rhinorrhea noted when patient leans his face forward for a minute  Lungs: non labored breathing  Heart: regular heart rate  Neurologic:   Mental status: Alert, oriented, speech clear and fluent, thought content appropriate  Cranial nerves: grossly intact (Cranial nerves II-XII)  Sensory: normal to LT X 4  Motor: moving all extremities without focal weakness, Strength 5/5 throughout     Coordination:  no drift in bilateral upper extremities    Lab Results:  Results from last 7 days   Lab Units 05/09/23  0511 05/08/23  1753 05/08/23  0922   WBC Thousand/uL 7 22  --  8 85   HEMOGLOBIN g/dL 14 2  --  15 7   HEMATOCRIT % 39 7  --  44 0   PLATELETS " Thousands/uL 145* 178 174   NEUTROS PCT % 56  --  65   MONOS PCT % 8  --  7     Results from last 7 days   Lab Units 05/09/23  0511 05/08/23  0922   POTASSIUM mmol/L 3 4* 3 5   CHLORIDE mmol/L 104 101   CO2 mmol/L 26 25   BUN mg/dL 14 21   CREATININE mg/dL 0 60 0 87   CALCIUM mg/dL 8 3 9 3   ALK PHOS U/L  --  73   ALT U/L  --  33   AST U/L  --  16     Results from last 7 days   Lab Units 05/09/23  0511   MAGNESIUM mg/dL 2 2     Results from last 7 days   Lab Units 05/09/23  0511   PHOSPHORUS mg/dL 2 8     Results from last 7 days   Lab Units 05/08/23  0922   INR  1 04   PTT seconds 24       Imaging Studies: I have personally reviewed pertinent reports and I have personally reviewed pertinent films in PACS    EKG, Pathology, and Other Studies: I have personally reviewed pertinent reports  PLEASE NOTE:  This encounter may have been completed utilizing the [employer] Direct Speech Voice Recognition Software  Grammatical errors, random word insertions, pronoun errors and incomplete sentences are occasional consequences of the system due to software limitations, ambient noise and hardware issues  These may be missed by proof reading prior to affixing electronic signature  Any questions or concerns about the content, text or information contained within the body of this dictation should be directly addressed to the advanced practitioner or physician for clarification

## 2023-05-09 NOTE — PROGRESS NOTES
Daily Progress Note - Critical Care   Martina Weller 56 y o  male MRN: [id number]  Unit/Bed#: [facility] 720-01 Encounter: [id number]        ----------------------------------------------------------------------------------------  HPI/24hr events: lactate improved to 2 0 after pushing additional 1 5L of normal saline at 11pm  Otherwise, no overnight events    ---------------------------------------------------------------------------------------  SUBJECTIVE  Patient is doing well  Reports having congestion of nostrils along with some sneezing, otherwise has no complaints  Mentions that swelling on forehead has not seemed to increase in size but wife says it has increased in size  Denies headaches, change in vision, chest pain, dyspnea, abdominal pain, swelling, fevers/chills, muscle weakness  Review of Systems   Constitutional: Negative for chills and fever  HENT: Positive for congestion, facial swelling, rhinorrhea and sneezing  Negative for hearing loss  Respiratory: Negative for apnea, cough and shortness of breath  Cardiovascular: Negative for chest pain and leg swelling  Gastrointestinal: Negative for abdominal distention and abdominal pain  Genitourinary: Negative for dysuria  Musculoskeletal: Negative for myalgias  Skin: Negative for color change  Neurological: Negative for weakness and headaches  Psychiatric/Behavioral: Negative for agitation       Review of systems was reviewed and negative unless stated above in HPI/24-hour events   ---------------------------------------------------------------------------------------  Assessment and Plan:    Neuro:   • Diagnosis: 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  o Plan:   - CT sinus w/ contrast [place] study completed- confirms pneumocephalus and CSF leak  - MRI w/ & w/o contrast completed, waiting on neurosurgery plan  - Potential procedure to repair leak  - Nostril discharge sent for CSF analysis  - Discontinued IV vancomycin+IV cefepime+PO Flagyl  - Started IV Unasyn 3g every 6 hours for 7 days  - 2 hour neuro check      CV:   • Diagnosis: Hypertension  o Plan: Continue lisinopril 10mg daily  o Hold HCTZ  o Reassess blood pressure for possible additional therapy  - Current BP acceptable  • Diagnosis: Hyperlipidemia  o Plan: Continue atorvastatin 40 mg daily      Pulm:  • Diagnosis: No active issues      GI:   • Diagnosis: no active issues        :   • Diagnosis: No active issues        F/E/N:   • Plan:   o Fluids: Discontinue IV isolyte continuous 125ml/hr  - Okay to discontinue since patient reports eating well  o Electrolytes: Potassium 3 4, down from 3 5 after giving 40 mEq K+  - Gave additional 40 mEq K+  - Magnesium and phosphorus at acceptable levels   o Nutrition- diabetic diet      Heme/Onc:   • Diagnosis: DVT prophylaxis  o Plan: Lovenox subcutaneous injection 40 mg        Endo:   • Diagnosis: Hypothyroidism  o Plan: Continue levothyroxine 136 mcg oral daily  • Diagnosis: Type 2 diabetes mellitus  o Plan:   - Fingerstick glucose 5/9- 189, down from 351  - Was on insulin sliding scale with Lantus nighttime dose at 10 units  • Increase Lantus to 14 units for tighter glucose control   - Continue monitoring glucose levels      ID:   • Diagnosis: No active issues          MSK/Skin:   • Diagnosis: No active issues      Patient appropriate for transfer out of the ICU today?: No  Disposition: Continue Stepdown Level 1 level of care   Code Status: Level 1 - Full Code  ---------------------------------------------------------------------------------------  ICU CORE MEASURES    Prophylaxis   VTE Pharmacologic Prophylaxis: Enoxaparin (Lovenox)  VTE Mechanical Prophylaxis: sequential compression device  Stress Ulcer Prophylaxis: Prophylaxis Not Indicated     ABCDE Protocol (if indicated)  Plan to perform spontaneous awakening trial today? Not applicable  Plan to perform spontaneous breathing trial today?  Not applicable  Obvious "barriers to extubation? Not applicable  CAM-ICU:     Invasive Devices Review  Invasive Devices     Peripheral Intravenous Line  Duration           Peripheral IV 23 Distal;Left;Ventral (anterior) Wrist <1 day    Peripheral IV 23 Right Antecubital <1 day              Can any invasive devices be discontinued today? Not applicable  ---------------------------------------------------------------------------------------  OBJECTIVE    Vitals   Vitals:    23 1700 23 1800 23 2000 23 0030   BP:  119/74 104/62 118/68   BP Location:       Pulse:  103 101 82   Resp:  16 16 16   Temp:       TempSrc:       SpO2:   97% 96%   Weight: 93 kg (205 lb 0 4 oz)      Height: 5' 9\" (1 753 m)        Temp (24hrs), Av 9 °F (36 6 °C), Min:97 4 °F (36 3 °C), Max:98 3 °F (36 8 °C)  Current: Temperature: 98 3 °F (36 8 °C)  HR: 82  BP: 118/68  RR: 16  SpO2: 96% at room air    Respiratory:  SpO2: SpO2: 96 %, SpO2 Activity: SpO2 Activity: At Rest       Invasive/non-invasive ventilation settings   Respiratory    Lab Data (Last 4 hours)    None         O2/Vent Data (Last 4 hours)    None                Physical Exam  Constitutional:       Appearance: Normal appearance  HENT:      Head: Normocephalic  Nose: Nose normal       Mouth/Throat:      Mouth: Mucous membranes are moist       Pharynx: Oropharynx is clear  Eyes:      General:         Right eye: No discharge  Left eye: No discharge  Extraocular Movements: Extraocular movements intact  Conjunctiva/sclera: Conjunctivae normal    Cardiovascular:      Rate and Rhythm: Normal rate and regular rhythm  Heart sounds: Normal heart sounds  No murmur heard  No gallop  Pulmonary:      Effort: Pulmonary effort is normal  No respiratory distress  Breath sounds: Normal breath sounds  No wheezing  Abdominal:      General: Abdomen is flat  Bowel sounds are normal  There is no distension  Palpations: Abdomen is soft        " Tenderness: There is no abdominal tenderness  Musculoskeletal:      Cervical back: Neck supple  Right lower leg: No edema  Left lower leg: No edema  Skin:     General: Skin is warm and dry  Capillary Refill: Capillary refill takes less than 2 seconds  Neurological:      General: No focal deficit present  Mental Status: He is alert  Cranial Nerves: Cranial nerves 2-12 are intact  Sensory: Sensation is intact  Motor: Motor function is intact  No weakness  Coordination: Finger-Nose-Finger Test normal       Deep Tendon Reflexes: Reflexes are normal and symmetric  Psychiatric:         Mood and Affect: Mood normal          Behavior: Behavior normal                Laboratory and Diagnostics:  Results from last 7 days   Lab Units 05/08/23  1753 05/08/23  0922   WBC Thousand/uL  --  8 85   HEMOGLOBIN g/dL  --  15 7   HEMATOCRIT %  --  44 0   PLATELETS Thousands/uL 178 174   NEUTROS PCT %  --  65   MONOS PCT %  --  7     Results from last 7 days   Lab Units 05/08/23  0922   SODIUM mmol/L 134*   POTASSIUM mmol/L 3 5   CHLORIDE mmol/L 101   CO2 mmol/L 25   ANION GAP mmol/L 8   BUN mg/dL 21   CREATININE mg/dL 0 87   CALCIUM mg/dL 9 3   GLUCOSE RANDOM mg/dL 330*   ALT U/L 33   AST U/L 16   ALK PHOS U/L 73   ALBUMIN g/dL 3 7   TOTAL BILIRUBIN mg/dL 0 96          Results from last 7 days   Lab Units 05/08/23  0922   INR  1 04   PTT seconds 24          Results from last 7 days   Lab Units 05/09/23  0112 05/08/23  2104 05/08/23  1753 05/08/23  1206 05/08/23  0922   LACTIC ACID mmol/L 2 0 3 4* 3 5* 3 5* 2 8*     ABG:    VBG:    Results from last 7 days   Lab Units 05/08/23  0922   PROCALCITONIN ng/ml 0 08       Micro  Results from last 7 days   Lab Units 05/08/23  3651   BLOOD CULTURE  Received in Microbiology Lab  Culture in Progress  Received in Microbiology Lab  Culture in Progress  EKG:   Imaging: I have personally reviewed pertinent reports        Intake and Output  I/O "05/07 0701 05/08 0700 05/08 0701 05/09 0700    I V  (mL/kg)  1431 3 (15 4)    IV Piggyback  300    Total Intake(mL/kg)  1731 3 (18 6)    Net  +1731 3          Unmeasured Urine Occurrence  2 x        Height and Weights   Height: 5' 9\" (175 3 cm)  IBW (Ideal Body Weight): 70 7 kg  Body mass index is 30 28 kg/m²  Weight (last 2 days)     Date/Time Weight    05/08/23 1700 93 (205 03)            Nutrition       Diet Orders   (From admission, onward)             Start     Ordered    05/08/23 1236  Diet Jaciel/CHO Controlled; Consistent Carbohydrate Diet Level 2 (5 carb servings/75 grams CHO/meal)  Diet effective now        References:    Nutrtion Support Algorithm Enteral vs  Parenteral   Question Answer Comment   Diet Type Jaciel/CHO Controlled    Jaciel/CHO Controlled Consistent Carbohydrate Diet Level 2 (5 carb servings/75 grams CHO/meal)    RD to adjust diet per protocol?  Yes        05/08/23 1235                Active Medications  Scheduled Meds:  Current Facility-Administered Medications   Medication Dose Route Frequency Provider Last Rate   • acetaminophen  650 mg Oral Q6H PRN Claudean Rhein, PA-C     • atorvastatin  40 mg Oral Daily Donovan Cross MD     • calcium carbonate  1,000 mg Oral Daily PRN Claudean Rhein, PA-C     • cefepime  2,000 mg Intravenous Q12H Claudean Rhein, PA-C Stopped (05/09/23 0401)   • docusate sodium  100 mg Oral BID Claudean Rhein, PA-C     • enoxaparin  40 mg Subcutaneous Daily Claudean Rhein, PA-C     • insulin glargine  10 Units Subcutaneous HS Donovan Cross MD     • insulin lispro  2-12 Units Subcutaneous 4x Daily (AC & HS) Donovan Cross MD     • levothyroxine  137 mcg Oral Early Morning Donovan Cross MD     • lisinopril  10 mg Oral Daily Donovan Cross MD     • metroNIDAZOLE  500 mg Oral [facility] Claudean Rhein, [place]     • multi-electrolyte  125 mL/hr Intravenous Continuous Donovan Cross  mL/hr (05/08/23 2313)   • ondansetron  4 mg " "Intravenous Q6H PRN Thu Kraft PA-C     • vancomycin  1,000 mg Intravenous Q12H Tash Puentes MD Stopped (05/09/23 1415)     Continuous Infusions:  multi-electrolyte, 125 mL/hr, Last Rate: 125 mL/hr (05/08/23 2172)      PRN Meds:   acetaminophen, 650 mg, Q6H PRN  calcium carbonate, 1,000 mg, Daily PRN  ondansetron, 4 mg, Q6H PRN        Allergies   No Known Allergies  ---------------------------------------------------------------------------------------  Advance Directive and Living Will:      Power of :    POLST:    ---------------------------------------------------------------------------------------  Care Time Delivered:   No Critical Care time spent     Julianne Pro  MS4    Portions of the record may have been created with voice recognition software  Occasional wrong word or \"sound a like\" substitutions may have occurred due to the inherent limitations of voice recognition software    Read the chart carefully and recognize, using context, where substitutions have occurred  "

## 2023-05-09 NOTE — TELEPHONE ENCOUNTER
05/12/2023-PT STILL IN HOSPITAL  DISCUSSED W/TEETEE WHO WILL MAKE PT AWARE OF Monday 05/15/2023  APT IN [place]     **WAITING FOR PT TO BE DISCHARGED**    Teetee Mcdaniels PA-C   I will be discharging this patient from the hospital today and would like for him to have close outpatient follow-up  Can we please schedule him for an appt with Dr Leslie Fay next week?    Will be clinical follow-up          05/11/2023-PT Eitan 54    05/09/2023-PT Aidan 27  05/31/2023-6 [facility] POV W/HDM IN [place]

## 2023-05-09 NOTE — ASSESSMENT & PLAN NOTE
· S/p left total craniotomy with craniectomy and orbital osteotomy with resection of extradural mass  Exenteration/obliteration of left frontal sinus and custom synthetic cranioplasty on 4/11/2023 with Dr Shu Paredes  · Pathology: Grade 2 Meningioma  · See plan above

## 2023-05-10 LAB
ANION GAP SERPL CALCULATED.3IONS-SCNC: 2 MMOL/L (ref 4–13)
BASOPHILS # BLD AUTO: 0.02 THOUSANDS/ÂΜL (ref 0–0.1)
BASOPHILS NFR BLD AUTO: 0 % (ref 0–1)
BUN SERPL-MCNC: 12 MG/DL (ref 5–25)
CALCIUM SERPL-MCNC: 8.2 MG/DL (ref 8.3–10.1)
CHLORIDE SERPL-SCNC: 107 MMOL/L (ref 96–108)
CO2 SERPL-SCNC: 27 MMOL/L (ref 21–32)
CREAT SERPL-MCNC: 0.57 MG/DL (ref 0.6–1.3)
EOSINOPHIL # BLD AUTO: 0.26 THOUSAND/ÂΜL (ref 0–0.61)
EOSINOPHIL NFR BLD AUTO: 5 % (ref 0–6)
ERYTHROCYTE [DISTWIDTH] IN BLOOD BY AUTOMATED COUNT: 12.4 % (ref 11.6–15.1)
GFR SERPL CREATININE-BSD FRML MDRD: 115 ML/MIN/1.73SQ M
GLUCOSE SERPL-MCNC: 154 MG/DL (ref 65–140)
GLUCOSE SERPL-MCNC: 179 MG/DL (ref 65–140)
GLUCOSE SERPL-MCNC: 203 MG/DL (ref 65–140)
GLUCOSE SERPL-MCNC: 207 MG/DL (ref 65–140)
GLUCOSE SERPL-MCNC: 221 MG/DL (ref 65–140)
HCT VFR BLD AUTO: 40.7 % (ref 36.5–49.3)
HGB BLD-MCNC: 13.9 G/DL (ref 12–17)
IMM GRANULOCYTES # BLD AUTO: 0.02 THOUSAND/UL (ref 0–0.2)
IMM GRANULOCYTES NFR BLD AUTO: 0 % (ref 0–2)
LYMPHOCYTES # BLD AUTO: 2.5 THOUSANDS/ÂΜL (ref 0.6–4.47)
LYMPHOCYTES NFR BLD AUTO: 45 % (ref 14–44)
MCH RBC QN AUTO: 31.3 PG (ref 26.8–34.3)
MCHC RBC AUTO-ENTMCNC: 34.2 G/DL (ref 31.4–37.4)
MCV RBC AUTO: 92 FL (ref 82–98)
MONOCYTES # BLD AUTO: 0.57 THOUSAND/ÂΜL (ref 0.17–1.22)
MONOCYTES NFR BLD AUTO: 10 % (ref 4–12)
NEUTROPHILS # BLD AUTO: 2.3 THOUSANDS/ÂΜL (ref 1.85–7.62)
NEUTS SEG NFR BLD AUTO: 40 % (ref 43–75)
NRBC BLD AUTO-RTO: 0 /100 WBCS
PLATELET # BLD AUTO: 142 THOUSANDS/UL (ref 149–390)
PMV BLD AUTO: 9.4 FL (ref 8.9–12.7)
POTASSIUM SERPL-SCNC: 3.6 MMOL/L (ref 3.5–5.3)
RBC # BLD AUTO: 4.44 MILLION/UL (ref 3.88–5.62)
SODIUM SERPL-SCNC: 136 MMOL/L (ref 135–147)
WBC # BLD AUTO: 5.67 THOUSAND/UL (ref 4.31–10.16)

## 2023-05-10 RX ORDER — POTASSIUM CHLORIDE 20 MEQ/1
40 TABLET, EXTENDED RELEASE ORAL ONCE
Status: COMPLETED | OUTPATIENT
Start: 2023-05-10 | End: 2023-05-10

## 2023-05-10 RX ORDER — INSULIN LISPRO 100 [IU]/ML
4-20 INJECTION, SOLUTION INTRAVENOUS; SUBCUTANEOUS
Status: DISCONTINUED | OUTPATIENT
Start: 2023-05-10 | End: 2023-05-13 | Stop reason: HOSPADM

## 2023-05-10 RX ADMIN — LEVOTHYROXINE SODIUM 137 MCG: 25 TABLET ORAL at 06:51

## 2023-05-10 RX ADMIN — ACETAMINOPHEN 650 MG: 325 TABLET ORAL at 08:12

## 2023-05-10 RX ADMIN — AMPICILLIN SODIUM AND SULBACTAM SODIUM 3 G: 2; 1 INJECTION, POWDER, FOR SOLUTION INTRAMUSCULAR; INTRAVENOUS at 23:23

## 2023-05-10 RX ADMIN — ACETAMINOPHEN 650 MG: 325 TABLET ORAL at 15:37

## 2023-05-10 RX ADMIN — AMPICILLIN SODIUM AND SULBACTAM SODIUM 3 G: 2; 1 INJECTION, POWDER, FOR SOLUTION INTRAMUSCULAR; INTRAVENOUS at 11:28

## 2023-05-10 RX ADMIN — ATORVASTATIN CALCIUM 40 MG: 40 TABLET, FILM COATED ORAL at 08:01

## 2023-05-10 RX ADMIN — INSULIN LISPRO 2 UNITS: 100 INJECTION, SOLUTION INTRAVENOUS; SUBCUTANEOUS at 08:03

## 2023-05-10 RX ADMIN — ACETAMINOPHEN 650 MG: 325 TABLET ORAL at 23:15

## 2023-05-10 RX ADMIN — INSULIN LISPRO 4 UNITS: 100 INJECTION, SOLUTION INTRAVENOUS; SUBCUTANEOUS at 23:06

## 2023-05-10 RX ADMIN — INSULIN LISPRO 4 UNITS: 100 INJECTION, SOLUTION INTRAVENOUS; SUBCUTANEOUS at 11:29

## 2023-05-10 RX ADMIN — INSULIN GLARGINE 14 UNITS: 100 INJECTION, SOLUTION SUBCUTANEOUS at 23:13

## 2023-05-10 RX ADMIN — DOCUSATE SODIUM 100 MG: 100 CAPSULE, LIQUID FILLED ORAL at 17:36

## 2023-05-10 RX ADMIN — INSULIN LISPRO 8 UNITS: 100 INJECTION, SOLUTION INTRAVENOUS; SUBCUTANEOUS at 17:37

## 2023-05-10 RX ADMIN — AMPICILLIN SODIUM AND SULBACTAM SODIUM 3 G: 2; 1 INJECTION, POWDER, FOR SOLUTION INTRAMUSCULAR; INTRAVENOUS at 06:51

## 2023-05-10 RX ADMIN — LISINOPRIL 10 MG: 10 TABLET ORAL at 08:02

## 2023-05-10 RX ADMIN — ENOXAPARIN SODIUM 40 MG: 40 INJECTION SUBCUTANEOUS at 08:02

## 2023-05-10 RX ADMIN — INSULIN LISPRO 5 UNITS: 100 INJECTION, SOLUTION INTRAVENOUS; SUBCUTANEOUS at 17:37

## 2023-05-10 RX ADMIN — INSULIN LISPRO 5 UNITS: 100 INJECTION, SOLUTION INTRAVENOUS; SUBCUTANEOUS at 11:28

## 2023-05-10 RX ADMIN — DOCUSATE SODIUM 100 MG: 100 CAPSULE, LIQUID FILLED ORAL at 08:00

## 2023-05-10 RX ADMIN — POTASSIUM CHLORIDE 40 MEQ: 1500 TABLET, EXTENDED RELEASE ORAL at 08:01

## 2023-05-10 RX ADMIN — INSULIN LISPRO 5 UNITS: 100 INJECTION, SOLUTION INTRAVENOUS; SUBCUTANEOUS at 08:03

## 2023-05-10 RX ADMIN — AMPICILLIN SODIUM AND SULBACTAM SODIUM 3 G: 2; 1 INJECTION, POWDER, FOR SOLUTION INTRAMUSCULAR; INTRAVENOUS at 17:36

## 2023-05-10 NOTE — PROGRESS NOTES
[facility]  Progress Note  Name: Leigh Balbuena  MRN: [id number]  [facility] I Date of Admission: 5/8/2023   Date of Service: 5/10/2023 I Hospital Day: 2    Assessment/Plan   Status post craniotomy and cranioplasty  Assessment & Plan  · S/p left total craniotomy with craniectomy and orbital osteotomy with resection of extradural mass  Exenteration/obliteration of left frontal sinus and custom synthetic cranioplasty on 4/11/2023 with Dr Shu Paredes  · Presenting with enlarging L frontal supraorbital air/ pneumocephalus with small fluid collection  · Reported sneezing episode 5/7 with discharge from nose 5/7 and again 5/8  Imaging:   · MRI brain w wo 5/8/23: Redemonstration of postsurgical changes status post left frontal craniectomy and cranioplasty for resection of meningioma  Mild increased smooth dural enhancement may be postoperative  No nodular enhancement to suspect recurrent disease  Enhancement of evolving late subacute left frontal infarct  Stable small amount of epidural fluid/residual blood products subjacent to craniotomy extending into the obliterated left frontal sinus  Left frontal subgaleal and epidural air similar to CT from earlier today but increased from initial postoperative studies stable mild mass effect on the left frontal lobe  Increased left frontal parafalcine extra-axial air now extends anterior to the left frontal lobe with new right frontal extra-axial pneumocephalus  Additional new foci of pneumocephalus extending posteriorly along the left frontal and parietal convexities favored to be subarachnoid  Again there is concern for CSF communication that may be due to incomplete obliteration of the left frontal sinus    · CT head 5/8/2023: Increase in small extra-axial fluid collection with worsening pneumocephalus resulting in mild compressive mass effect on the left anterior frontal lobe status post resection of atypical who "grade 2 meningioma  Markedly worsened subgaleal air superficial left frontal synthetic cranioplasty flap  Persistent mild vasogenic edema in the left frontal lobe with interval resolution of small acute intraparenchymal hemorrhage  Plan:   · Continue close neurological monitoring  · DVT ppx: SCD's and lovenox  · Patient without any obvious signs of infection on exam     · Pt remains afebrile  · Lactic acid slightly elevated 2 8  · WBC normal   · Beta 2 transferrin lab was sent from small nasal discharge- results pending  · Patient on IV antibiotics preventatively given likely communication with sinus and intracranial space  Pt currently on IV Unasyn  · Imaging showed intracranial pneumocephalus communicating both subgaleal and epidural through synthetic cranioplasty flap  · Dr Kaz Byrne had extensive discussion with patient/ his family about both conservative vs surgical management  Given that pt is stable on exam at this time, with slightly improved left forehead swelling today, will continue to monitor and keep pt on antibiotics  · Neurosurgery will continue to monitor as primary team  Please contact neurosurgery with any questions or concerns  Meningioma [facility])  Assessment & Plan  · S/p left total craniotomy with craniectomy and orbital osteotomy with resection of extradural mass  Exenteration/obliteration of left frontal sinus and custom synthetic cranioplasty on 4/11/2023 with Dr Kaz Byrne  · Pathology: Grade 2 Meningioma  · See plan above  Pneumocephalus  Assessment & Plan  See plan above  Subjective/Objective     Chief Complaint: \"I have slight headache\"    Subjective: Pt reports he had a slight headache that was relieved with Tylenol  Patient denies any headache, dizziness, lightheadedness or visual disturbance  Patient denies any new numbness, tingling or weakness  Pt denies changes in gait or balance  Pt denies nausea or vomiting       Objective: Alert and awake, No acute " "distress  I/O       05/08 0701 05/09 0700 05/09 0701  05/10 0700 05/10 0701 05/11 0700    P  O   812     I V  (mL/kg) 1431 3 (15 4) 857 1 (9 2)     IV Piggyback 300 200     Total Intake(mL/kg) 1731 3 (18 6) 1869 1 (20 1)     Net +1731 3 +1869 1            Unmeasured Urine Occurrence 2 x 2 x     Unmeasured Stool Occurrence  1 x           Invasive Devices     Peripheral Intravenous Line  Duration           Peripheral IV 05/08/23 Right Antecubital 2 days    Peripheral IV 05/08/23 Distal;Left;Ventral (anterior) Wrist 1 day                Physical Exam:  Vitals: Blood pressure 132/83, pulse 86, temperature 97 6 °F (36 4 °C), temperature source Oral, resp  rate 18, height 5' 9\" (1 753 m), weight 93 kg (205 lb 0 4 oz), SpO2 96 %  ,Body mass index is 30 28 kg/m²  General appearance:  Appears stated age  Head: Normocephalic, left forehead swelling slightly decreased compared to yesterday  Eyes: EOMI, PERRL  Neck: supple, symmetrical, trachea midline   Lungs: non labored breathing  Heart: regular heart rate  Neurologic:   Mental status: Alert, oriented, thought content appropriate  Cranial nerves: grossly intact (Cranial nerves II-XII)  Sensory: normal to LT X 4  Motor: moving all extremities without focal weakness  Strength 5/5 throughout     Coordination: finger to nose test normal bilaterally, no drift in bilateral upper extremities    Lab Results:  Results from last 7 days   Lab Units 05/10/23  0443 05/09/23  0511 05/08/23  1753 05/08/23  0922   WBC Thousand/uL 5 67 7 22  --  8 85   HEMOGLOBIN g/dL 13 9 14 2  --  15 7   HEMATOCRIT % 40 7 39 7  --  44 0   PLATELETS Thousands/uL 142* 145* 178 174   NEUTROS PCT % 40* 56  --  65   MONOS PCT % 10 8  --  7     Results from last 7 days   Lab Units 05/10/23  0443 05/09/23  0511 05/08/23  0922   POTASSIUM mmol/L 3 6 3 4* 3 5   CHLORIDE mmol/L 107 104 101   CO2 mmol/L 27 26 25   BUN mg/dL 12 14 21   CREATININE mg/dL 0 57* 0 60 0 87   CALCIUM mg/dL 8 2* 8 3 9 3   ALK PHOS " U/L  --   --  73   ALT U/L  --   --  33   AST U/L  --   --  16     Results from last 7 days   Lab Units 05/09/23  0511   MAGNESIUM mg/dL 2 2     Results from last 7 days   Lab Units 05/09/23  0511   PHOSPHORUS mg/dL 2 8     Results from last 7 days   Lab Units 05/08/23  0922   INR  1 04   PTT seconds 24     Imaging Studies: I have personally reviewed pertinent reports and I have personally reviewed pertinent films in PACS    EKG, Pathology, and Other Studies: I have personally reviewed pertinent reports  PLEASE NOTE:  This encounter may have been completed utilizing the [employer] Direct Speech Voice Recognition Software  Grammatical errors, random word insertions, pronoun errors and incomplete sentences are occasional consequences of the system due to software limitations, ambient noise and hardware issues  These may be missed by proof reading prior to affixing electronic signature  Any questions or concerns about the content, text or information contained within the body of this dictation should be directly addressed to the advanced practitioner or physician for clarification

## 2023-05-10 NOTE — PROGRESS NOTES
Daily Progress Note - Critical Care   Marisa Leonardo 56 y o  male MRN: [id number]  Unit/Bed#: [facility] Encounter: [id number]        ----------------------------------------------------------------------------------------  HPI/24hr events: no overnight events  ---------------------------------------------------------------------------------------  SUBJECTIVE  Patient is doing well  Tylenol helped his headache yesterday and he was able to sleep easy last night  Currently having headache again and would like tylenol again  Reports congestion and leakage of CSF fluid from nostril  Denies visual/hearing changes, fevers/chills,     Review of Systems   Constitutional: Negative for chills and fever  HENT: Positive for congestion, facial swelling, rhinorrhea and sneezing  Negative for hearing loss  Eyes: Negative for pain  Respiratory: Negative for cough and shortness of breath  Cardiovascular: Negative for chest pain and leg swelling  Gastrointestinal: Negative for abdominal distention and abdominal pain  Genitourinary: Negative for dysuria  Musculoskeletal: Negative for arthralgias  Skin: Negative for color change  Neurological: Positive for headaches (Resolves with tylenol)  Negative for dizziness and numbness       Review of systems was reviewed and negative unless stated above in HPI/24-hour events   ---------------------------------------------------------------------------------------  Assessment and Plan:    Neuro:   • Diagnosis: Pneumocephalus w/ CSF leakage communicating with left sinus  o Plan: CT sinus w/ contrast [place] study completed- cofirms pneumocephalus and CSF leak  o MRI w/ w/o contrast completed, neurosurgery planning for observation/monitoring with possible aspiration or surgical intervention if needed  o Nostril discharge sent for CSF analysis  o Continue IV Unasyn  o 2 hour neuro check      CV:   • Diagnosis: Hypertension  o Plan:  - Continue lisinopril 10mg daily  - Hold HCTZ  - Reassess blood pressure for possible additional therapy  • Current BP acceptable  • Diagnosis: Hyperlipidemia  o Plan: Continue atorvastatin 40 mg daily      Pulm:  • Diagnosis: no active issues      GI:   • Diagnosis: No active issues      :   • Diagnosis: No active issues      F/E/N:   • Plan:   o Fluids: No fluids  o Electrolytes:   - Potassium up to 3 6, up from 3 4 yesterday after giving a second 40 mEq K+ tablet  - Will give additional 40 mEq K+ tablet given increase in insulin  o Nutrition: diabetic diet      Heme/Onc:   • Diagnosis: DVT prophylaxis  o Plan: Lovenox subcutaneous injection 40 mg        Endo:   • Diagnosis: Hypothyroidism  o Plan: Continue levothyroxine 136 mcg oral daily  • Diagnosis: Type 2 diabetes mellitus  o Plan: Fingerstick glucose 5/10- 207  o Is on sliding scale with 14 unit Lantus nightime insulin, now including 4 units of insulin w/ meals  - Increasing slide scale from 4 to 5 units  ID:   • Diagnosis: No active issue      MSK/Skin:   • Diagnosis: No active issue    Patient appropriate for transfer out of the ICU today?: No  Disposition: Continue Stepdown Level 1 level of care   Code Status: Level 1 - Full Code  ---------------------------------------------------------------------------------------  ICU CORE MEASURES    Prophylaxis   VTE Pharmacologic Prophylaxis: Enoxaparin (Lovenox)  VTE Mechanical Prophylaxis: sequential compression device  Stress Ulcer Prophylaxis: Prophylaxis Not Indicated     ABCDE Protocol (if indicated)  Plan to perform spontaneous awakening trial today? Not applicable  Plan to perform spontaneous breathing trial today? Not applicable  Obvious barriers to extubation?  Not applicable  CAM-ICU:      Invasive Devices Review  Invasive Devices     Peripheral Intravenous Line  Duration           Peripheral IV 05/08/23 Right Antecubital 2 days    Peripheral IV 05/08/23 Distal;Left;Ventral (anterior) Wrist 1 day              Can any invasive devices be discontinued today? Not applicable  ---------------------------------------------------------------------------------------  OBJECTIVE    Vitals   Vitals:    23 2130 05/10/23 0725 05/10/23 0802 05/10/23 1136   BP:   132/83 119/82   BP Location:   Left arm    Pulse:  88 86 73   Resp: 17 18 18 16   Temp:  97 6 °F (36 4 °C) 97 6 °F (36 4 °C) 97 5 °F (36 4 °C)   TempSrc:  Oral Oral    SpO2:   96% 97%   Weight:       Height:         Temp (24hrs), Av °F (36 7 °C), Min:97 5 °F (36 4 °C), Max:98 5 °F (36 9 °C)  Current: Temperature: 97 5 °F (36 4 °C)  HR: 73  BP: 119/82  RR: 16  SpO2: 97% on room air    Respiratory:  SpO2 Activity: SpO2 Activity: At Rest (Simultaneous filing  User may not have seen previous data )       Invasive/non-invasive ventilation settings   Respiratory    Lab Data (Last 4 hours)    None         O2/Vent Data (Last 4 hours)    None                Physical Exam  Constitutional:       General: He is not in acute distress  Appearance: Normal appearance  He is not ill-appearing  HENT:      Head: Normocephalic  Mouth/Throat:      Mouth: Mucous membranes are moist       Pharynx: Oropharynx is clear  Cardiovascular:      Rate and Rhythm: Normal rate and regular rhythm  Heart sounds: Normal heart sounds  No murmur heard  No gallop  Pulmonary:      Effort: Pulmonary effort is normal  No respiratory distress  Breath sounds: Normal breath sounds  No wheezing  Abdominal:      General: Abdomen is flat  Bowel sounds are normal  There is no distension  Palpations: Abdomen is soft  Tenderness: There is no abdominal tenderness  Musculoskeletal:      Cervical back: Neck supple  No rigidity  Right lower leg: No edema  Left lower leg: No edema  Skin:     General: Skin is warm and dry  Capillary Refill: Capillary refill takes less than 2 seconds  Neurological:      General: No focal deficit present  Mental Status: He is alert  Cranial Nerves: Cranial nerves 2-12 are intact  Sensory: Sensation is intact  Motor: Motor function is intact  Coordination: Finger-Nose-Finger Test normal       Deep Tendon Reflexes: Reflexes are normal and symmetric  Psychiatric:         Mood and Affect: Mood normal          Behavior: Behavior normal        Laboratory and Diagnostics:  Results from last 7 days   Lab Units 05/10/23  0443 05/09/23  0511 05/08/23  1753 05/08/23  0922   WBC Thousand/uL 5 67 7 22  --  8 85   HEMOGLOBIN g/dL 13 9 14 2  --  15 7   HEMATOCRIT % 40 7 39 7  --  44 0   PLATELETS Thousands/uL 142* 145* 178 174   NEUTROS PCT % 40* 56  --  65   MONOS PCT % 10 8  --  7     Results from last 7 days   Lab Units 05/10/23  0443 05/09/23  0511 05/08/23  0922   SODIUM mmol/L 136 134* 134*   POTASSIUM mmol/L 3 6 3 4* 3 5   CHLORIDE mmol/L 107 104 101   CO2 mmol/L 27 26 25   ANION GAP mmol/L 2* 4 8   BUN mg/dL 12 14 21   CREATININE mg/dL 0 57* 0 60 0 87   CALCIUM mg/dL 8 2* 8 3 9 3   GLUCOSE RANDOM mg/dL 179* 224* 330*   ALT U/L  --   --  33   AST U/L  --   --  16   ALK PHOS U/L  --   --  73   ALBUMIN g/dL  --   --  3 7   TOTAL BILIRUBIN mg/dL  --   --  0 96     Results from last 7 days   Lab Units 05/09/23  0511   MAGNESIUM mg/dL 2 2   PHOSPHORUS mg/dL 2 8      Results from last 7 days   Lab Units 05/08/23  0922   INR  1 04   PTT seconds 24          Results from last 7 days   Lab Units 05/09/23  0112 05/08/23  2104 05/08/23  1753 05/08/23  1206 05/08/23  0922   LACTIC ACID mmol/L 2 0 3 4* 3 5* 3 5* 2 8*     ABG:    VBG:    Results from last 7 days   Lab Units 05/08/23  0922   PROCALCITONIN ng/ml 0 08       Micro  Results from last 7 days   Lab Units 05/08/23  0922   BLOOD CULTURE  No Growth at 48 hrs  No Growth at 48 hrs  EKG:   Imaging:  I have personally reviewed pertinent reports  Intake and Output  I/O       05/08 0701 05/09 0700 05/09 0701  05/10 0700    P  O   812    I V  (mL/kg) 1431 3 (15 4) 857 1 (9 2)    IV "Piggyback 300 200    Total Intake(mL/kg) 1731 3 (18 6) 1869 1 (20 1)    Net +1731 3 +1869 1          Unmeasured Urine Occurrence 2 x 2 x    Unmeasured Stool Occurrence  1 x            Height and Weights   Height: 5' 9\" (175 3 cm)  IBW (Ideal Body Weight): 70 7 kg  Body mass index is 30 28 kg/m²  Weight (last 2 days)     Date/Time Weight    05/08/23 1700 93 (205 03)            Nutrition       Diet Orders   (From admission, onward)             Start     Ordered    05/08/23 1236  Diet Jaciel/CHO Controlled; Consistent Carbohydrate Diet Level 2 (5 carb servings/75 grams CHO/meal)  Diet effective now        References:    Nutrtion Support Algorithm Enteral vs  Parenteral   Question Answer Comment   Diet Type Jaciel/CHO Controlled    Jaciel/CHO Controlled Consistent Carbohydrate Diet Level 2 (5 carb servings/75 grams CHO/meal)    RD to adjust diet per protocol?  Yes        05/08/23 1235                  Active Medications  Scheduled Meds:  Current Facility-Administered Medications   Medication Dose Route Frequency Provider Last Rate   • acetaminophen  650 mg Oral Q6H PRN Tobin Glover MD     • ampicillin-sulbactam  3 g Intravenous Q6H Alessandra Mass, DO 3 g (05/10/23 1128)   • atorvastatin  40 mg Oral Daily Tobin Glover MD     • calcium carbonate  1,000 mg Oral Daily PRN Nithya Fairbanks PA-C     • docusate sodium  100 mg Oral BID Nithya Fairbanks PA-C     • enoxaparin  40 mg Subcutaneous Daily Nithya Fairbanks PA-C     • insulin glargine  14 Units Subcutaneous HS Tobin Glover MD     • insulin lispro  4-20 Units Subcutaneous 4x Daily (AC & HS) Tobin Glover MD     • insulin lispro  5 Units Subcutaneous TID With Meals Tobin Glover MD     • levothyroxine  137 mcg Oral Early Morning Tobin Glover MD     • lisinopril  10 mg Oral Daily Tobin Glover MD     • ondansetron  4 mg Intravenous Q6H PRN Nithya Fairbanks PA-C       Continuous Infusions:     PRN Meds:   acetaminophen, 650 mg, " "Q6H PRN  calcium carbonate, 1,000 mg, Daily PRN  ondansetron, 4 mg, Q6H PRN        Allergies   No Known Allergies  ---------------------------------------------------------------------------------------  Advance Directive and Living Will:      Power of :    POLST:    ---------------------------------------------------------------------------------------  Care Time Delivered:   No Critical Care time spent     [facility]    Portions of the record may have been created with voice recognition software  Occasional wrong word or \"sound a like\" substitutions may have occurred due to the inherent limitations of voice recognition software    Read the chart carefully and recognize, using context, where substitutions have occurred  "

## 2023-05-10 NOTE — PLAN OF CARE
Problem: INFECTION - ADULT  Goal: Absence of fever/infection during neutropenic period  Description: INTERVENTIONS:  - Monitor WBC    Outcome: Progressing     Problem: SAFETY ADULT  Goal: Patient will remain free of falls  Description: INTERVENTIONS:  - Educate patient/family on patient safety including physical limitations  - Instruct patient to call for assistance with activity   - Consult OT/PT to assist with strengthening/mobility   - Keep Call bell within reach  - Keep bed low and locked with side rails adjusted as appropriate  - Keep care items and personal belongings within reach  - Initiate and maintain comfort rounds  - Make Fall Risk Sign visible to staff  - Offer Toileting every 2 Hours, in advance of need  - Initiate/Maintain bed alarm  - Obtain necessary fall risk management equipment: non skid footwear  - Apply yellow socks and bracelet for high fall risk patients  - Consider moving patient to room near nurses station  Outcome: Progressing     Problem: NEUROSENSORY - ADULT  Goal: Achieves stable or improved neurological status  Description: INTERVENTIONS  - Monitor and report changes in neurological status  - Monitor vital signs such as temperature, blood pressure, glucose, and any other labs ordered   - Initiate measures to prevent increased intracranial pressure  - Monitor for seizure activity and implement precautions if appropriate      Outcome: Progressing     Problem: METABOLIC, FLUID AND ELECTROLYTES - ADULT  Goal: Electrolytes maintained within normal limits  Description: INTERVENTIONS:  - Monitor labs and assess patient for signs and symptoms of electrolyte imbalances  - Administer electrolyte replacement as ordered  - Monitor response to electrolyte replacements, including repeat lab results as appropriate  - Instruct patient on fluid and nutrition as appropriate  Outcome: Progressing  Goal: Glucose maintained within target range  Description: INTERVENTIONS:  - Monitor Blood Glucose as ordered  - Assess for signs and symptoms of hyperglycemia and hypoglycemia  - Administer ordered medications to maintain glucose within target range  - Assess nutritional intake and initiate nutrition service referral as needed  Outcome: Progressing

## 2023-05-10 NOTE — ASSESSMENT & PLAN NOTE
· S/p left total craniotomy with craniectomy and orbital osteotomy with resection of extradural mass  Exenteration/obliteration of left frontal sinus and custom synthetic cranioplasty on 4/11/2023 with Dr Ryanne Vázquez  · Pathology: Grade 2 Meningioma  · See plan above

## 2023-05-10 NOTE — ASSESSMENT & PLAN NOTE
· S/p left total craniotomy with craniectomy and orbital osteotomy with resection of extradural mass  Exenteration/obliteration of left frontal sinus and custom synthetic cranioplasty on 4/11/2023 with Dr Mickey Landeros  · Presenting with enlarging L frontal supraorbital air/ pneumocephalus with small fluid collection  · Reported sneezing episode 5/7 with discharge from nose 5/7 and again 5/8  Imaging:   · MRI brain w wo 5/8/23: Redemonstration of postsurgical changes status post left frontal craniectomy and cranioplasty for resection of meningioma  Mild increased smooth dural enhancement may be postoperative  No nodular enhancement to suspect recurrent disease  Enhancement of evolving late subacute left frontal infarct  Stable small amount of epidural fluid/residual blood products subjacent to craniotomy extending into the obliterated left frontal sinus  Left frontal subgaleal and epidural air similar to CT from earlier today but increased from initial postoperative studies stable mild mass effect on the left frontal lobe  Increased left frontal parafalcine extra-axial air now extends anterior to the left frontal lobe with new right frontal extra-axial pneumocephalus  Additional new foci of pneumocephalus extending posteriorly along the left frontal and parietal convexities favored to be subarachnoid  Again there is concern for CSF communication that may be due to incomplete obliteration of the left frontal sinus  · CT head 5/8/2023: Increase in small extra-axial fluid collection with worsening pneumocephalus resulting in mild compressive mass effect on the left anterior frontal lobe status post resection of atypical who grade 2 meningioma  Markedly worsened subgaleal air superficial left frontal synthetic cranioplasty flap  Persistent mild vasogenic edema in the left frontal lobe with interval resolution of small acute intraparenchymal hemorrhage      Plan:   · Continue close neurological monitoring  · DVT ppx: SCD's and lovenox  · Patient without any obvious signs of infection on exam     · Pt remains afebrile  · Lactic acid slightly elevated 2 8  · WBC normal   · Beta 2 transferrin lab was sent from small nasal discharge- results pending  · Patient on IV antibiotics preventatively given likely communication with sinus and intracranial space  Pt currently on IV Unasyn  · Imaging showed intracranial pneumocephalus communicating both subgaleal and epidural through synthetic cranioplasty flap  · Dr Jordan Sanches had extensive discussion with patient/ his family about both conservative vs surgical management  Given that pt is stable on exam at this time, with slightly improved left forehead swelling today, will continue to monitor and keep pt on antibiotics  · Neurosurgery will continue to monitor as primary team  Please contact neurosurgery with any questions or concerns

## 2023-05-11 LAB
ANION GAP SERPL CALCULATED.3IONS-SCNC: 3 MMOL/L (ref 4–13)
BUN SERPL-MCNC: 9 MG/DL (ref 5–25)
CALCIUM SERPL-MCNC: 8.6 MG/DL (ref 8.3–10.1)
CHLORIDE SERPL-SCNC: 105 MMOL/L (ref 96–108)
CO2 SERPL-SCNC: 25 MMOL/L (ref 21–32)
CREAT SERPL-MCNC: 0.82 MG/DL (ref 0.6–1.3)
GFR SERPL CREATININE-BSD FRML MDRD: 99 ML/MIN/1.73SQ M
GLUCOSE SERPL-MCNC: 154 MG/DL (ref 65–140)
GLUCOSE SERPL-MCNC: 173 MG/DL (ref 65–140)
GLUCOSE SERPL-MCNC: 218 MG/DL (ref 65–140)
GLUCOSE SERPL-MCNC: 247 MG/DL (ref 65–140)
GLUCOSE SERPL-MCNC: 319 MG/DL (ref 65–140)
POTASSIUM SERPL-SCNC: 4.1 MMOL/L (ref 3.5–5.3)
SODIUM SERPL-SCNC: 133 MMOL/L (ref 135–147)

## 2023-05-11 RX ORDER — INSULIN GLARGINE 100 [IU]/ML
18 INJECTION, SOLUTION SUBCUTANEOUS
Status: DISCONTINUED | OUTPATIENT
Start: 2023-05-11 | End: 2023-05-13 | Stop reason: HOSPADM

## 2023-05-11 RX ORDER — INSULIN GLARGINE 100 [IU]/ML
16 INJECTION, SOLUTION SUBCUTANEOUS
Status: DISCONTINUED | OUTPATIENT
Start: 2023-05-11 | End: 2023-05-11

## 2023-05-11 RX ORDER — INSULIN LISPRO 100 [IU]/ML
8 INJECTION, SOLUTION INTRAVENOUS; SUBCUTANEOUS
Status: DISCONTINUED | OUTPATIENT
Start: 2023-05-11 | End: 2023-05-13 | Stop reason: HOSPADM

## 2023-05-11 RX ADMIN — INSULIN LISPRO 8 UNITS: 100 INJECTION, SOLUTION INTRAVENOUS; SUBCUTANEOUS at 12:04

## 2023-05-11 RX ADMIN — LISINOPRIL 10 MG: 10 TABLET ORAL at 08:30

## 2023-05-11 RX ADMIN — INSULIN LISPRO 5 UNITS: 100 INJECTION, SOLUTION INTRAVENOUS; SUBCUTANEOUS at 12:05

## 2023-05-11 RX ADMIN — INSULIN LISPRO 4 UNITS: 100 INJECTION, SOLUTION INTRAVENOUS; SUBCUTANEOUS at 16:58

## 2023-05-11 RX ADMIN — AMPICILLIN SODIUM AND SULBACTAM SODIUM 3 G: 2; 1 INJECTION, POWDER, FOR SOLUTION INTRAMUSCULAR; INTRAVENOUS at 06:53

## 2023-05-11 RX ADMIN — ATORVASTATIN CALCIUM 40 MG: 40 TABLET, FILM COATED ORAL at 08:30

## 2023-05-11 RX ADMIN — LEVOTHYROXINE SODIUM 137 MCG: 25 TABLET ORAL at 06:52

## 2023-05-11 RX ADMIN — ENOXAPARIN SODIUM 40 MG: 40 INJECTION SUBCUTANEOUS at 08:30

## 2023-05-11 RX ADMIN — INSULIN GLARGINE 18 UNITS: 100 INJECTION, SOLUTION SUBCUTANEOUS at 21:22

## 2023-05-11 RX ADMIN — INSULIN LISPRO 4 UNITS: 100 INJECTION, SOLUTION INTRAVENOUS; SUBCUTANEOUS at 21:27

## 2023-05-11 RX ADMIN — SODIUM CHLORIDE 3 G: 9 INJECTION, SOLUTION INTRAVENOUS at 21:22

## 2023-05-11 RX ADMIN — AMPICILLIN SODIUM AND SULBACTAM SODIUM 3 G: 2; 1 INJECTION, POWDER, FOR SOLUTION INTRAMUSCULAR; INTRAVENOUS at 12:03

## 2023-05-11 RX ADMIN — INSULIN LISPRO 8 UNITS: 100 INJECTION, SOLUTION INTRAVENOUS; SUBCUTANEOUS at 16:58

## 2023-05-11 RX ADMIN — INSULIN LISPRO 8 UNITS: 100 INJECTION, SOLUTION INTRAVENOUS; SUBCUTANEOUS at 07:51

## 2023-05-11 RX ADMIN — INSULIN LISPRO 5 UNITS: 100 INJECTION, SOLUTION INTRAVENOUS; SUBCUTANEOUS at 07:52

## 2023-05-11 RX ADMIN — ACETAMINOPHEN 650 MG: 325 TABLET ORAL at 21:29

## 2023-05-11 NOTE — PROGRESS NOTES
Daily Progress Note - Critical Care   Divina Gar 56 y o  male MRN: [id number]  Unit/Bed#: [facility] 720-01 Encounter: [id number]        ----------------------------------------------------------------------------------------  HPI/24hr events: no overnight events    ---------------------------------------------------------------------------------------  SUBJECTIVE  Patient is doing well  Mentions occasional headaches that resolve with tylenol  Reports swelling on forehead has decreased slightly, continues to have congestion and some yellow discharge from nostril  No sneezing  Reports dysuria since yesterday evening, unsure of urine color but does not smell abnormal  Denies fevers/chills  Also reports watery diarrhea since yesterday  Denies abdominal distention and pain  Also denies chest pain, dyspnia  Review of Systems   Constitutional: Negative for chills and fever  HENT: Positive for congestion and rhinorrhea  Negative for hearing loss and sneezing  Eyes: Negative for pain  Respiratory: Negative for cough and shortness of breath  Cardiovascular: Negative for chest pain and leg swelling  Gastrointestinal: Positive for diarrhea  Negative for abdominal distention, abdominal pain and constipation  Genitourinary: Positive for dysuria  Musculoskeletal: Negative for arthralgias  Skin: Negative for color change  Neurological: Negative for dizziness, weakness and headaches       Review of systems was reviewed and negative unless stated above in HPI/24-hour events   ---------------------------------------------------------------------------------------  Assessment and Plan:    Neuro:   • Diagnosis: 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  o Plan:   - CT sinus w/ contrast and MRI c/ w/o contrast confirms pneumocephalus and CSF leakage communicating with left sinus  - Nostril discharge sent for CSF analysis  - Continue IV unasyn  • Change from q6 to 8q given new onset diarrhea  - 2 "hour neuro checks  - Sign off      CV:   • Diagnosis: Hypertension  o Plan:   - Continue lisinopril 10mg daily  - Hold HCTZ  - Reassess BP  • Diagnosis: Hyperlipidemia  o Plan: Continue atorvastatin 40 mg daily      Pulm:  • Diagnosis: No active issues      GI:   • Diagnosis: Watery diarrhea  o Plan:   - Started yesterday evening, 2x yesteday (5/11) with one this morning (5/11)  - Halt colace tablets  - Change IV unasyn from 6q to 8q    :   • Diagnosis: Dysuria  o Plan:   - Started yesterday evening  • Unsure urine color change  • No change to odor  - Drinks about 5-6 cups of water per day  - Increase water intake to 8-9 cups      F/E/N:   • F- No fluids  • Electrolytes: normal  • Nutrition: diabetic diet      Heme/Onc:   • Diagnosis: DVT prophylaxis  o Plan: Lovenox subcutaneous injection 40 mg      Endo:   • Diagnosis: Hypothyroidism  o Plan: Continue levothyroxine 136 mcg oral daily  • Diagnosis: Type 2 diabetes   o Plan:  - \"Fasting\" glucose at 6am: 247- due to eating bag of chips and orange juice beforehand  • Given 8 units sliding scale, 5 units pre-meal insulin afterwards  - Glucose test at 11am high at 218  - Increased Lantus to 18 units, premeal insulin 8 units      ID:   • Diagnosis: no active issue      MSK/Skin:   • Diagnosis: no active issue    Patient appropriate for transfer out of the ICU today?: No  Disposition: Transfer to Stepdown Level 2  Code Status: Level 1 - Full Code  ---------------------------------------------------------------------------------------  ICU CORE MEASURES    Prophylaxis   VTE Pharmacologic Prophylaxis: Enoxaparin (Lovenox)  VTE Mechanical Prophylaxis: reason for no mechanical VTE prophylaxis    Stress Ulcer Prophylaxis: Prophylaxis Not Indicated     ABCDE Protocol (if indicated)  Plan to perform spontaneous awakening trial today? Not applicable  Plan to perform spontaneous breathing trial today? Not applicable  Obvious barriers to extubation?  Not applicable  CAM-ICU:  " Invasive Devices Review  Invasive Devices     Peripheral Intravenous Line  Duration           Peripheral IV 23 Right Antecubital 3 days    Peripheral IV 23 Distal;Left;Ventral (anterior) Wrist 2 days              Can any invasive devices be discontinued today? Not applicable  ---------------------------------------------------------------------------------------  OBJECTIVE    Vitals   Vitals:    23 0712 23 0823 23 1009 23 1209   BP: 118/84 131/87 134/79 134/77   BP Location:  Left arm Left arm    Pulse: 81 92 91 98   Resp: 18 16 16    Temp: 98 1 °F (36 7 °C) 98 °F (36 7 °C) 98 6 °F (37 °C) 98 1 °F (36 7 °C)   TempSrc:  Oral Oral    SpO2: 98% 99% 98% 96%   Weight:       Height:         Temp (24hrs), Av °F (36 7 °C), Min:97 3 °F (36 3 °C), Max:98 6 °F (37 °C)  Current: Temperature: 98 1 °F (36 7 °C)  HR: 81  BP: 118/84  RR: 18  SpO2: 98% on room air    Respiratory:  SpO2: SpO2: 96 %       Invasive/non-invasive ventilation settings   Respiratory    Lab Data (Last 4 hours)    None         O2/Vent Data (Last 4 hours)    None                Physical Exam  Constitutional:       Appearance: Normal appearance  HENT:      Head: Normocephalic  Mouth/Throat:      Mouth: Mucous membranes are moist       Pharynx: Oropharynx is clear  Eyes:      Conjunctiva/sclera: Conjunctivae normal    Cardiovascular:      Rate and Rhythm: Normal rate and regular rhythm  Heart sounds: Normal heart sounds  No murmur heard  No gallop  Pulmonary:      Effort: Pulmonary effort is normal       Breath sounds: Normal breath sounds  Abdominal:      General: Abdomen is flat  Bowel sounds are normal       Palpations: Abdomen is soft  Musculoskeletal:      Cervical back: Neck supple  No rigidity  Right lower leg: No edema  Left lower leg: No edema  Skin:     General: Skin is warm  Capillary Refill: Capillary refill takes less than 2 seconds     Neurological:      General: No focal deficit present  Mental Status: He is alert  Psychiatric:         Mood and Affect: Mood normal          Behavior: Behavior normal          Laboratory and Diagnostics:  Results from last 7 days   Lab Units 05/10/23  0443 05/09/23  0511 05/08/23  1753 05/08/23  0922   WBC Thousand/uL 5 67 7 22  --  8 85   HEMOGLOBIN g/dL 13 9 14 2  --  15 7   HEMATOCRIT % 40 7 39 7  --  44 0   PLATELETS Thousands/uL 142* 145* 178 174   NEUTROS PCT % 40* 56  --  65   MONOS PCT % 10 8  --  7     Results from last 7 days   Lab Units 05/11/23  0829 05/10/23  0443 05/09/23  0511 05/08/23  0922   SODIUM mmol/L 133* 136 134* 134*   POTASSIUM mmol/L 4 1 3 6 3 4* 3 5   CHLORIDE mmol/L 105 107 104 101   CO2 mmol/L 25 27 26 25   ANION GAP mmol/L 3* 2* 4 8   BUN mg/dL 9 12 14 21   CREATININE mg/dL 0 82 0 57* 0 60 0 87   CALCIUM mg/dL 8 6 8 2* 8 3 9 3   GLUCOSE RANDOM mg/dL 319* 179* 224* 330*   ALT U/L  --   --   --  33   AST U/L  --   --   --  16   ALK PHOS U/L  --   --   --  73   ALBUMIN g/dL  --   --   --  3 7   TOTAL BILIRUBIN mg/dL  --   --   --  0 96     Results from last 7 days   Lab Units 05/09/23  0511   MAGNESIUM mg/dL 2 2   PHOSPHORUS mg/dL 2 8      Results from last 7 days   Lab Units 05/08/23  0922   INR  1 04   PTT seconds 24          Results from last 7 days   Lab Units 05/09/23  0112 05/08/23  2104 05/08/23  1753 05/08/23  1206 05/08/23  0922   LACTIC ACID mmol/L 2 0 3 4* 3 5* 3 5* 2 8*     ABG:    VBG:    Results from last 7 days   Lab Units 05/08/23  0922   PROCALCITONIN ng/ml 0 08       Micro  Results from last 7 days   Lab Units 05/08/23  0922   BLOOD CULTURE  No Growth at 72 hrs  No Growth at 72 hrs  EKG:   Imaging:     Intake and Output  I/O       05/09 0701  05/10 0700 05/10 0701  05/11 0700 05/11 0701  05/12 0700    P  O  812 240 720    I V  (mL/kg) 857 1 (9 2) 35 (0 4)     IV Piggyback 200 200     Total Intake(mL/kg) 1869 1 (20 1) 475 (5 1) 720 (7 7)    Net +1869 1 +475 +720           Unmeasured Urine "Occurrence 2 x      Unmeasured Stool Occurrence 1 x              Height and Weights   Height: 5' 9\" (175 3 cm)  IBW (Ideal Body Weight): 70 7 kg  Body mass index is 30 28 kg/m²  Weight (last 2 days)     None            Nutrition       Diet Orders   (From admission, onward)             Start     Ordered    05/08/23 1236  Diet Jaciel/CHO Controlled; Consistent Carbohydrate Diet Level 2 (5 carb servings/75 grams CHO/meal)  Diet effective now        References:    Nutrtion Support Algorithm Enteral vs  Parenteral   Question Answer Comment   Diet Type Jaciel/CHO Controlled    Jaciel/CHO Controlled Consistent Carbohydrate Diet Level 2 (5 carb servings/75 grams CHO/meal)    RD to adjust diet per protocol?  Yes        05/08/23 1235                    Active Medications  Scheduled Meds:  Current Facility-Administered Medications   Medication Dose Route Frequency Provider Last Rate   • acetaminophen  650 mg Oral Q6H PRN Manda Soto MD     • ampicillin-sulbactam  3 g Intravenous Q6H Ria Dimes, DO 3 g (05/11/23 1203)   • atorvastatin  40 mg Oral Daily Manda Soto MD     • calcium carbonate  1,000 mg Oral Daily PRN Danna Duenas PA-C     • docusate sodium  100 mg Oral BID Danna Duenas PA-C     • enoxaparin  40 mg Subcutaneous Daily Danna Duenas PA-C     • insulin glargine  14 Units Subcutaneous HS Manda Soto MD     • insulin lispro  4-20 Units Subcutaneous 4x Daily (AC & HS) Manda Soto MD     • insulin lispro  5 Units Subcutaneous TID With Meals Manda Soto MD     • levothyroxine  137 mcg Oral Early Morning Manda Soto MD     • lisinopril  10 mg Oral Daily Manda Soto MD     • ondansetron  4 mg Intravenous Q6H PRN Danna Duenas PA-C       Continuous Infusions:     PRN Meds:   acetaminophen, 650 mg, Q6H PRN  calcium carbonate, 1,000 mg, Daily PRN  ondansetron, 4 mg, Q6H PRN        Allergies   No Known " "Allergies  ---------------------------------------------------------------------------------------  Advance Directive and Living Will:      Power of :    POLST:    ---------------------------------------------------------------------------------------  Care Time Delivered:   No Critical Care time spent     [facility]    Portions of the record may have been created with voice recognition software  Occasional wrong word or \"sound a like\" substitutions may have occurred due to the inherent limitations of voice recognition software    Read the chart carefully and recognize, using context, where substitutions have occurred  "

## 2023-05-11 NOTE — ASSESSMENT & PLAN NOTE
· S/p left total craniotomy with craniectomy and orbital osteotomy with resection of extradural mass  Exenteration/obliteration of left frontal sinus and custom synthetic cranioplasty on 4/11/2023 with Dr Sophy Brown  · Pathology: Grade 2 Meningioma  · See plan above

## 2023-05-11 NOTE — ASSESSMENT & PLAN NOTE
· S/p left total craniotomy with craniectomy and orbital osteotomy with resection of extradural mass  Exenteration/obliteration of left frontal sinus and custom synthetic cranioplasty on 4/11/2023 with Dr Nette Bello  · Pathology: Grade 2 Meningioma  · See plan above

## 2023-05-11 NOTE — ASSESSMENT & PLAN NOTE
· S/p left total craniotomy with craniectomy and orbital osteotomy with resection of extradural mass  Exenteration/obliteration of left frontal sinus and custom synthetic cranioplasty on 4/11/2023 with Dr Ryanne Vázquez  · Presenting with enlarging L frontal supraorbital air/ pneumocephalus with small fluid collection  · Reported sneezing episode 5/7 with discharge from nose 5/7 and again 5/8  Imaging:   · MRI brain w wo 5/8/23: Redemonstration of postsurgical changes status post left frontal craniectomy and cranioplasty for resection of meningioma  Mild increased smooth dural enhancement may be postoperative  No nodular enhancement to suspect recurrent disease  Enhancement of evolving late subacute left frontal infarct  Stable small amount of epidural fluid/residual blood products subjacent to craniotomy extending into the obliterated left frontal sinus  Left frontal subgaleal and epidural air similar to CT from earlier today but increased from initial postoperative studies stable mild mass effect on the left frontal lobe  Increased left frontal parafalcine extra-axial air now extends anterior to the left frontal lobe with new right frontal extra-axial pneumocephalus  Additional new foci of pneumocephalus extending posteriorly along the left frontal and parietal convexities favored to be subarachnoid  Again there is concern for CSF communication that may be due to incomplete obliteration of the left frontal sinus  · CT head 5/8/2023: Increase in small extra-axial fluid collection with worsening pneumocephalus resulting in mild compressive mass effect on the left anterior frontal lobe status post resection of atypical who grade 2 meningioma  Markedly worsened subgaleal air superficial left frontal synthetic cranioplasty flap  Persistent mild vasogenic edema in the left frontal lobe with interval resolution of small acute intraparenchymal hemorrhage      Plan:   · Continue close neurological monitoring  · DVT ppx: SCD's and lovenox  · Patient without any obvious signs of infection on exam     · Pt remains afebrile  · Lactic acid slightly elevated 2 8  · WBC normal   · Beta 2 transferrin lab was sent from small nasal discharge- results pending  · Patient on IV antibiotics preventatively given likely communication with sinus and intracranial space  Pt currently on IV Unasyn  · Imaging showed intracranial pneumocephalus communicating both subgaleal and epidural through synthetic cranioplasty flap  · Dr Haydee Garland had extensive discussion with patient/ his family about both conservative vs surgical management  Given that pt is stable on exam at this time, with slightly improved left forehead swelling today, will continue to monitor and keep pt on antibiotics  · Neurosurgery will continue to monitor as primary team  Please contact neurosurgery with any questions or concerns

## 2023-05-11 NOTE — CASE MANAGEMENT
Case Management Assessment & Discharge Planning Note    Patient name Margot Alexandre  [id number]  : 1965 Date 2023       Current Admission Date: 2023  Current Admission Diagnosis:Pneumocephalus   Patient Active Problem List    Diagnosis Date Noted   • Status post craniotomy and cranioplasty 2023   • Meningioma ([facility] [address] ) 2023   • Left-sided low back pain without sciatica 2023   • Pneumocephalus 11/10/2022   • Subacromial impingement, right 2022   • Rotator cuff syndrome, right 2022   • Status post appendectomy 2021   • Dental caries 2021   • Frontal skull lesion 2020   • Dizziness 2020   • Other specified glaucoma 2020   • Candida rash of groin 2020   • Chronic pain in penis 2020   • Fibromatosis of plantar fascia 2019   • Balanitis 11/15/2019   • Cough 2019   • Elevated AST (SGOT) 2018   • Alcohol abuse 2018   • Adhesive capsulitis of right shoulder 2018   • Right shoulder pain    • Uncomplicated alcohol dependence ([facility] [address] ) 2015   • Controlled type 2 diabetes mellitus without complication, without long-term current use of insulin ([facility] [address] ) 2015   • Benign essential hypertension 2015   • Hyperlipidemia 2015   • Other specified hypothyroidism 2013      LOS (days): 3  Geometric Mean LOS (GMLOS) (days): 4 30  Days to GMLOS:1 2     OBJECTIVE:  PATIENT READMITTED TO HOSPITAL  Risk of Unplanned Readmission Score: 12 2         Current admission status: Inpatient       Preferred Pharmacy:   [facility] [address] MADDI KEARNEY [address] [facility], [address] [place] [address] Road  [address] [place] [address]  Phone: [phone] Fax: [phone]    Primary Care Provider: Mann Lazcano MD    Primary Insurance: Joselyn Rosales  Secondary Insurance:     ASSESSMENT:  Yunier Jean Proxies    There are no active Health Care Proxies on file  Readmission Root Cause  30 Day Readmission: No    Patient Information  Admitted from[de-identified] Home  Mental Status: Alert  During Assessment patient was accompanied by: Not accompanied during assessment  Assessment information provided by[de-identified] Patient  Primary Caregiver: [address] of Residence: [address] [facility] do you live in?: [place]  Type of Current Residence:  (private residence)  In the last 12 months, was there a time when you were not able to pay the mortgage or rent on time?: No  In the last 12 months, how many places have you lived?: 1  In the last 12 months, was there a time when you did not have a steady place to sleep or slept in a shelter (including now)?: No  Homeless/housing insecurity resource given?: N/A  Living Arrangements: Lives w/ Spouse/significant other    Activities of Daily Living Prior to Admission  Functional Status: Independent  Completes ADLs independently?: Yes  Ambulates independently?: Yes  Does patient use assisted devices?: No  Does patient currently own DME?: No  Does patient have a history of Outpatient Therapy (PT/OT)?: No  Does the patient have a history of Short-Term Rehab?: No  Does patient have a history of HHC?: No         Patient Information Continued  Income Source: Unemployed  Does patient have prescription coverage?: Yes  Within the past 12 months, you worried that your food would run out before you got the money to buy more : Never true  Within the past 12 months, the food you bought just didn't last and you didn't have money to get more : Never true  Food insecurity resource given?: N/A  Does patient receive dialysis treatments?: No  Does patient have a history of substance abuse?: No  Does patient have a history of Mental Health Diagnosis?: No         Means of Transportation  Means of Transport to Appts[de-identified] Drives Self  In the past 12 months, has lack of transportation kept you from medical appointments or from getting medications?: No  In the past 12 months, has lack of transportation kept you from meetings, work, or from getting things needed for daily living?: No  Was application for public transport provided?: N/A        DISCHARGE DETAILS:    Discharge planning discussed with[de-identified] bedside with patient  Freedom of Choice: No  Comments - Freedom of Choice: No aftercare reccs  CM contacted family/caregiver?: No- see comments (Pt is A&O)             Contacts  Patient Contacts: Prashanth Garay  Relationship to Patient[de-identified] Family (spouse)  Contact Method: Phone  Reason/Outcome: Emergency Contact

## 2023-05-11 NOTE — PLAN OF CARE
Problem: INFECTION - ADULT  Goal: Absence or prevention of progression during hospitalization  Description: INTERVENTIONS:  - Assess and monitor for signs and symptoms of infection  - Monitor lab/diagnostic results  - Monitor all insertion sites, i e  indwelling lines, tubes, and drains  - Monitor endotracheal if appropriate and nasal secretions for changes in amount and color  - [place] appropriate cooling/warming therapies per order  - Administer medications as ordered  - Instruct and encourage patient and family to use good hand hygiene technique  - Identify and instruct in appropriate isolation precautions for identified infection/condition  Outcome: Progressing  Goal: Absence of fever/infection during neutropenic period  Description: INTERVENTIONS:  - Monitor WBC    Outcome: Progressing     Problem: SAFETY ADULT  Goal: Patient will remain free of falls  Description: INTERVENTIONS:  - Educate patient/family on patient safety including physical limitations  - Instruct patient to call for assistance with activity   - Consult OT/PT to assist with strengthening/mobility   - Keep Call bell within reach  - Keep bed low and locked with side rails adjusted as appropriate  - Keep care items and personal belongings within reach  - Initiate and maintain comfort rounds  - Make Fall Risk Sign visible to staff  - Offer Toileting every 2 Hours, in advance of need  - Initiate/Maintain bed alarm  - Obtain necessary fall risk management equipment: non skid footwear  - Apply yellow socks and bracelet for high fall risk patients  - Consider moving patient to room near nurses station  Outcome: Progressing  Goal: Maintain or return to baseline ADL function  Description: INTERVENTIONS:  -  Assess patient's ability to carry out ADLs; assess patient's baseline for ADL function and identify physical deficits which impact ability to perform ADLs (bathing, care of mouth/teeth, toileting, grooming, dressing, etc )  - Assess/evaluate cause of self-care deficits   - Assess range of motion  - Assess patient's mobility; develop plan if impaired  - Assess patient's need for assistive devices and provide as appropriate  - Encourage maximum independence but intervene and supervise when necessary  - Involve family in performance of ADLs  - Assess for home care needs following discharge   - Consider OT consult to assist with ADL evaluation and planning for discharge  - Provide patient education as appropriate  Outcome: Progressing  Goal: Maintains/Returns to pre admission functional level  Description: INTERVENTIONS:  - Perform BMAT or MOVE assessment daily    - Set and communicate daily mobility goal to care team and patient/family/caregiver  - Collaborate with rehabilitation services on mobility goals if consulted  - Perform Range of Motion 3 times a day  - Reposition patient every 2 hours    - Dangle patient 3 times a day  - Stand patient 3 times a day  - Ambulate patient 3 times a day  - Out of bed to chair 2 times a day   - Out of bed for meals 2 times a day  - Out of bed for toileting  - Record patient progress and toleration of activity level   Outcome: Progressing     Problem: DISCHARGE PLANNING  Goal: Discharge to home or other facility with appropriate resources  Description: INTERVENTIONS:  - Identify barriers to discharge w/patient and caregiver  - Arrange for needed discharge resources and transportation as appropriate  - Identify discharge learning needs (meds, wound care, etc )  - Arrange for interpretive services to assist at discharge as needed  - Refer to Case Management Department for coordinating discharge planning if the patient needs post-hospital services based on physician/advanced practitioner order or complex needs related to functional status, cognitive ability, or social support system  Outcome: Progressing     Problem: Knowledge Deficit  Goal: Patient/family/caregiver demonstrates understanding of disease process, treatment plan, medications, and discharge instructions  Description: Complete learning assessment and assess knowledge base  Interventions:  - Provide teaching at level of understanding  - Provide teaching via preferred learning methods  Outcome: Progressing     Problem: MOBILITY - ADULT  Goal: Maintain or return to baseline ADL function  Description: INTERVENTIONS:  -  Assess patient's ability to carry out ADLs; assess patient's baseline for ADL function and identify physical deficits which impact ability to perform ADLs (bathing, care of mouth/teeth, toileting, grooming, dressing, etc )  - Assess/evaluate cause of self-care deficits   - Assess range of motion  - Assess patient's mobility; develop plan if impaired  - Assess patient's need for assistive devices and provide as appropriate  - Encourage maximum independence but intervene and supervise when necessary  - Involve family in performance of ADLs  - Assess for home care needs following discharge   - Consider OT consult to assist with ADL evaluation and planning for discharge  - Provide patient education as appropriate  Outcome: Progressing  Goal: Maintains/Returns to pre admission functional level  Description: INTERVENTIONS:  - Perform BMAT or MOVE assessment daily    - Set and communicate daily mobility goal to care team and patient/family/caregiver  - Collaborate with rehabilitation services on mobility goals if consulted  - Perform Range of Motion 3 times a day  - Reposition patient every 2 hours    - Dangle patient 3 times a day  - Stand patient 3 times a day  - Ambulate patient 3 times a day  - Out of bed to chair 2 times a day   - Out of bed for meals 2 times a day  - Out of bed for toileting  - Record patient progress and toleration of activity level   Outcome: Progressing     Problem: NEUROSENSORY - ADULT  Goal: Achieves stable or improved neurological status  Description: INTERVENTIONS  - Monitor and report changes in neurological status  - Monitor vital signs such as temperature, blood pressure, glucose, and any other labs ordered   - Initiate measures to prevent increased intracranial pressure  - Monitor for seizure activity and implement precautions if appropriate      Outcome: Progressing  Goal: Remains free of injury related to seizures activity  Description: INTERVENTIONS  - Maintain airway, patient safety  and administer oxygen as ordered  - Monitor patient for seizure activity, document and report duration and description of seizure to physician/advanced practitioner  - If seizure occurs,  ensure patient safety during seizure  - Reorient patient post seizure  - Seizure pads on all 4 side rails  - Instruct patient/family to notify RN of any seizure activity including if an aura is experienced  - Instruct patient/family to call for assistance with activity based on nursing assessment  - Administer anti-seizure medications if ordered    Outcome: Progressing  Goal: Achieves maximal functionality and self care  Description: INTERVENTIONS  - Monitor swallowing and airway patency with patient fatigue and changes in neurological status  - Encourage and assist patient to increase activity and self care     - Encourage visually impaired, hearing impaired and aphasic patients to use assistive/communication devices  Outcome: Progressing     Problem: METABOLIC, FLUID AND ELECTROLYTES - ADULT  Goal: Electrolytes maintained within normal limits  Description: INTERVENTIONS:  - Monitor labs and assess patient for signs and symptoms of electrolyte imbalances  - Administer electrolyte replacement as ordered  - Monitor response to electrolyte replacements, including repeat lab results as appropriate  - Instruct patient on fluid and nutrition as appropriate  Outcome: Progressing  Goal: Fluid balance maintained  Description: INTERVENTIONS:  - Monitor labs   - Monitor I/O and WT  - Instruct patient on fluid and nutrition as appropriate  - Assess for signs & symptoms of volume excess or deficit  Outcome: Progressing  Goal: Glucose maintained within target range  Description: INTERVENTIONS:  - Monitor Blood Glucose as ordered  - Assess for signs and symptoms of hyperglycemia and hypoglycemia  - Administer ordered medications to maintain glucose within target range  - Assess nutritional intake and initiate nutrition service referral as needed  Outcome: Progressing

## 2023-05-11 NOTE — ASSESSMENT & PLAN NOTE
S/P image guided left frontal craniotomy for resection of extra-axial and scalp mass, with cranioplasty,exenteration/obliteration of frontal sinus (Dr Bobby Calixto,  4/11/23)  · Presenting with enlarging L frontal supraorbital air/ pneumocephalus with small fluid collection  · Appears lightly improved since arrival   · Reported sneezing episode 5/7 with discharge from nose 5/7 and again 5/8  Imaging:   · MRI brain w wo 5/8/23: Redemonstration of postsurgical changes status post left frontal craniectomy and cranioplasty for resection of meningioma  Mild increased smooth dural enhancement may be postoperative  No nodular enhancement to suspect recurrent disease  Enhancement of evolving late subacute left frontal infarct  Stable small amount of epidural fluid/residual blood products subjacent to craniotomy extending into the obliterated left frontal sinus  Left frontal subgaleal and epidural air similar to CT from earlier today but increased from initial postoperative studies stable mild mass effect on the left frontal lobe  Increased left frontal parafalcine extra-axial air now extends anterior to the left frontal lobe with new right frontal extra-axial pneumocephalus  Additional new foci of pneumocephalus extending posteriorly along the left frontal and parietal convexities favored to be subarachnoid  Again there is concern for CSF communication that may be due to incomplete obliteration of the left frontal sinus  · CT head 5/8/2023: Increase in small extra-axial fluid collection with worsening pneumocephalus resulting in mild compressive mass effect on the left anterior frontal lobe status post resection of atypical who grade 2 meningioma  Markedly worsened subgaleal air superficial left frontal synthetic cranioplasty flap  Persistent mild vasogenic edema in the left frontal lobe with interval resolution of small acute intraparenchymal hemorrhage      Plan:   · Continue to closely monitor neuro exam · Continue frequent neuro checks   · Repeat STAT CTH with any acute decline in GCS > 2pts or more in 1hr   · Maintain normotensive BP goals, MAP > 65   · No acute neurosurgical intervention indicated at this time   · Case and imaging reviewed and discussed at length this am on rounds  · Imaging showed intracranial pneumocephalus communicating both subgaleal and epidural through synthetic cranioplasty flap  · At this time, after discussed with Dr Daljit Briceño, do not feel that there is a defect that needs to be repaired, no evidence of this on imaging  Anticipate that this will heal on its own  · Will transition from IV unasyn to PO Augmentin at this time, anticipate at least a 2 week course of abx   · Follow-up beta-2 transferrin -> pending   · Can follow-up result in the clinic as an outpt as well if does not come back today   · Low suspicion this would be positive given such minimal to no drainage and imaging results   · Patient without any obvious signs of infection on exam     · Pt remains afebrile, non-toxic appearing, no leukocytosis   · DVT ppx: SCDs, Lovenox   · Pain control   · PT/OT   · continue home medications   · Social work following for assistance with dispo     Neurosurgery will continue to follow as primary team  Plan for discharge to home today with close outpatient follow-up scheduled in our office this week with Dr Daljit Briceño  Please reach out with any questions or concerns

## 2023-05-12 LAB
ANION GAP SERPL CALCULATED.3IONS-SCNC: 3 MMOL/L (ref 4–13)
BUN SERPL-MCNC: 10 MG/DL (ref 5–25)
CALCIUM SERPL-MCNC: 8.9 MG/DL (ref 8.3–10.1)
CHLORIDE SERPL-SCNC: 107 MMOL/L (ref 96–108)
CO2 SERPL-SCNC: 26 MMOL/L (ref 21–32)
CREAT SERPL-MCNC: 0.63 MG/DL (ref 0.6–1.3)
ERYTHROCYTE [DISTWIDTH] IN BLOOD BY AUTOMATED COUNT: 12.3 % (ref 11.6–15.1)
EST. AVERAGE GLUCOSE BLD GHB EST-MCNC: 177 MG/DL
GFR SERPL CREATININE-BSD FRML MDRD: 110 ML/MIN/1.73SQ M
GLUCOSE SERPL-MCNC: 132 MG/DL (ref 65–140)
GLUCOSE SERPL-MCNC: 143 MG/DL (ref 65–140)
GLUCOSE SERPL-MCNC: 168 MG/DL (ref 65–140)
GLUCOSE SERPL-MCNC: 220 MG/DL (ref 65–140)
GLUCOSE SERPL-MCNC: 241 MG/DL (ref 65–140)
HBA1C MFR BLD: 7.8 %
HCT VFR BLD AUTO: 42.5 % (ref 36.5–49.3)
HGB BLD-MCNC: 14.5 G/DL (ref 12–17)
MCH RBC QN AUTO: 31.1 PG (ref 26.8–34.3)
MCHC RBC AUTO-ENTMCNC: 34.1 G/DL (ref 31.4–37.4)
MCV RBC AUTO: 91 FL (ref 82–98)
PLATELET # BLD AUTO: 172 THOUSANDS/UL (ref 149–390)
PMV BLD AUTO: 9.4 FL (ref 8.9–12.7)
POTASSIUM SERPL-SCNC: 4.2 MMOL/L (ref 3.5–5.3)
RBC # BLD AUTO: 4.66 MILLION/UL (ref 3.88–5.62)
SODIUM SERPL-SCNC: 136 MMOL/L (ref 135–147)
WBC # BLD AUTO: 6.85 THOUSAND/UL (ref 4.31–10.16)

## 2023-05-12 RX ORDER — AMOXICILLIN AND CLAVULANATE POTASSIUM 875; 125 MG/1; MG/1
1 TABLET, FILM COATED ORAL 2 TIMES DAILY
Status: DISCONTINUED | OUTPATIENT
Start: 2023-05-12 | End: 2023-05-13 | Stop reason: HOSPADM

## 2023-05-12 RX ADMIN — LEVOTHYROXINE SODIUM 137 MCG: 25 TABLET ORAL at 05:29

## 2023-05-12 RX ADMIN — INSULIN LISPRO 8 UNITS: 100 INJECTION, SOLUTION INTRAVENOUS; SUBCUTANEOUS at 08:25

## 2023-05-12 RX ADMIN — LISINOPRIL 10 MG: 10 TABLET ORAL at 08:24

## 2023-05-12 RX ADMIN — ACETAMINOPHEN 650 MG: 325 TABLET ORAL at 21:44

## 2023-05-12 RX ADMIN — ATORVASTATIN CALCIUM 40 MG: 40 TABLET, FILM COATED ORAL at 08:24

## 2023-05-12 RX ADMIN — INSULIN LISPRO 8 UNITS: 100 INJECTION, SOLUTION INTRAVENOUS; SUBCUTANEOUS at 11:43

## 2023-05-12 RX ADMIN — AMOXICILLIN AND CLAVULANATE POTASSIUM 1 TABLET: 875; 125 TABLET, FILM COATED ORAL at 14:13

## 2023-05-12 RX ADMIN — SODIUM CHLORIDE 3 G: 9 INJECTION, SOLUTION INTRAVENOUS at 05:32

## 2023-05-12 RX ADMIN — INSULIN GLARGINE 18 UNITS: 100 INJECTION, SOLUTION SUBCUTANEOUS at 21:42

## 2023-05-12 RX ADMIN — INSULIN LISPRO 4 UNITS: 100 INJECTION, SOLUTION INTRAVENOUS; SUBCUTANEOUS at 16:31

## 2023-05-12 RX ADMIN — ENOXAPARIN SODIUM 40 MG: 40 INJECTION SUBCUTANEOUS at 08:25

## 2023-05-12 RX ADMIN — ACETAMINOPHEN 650 MG: 325 TABLET ORAL at 16:31

## 2023-05-12 RX ADMIN — INSULIN LISPRO 8 UNITS: 100 INJECTION, SOLUTION INTRAVENOUS; SUBCUTANEOUS at 16:32

## 2023-05-12 RX ADMIN — AMOXICILLIN AND CLAVULANATE POTASSIUM 1 TABLET: 875; 125 TABLET, FILM COATED ORAL at 21:44

## 2023-05-12 RX ADMIN — INSULIN LISPRO 8 UNITS: 100 INJECTION, SOLUTION INTRAVENOUS; SUBCUTANEOUS at 21:43

## 2023-05-12 NOTE — PROGRESS NOTES
[address] [facility]  Progress Note  Name: Contreras Vasquez  MRN: [id number]  Unit/Bed#: [facility] 720-01 I Date of Admission: 5/8/2023   Date of Service: 5/12/2023 I Hospital Day: 4    Assessment/Plan   Status post craniotomy and cranioplasty  Assessment & Plan  S/P image guided left frontal craniotomy for resection of extra-axial and scalp mass, with cranioplasty,exenteration/obliteration of frontal sinus (Dr Vianca Goddard,  4/11/23)  · Presenting with enlarging L frontal supraorbital air/ pneumocephalus with small fluid collection  · Appears lightly improved since arrival   · Reported sneezing episode 5/7 with discharge from nose 5/7 and again 5/8  Imaging:   · MRI brain w wo 5/8/23: Redemonstration of postsurgical changes status post left frontal craniectomy and cranioplasty for resection of meningioma  Mild increased smooth dural enhancement may be postoperative  No nodular enhancement to suspect recurrent disease  Enhancement of evolving late subacute left frontal infarct  Stable small amount of epidural fluid/residual blood products subjacent to craniotomy extending into the obliterated left frontal sinus  Left frontal subgaleal and epidural air similar to CT from earlier today but increased from initial postoperative studies stable mild mass effect on the left frontal lobe  Increased left frontal parafalcine extra-axial air now extends anterior to the left frontal lobe with new right frontal extra-axial pneumocephalus  Additional new foci of pneumocephalus extending posteriorly along the left frontal and parietal convexities favored to be subarachnoid  Again there is concern for CSF communication that may be due to incomplete obliteration of the left frontal sinus    · CT head 5/8/2023: Increase in small extra-axial fluid collection with worsening pneumocephalus resulting in mild compressive mass effect on the left anterior frontal lobe status post resection of atypical who grade 2 meningioma  Markedly worsened subgaleal air superficial left frontal synthetic cranioplasty flap  Persistent mild vasogenic edema in the left frontal lobe with interval resolution of small acute intraparenchymal hemorrhage  Plan:   · Continue to closely monitor neuro exam   · Continue frequent neuro checks   · Repeat STAT CTH with any acute decline in GCS > 2pts or more in 1hr   · Maintain normotensive BP goals, MAP > 65   · No acute neurosurgical intervention indicated at this time   · Case and imaging reviewed and discussed at length this am on rounds  · Imaging showed intracranial pneumocephalus communicating both subgaleal and epidural through synthetic cranioplasty flap  · At this time, after discussed with Dr Kaz Byrne, do not feel that there is a defect that needs to be repaired, no evidence of this on imaging  Anticipate that this will heal on its own  · Will transition from IV unasyn to PO Augmentin at this time, anticipate at least a 2 week course of abx   · Follow-up beta-2 transferrin -> pending   · Can follow-up result in the clinic as an outpt as well if does not come back today   · Low suspicion this would be positive given such minimal to no drainage and imaging results   · Patient without any obvious signs of infection on exam     · Pt remains afebrile, non-toxic appearing, no leukocytosis   · DVT ppx: SCDs, Lovenox   · Pain control   · PT/OT   · continue home medications   · Social work following for assistance with dispo     Neurosurgery will continue to follow as primary team  Plan for discharge to home today with close outpatient follow-up scheduled in our office this week with Dr Kaz Byrne  Please reach out with any questions or concerns  Meningioma [facility])  Assessment & Plan  · S/p left total craniotomy with craniectomy and orbital osteotomy with resection of extradural mass    Exenteration/obliteration of left frontal sinus and custom synthetic cranioplasty on 4/11/2023 with   "[place]  · Pathology: Grade 2 Meningioma  · See plan above  Pneumocephalus  Assessment & Plan  See plan above  Subjective/Objective   Chief Complaint: \" Any updates on when I can get home? \"    Subjective: Seen and examined this a m  on rounds  No acute events overnight  Patient denies any headaches at this time  Patient states he is feeling well and asking when he can be discharged home  Objective: Pleasant, well-appearing, middle-aged male sitting comfortably in bed    I/O       05/10 0701 05/11 0700 05/11 0701 05/12 0700 05/12 0701  05/13 0700    P  O  240 720     I V  (mL/kg) 35 (0 4)      IV Piggyback 200      Total Intake(mL/kg) 475 (5 1) 720 (7 7)     Net +475 +720                Invasive Devices     Peripheral Intravenous Line  Duration           Peripheral IV 05/12/23 Right;Ventral (anterior) Forearm <1 day              Physical Exam:  Vitals: Blood pressure 128/84, pulse 88, temperature 98 1 °F (36 7 °C), resp  rate 16, height 5' 9\" (1 753 m), weight 93 kg (205 lb 0 4 oz), SpO2 97 %  ,Body mass index is 30 28 kg/m²  General appearance: alert, pleasant, middle-aged male appears stated age, cooperative and no distress  Head: Left sided, supraorbital area of outpouching improved today, flatter, still easily indented bowel/soft   -No appreciated nasal drainage  Eyes: EOMI, PERRL  Neck: supple, symmetrical, trachea midline and NT  Back: no kyphosis present, no tenderness to percussion or palpation  Lungs: non labored breathing, no respiratory distress on room air  Heart: regular heart rate  Neurologic:   Mental status: Alert, oriented x3, thought content appropriate  Cranial nerves: grossly intact (Cranial nerves II-XII)  Sensory: normal to light touch bilaterally throughout  Motor: moving all extremities without focal weakness   Strength 5/5 throughout   Reflexes: 2+ and symmetric  Coordination: finger to nose normal bilaterally, no drift bilaterally    Lab Results:  Results from last 7 " days   Lab Units 05/12/23  0525 05/10/23  0443 05/09/23  0511 05/08/23  1753 05/08/23  0922   WBC Thousand/uL 6 85 5 67 7 22  --  8 85   HEMOGLOBIN g/dL 14 5 13 9 14 2  --  15 7   HEMATOCRIT % 42 5 40 7 39 7  --  44 0   PLATELETS Thousands/uL 172 142* 145*   < > 174   NEUTROS PCT %  --  40* 56  --  65   MONOS PCT %  --  10 8  --  7    < > = values in this interval not displayed  Results from last 7 days   Lab Units 05/12/23  0525 05/11/23  0829 05/10/23  0443 05/09/23  0511 05/08/23  0922   POTASSIUM mmol/L 4 2 4 1 3 6   < > 3 5   CHLORIDE mmol/L 107 105 107   < > 101   CO2 mmol/L 26 25 27   < > 25   BUN mg/dL 10 9 12   < > 21   CREATININE mg/dL 0 63 0 82 0 57*   < > 0 87   CALCIUM mg/dL 8 9 8 6 8 2*   < > 9 3   ALK PHOS U/L  --   --   --   --  73   ALT U/L  --   --   --   --  33   AST U/L  --   --   --   --  16    < > = values in this interval not displayed  Results from last 7 days   Lab Units 05/09/23  0511   MAGNESIUM mg/dL 2 2     Results from last 7 days   Lab Units 05/09/23  0511   PHOSPHORUS mg/dL 2 8     Results from last 7 days   Lab Units 05/08/23  0922   INR  1 04   PTT seconds 24     No results found for: TROPONINT  ABG:No results found for: PHART, JJR1NOE, PO2ART, SLI1JZT, E4TMVOUU, BEART, SOURCE    Imaging Studies: I have personally reviewed pertinent reports  and I have personally reviewed pertinent films in PACS    CT head without contrast    Result Date: 5/8/2023  Impression: Interval increase in small extra-axial fluid collection with worsened pneumocephalus resulting in mild compressive mass effect on left anterior frontal lobe status post resection of left frontal atypical 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)  Markedly worsened subgaleal air superficial to left frontal synthetic cranioplasty status post removal of left subgaleal drain  Findings are concerning CSF leak communicating with left frontal sinus +/- infection  Recommend neurosurgical consultation for further evaluation   Persistent mild vasogenic edema in left frontal lobe with interval resolution of small acute intraparenchymal parenchymal hemorrhage in left frontal lobe  Sinus disease, severe in left frontal sinus  I personally discussed this study with Clary Kumari on 5/8/2023 10:32 AM  Workstation performed: [id number]     MRI brain w wo contrast    Addendum Date: 5/9/2023    ADDENDUM: Stable encephalomalacia in the inferior frontal lobes likely sequela of remote trauma  Stable mild chronic microangiopathy  Result Date: 5/9/2023  Impression: Redemonstration of postsurgical changes status post left frontal craniectomy and cranioplasty for resection of meningioma  Mild increased smooth dural enhancement may be postoperative  No nodular enhancement to suspect recurrent disease  Recommend continued surveillance  Enhancement of evolving late subacute left frontal infarct Stable small amount of epidural fluid/residual blood products subjacent to craniotomy extending into the obliterated left frontal sinus  Left frontal subgaleal and epidural air similar to CT from earlier today but increased from initial postoperative studies stable mild mass effect on the left frontal lobe    Increased left frontal parafalcine extra-axial air now extends anterior to the left frontal lobe with new right frontal extra-axial pneumocephalus  Additional new foci of pneumocephalus extending posteriorly along the left frontal and parietal convexities favored to be subarachnoid  Again there is concern for CSF communication that may be due to incomplete obliteration of the left frontal sinus   Workstation performed: [id number]     CT sinus wo contrast    Result Date: 5/8/2023  Impression: Similar extra-axial fluid collection in left frontal region with associated debris and air communicating with left frontal sinus, similar subgaleal air superficial to left frontal synthetic cranioplasty, new pneumocephalus along left parafalcine region, with similar mild compressive mass effect on left anterior frontal lobe  Findings are concerning for CSF leak communicating with left frontal sinus +/- infection as seen on earlier same day CT head  Given new pneumocephalus along left parafalcine region interval rupture of extra-axial fluid collection is within the differential  Sinus disease (severe in left frontal sinus), as detailed above  The study was marked in [facility] for immediate notification  Workstation performed: [id number]       EKG, Pathology, and Other Studies: I have personally reviewed pertinent reports        VTE Pharmacologic Prophylaxis: Sequential compression device (Venodyne)  and Enoxaparin (Lovenox)    VTE Mechanical Prophylaxis: sequential compression device

## 2023-05-12 NOTE — PLAN OF CARE
Problem: INFECTION - ADULT  Goal: Absence or prevention of progression during hospitalization  Description: INTERVENTIONS:  - Assess and monitor for signs and symptoms of infection  - Monitor lab/diagnostic results  - Monitor all insertion sites, i e  indwelling lines, tubes, and drains  - Monitor endotracheal if appropriate and nasal secretions for changes in amount and color  - [place] appropriate cooling/warming therapies per order  - Administer medications as ordered  - Instruct and encourage patient and family to use good hand hygiene technique  - Identify and instruct in appropriate isolation precautions for identified infection/condition  Outcome: Progressing  Goal: Absence of fever/infection during neutropenic period  Description: INTERVENTIONS:  - Monitor WBC    Outcome: Progressing     Problem: SAFETY ADULT  Goal: Patient will remain free of falls  Description: INTERVENTIONS:  - Educate patient/family on patient safety including physical limitations  - Instruct patient to call for assistance with activity   - Consult OT/PT to assist with strengthening/mobility   - Keep Call bell within reach  - Keep bed low and locked with side rails adjusted as appropriate  - Keep care items and personal belongings within reach  - Initiate and maintain comfort rounds  - Make Fall Risk Sign visible to staff  - Offer Toileting every 2 Hours, in advance of need  - Initiate/Maintain bed alarm  - Obtain necessary fall risk management equipment: non skid footwear  - Apply yellow socks and bracelet for high fall risk patients  - Consider moving patient to room near nurses station  Outcome: Progressing  Goal: Maintain or return to baseline ADL function  Description: INTERVENTIONS:  -  Assess patient's ability to carry out ADLs; assess patient's baseline for ADL function and identify physical deficits which impact ability to perform ADLs (bathing, care of mouth/teeth, toileting, grooming, dressing, etc )  - Assess/evaluate cause of self-care deficits   - Assess range of motion  - Assess patient's mobility; develop plan if impaired  - Assess patient's need for assistive devices and provide as appropriate  - Encourage maximum independence but intervene and supervise when necessary  - Involve family in performance of ADLs  - Assess for home care needs following discharge   - Consider OT consult to assist with ADL evaluation and planning for discharge  - Provide patient education as appropriate  Outcome: Progressing  Goal: Maintains/Returns to pre admission functional level  Description: INTERVENTIONS:  - Perform BMAT or MOVE assessment daily    - Set and communicate daily mobility goal to care team and patient/family/caregiver  - Collaborate with rehabilitation services on mobility goals if consulted  - Perform Range of Motion 3 times a day  - Reposition patient every 2 hours    - Dangle patient 3 times a day  - Stand patient 3 times a day  - Ambulate patient 3 times a day  - Out of bed to chair 2 times a day   - Out of bed for meals 2 times a day  - Out of bed for toileting  - Record patient progress and toleration of activity level   Outcome: Progressing     Problem: DISCHARGE PLANNING  Goal: Discharge to home or other facility with appropriate resources  Description: INTERVENTIONS:  - Identify barriers to discharge w/patient and caregiver  - Arrange for needed discharge resources and transportation as appropriate  - Identify discharge learning needs (meds, wound care, etc )  - Arrange for interpretive services to assist at discharge as needed  - Refer to Case Management Department for coordinating discharge planning if the patient needs post-hospital services based on physician/advanced practitioner order or complex needs related to functional status, cognitive ability, or social support system  Outcome: Progressing     Problem: Knowledge Deficit  Goal: Patient/family/caregiver demonstrates understanding of disease process, treatment plan, medications, and discharge instructions  Description: Complete learning assessment and assess knowledge base  Interventions:  - Provide teaching at level of understanding  - Provide teaching via preferred learning methods  Outcome: Progressing     Problem: MOBILITY - ADULT  Goal: Maintain or return to baseline ADL function  Description: INTERVENTIONS:  -  Assess patient's ability to carry out ADLs; assess patient's baseline for ADL function and identify physical deficits which impact ability to perform ADLs (bathing, care of mouth/teeth, toileting, grooming, dressing, etc )  - Assess/evaluate cause of self-care deficits   - Assess range of motion  - Assess patient's mobility; develop plan if impaired  - Assess patient's need for assistive devices and provide as appropriate  - Encourage maximum independence but intervene and supervise when necessary  - Involve family in performance of ADLs  - Assess for home care needs following discharge   - Consider OT consult to assist with ADL evaluation and planning for discharge  - Provide patient education as appropriate  Outcome: Progressing  Goal: Maintains/Returns to pre admission functional level  Description: INTERVENTIONS:  - Perform BMAT or MOVE assessment daily    - Set and communicate daily mobility goal to care team and patient/family/caregiver  - Collaborate with rehabilitation services on mobility goals if consulted  - Perform Range of Motion 3 times a day  - Reposition patient every 2 hours    - Dangle patient 3 times a day  - Stand patient 3 times a day  - Ambulate patient 3 times a day  - Out of bed to chair 2 times a day   - Out of bed for meals 2 times a day  - Out of bed for toileting  - Record patient progress and toleration of activity level   Outcome: Progressing     Problem: NEUROSENSORY - ADULT  Goal: Achieves stable or improved neurological status  Description: INTERVENTIONS  - Monitor and report changes in neurological status  - Monitor vital signs such as temperature, blood pressure, glucose, and any other labs ordered   - Initiate measures to prevent increased intracranial pressure  - Monitor for seizure activity and implement precautions if appropriate      Outcome: Progressing  Goal: Remains free of injury related to seizures activity  Description: INTERVENTIONS  - Maintain airway, patient safety  and administer oxygen as ordered  - Monitor patient for seizure activity, document and report duration and description of seizure to physician/advanced practitioner  - If seizure occurs,  ensure patient safety during seizure  - Reorient patient post seizure  - Seizure pads on all 4 side rails  - Instruct patient/family to notify RN of any seizure activity including if an aura is experienced  - Instruct patient/family to call for assistance with activity based on nursing assessment  - Administer anti-seizure medications if ordered    Outcome: Progressing  Goal: Achieves maximal functionality and self care  Description: INTERVENTIONS  - Monitor swallowing and airway patency with patient fatigue and changes in neurological status  - Encourage and assist patient to increase activity and self care     - Encourage visually impaired, hearing impaired and aphasic patients to use assistive/communication devices  Outcome: Progressing     Problem: METABOLIC, FLUID AND ELECTROLYTES - ADULT  Goal: Electrolytes maintained within normal limits  Description: INTERVENTIONS:  - Monitor labs and assess patient for signs and symptoms of electrolyte imbalances  - Administer electrolyte replacement as ordered  - Monitor response to electrolyte replacements, including repeat lab results as appropriate  - Instruct patient on fluid and nutrition as appropriate  Outcome: Progressing  Goal: Fluid balance maintained  Description: INTERVENTIONS:  - Monitor labs   - Monitor I/O and WT  - Instruct patient on fluid and nutrition as appropriate  - Assess for signs & symptoms of volume excess or deficit  Outcome: Progressing  Goal: Glucose maintained within target range  Description: INTERVENTIONS:  - Monitor Blood Glucose as ordered  - Assess for signs and symptoms of hyperglycemia and hypoglycemia  - Administer ordered medications to maintain glucose within target range  - Assess nutritional intake and initiate nutrition service referral as needed  Outcome: Progressing

## 2023-05-12 NOTE — PLAN OF CARE
Problem: INFECTION - ADULT  Goal: Absence or prevention of progression during hospitalization  Description: INTERVENTIONS:  - Assess and monitor for signs and symptoms of infection  - Monitor lab/diagnostic results  - Monitor all insertion sites, i e  indwelling lines, tubes, and drains  - Monitor endotracheal if appropriate and nasal secretions for changes in amount and color  - [place] appropriate cooling/warming therapies per order  - Administer medications as ordered  - Instruct and encourage patient and family to use good hand hygiene technique  - Identify and instruct in appropriate isolation precautions for identified infection/condition  Outcome: Progressing  Goal: Absence of fever/infection during neutropenic period  Description: INTERVENTIONS:  - Monitor WBC    Outcome: Progressing     Problem: SAFETY ADULT  Goal: Patient will remain free of falls  Description: INTERVENTIONS:  - Educate patient/family on patient safety including physical limitations  - Instruct patient to call for assistance with activity   - Consult OT/PT to assist with strengthening/mobility   - Keep Call bell within reach  - Keep bed low and locked with side rails adjusted as appropriate  - Keep care items and personal belongings within reach  - Initiate and maintain comfort rounds  - Make Fall Risk Sign visible to staff  - Offer Toileting every 2 Hours, in advance of need  - Initiate/Maintain bed alarm  - Obtain necessary fall risk management equipment: non skid footwear  - Apply yellow socks and bracelet for high fall risk patients  - Consider moving patient to room near nurses station  Outcome: Progressing  Goal: Maintain or return to baseline ADL function  Description: INTERVENTIONS:  -  Assess patient's ability to carry out ADLs; assess patient's baseline for ADL function and identify physical deficits which impact ability to perform ADLs (bathing, care of mouth/teeth, toileting, grooming, dressing, etc )  - Assess/evaluate cause of self-care deficits   - Assess range of motion  - Assess patient's mobility; develop plan if impaired  - Assess patient's need for assistive devices and provide as appropriate  - Encourage maximum independence but intervene and supervise when necessary  - Involve family in performance of ADLs  - Assess for home care needs following discharge   - Consider OT consult to assist with ADL evaluation and planning for discharge  - Provide patient education as appropriate  Outcome: Progressing  Goal: Maintains/Returns to pre admission functional level  Description: INTERVENTIONS:  - Perform BMAT or MOVE assessment daily    - Set and communicate daily mobility goal to care team and patient/family/caregiver  - Collaborate with rehabilitation services on mobility goals if consulted  - Perform Range of Motion 3 times a day  - Reposition patient every 2 hours    - Dangle patient 3 times a day  - Stand patient 3 times a day  - Ambulate patient 3 times a day  - Out of bed to chair 2 times a day   - Out of bed for meals 2 times a day  - Out of bed for toileting  - Record patient progress and toleration of activity level   Outcome: Progressing     Problem: DISCHARGE PLANNING  Goal: Discharge to home or other facility with appropriate resources  Description: INTERVENTIONS:  - Identify barriers to discharge w/patient and caregiver  - Arrange for needed discharge resources and transportation as appropriate  - Identify discharge learning needs (meds, wound care, etc )  - Arrange for interpretive services to assist at discharge as needed  - Refer to Case Management Department for coordinating discharge planning if the patient needs post-hospital services based on physician/advanced practitioner order or complex needs related to functional status, cognitive ability, or social support system  Outcome: Progressing     Problem: Knowledge Deficit  Goal: Patient/family/caregiver demonstrates understanding of disease process, treatment plan, medications, and discharge instructions  Description: Complete learning assessment and assess knowledge base  Interventions:  - Provide teaching at level of understanding  - Provide teaching via preferred learning methods  Outcome: Progressing     Problem: MOBILITY - ADULT  Goal: Maintain or return to baseline ADL function  Description: INTERVENTIONS:  -  Assess patient's ability to carry out ADLs; assess patient's baseline for ADL function and identify physical deficits which impact ability to perform ADLs (bathing, care of mouth/teeth, toileting, grooming, dressing, etc )  - Assess/evaluate cause of self-care deficits   - Assess range of motion  - Assess patient's mobility; develop plan if impaired  - Assess patient's need for assistive devices and provide as appropriate  - Encourage maximum independence but intervene and supervise when necessary  - Involve family in performance of ADLs  - Assess for home care needs following discharge   - Consider OT consult to assist with ADL evaluation and planning for discharge  - Provide patient education as appropriate  Outcome: Progressing  Goal: Maintains/Returns to pre admission functional level  Description: INTERVENTIONS:  - Perform BMAT or MOVE assessment daily    - Set and communicate daily mobility goal to care team and patient/family/caregiver  - Collaborate with rehabilitation services on mobility goals if consulted  - Perform Range of Motion 3 times a day  - Reposition patient every 2 hours    - Dangle patient 3 times a day  - Stand patient 3 times a day  - Ambulate patient 3 times a day  - Out of bed to chair 2 times a day   - Out of bed for meals 2 times a day  - Out of bed for toileting  - Record patient progress and toleration of activity level   Outcome: Progressing     Problem: NEUROSENSORY - ADULT  Goal: Achieves stable or improved neurological status  Description: INTERVENTIONS  - Monitor and report changes in neurological status  - Monitor vital signs such as temperature, blood pressure, glucose, and any other labs ordered   - Initiate measures to prevent increased intracranial pressure  - Monitor for seizure activity and implement precautions if appropriate      Outcome: Progressing  Goal: Remains free of injury related to seizures activity  Description: INTERVENTIONS  - Maintain airway, patient safety  and administer oxygen as ordered  - Monitor patient for seizure activity, document and report duration and description of seizure to physician/advanced practitioner  - If seizure occurs,  ensure patient safety during seizure  - Reorient patient post seizure  - Seizure pads on all 4 side rails  - Instruct patient/family to notify RN of any seizure activity including if an aura is experienced  - Instruct patient/family to call for assistance with activity based on nursing assessment  - Administer anti-seizure medications if ordered    Outcome: Progressing  Goal: Achieves maximal functionality and self care  Description: INTERVENTIONS  - Monitor swallowing and airway patency with patient fatigue and changes in neurological status  - Encourage and assist patient to increase activity and self care     - Encourage visually impaired, hearing impaired and aphasic patients to use assistive/communication devices  Outcome: Progressing     Problem: METABOLIC, FLUID AND ELECTROLYTES - ADULT  Goal: Electrolytes maintained within normal limits  Description: INTERVENTIONS:  - Monitor labs and assess patient for signs and symptoms of electrolyte imbalances  - Administer electrolyte replacement as ordered  - Monitor response to electrolyte replacements, including repeat lab results as appropriate  - Instruct patient on fluid and nutrition as appropriate  Outcome: Progressing  Goal: Fluid balance maintained  Description: INTERVENTIONS:  - Monitor labs   - Monitor I/O and WT  - Instruct patient on fluid and nutrition as appropriate  - Assess for signs & symptoms of volume excess or deficit  Outcome: Progressing  Goal: Glucose maintained within target range  Description: INTERVENTIONS:  - Monitor Blood Glucose as ordered  - Assess for signs and symptoms of hyperglycemia and hypoglycemia  - Administer ordered medications to maintain glucose within target range  - Assess nutritional intake and initiate nutrition service referral as needed  Outcome: Progressing

## 2023-05-13 VITALS
OXYGEN SATURATION: 97 % | BODY MASS INDEX: 30.37 KG/M2 | HEART RATE: 85 BPM | SYSTOLIC BLOOD PRESSURE: 99 MMHG | DIASTOLIC BLOOD PRESSURE: 70 MMHG | WEIGHT: 205.03 LBS | RESPIRATION RATE: 16 BRPM | TEMPERATURE: 97.7 F | HEIGHT: 69 IN

## 2023-05-13 LAB
BACTERIA BLD CULT: NORMAL
BACTERIA BLD CULT: NORMAL
GLUCOSE SERPL-MCNC: 127 MG/DL (ref 65–140)
GLUCOSE SERPL-MCNC: 220 MG/DL (ref 65–140)

## 2023-05-13 RX ORDER — GLUCOSAMINE HCL/CHONDROITIN SU 500-400 MG
CAPSULE ORAL
Qty: 100 EACH | Refills: 0 | Status: SHIPPED | OUTPATIENT
Start: 2023-05-13

## 2023-05-13 RX ORDER — BLOOD SUGAR DIAGNOSTIC
STRIP MISCELLANEOUS
Qty: 100 EACH | Refills: 0 | Status: SHIPPED | OUTPATIENT
Start: 2023-05-13

## 2023-05-13 RX ORDER — LANCETS 33 GAUGE
EACH MISCELLANEOUS
Qty: 100 EACH | Refills: 0 | Status: SHIPPED | OUTPATIENT
Start: 2023-05-13

## 2023-05-13 RX ORDER — INSULIN LISPRO 100 [IU]/ML
12 INJECTION, SOLUTION INTRAVENOUS; SUBCUTANEOUS
Qty: 15 ML | Refills: 0
Start: 2023-05-13 | End: 2023-05-18

## 2023-05-13 RX ORDER — INSULIN GLARGINE 100 [IU]/ML
18 INJECTION, SOLUTION SUBCUTANEOUS
Qty: 6 ML | Refills: 0 | Status: SHIPPED | OUTPATIENT
Start: 2023-05-13 | End: 2023-05-18

## 2023-05-13 RX ORDER — AMOXICILLIN AND CLAVULANATE POTASSIUM 875; 125 MG/1; MG/1
1 TABLET, FILM COATED ORAL 2 TIMES DAILY
Qty: 28 TABLET | Refills: 0 | Status: SHIPPED | OUTPATIENT
Start: 2023-05-13 | End: 2023-05-17 | Stop reason: SDUPTHER

## 2023-05-13 RX ORDER — ACETAMINOPHEN 325 MG/1
650 TABLET ORAL EVERY 6 HOURS PRN
Refills: 0
Start: 2023-05-13

## 2023-05-13 RX ORDER — PEN NEEDLE, DIABETIC 32GX 5/32"
NEEDLE, DISPOSABLE MISCELLANEOUS
Qty: 100 EACH | Refills: 0 | Status: SHIPPED | OUTPATIENT
Start: 2023-05-13

## 2023-05-13 RX ORDER — BLOOD-GLUCOSE METER
KIT MISCELLANEOUS
Qty: 1 KIT | Refills: 0 | Status: SHIPPED | OUTPATIENT
Start: 2023-05-13

## 2023-05-13 RX ADMIN — INSULIN LISPRO 8 UNITS: 100 INJECTION, SOLUTION INTRAVENOUS; SUBCUTANEOUS at 08:36

## 2023-05-13 RX ADMIN — LEVOTHYROXINE SODIUM 137 MCG: 25 TABLET ORAL at 05:13

## 2023-05-13 RX ADMIN — LISINOPRIL 10 MG: 10 TABLET ORAL at 08:36

## 2023-05-13 RX ADMIN — INSULIN LISPRO 8 UNITS: 100 INJECTION, SOLUTION INTRAVENOUS; SUBCUTANEOUS at 11:43

## 2023-05-13 RX ADMIN — ENOXAPARIN SODIUM 40 MG: 40 INJECTION SUBCUTANEOUS at 08:36

## 2023-05-13 RX ADMIN — AMOXICILLIN AND CLAVULANATE POTASSIUM 1 TABLET: 875; 125 TABLET, FILM COATED ORAL at 08:36

## 2023-05-13 RX ADMIN — ACETAMINOPHEN 650 MG: 325 TABLET ORAL at 15:06

## 2023-05-13 RX ADMIN — ACETAMINOPHEN 650 MG: 325 TABLET ORAL at 08:36

## 2023-05-13 RX ADMIN — ATORVASTATIN CALCIUM 40 MG: 40 TABLET, FILM COATED ORAL at 08:36

## 2023-05-13 NOTE — NURSING NOTE
DC instructions given to and explained to patient  Proper insulin pen administration taught to patient with verbal understanding

## 2023-05-13 NOTE — DISCHARGE SUMMARY
Discharge Summary - Neurosurgery   Jose Pichardo 56 y o  male MRN: [id number]  Unit/Bed#: [facility] 724-01 Encounter: [id number]    Admission Date:   Admission Orders (From admission, onward)     Ordered        05/08/23 1107  [facility]  Once                       Discharge Date: 5/13/2023    Admitting Diagnosis: Pneumocephalus [G93 89]  Meningioma ([address] ) [D32 9]  Facial swelling [R22 0]  Controlled type 2 diabetes mellitus without complication, without long-term current use of insulin ([address] ) [E11 9]    Discharge Diagnosis: subgaleal and epidural pneumocephalus     Medical Problems     Resolved Problems  Date Reviewed: 3/27/2023   None       Attending: Dr Coty Saul Physician(s):   - CCM   - endocrinology     Procedures Performed: No orders of the defined types were placed in this encounter  Pathology: N/A    Hospital Course: Pt is a 68-year-old male well-known to the neurosurgery office as he underwent complex left frontal orbital surgery on 4/11/2023 for resection of anterolateral frontal pole meningioma with invasion of the inner table of the frontal bone adjacent to the frontal sinus  Final path came back to grade 2 meningioma  Patient did well postoperatively and was discharged to home  Patient states he was doing well until Sunday evening, on 5/8 when he was outside and had a bout of sneezing and coughing  Patient woke up the next morning and noticed swelling of the left side of his forehead as well as a significant headache  Patient also admitted to some nasal drainage specifically out of the left nostril  Patient called the neurosurgery office and was advised to be evaluated in the ER  CT completed in the ER revealed significant pneumocephalus both superficial and deep to the cranioplasty site with some mild mass effect on the adjacent underlying brain  There was concern for breach of the left frontal sinus closure    Patient was admitted to the hospital on neurosurgery service with critical care medicine consult  Patient did not appear acutely septic/toxic/infected  However he was treated with IV Unasyn empirically  The case was reviewed at length by Dr Clint Silva and Dr Toi Truong with ENT  Further imaging was reviewed as well including an MRI brain  No obvious breach in the sinus or distraction of the previous surgical site appreciated  Ultimately the decision was made to treat the patient conservatively with close observation and continuation of antibiotics  A beta-2 transferrin was collected and sent however patient with trace to 0 drainage and suspect that this will be negative  Patient continued to do well  His headaches resolved and he denied other neurological symptoms  He remained neurologically intact  Decision was made for discharge to home on oral Augmentin with close outpatient follow-up in the neurosurgery clinic with Dr Clint Silva next week  Patient and wife are agreeable to this plan  Appointments were scheduled with the front office staff in the neurosurgery clinic  Of note, on arrival the patient was noted to be profoundly hyperglycemic  Patient with a known history of diabetes type 2  No evidence of DKA or HHS  Glycemic control was achieved with both long-acting basal insulin as well as 3 times daily short acting insulin with meals  Endocrinology was consulted prior to discharge and recommended continuation of this insulin regimen at home in addition to his home Jardiance and discontinuing home metformin  Recommended close outpatient follow-up with endocrinology  Prescriptions for this new insulin regimen was sent to the patient's pharmacy  Patient was provided instructions on how to administer himself the insulin and use the equipment required by his nurse  All questions answered at this time  Patient cleared for discharge to home      Condition at Discharge: good     Discharge instructions/Information to patient and family:   See after visit summary for information provided to patient and family  Provisions for Follow-Up Care:  See after visit summary for information related to follow-up care and any pertinent home health orders  Disposition: Home      Planned Readmission: No    Discharge Statement   I spent 30 minutes discharging the patient  This time was spent on the day of discharge  I had direct contact with the patient on the day of discharge  Additional documentation is required if more than 30 minutes were spent on discharge  Discharge Medications:  See after visit summary for reconciled discharge medications provided to patient and family

## 2023-05-13 NOTE — ASSESSMENT & PLAN NOTE
Lab Results   Component Value Date    HGBA1C 7 8 (H) 05/12/2023       Recent Labs     05/12/23  1608 05/12/23  2127 05/13/23  0610 05/13/23  1120   POCGLU 168* 220* 127 220*       Blood Sugar Average: Last 72 hrs:  (P) 191 [id number]     Hx of Type 2 Diabetes   - following with PCP as an outpt   - home meds: Metformin, Jardiance     - presented with BS in the 300s and was started on insulin regimen for management her in the hospital   - currently on:    - Lantus 18 units sq qhs    - Lispro 8 units TID before meals   - endocrine consulted for assistance given insulin would be new for this patient   - will follow-up their final recommendations, but per verbal report from fellow:    - continue with lantus 18 units q hs and increase lispro to 12 units TID    - pt will need to check BS at least twice a day    - will need glucometer and other supplies at discharge    - pt may continue on his home Jardiance but discontinue his home Metformin    -Patient will need to continue a diabetic diet on discharge    -Recommended avoiding sugary drinks such as juices to avoid spikes in blood sugar  - pt will need to follow-up with endocrinology as an outpatient

## 2023-05-13 NOTE — ASSESSMENT & PLAN NOTE
S/P image guided left frontal craniotomy for resection of extra-axial and scalp mass, with cranioplasty,exenteration/obliteration of frontal sinus (Dr Amador Turner,  4/11/23)  · Presenting with enlarging L frontal supraorbital air/ pneumocephalus with small fluid collection  · Appears lightly improved since arrival   · Reported sneezing episode 5/7 with discharge from nose 5/7 and again 5/8  Imaging:   · MRI brain w wo 5/8/23: Redemonstration of postsurgical changes status post left frontal craniectomy and cranioplasty for resection of meningioma  Mild increased smooth dural enhancement may be postoperative  No nodular enhancement to suspect recurrent disease  Enhancement of evolving late subacute left frontal infarct  Stable small amount of epidural fluid/residual blood products subjacent to craniotomy extending into the obliterated left frontal sinus  Left frontal subgaleal and epidural air similar to CT from earlier today but increased from initial postoperative studies stable mild mass effect on the left frontal lobe  Increased left frontal parafalcine extra-axial air now extends anterior to the left frontal lobe with new right frontal extra-axial pneumocephalus  Additional new foci of pneumocephalus extending posteriorly along the left frontal and parietal convexities favored to be subarachnoid  Again there is concern for CSF communication that may be due to incomplete obliteration of the left frontal sinus  · CT head 5/8/2023: Increase in small extra-axial fluid collection with worsening pneumocephalus resulting in mild compressive mass effect on the left anterior frontal lobe status post resection of atypical who grade 2 meningioma  Markedly worsened subgaleal air superficial left frontal synthetic cranioplasty flap  Persistent mild vasogenic edema in the left frontal lobe with interval resolution of small acute intraparenchymal hemorrhage      Plan:   · Continue to closely monitor neuro exam · Continue frequent neuro checks   · Repeat STAT CTH with any acute decline in GCS > 2pts or more in 1hr   · Maintain normotensive BP goals, MAP > 65   · No acute neurosurgical intervention indicated at this time   · Case and imaging reviewed and discussed at length this am on rounds  · Imaging showed intracranial pneumocephalus communicating both subgaleal and epidural through synthetic cranioplasty flap  · At this time, after discussed with Dr Marilin Arguello, do not feel that there is a defect that needs to be repaired, no evidence of this on imaging  Anticipate that this will heal on its own  · Continue on PO Augmentin, ancipitate at least a 2 week course, potentially longer  · Follow-up beta-2 transferrin -> pending   · Can follow-up result in the clinic as an outpt  · Low suspicion this would be positive given such minimal to no drainage and imaging results/clinical improvement  · Patient without any obvious signs of infection on exam     · Pt remains afebrile, non-toxic appearing, no leukocytosis   · DVT ppx: SCDs, Lovenox   · Pain control   · PT/OT   · continue home medications   · Social work following for assistance with dispo     Neurosurgery will continue to follow as primary team  Plan for discharge to home today with close outpatient follow-up scheduled in our office this week with Dr Marilin Arguello  Will be discharged on PO Augmentin  Please reach out with any questions or concerns

## 2023-05-13 NOTE — PLAN OF CARE
Problem: INFECTION - ADULT  Goal: Absence or prevention of progression during hospitalization  Description: INTERVENTIONS:  - Assess and monitor for signs and symptoms of infection  - Monitor lab/diagnostic results  - Monitor all insertion sites, i e  indwelling lines, tubes, and drains  - Monitor endotracheal if appropriate and nasal secretions for changes in amount and color  - [place] appropriate cooling/warming therapies per order  - Administer medications as ordered  - Instruct and encourage patient and family to use good hand hygiene technique  - Identify and instruct in appropriate isolation precautions for identified infection/condition  Outcome: Progressing  Goal: Absence of fever/infection during neutropenic period  Description: INTERVENTIONS:  - Monitor WBC    Outcome: Progressing     Problem: SAFETY ADULT  Goal: Patient will remain free of falls  Description: INTERVENTIONS:  - Educate patient/family on patient safety including physical limitations  - Instruct patient to call for assistance with activity   - Consult OT/PT to assist with strengthening/mobility   - Keep Call bell within reach  - Keep bed low and locked with side rails adjusted as appropriate  - Keep care items and personal belongings within reach  - Initiate and maintain comfort rounds  - Make Fall Risk Sign visible to staff  - Offer Toileting every 2 Hours, in advance of need  - Initiate/Maintain bed alarm  - Obtain necessary fall risk management equipment: non skid footwear  - Apply yellow socks and bracelet for high fall risk patients  - Consider moving patient to room near nurses station  Outcome: Progressing  Goal: Maintain or return to baseline ADL function  Description: INTERVENTIONS:  -  Assess patient's ability to carry out ADLs; assess patient's baseline for ADL function and identify physical deficits which impact ability to perform ADLs (bathing, care of mouth/teeth, toileting, grooming, dressing, etc )  - Assess/evaluate cause of self-care deficits   - Assess range of motion  - Assess patient's mobility; develop plan if impaired  - Assess patient's need for assistive devices and provide as appropriate  - Encourage maximum independence but intervene and supervise when necessary  - Involve family in performance of ADLs  - Assess for home care needs following discharge   - Consider OT consult to assist with ADL evaluation and planning for discharge  - Provide patient education as appropriate  Outcome: Progressing  Goal: Maintains/Returns to pre admission functional level  Description: INTERVENTIONS:  - Perform BMAT or MOVE assessment daily    - Set and communicate daily mobility goal to care team and patient/family/caregiver  - Collaborate with rehabilitation services on mobility goals if consulted  - Perform Range of Motion 3 times a day  - Reposition patient every 2 hours    - Dangle patient 3 times a day  - Stand patient 3 times a day  - Ambulate patient 3 times a day  - Out of bed to chair 2 times a day   - Out of bed for meals 2 times a day  - Out of bed for toileting  - Record patient progress and toleration of activity level   Outcome: Progressing     Problem: DISCHARGE PLANNING  Goal: Discharge to home or other facility with appropriate resources  Description: INTERVENTIONS:  - Identify barriers to discharge w/patient and caregiver  - Arrange for needed discharge resources and transportation as appropriate  - Identify discharge learning needs (meds, wound care, etc )  - Arrange for interpretive services to assist at discharge as needed  - Refer to Case Management Department for coordinating discharge planning if the patient needs post-hospital services based on physician/advanced practitioner order or complex needs related to functional status, cognitive ability, or social support system  Outcome: Progressing     Problem: Knowledge Deficit  Goal: Patient/family/caregiver demonstrates understanding of disease process, treatment plan, medications, and discharge instructions  Description: Complete learning assessment and assess knowledge base  Interventions:  - Provide teaching at level of understanding  - Provide teaching via preferred learning methods  Outcome: Progressing     Problem: MOBILITY - ADULT  Goal: Maintain or return to baseline ADL function  Description: INTERVENTIONS:  -  Assess patient's ability to carry out ADLs; assess patient's baseline for ADL function and identify physical deficits which impact ability to perform ADLs (bathing, care of mouth/teeth, toileting, grooming, dressing, etc )  - Assess/evaluate cause of self-care deficits   - Assess range of motion  - Assess patient's mobility; develop plan if impaired  - Assess patient's need for assistive devices and provide as appropriate  - Encourage maximum independence but intervene and supervise when necessary  - Involve family in performance of ADLs  - Assess for home care needs following discharge   - Consider OT consult to assist with ADL evaluation and planning for discharge  - Provide patient education as appropriate  Outcome: Progressing  Goal: Maintains/Returns to pre admission functional level  Description: INTERVENTIONS:  - Perform BMAT or MOVE assessment daily    - Set and communicate daily mobility goal to care team and patient/family/caregiver  - Collaborate with rehabilitation services on mobility goals if consulted  - Perform Range of Motion 3 times a day  - Reposition patient every 2 hours    - Dangle patient 3 times a day  - Stand patient 3 times a day  - Ambulate patient 3 times a day  - Out of bed to chair 2 times a day   - Out of bed for meals 2 times a day  - Out of bed for toileting  - Record patient progress and toleration of activity level   Outcome: Progressing     Problem: NEUROSENSORY - ADULT  Goal: Achieves stable or improved neurological status  Description: INTERVENTIONS  - Monitor and report changes in neurological status  - Monitor vital signs such as temperature, blood pressure, glucose, and any other labs ordered   - Initiate measures to prevent increased intracranial pressure  - Monitor for seizure activity and implement precautions if appropriate      Outcome: Progressing  Goal: Remains free of injury related to seizures activity  Description: INTERVENTIONS  - Maintain airway, patient safety  and administer oxygen as ordered  - Monitor patient for seizure activity, document and report duration and description of seizure to physician/advanced practitioner  - If seizure occurs,  ensure patient safety during seizure  - Reorient patient post seizure  - Seizure pads on all 4 side rails  - Instruct patient/family to notify RN of any seizure activity including if an aura is experienced  - Instruct patient/family to call for assistance with activity based on nursing assessment  - Administer anti-seizure medications if ordered    Outcome: Progressing  Goal: Achieves maximal functionality and self care  Description: INTERVENTIONS  - Monitor swallowing and airway patency with patient fatigue and changes in neurological status  - Encourage and assist patient to increase activity and self care     - Encourage visually impaired, hearing impaired and aphasic patients to use assistive/communication devices  Outcome: Progressing     Problem: METABOLIC, FLUID AND ELECTROLYTES - ADULT  Goal: Electrolytes maintained within normal limits  Description: INTERVENTIONS:  - Monitor labs and assess patient for signs and symptoms of electrolyte imbalances  - Administer electrolyte replacement as ordered  - Monitor response to electrolyte replacements, including repeat lab results as appropriate  - Instruct patient on fluid and nutrition as appropriate  Outcome: Progressing  Goal: Fluid balance maintained  Description: INTERVENTIONS:  - Monitor labs   - Monitor I/O and WT  - Instruct patient on fluid and nutrition as appropriate  - Assess for signs & symptoms of volume excess or deficit  Outcome: Progressing  Goal: Glucose maintained within target range  Description: INTERVENTIONS:  - Monitor Blood Glucose as ordered  - Assess for signs and symptoms of hyperglycemia and hypoglycemia  - Administer ordered medications to maintain glucose within target range  - Assess nutritional intake and initiate nutrition service referral as needed  Outcome: Progressing

## 2023-05-13 NOTE — DISCHARGE INSTR - AVS FIRST PAGE
Endocrinology/Diabetes Discharge Reccommendations  - You were started on an insulin regimen int [facility] to better control your Type 2 diabetes  This regimen consists of long acting insulin that will be administered nightly before bed and a short acting insulin that will be administered before meals three times a day  The same regime you were on in the hospital during your admission    - you will administer yourself 18 units of the Lantus (long acting insulin) every night before bed   - will will administer yourself 12 units of the Lispro (short acting insulin) three times a day before meals   - Please check your blood sugar with the glucometer at least 2-3 times a day, ideally 3 times a day with each meal and once at night before bed you should check a blood sugar   - Prescriptions for this insulin regimen, a glucometer, and all the needles and syringes you will need was sent to your pharmacy  - you may continue to take your home Jardiance with this new insulin regimen but please STOP taking the Metformin at this time   - You will need to call and schedule an appointment with [facility]'s endocrinology in 2 to 4 weeks after discharge  The sooner you follow-up better  - You are also encouraged to call your PCP and schedule a follow-up appointment with them as well  Neurosurgery Discharge Instructions   - please continue to take Augmentin (the antibiotics) prescribed to you as ordered  - please attend your scheduled follow-up appointment next week in the nsgy clinic, Scheduled for Wednesday 5/17 with Dr Bailey Durán

## 2023-05-13 NOTE — ASSESSMENT & PLAN NOTE
· S/p left total craniotomy with craniectomy and orbital osteotomy with resection of extradural mass  Exenteration/obliteration of left frontal sinus and custom synthetic cranioplasty on 4/11/2023 with Dr Martínez Links  · Pathology: Grade 2 Meningioma  · See plan above

## 2023-05-13 NOTE — CONSULTS
Consultation - Susan Pump 56 y o  male MRN: [id number]    Unit/Bed#: [facility] 724-01 Encounter: [id number]        Impression:   Diabetes type 2 with no complications  Meningioma status post resection        Assessment: This is a 56y o -year-old male with past medical history of meningioma, diabetes type 2 without complications  Presents to the hospital for a planned neurosurgery    Plan:  A1c: 7 8  Home regimen: Jardiance 25 mg and metformin 1 g twice daily  Patient regimen Lantus 18 units at bedtime and lispro 8 units TID plus correctional  Episodes of postprandial hyperglycemia, the patient admits that he has been drinking juice  We recommended him to avoid orange juice  For now continue with Lantus 18 units at bedtime, and increase lispro to 12 units daily with meals  The patient will need insulin teaching, and glucometer use teaching before discharge  At discharge patient can continue with current insulin regimen plus Jardiance 25 mg daily and needs to follow-up with endocrinology outpatient in 2 to 4 weeks     For discharge, if Lantus solostar is not the preferred basal insulin for the patient's insurance company, please substitute with Verdia Binh flexpen, Basaglar Kwikpen , Levemir flexpen, Toujeo solostar pen  or equivalent insulin vials at the same dose instead  For discharge, if Humalog  Kwik pen is not the preferred mealtime insulin for the patient's insurance, please substitute with NovoLog flexpen, Fiasp  flextouch, Admelog solostar  or Apidra solostar pen or equivalent insulin vials at the same dose instead  If basal/bolus therapy is not covered or affordable, please substitute with Novolin 70/30 Flexpen OR Novolin 70/30 vial     Please prescribe insulin pen needles, glucometer, test strips, lancets and insulin syringes (only when using insulin vials) on discharge           CC: Diabetes Consult      HPI: Susan Pump is a 56y o  year old male with diabetes type 2, without complications not on insulin at home, and meningioma  Was admitted to the hospital for a planned neurosurgery  As per patient he was diagnosed with diabetes type 2 more than 5 years ago, and has never been on insulin  Patient reports that he is compliant with his home regimen  And he does Accu-Cheks at home glucose ranging around 150s  Currently patient denies polydipsia, polyuria, polyphagia  Family history: Diabetes type 2; mother      Review of Systems   Constitutional: Negative for chills and fever  HENT: Negative for ear pain and sore throat  Eyes: Negative for pain and visual disturbance  Respiratory: Negative for cough and shortness of breath  Cardiovascular: Negative for chest pain and palpitations  Gastrointestinal: Negative for abdominal pain and vomiting  Genitourinary: Negative for dysuria and hematuria  Musculoskeletal: Negative for arthralgias and back pain  Skin: Negative for color change and rash  Neurological: Negative for seizures and syncope  All other systems reviewed and are negative        Historical Information   Past Medical History:   Diagnosis Date   • Diabetes mellitus ([facility] [address] )    • Elevated LFTs     last assessed 02Nov2015   • Hyperlipidemia    • Hypertension    • Impaired fasting glucose     last assessed 29Jan2014     Past Surgical History:   Procedure Laterality Date   • APPENDECTOMY     • COLONOSCOPY     • [place] CRANIEC TREPHINE BONE FLP BRAIN TUMOR SUPRTENTOR Left 4/11/2023    Procedure: IMAGE-GUIDED LEFT FRONTAL CRANIOTOMY FOR RESECTION OF EXTRA-AXIAL & SKULL MASS, WITH CRANIOPLASTY, EXENTERATION/OBLITERATION OF FRONTAL SINUS; ORBITAL OSTEOTOMY;  Surgeon: Leny Ambrose MD;  Location: [facility];  Service: Neurosurgery     Social History   Social History     Substance and Sexual Activity   Alcohol Use Yes   • Alcohol/week: 3 0 standard drinks   • Types: 3 Glasses of wine per week    Comment: drinks daily      Social History     Substance and Sexual "Activity   Drug Use Never     Social History     Tobacco Use   Smoking Status Never   Smokeless Tobacco Never     Family History:   Family History   Problem Relation Age of Onset   • Diabetes Mother    • Breast cancer Mother    • Diabetes Father        Meds/Allergies   Current Facility-Administered Medications   Medication Dose Route Frequency Provider Last Rate Last Admin   • acetaminophen (TYLENOL) tablet 650 mg  650 mg Oral Q6H PRN Cristina Baron MD   650 mg at 05/13/23 6994   • amoxicillin-clavulanate (AUGMENTIN) 875-125 mg per tablet 1 tablet  1 tablet Oral BID Izella Anabel, PA-C   1 tablet at 05/13/23 0652   • atorvastatin (LIPITOR) tablet 40 mg  40 mg Oral Daily Cristina Baron MD   40 mg at 05/13/23 6057   • calcium carbonate (TUMS) chewable tablet 1,000 mg  1,000 mg Oral Daily PRN Reuel Fraction, PA-C   1,000 mg at 05/08/23 2207   • enoxaparin (LOVENOX) subcutaneous injection 40 mg  40 mg Subcutaneous Daily Reuel Fraction, PA-C   40 mg at 05/13/23 0836   • insulin glargine (LANTUS) subcutaneous injection 18 Units 0 18 mL  18 Units Subcutaneous HS Cristina Baron MD   18 Units at 05/12/23 2142   • insulin lispro (HumaLOG) 100 units/mL subcutaneous injection 4-20 Units  4-20 Units Subcutaneous 4x Daily (AC & HS) Cristina Baron MD   8 Units at 05/13/23 1143   • insulin lispro (HumaLOG) 100 units/mL subcutaneous injection 8 Units  8 Units Subcutaneous TID With Meals Cristina Baron MD   8 Units at 05/13/23 1143   • levothyroxine tablet 137 mcg  137 mcg Oral Early Morning Cristina Baron MD   137 mcg at 05/13/23 0513   • lisinopril (ZESTRIL) tablet 10 mg  10 mg Oral Daily Cristina Baron MD   10 mg at 05/13/23 0836   • ondansetron (ZOFRAN) injection 4 mg  4 mg Intravenous Q6H PRN Reuel Fraction, PA-C         No Known Allergies    Objective   Vitals: Blood pressure 124/82, pulse 90, temperature 98 °F (36 7 °C), resp   rate 16, height 5' 9\" (1 753 m), weight 93 kg " (205 lb 0 4 oz), SpO2 97 %  Intake/Output Summary (Last 24 hours) at 5/13/2023 1357  Last data filed at 5/13/2023 6544  Gross per 24 hour   Intake 120 ml   Output --   Net 120 ml     Invasive Devices     Peripheral Intravenous Line  Duration           Peripheral IV 05/12/23 Right;Ventral (anterior) Forearm 1 day                Physical Exam  Constitutional:       General: He is not in acute distress  Appearance: He is not ill-appearing  HENT:      Head: Normocephalic and atraumatic  Nose: No congestion or rhinorrhea  Eyes:      Conjunctiva/sclera: Conjunctivae normal       Pupils: Pupils are equal, round, and reactive to light  Cardiovascular:      Rate and Rhythm: Normal rate  Pulses: Normal pulses  Heart sounds: No murmur heard  Pulmonary:      Effort: No respiratory distress  Breath sounds: Normal breath sounds  No wheezing  Abdominal:      General: There is no distension  Tenderness: There is no abdominal tenderness  Musculoskeletal:         General: No swelling or tenderness  Cervical back: No rigidity or tenderness  Skin:     Coloration: Skin is not jaundiced or pale  Neurological:      Mental Status: He is alert and oriented to person, place, and time  Motor: No weakness  Psychiatric:         Mood and Affect: Mood normal          Thought Content:  Thought content normal              Lab Results:   Results from last 7 days   Lab Units 05/12/23  1506   HEMOGLOBIN A1C % 7 8*     Lab Results   Component Value Date    WBC 6 85 05/12/2023    HGB 14 5 05/12/2023    HCT 42 5 05/12/2023    MCV 91 05/12/2023     05/12/2023     Lab Results   Component Value Date/Time    BUN 10 05/12/2023 05:25 AM    BUN 9 05/01/2020 07:08 AM     10/26/2015 09:00 AM    K 4 2 05/12/2023 05:25 AM    K 4 8 05/01/2020 07:08 AM     05/12/2023 05:25 AM     05/01/2020 07:08 AM    CO2 26 05/12/2023 05:25 AM    CO2 30 05/01/2020 07:08 AM    CREATININE 0 63 05/12/2023 05:25 AM    CREATININE 0 90 10/26/2015 09:00 AM    AST 16 05/08/2023 09:22 AM    AST 27 05/01/2020 07:08 AM    ALT 33 05/08/2023 09:22 AM    ALT 35 05/01/2020 07:08 AM    ALB 3 7 05/08/2023 09:22 AM    ALB 4 1 10/26/2015 09:00 AM    GLOB 2 3 05/01/2020 07:08 AM     No results for input(s): CHOL, HDL, LDL, TRIG, VLDL in the last 72 hours  No results found for: Odella Minus  POC Glucose (mg/dl)   Date Value   05/13/2023 220 (H)   05/13/2023 127   05/12/2023 220 (H)   05/12/2023 168 (H)   05/12/2023 241 (H)   05/12/2023 132   05/11/2023 173 (H)   05/11/2023 154 (H)   05/11/2023 218 (H)   05/11/2023 247 (H)           Portions of the record may have been created with voice recognition software

## 2023-05-13 NOTE — PROGRESS NOTES
[address] [facility]  Progress Note  Name: Carisa Cates  MRN: [id number]  [facility] I Date of Admission: 5/8/2023   Date of Service: 5/13/2023 I Hospital Day: 5    Assessment/Plan   Status post craniotomy and cranioplasty  Assessment & Plan  S/P image guided left frontal craniotomy for resection of extra-axial and scalp mass, with cranioplasty,exenteration/obliteration of frontal sinus (Dr Mary Blackwood,  4/11/23)  · Presenting with enlarging L frontal supraorbital air/ pneumocephalus with small fluid collection  · Appears lightly improved since arrival   · Reported sneezing episode 5/7 with discharge from nose 5/7 and again 5/8  Imaging:   · MRI brain w wo 5/8/23: Redemonstration of postsurgical changes status post left frontal craniectomy and cranioplasty for resection of meningioma  Mild increased smooth dural enhancement may be postoperative  No nodular enhancement to suspect recurrent disease  Enhancement of evolving late subacute left frontal infarct  Stable small amount of epidural fluid/residual blood products subjacent to craniotomy extending into the obliterated left frontal sinus  Left frontal subgaleal and epidural air similar to CT from earlier today but increased from initial postoperative studies stable mild mass effect on the left frontal lobe  Increased left frontal parafalcine extra-axial air now extends anterior to the left frontal lobe with new right frontal extra-axial pneumocephalus  Additional new foci of pneumocephalus extending posteriorly along the left frontal and parietal convexities favored to be subarachnoid  Again there is concern for CSF communication that may be due to incomplete obliteration of the left frontal sinus    · CT head 5/8/2023: Increase in small extra-axial fluid collection with worsening pneumocephalus resulting in mild compressive mass effect on the left anterior frontal lobe status post resection of atypical who grade 2 meningioma  Markedly worsened subgaleal air superficial left frontal synthetic cranioplasty flap  Persistent mild vasogenic edema in the left frontal lobe with interval resolution of small acute intraparenchymal hemorrhage  Plan:   · Continue to closely monitor neuro exam   · Continue frequent neuro checks   · Repeat STAT CTH with any acute decline in GCS > 2pts or more in 1hr   · Maintain normotensive BP goals, MAP > 65   · No acute neurosurgical intervention indicated at this time   · Case and imaging reviewed and discussed at length this am on rounds  · Imaging showed intracranial pneumocephalus communicating both subgaleal and epidural through synthetic cranioplasty flap  · At this time, after discussed with Dr Amador Turner, do not feel that there is a defect that needs to be repaired, no evidence of this on imaging  Anticipate that this will heal on its own  · Continue on PO Augmentin, ancipitate at least a 2 week course, potentially longer  · Follow-up beta-2 transferrin -> pending   · Can follow-up result in the clinic as an outpt  · Low suspicion this would be positive given such minimal to no drainage and imaging results/clinical improvement  · Patient without any obvious signs of infection on exam     · Pt remains afebrile, non-toxic appearing, no leukocytosis   · DVT ppx: SCDs, Lovenox   · Pain control   · PT/OT   · continue home medications   · Social work following for assistance with dispo     Neurosurgery will continue to follow as primary team  Plan for discharge to home today with close outpatient follow-up scheduled in our office this week with Dr Amador Turner  Will be discharged on PO Augmentin  Please reach out with any questions or concerns  * Meningioma ([address] )  Assessment & Plan  · S/p left total craniotomy with craniectomy and orbital osteotomy with resection of extradural mass    Exenteration/obliteration of left frontal sinus and custom synthetic cranioplasty on 4/11/2023 with   "[employer]  · Pathology: Grade 2 Meningioma  · See plan above  Pneumocephalus  Assessment & Plan  See plan above  Diabetes [facility])  Assessment & Plan  Lab Results   Component Value Date    HGBA1C 7 8 (H) 05/12/2023       Recent Labs     05/12/23  1608 05/12/23  2127 05/13/23  0610 05/13/23  1120   POCGLU 168* 220* 127 220*       Blood Sugar Average: Last 72 hrs:  (P) 191 [id number]     Hx of Type 2 Diabetes   - following with PCP as an outpt   - home meds: Metformin, Jardiance     - presented with BS in the 300s and was started on insulin regimen for management her in the hospital   - currently on:    - Lantus 18 units sq qhs    - Lispro 8 units TID before meals   - endocrine consulted for assistance given insulin would be new for this patient   - will follow-up their final recommendations, but per verbal report from fellow:    - continue with lantus 18 units q hs and increase lispro to 12 units TID    - pt will need to check BS at least twice a day    - will need glucometer and other supplies at discharge    - pt may continue on his home Jardiance but discontinue his home Metformin    -Patient will need to continue a diabetic diet on discharge    -Recommended avoiding sugary drinks such as juices to avoid spikes in blood sugar  - pt will need to follow-up with endocrinology as an outpatient          Subjective/Objective   Chief Complaint: \" Hi, good to see you\"    Subjective: Patient seen and examined this a m  on rounds  No acute events overnight  Patient reports a mild headache this morning that improved with Tylenol  Patient's supraorbital edema/subcu air appears stable/slightly flattened  Patient is asking when he can be discharged  However, he is understanding that he needs to be evaluated by endocrinology prior to discharge  Once seen by endocrinology, patient is cleared for discharge to home at this time  Will be discharged on p o  Augmentin      Objective: Pleasant, middle-aged male " "resting comfortably in bed  No acute distress  I/O       05/11 0701 05/12 0700 05/12 0701 05/13 0700 05/13 0701 05/14 0700    P  O  720  120    I V  (mL/kg)       IV Piggyback       Total Intake(mL/kg) 720 (7 7)  120 (1 3)    Net +720  +120               Invasive Devices     Peripheral Intravenous Line  Duration           Peripheral IV 05/12/23 Right;Ventral (anterior) Forearm 1 day              Physical Exam:  Vitals: Blood pressure 124/82, pulse 90, temperature 98 °F (36 7 °C), resp  rate 16, height 5' 9\" (1 753 m), weight 93 kg (205 lb 0 4 oz), SpO2 97 %  ,Body mass index is 30 28 kg/m²  General appearance: alert, appears stated age, cooperative and no distress  Head: Left sided, supraorbital area of outpouching improved today, flatter, still easily indented bowel/soft   -No appreciated nasal drainage  Eyes: EOMI, PERRL  Neck: supple, symmetrical, trachea midline and NT  Back: no kyphosis present, no tenderness to percussion or palpation  Lungs: non labored breathing, no respiratory distress on room air  Heart: regular heart rate  Neurologic:   Mental status: Alert, oriented x3, thought content appropriate  Cranial nerves: grossly intact (Cranial nerves II-XII)  Sensory: normal to light touch bilaterally throughout  Motor: moving all extremities without focal weakness  Strength 5/5 bilaterally throughout  Reflexes: 2+ and symmetric  Coordination: finger to nose normal bilaterally, no drift bilaterally    Lab Results:  Results from last 7 days   Lab Units 05/12/23  0525 05/10/23  0443 05/09/23  0511 05/08/23  1753 05/08/23  0922   WBC Thousand/uL 6 85 5 67 7 22  --  8 85   HEMOGLOBIN g/dL 14 5 13 9 14 2  --  15 7   HEMATOCRIT % 42 5 40 7 39 7  --  44 0   PLATELETS Thousands/uL 172 142* 145*   < > 174   NEUTROS PCT %  --  40* 56  --  65   MONOS PCT %  --  10 8  --  7    < > = values in this interval not displayed       Results from last 7 days   Lab Units 05/12/23  0525 05/11/23  0829 05/10/23  0443 " 05/09/23  0511 05/08/23  0922   POTASSIUM mmol/L 4 2 4 1 3 6   < > 3 5   CHLORIDE mmol/L 107 105 107   < > 101   CO2 mmol/L 26 25 27   < > 25   BUN mg/dL 10 9 12   < > 21   CREATININE mg/dL 0 63 0 82 0 57*   < > 0 87   CALCIUM mg/dL 8 9 8 6 8 2*   < > 9 3   ALK PHOS U/L  --   --   --   --  73   ALT U/L  --   --   --   --  33   AST U/L  --   --   --   --  16    < > = values in this interval not displayed  Results from last 7 days   Lab Units 05/09/23  0511   MAGNESIUM mg/dL 2 2     Results from last 7 days   Lab Units 05/09/23  0511   PHOSPHORUS mg/dL 2 8     Results from last 7 days   Lab Units 05/08/23 0922   INR  1 04   PTT seconds 24     No results found for: TROPONINT  ABG:No results found for: PHART, YYG6MIQ, PO2ART, ZVX7UGU, M7XTGOXL, BEART, SOURCE    Imaging Studies: I have personally reviewed pertinent reports  and I have personally reviewed pertinent films in PACS    CT head without contrast    Result Date: 5/8/2023  Impression: Interval increase in small extra-axial fluid collection with worsened pneumocephalus resulting in mild compressive mass effect on left anterior frontal lobe status post resection of left frontal atypical 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)  Markedly worsened subgaleal air superficial to left frontal synthetic cranioplasty status post removal of left subgaleal drain  Findings are concerning CSF leak communicating with left frontal sinus +/- infection  Recommend neurosurgical consultation for further evaluation  Persistent mild vasogenic edema in left frontal lobe with interval resolution of small acute intraparenchymal parenchymal hemorrhage in left frontal lobe  Sinus disease, severe in left frontal sinus  I personally discussed this study with Michael Christina on 5/8/2023 10:32 AM  Workstation performed: [id number]     MRI brain w wo contrast    Addendum Date: 5/9/2023    ADDENDUM: Stable encephalomalacia in the inferior frontal lobes likely sequela of remote trauma   Stable mild chronic microangiopathy  Result Date: 5/9/2023  Impression: Redemonstration of postsurgical changes status post left frontal craniectomy and cranioplasty for resection of meningioma  Mild increased smooth dural enhancement may be postoperative  No nodular enhancement to suspect recurrent disease  Recommend continued surveillance  Enhancement of evolving late subacute left frontal infarct Stable small amount of epidural fluid/residual blood products subjacent to craniotomy extending into the obliterated left frontal sinus  Left frontal subgaleal and epidural air similar to CT from earlier today but increased from initial postoperative studies stable mild mass effect on the left frontal lobe    Increased left frontal parafalcine extra-axial air now extends anterior to the left frontal lobe with new right frontal extra-axial pneumocephalus  Additional new foci of pneumocephalus extending posteriorly along the left frontal and parietal convexities favored to be subarachnoid  Again there is concern for CSF communication that may be due to incomplete obliteration of the left frontal sinus  Workstation performed: [id number]     CT sinus wo contrast    Result Date: 5/8/2023  Impression: Similar extra-axial fluid collection in left frontal region with associated debris and air communicating with left frontal sinus, similar subgaleal air superficial to left frontal synthetic cranioplasty, new pneumocephalus along left parafalcine region, with similar mild compressive mass effect on left anterior frontal lobe  Findings are concerning for CSF leak communicating with left frontal sinus +/- infection as seen on earlier same day CT head  Given new pneumocephalus along left parafalcine region interval rupture of extra-axial fluid collection is within the differential  Sinus disease (severe in left frontal sinus), as detailed above  The study was marked in [facility] for immediate notification   Workstation performed: [id number]       EKG, Pathology, and Other Studies: I have personally reviewed pertinent reports        VTE Pharmacologic Prophylaxis: Sequential compression device (Venodyne)  and Enoxaparin (Lovenox)    VTE Mechanical Prophylaxis: sequential compression device

## 2023-05-15 NOTE — UTILIZATION REVIEW
NOTIFICATION OF ADMISSION DISCHARGE   This is a Notification of Discharge from [facility]  Please be advised that this patient has been discharge from our facility  Below you will find the admission and discharge date and time including the patient’s disposition  UTILIZATION REVIEW CONTACT:  Liang Guidry  Utilization   Network Utilization Review Department  Phone: [phone] x carefully listen to the prompts  All voicemails are confidential   Email: [email or web address]     ADMISSION INFORMATION  PRESENTATION DATE: 5/8/2023  8:52 AM  OBERVATION ADMISSION DATE:  INPATIENT ADMISSION DATE: 5/8/23 11:07 AM   DISCHARGE DATE: 5/13/2023  3:51 PM   DISPOSITION:Home/Self Care    IMPORTANT INFORMATION:  Send all requests for admission clinical reviews, approved or denied determinations and any other requests to dedicated fax number below belonging to the campus where the patient is receiving treatment   List of dedicated fax numbers:  [address] DENIALS (Administrative/Medical Necessity) [phone]   [address] (Maternity/NICU/Pediatrics) [phone]   [facility] [phone]   [place] [address] [phone]   [address] [phone]   [address] [facility] [phone]   [address] [facility] [phone]   [address] [place] [address] [phone]   [facility]  [phone]   [address] [facility] [phone]   [address] [facility] [address] [facility] [phone]

## 2023-05-17 ENCOUNTER — TELEPHONE (OUTPATIENT)
Dept: FAMILY MEDICINE CLINIC | Facility: CLINIC | Age: 58
End: 2023-05-17

## 2023-05-17 ENCOUNTER — TELEPHONE (OUTPATIENT)
Dept: NEUROSURGERY | Facility: CLINIC | Age: 58
End: 2023-05-17

## 2023-05-17 ENCOUNTER — OFFICE VISIT (OUTPATIENT)
Dept: NEUROSURGERY | Facility: CLINIC | Age: 58
End: 2023-05-17

## 2023-05-17 VITALS
WEIGHT: 205 LBS | TEMPERATURE: 98.6 F | RESPIRATION RATE: 16 BRPM | BODY MASS INDEX: 30.36 KG/M2 | DIASTOLIC BLOOD PRESSURE: 74 MMHG | HEART RATE: 98 BPM | SYSTOLIC BLOOD PRESSURE: 120 MMHG | HEIGHT: 69 IN

## 2023-05-17 DIAGNOSIS — R22.0 FACIAL SWELLING: ICD-10-CM

## 2023-05-17 DIAGNOSIS — D32.9 MENINGIOMA (HCC): Primary | ICD-10-CM

## 2023-05-17 DIAGNOSIS — E11.9 DIABETES (HCC): ICD-10-CM

## 2023-05-17 DIAGNOSIS — Z98.890 STATUS POST CRANIOTOMY: ICD-10-CM

## 2023-05-17 DIAGNOSIS — G93.89 PNEUMOCEPHALUS: ICD-10-CM

## 2023-05-17 RX ORDER — ACETAMINOPHEN 500 MG
1000 TABLET ORAL AS NEEDED
COMMUNITY

## 2023-05-17 RX ORDER — INSULIN GLARGINE 100 [IU]/ML
12 INJECTION, SOLUTION SUBCUTANEOUS
COMMUNITY
End: 2023-05-18

## 2023-05-17 RX ORDER — AMOXICILLIN AND CLAVULANATE POTASSIUM 875; 125 MG/1; MG/1
1 TABLET, FILM COATED ORAL 2 TIMES DAILY
Qty: 42 TABLET | Refills: 0 | Status: SHIPPED | OUTPATIENT
Start: 2023-05-17 | End: 2023-06-07

## 2023-05-17 NOTE — TELEPHONE ENCOUNTER
Received notification from Dr Rosalba Castañeda MA stating patient's humalog was not at the pharmacy after discharge and is still in need of the medication  Per the office note from today Dr Mickey Landeros reached out to the PCP directly regarding patient's mediations needed for his diabetes  This RN called in the script to the pharmacy for the humalog from discharge  Patient will be able to pick this medication up this evening

## 2023-05-17 NOTE — TELEPHONE ENCOUNTER
----- Message from Arturo Rachel MD sent at 5/17/2023  2:16 PM EDT -----  Received call from Neurosurgery Dr Donnie Osorio , patient with uncontrolled sugar levels following his surgery    Please schedule visit to discuss management as soon as possible, can be either in person or virtual

## 2023-05-17 NOTE — PROGRESS NOTES
Neurosurgery Office Note  Margot Alexandre 56 y o  male MRN: [id number]      Assessment/Plan      Diagnoses and all orders for this visit:    Meningioma [facility])    Facial swelling  -     amoxicillin-clavulanate (AUGMENTIN) 875-125 mg per tablet; Take 1 tablet by mouth 2 (two) times a day for 21 days    Pneumocephalus  -     amoxicillin-clavulanate (AUGMENTIN) 875-125 mg per tablet; Take 1 tablet by mouth 2 (two) times a day for 21 days    Status post craniotomy and cranioplasty  -     amoxicillin-clavulanate (AUGMENTIN) 875-125 mg per tablet; Take 1 tablet by mouth 2 (two) times a day for 21 days    Diabetes ([address] )    Other orders  -     acetaminophen (TYLENOL) 500 mg tablet; Take 1,000 mg by mouth if needed for mild pain  -     insulin glargine (LANTUS) 100 units/mL subcutaneous injection; Inject 12 Units under the skin 3 (three) times daily after meals (Patient not taking: Reported on 5/17/2023)        Discussion:    Pain Score:   4 (left side head/forehead/left eye)    Status post left/semicoronal craniotomy with craniectomy and orbital osteotomy with resection of extradural mass left frontal, with exenteration/obliteration frontal sinus and custom synthetic cranioplasty on 4/11/2023  Pathology who grade 2 meningioma    I saw the patient in follow-up on 5/1/2023 and he was doing well making good recovery  We had plans for surveillance imaging, to be ordered later, MRI scan at 3 months postop (which would be around 7/11/23, and will still need to be ordered/scheduled)  However, he presented to the ER 5//823 after coughing/sneezing/blowing his nose, which created a noticeable pocket of pneumocephalus under the scalp flap, over his synthetic bone flap  He had no definitive signs of CSF leakage, despite provocative testing done personally  We placed him on strict sinus precautions, no nose blowing etc , and discussed options for management  He had no signs of overt infection    I discussed with him personally conservative measures/management versus reexploration and obliteration of his frontal sinus  As he had no active CSF leak, and on scanning his sinus appeared relatively well opacified/obliterated, consensus was for expectant management  The subgaleal component of his swelling softened and reduced somewhat in the days he was in the hospital   He developed no definitive signs of leak, and while beta-2 transferrin was theoretically obtained/tested, the result is still pending  He was discharged on prophylactic antibiotics  See him back today in the office a few days after his discharge  His subgaleal air collection has completely resolved, and his incision looks to be healing well  He does tell me that the subgaleal swelling is present in the morning after he sleeps/lays flat, and then reduces within an hour or 2  I explained the mechanism for this  As such, I have recommended he sleep propped up with head of bed greater than 30 degrees if possible, and wrap his head with an Ace bandage to reduce the scalp at nighttime  This should help promote healing/scarring and obliterate this potential space  He is tolerating the antibiotics well and I renewed his prescription so that he will be able to take them for a full month  He denies fevers chills nausea vomiting etc     I will plan to see him back then in next week to assure he is progressing well there is no signs or symptoms of worsening, infection, reopening of his sinus obliteration, etc     Lastly, the patient was kept in the hospital an additional day because his sugars were not well controlled  He had been started on insulin and we had endocrinology see the patient  It is not clear he is taking his discharge medications correctly  I did reach out to his PCP today, and spoke to her personally, Dr Abdulaziz Cortés  I have asked her to reach out to the patient and manage his diabetes medications going forward, and she has agreed        01/11/23 Metrics: ZI8T9B 86436=0 000; ; MOCA 25/30    03/10/23 Metrics: EQ5D5L 15943=5 000;     03/22/23 Metrics: EQ5D5L 01322=7 000;     05/01/23 Metrics: EQ5D5L 82250=9 000;     05/17/23 Metrics: EQ5D5L 14184=8 861; VAS 80          CHIEF COMPLAINT    Chief Complaint   Patient presents with   • Post-op     POV CLINICAL W/HDM PER SHANNON       HISTORY    History of Present Illness     56y o  year old male     HPI    See Discussion    REVIEW OF SYSTEMS    Review of Systems   Constitutional: Negative for fatigue  HENT: Positive for rhinorrhea  Eyes: Positive for pain (unchanged since surgery, left eye) and visual disturbance (unchanged since surgery, left eye blurry vision, started since surgery)  Left eye issue at night time when sleep will wake up and can't open due to dry but also a lot of watery eyes, especially with bright lights will cause increase watery eyes   Respiratory: Negative  Cardiovascular: Negative  Gastrointestinal: Negative  Endocrine: Negative  Genitourinary: Negative  Musculoskeletal: Negative for back pain (resolved since last visit, left side lower back sometimes radiates to front in lower abdomen/pelvis area)  Skin: Negative  Allergic/Immunologic: Negative  Neurological: Positive for weakness (improved since last visit, generalized), numbness (unchanged since last visit, right side back of head radiates to top of head numbness) and headaches (unchanged since last visit, and more pressure then pain, left side head (temple/ear/left side of head))  Negative for dizziness, seizures and syncope  Frontal skull lesion   Hematological: Negative  Psychiatric/Behavioral: Negative  Meds/Allergies     Current Outpatient Medications   Medication Sig Dispense Refill   • acetaminophen (TYLENOL) 500 mg tablet Take 1,000 mg by mouth if needed for mild pain     • Alcohol Swabs 70 % PADS May substitute brand based on insurance coverage  Check glucose TID  100 each 0   • amoxicillin-clavulanate (AUGMENTIN) 875-125 mg per tablet Take 1 tablet by mouth 2 (two) times a day for 21 days 42 tablet 0   • atorvastatin (LIPITOR) 40 mg tablet Take 1 tablet (40 mg total) by mouth daily 90 tablet 1   • Blood Glucose Monitoring Suppl (OneTouch Verio Reflect) w/Device KIT May substitute brand based on insurance coverage  Check glucose TID  1 kit 0   • Empagliflozin (Jardiance) 10 MG TABS tablet Take 1 tablet (10 mg total) by mouth every morning 90 tablet 1   • glucose blood (Accu-Chek Guide) test strip Use 1 each 2 (two) times a day Use as instructed 100 each 5   • glucose blood (OneTouch Verio) test strip Use 1 each 2 (two) times a day Use as instructed 100 each 5   • glucose blood (OneTouch Verio) test strip May substitute brand based on insurance coverage  Check glucose TID  100 each 0   • Insulin Glargine Solostar (Lantus SoloStar) 100 UNIT/ML SOPN Inject 0 18 mL (18 Units total) under the skin daily at bedtime 6 mL 0   • Insulin Pen Needle (BD Pen Needle Mariela 2nd Gen) 32G X 4 MM MISC For use with insulin pen  Pharmacy may dispense brand covered by insurance  100 each 0   • Insulin Pen Needle (BD Pen Needle Mariela 2nd Gen) 32G X 4 MM MISC For use with insulin pen  Pharmacy may dispense brand covered by insurance  100 each 0   • Insulin Pen Needle (BD Pen Needle Mariela 2nd Gen) 32G X 4 MM MISC For use with insulin pen  Pharmacy may dispense brand covered by insurance  100 each 0   • levothyroxine 137 mcg tablet Take 1 tablet by mouth once daily 60 tablet 0   • lisinopril-hydrochlorothiazide (PRINZIDE,ZESTORETIC) 10-12 5 MG per tablet Take 1 tablet by mouth daily 90 tablet 1   • OneTouch Delica Lancets 19A MISC May substitute brand based on insurance coverage  Check glucose TID   100 each 0   • acetaminophen (TYLENOL) 325 mg tablet Take 2 tablets (650 mg total) by mouth every 6 (six) hours as needed for headaches or mild pain (Patient not taking: Reported on 5/17/2023)  0   • insulin glargine (LANTUS) 100 units/mL subcutaneous injection Inject 12 Units under the skin 3 (three) times daily after meals (Patient not taking: Reported on 5/17/2023)     • Insulin Glargine Solostar (Basaglar KwikPen) 100 UNIT/ML SOPN Inject 0 18 mL (18 Units total) under the skin daily at bedtime (Patient not taking: Reported on 5/17/2023) 6 mL 0   • insulin lispro (HumaLOG KwikPen) 100 units/mL injection pen Inject 12 Units under the skin 3 (three) times a day with meals (Patient not taking: Reported on 5/17/2023) 15 mL 0     No current facility-administered medications for this visit  No Known Allergies    PAST HISTORY    Past Medical History:   Diagnosis Date   • Diabetes mellitus ([facility] [address] )    • Elevated LFTs     last assessed 02Nov2015   • Hyperlipidemia    • Hypertension    • Impaired fasting glucose     last assessed 29Jan2014       Past Surgical History:   Procedure Laterality Date   • APPENDECTOMY     • COLONOSCOPY     • PA CRANIEC TREPHINE BONE FLP BRAIN TUMOR SUPRTENTOR Left 4/11/2023    Procedure: IMAGE-GUIDED LEFT FRONTAL CRANIOTOMY FOR RESECTION OF EXTRA-AXIAL & SKULL MASS, WITH CRANIOPLASTY, EXENTERATION/OBLITERATION OF FRONTAL SINUS; ORBITAL OSTEOTOMY;  Surgeon: Ochoa Macedo MD;  Location: [facility];  Service: Neurosurgery       Social History     Tobacco Use   • Smoking status: Never   • Smokeless tobacco: Never   Vaping Use   • Vaping Use: Never used   Substance Use Topics   • Alcohol use: Yes     Alcohol/week: 3 0 standard drinks     Types: 3 Glasses of wine per week     Comment: drinks daily    • Drug use: Never       Family History   Problem Relation Age of Onset   • Diabetes Mother    • Breast cancer Mother    • Diabetes Father          The following portions of the patient's history were reviewed in this encounter and updated as appropriate: Past medical, surgical, family, and social history, as well as medications, allergies, and review of systems          EXAM    Vitals:Blood pressure "120/74, pulse 98, temperature 98 6 °F (37 °C), resp  rate 16, height 5' 9\" (1 753 m), weight 93 kg (205 lb)  ,Body mass index is 30 27 kg/m²  Physical Exam    Neurologic Exam          PLEASE NOTE:  This encounter may have been completed utilizing the [employer] Direct Speech Voice Recognition Software  Grammatical errors, random word insertions, pronoun errors and incomplete sentences are occasional consequences of the system due to software limitations, ambient noise and hardware issues  These may be missed by proof reading prior to affixing electronic signature  Any questions or concerns about the content, text or information contained within the body of this dictation should be directly addressed to the advanced practitioner or physician for clarification     "

## 2023-05-18 ENCOUNTER — OFFICE VISIT (OUTPATIENT)
Dept: FAMILY MEDICINE CLINIC | Facility: CLINIC | Age: 58
End: 2023-05-18

## 2023-05-18 VITALS
RESPIRATION RATE: 18 BRPM | BODY MASS INDEX: 27.25 KG/M2 | HEIGHT: 69 IN | SYSTOLIC BLOOD PRESSURE: 120 MMHG | DIASTOLIC BLOOD PRESSURE: 81 MMHG | OXYGEN SATURATION: 98 % | HEART RATE: 102 BPM | WEIGHT: 184 LBS | TEMPERATURE: 98.3 F

## 2023-05-18 DIAGNOSIS — E11.9 TYPE 2 DIABETES MELLITUS WITHOUT COMPLICATION, WITHOUT LONG-TERM CURRENT USE OF INSULIN (HCC): Primary | ICD-10-CM

## 2023-05-18 DIAGNOSIS — E11.9 CONTROLLED TYPE 2 DIABETES MELLITUS WITHOUT COMPLICATION, WITHOUT LONG-TERM CURRENT USE OF INSULIN (HCC): ICD-10-CM

## 2023-05-18 RX ORDER — INSULIN LISPRO 100 [IU]/ML
3 INJECTION, SOLUTION INTRAVENOUS; SUBCUTANEOUS
Qty: 15 ML | Refills: 0 | Status: ON HOLD | OUTPATIENT
Start: 2023-05-18 | End: 2023-05-26 | Stop reason: SDUPTHER

## 2023-05-18 RX ORDER — INSULIN GLARGINE 100 [IU]/ML
15 INJECTION, SOLUTION SUBCUTANEOUS
Qty: 4.5 ML | Refills: 1 | Status: ON HOLD | OUTPATIENT
Start: 2023-05-18 | End: 2023-05-26 | Stop reason: SDUPTHER

## 2023-05-18 NOTE — ASSESSMENT & PLAN NOTE
Lab Results   Component Value Date    HGBA1C 7 8 (H) 05/12/2023   -Patient presents for diabetic management s/p meningoma surgery  Patient completed decadron on 4/24 and was noted with elevated blood sugars in the 300s  -Patient was started on Lantus 18 units nightly and Humalog 12 units 3 times daily  However, patient took Lantus 3 times daily with meals instead of Humalog  No hypoglycemic episodes noted or reported by patient   -Patient reports blood glucose today; fasting 119 and around 140 with meal   Also reports that his blood sugar has been under 200   -Discussed with patient's due to relatively well-controlled blood glucose   Will start Humalog 3 units TID, but gave precautions if >200 can increase insulin to 6 units and start Lantus 15 unit QHS  -Continue Jardiance 25 mg daily  -Advised to continue to monitor BG daily   -F/U in one week  -If blood glucose continues to be well controlled, plan to wean off insulin and restart patient's home prior regimen of metformin and Jardiance

## 2023-05-18 NOTE — PROGRESS NOTES
Name: Shannen Crews      : 1965      MRN: [id number]  Encounter Provider: Ion Cronin MD  Encounter Date: 2023   Encounter department: [address] [place] [address]  Type 2 diabetes mellitus without complication, without long-term current use of insulin ([facility])  Assessment & Plan:    Lab Results   Component Value Date    HGBA1C 7 8 (H) 2023   -Patient presents for diabetic management s/p meningoma surgery  Patient completed decadron on  and was noted with elevated blood sugars in the 300s  -Patient was started on Lantus 18 units nightly and Humalog 12 units 3 times daily  However, patient took Lantus 3 times daily with meals instead of Humalog  No hypoglycemic episodes noted or reported by patient   -Patient reports blood glucose today; fasting 119 and around 140 with meal   Also reports that his blood sugar has been under 200   -Discussed with patient's due to relatively well-controlled blood glucose   Will start Humalog 3 units TID, but gave precautions if >200 can increase insulin to 6 units and start Lantus 15 unit QHS  -Continue Jardiance 25 mg daily  -Advised to continue to monitor BG daily   -F/U in one week  -If blood glucose continues to be well controlled, plan to wean off insulin and restart patient's home prior regimen of metformin and Jardiance  Orders:  -     insulin lispro (HumaLOG KwikPen) 100 units/mL injection pen; Inject 3 Units under the skin 3 (three) times a day with meals    2  Controlled type 2 diabetes mellitus without complication, without long-term current use of insulin ([facility])  -     Insulin Glargine Solostar (Lantus SoloStar) 100 UNIT/ML SOPN; Inject 0 15 mL (15 Units total) under the skin daily at bedtime         Subjective      Presents to the clinic today for diabetic/insulin management    Patient with a history of meningioma diagnosed in  and had recent surgery  - IMAGE-GUIDED LEFT FRONTAL CRANIOTOMY FOR RESECTION OF EXTRA-AXIAL & SKULL MASS, WITH CRANIOPLASTY, EXENTERATION/OBLITERATION OF FRONTAL SINUS; ORBITAL OSTEOTOMY  Patient was noted with elevated blood sugars and started on insulin  There was some confusion about patient's insulin orders/regimen  Patient was taking Lantus 3 times daily instead of Humalog  He was called by PCP yesterday for further clarification  Today, he endorses some swelling around left periorbital area and some visual disturbances (blurry vision)  He states blurry vision occurred even prior to surgery  Review of Systems   Eyes:        + left elsie -orbital swelling    Respiratory: Negative for cough and shortness of breath  Cardiovascular: Negative for chest pain and palpitations  Gastrointestinal: Negative for abdominal pain and vomiting  Skin: Negative for color change and rash  Neurological: Negative for seizures and syncope  All other systems reviewed and are negative  Current Outpatient Medications on File Prior to Visit   Medication Sig   • acetaminophen (TYLENOL) 325 mg tablet Take 2 tablets (650 mg total) by mouth every 6 (six) hours as needed for headaches or mild pain   • acetaminophen (TYLENOL) 500 mg tablet Take 1,000 mg by mouth if needed for mild pain   • Alcohol Swabs 70 % PADS May substitute brand based on insurance coverage  Check glucose TID  • amoxicillin-clavulanate (AUGMENTIN) 875-125 mg per tablet Take 1 tablet by mouth 2 (two) times a day for 21 days   • atorvastatin (LIPITOR) 40 mg tablet Take 1 tablet (40 mg total) by mouth daily   • Blood Glucose Monitoring Suppl (OneTouch Verio Reflect) w/Device KIT May substitute brand based on insurance coverage  Check glucose TID     • Empagliflozin (Jardiance) 10 MG TABS tablet Take 1 tablet (10 mg total) by mouth every morning   • glucose blood (Accu-Chek Guide) test strip Use 1 each 2 (two) times a day Use as instructed   • glucose blood (OneTouch Verio) test strip Use 1 "each 2 (two) times a day Use as instructed   • glucose blood (OneTouch Verio) test strip May substitute brand based on insurance coverage  Check glucose TID  • Insulin Pen Needle (BD Pen Needle Mariela 2nd Gen) 32G X 4 MM MISC For use with insulin pen  Pharmacy may dispense brand covered by insurance  • Insulin Pen Needle (BD Pen Needle Mariela 2nd Gen) 32G X 4 MM MISC For use with insulin pen  Pharmacy may dispense brand covered by insurance  • Insulin Pen Needle (BD Pen Needle Mariela 2nd Gen) 32G X 4 MM MISC For use with insulin pen  Pharmacy may dispense brand covered by insurance  • levothyroxine 137 mcg tablet Take 1 tablet by mouth once daily   • lisinopril-hydrochlorothiazide (PRINZIDE,ZESTORETIC) 10-12 5 MG per tablet Take 1 tablet by mouth daily   • OneTouch Delica Lancets 80F MISC May substitute brand based on insurance coverage  Check glucose TID  • [DISCONTINUED] insulin glargine (LANTUS) 100 units/mL subcutaneous injection Inject 12 Units under the skin 3 (three) times daily after meals   • [DISCONTINUED] Insulin Glargine Solostar (Basaglar KwikPen) 100 UNIT/ML SOPN Inject 0 18 mL (18 Units total) under the skin daily at bedtime   • [DISCONTINUED] Insulin Glargine Solostar (Lantus SoloStar) 100 UNIT/ML SOPN Inject 0 18 mL (18 Units total) under the skin daily at bedtime   • [DISCONTINUED] insulin lispro (HumaLOG KwikPen) 100 units/mL injection pen Inject 12 Units under the skin 3 (three) times a day with meals       Objective     /81 (BP Location: Right arm, Patient Position: Sitting, Cuff Size: Standard)   Pulse 102   Temp 98 3 °F (36 8 °C) (Temporal)   Resp 18   Ht 5' 9\" (1 753 m)   Wt 83 5 kg (184 lb)   SpO2 98%   BMI 27 17 kg/m²     Physical Exam  Vitals reviewed  Constitutional:       General: He is not in acute distress  Appearance: Normal appearance  He is not ill-appearing, toxic-appearing or diaphoretic  HENT:      Head: Normocephalic and atraumatic     Eyes:      General: " Right eye: No discharge  Left eye: No discharge  Conjunctiva/sclera: Conjunctivae normal    Cardiovascular:      Rate and Rhythm: Normal rate and regular rhythm  Pulses: Normal pulses  Heart sounds: Normal heart sounds  Pulmonary:      Effort: Pulmonary effort is normal  No respiratory distress  Breath sounds: Normal breath sounds  No wheezing  Abdominal:      General: Abdomen is flat  Bowel sounds are normal  There is no distension  Palpations: Abdomen is soft  Tenderness: There is no abdominal tenderness  There is no guarding  Musculoskeletal:         General: Normal range of motion  Right lower leg: No edema  Left lower leg: No edema  Skin:     General: Skin is warm and dry  Neurological:      Mental Status: He is alert  Mental status is at baseline  Psychiatric:         Mood and Affect: Mood normal          Behavior: Behavior normal          Thought Content:  Thought content normal          Judgment: Judgment normal        Judy Angel MD

## 2023-05-21 ENCOUNTER — HOSPITAL ENCOUNTER (INPATIENT)
Facility: HOSPITAL | Age: 58
LOS: 3 days | Discharge: HOME | End: 2023-05-26
Attending: EMERGENCY MEDICINE | Admitting: FAMILY MEDICINE

## 2023-05-21 ENCOUNTER — APPOINTMENT (EMERGENCY)
Dept: RADIOLOGY | Facility: HOSPITAL | Age: 58
End: 2023-05-21

## 2023-05-21 DIAGNOSIS — E11.9 TYPE 2 DIABETES MELLITUS WITHOUT COMPLICATION, WITHOUT LONG-TERM CURRENT USE OF INSULIN (HCC): ICD-10-CM

## 2023-05-21 DIAGNOSIS — D32.9 MENINGIOMA (HCC): ICD-10-CM

## 2023-05-21 DIAGNOSIS — G93.89 PNEUMOCEPHALUS: ICD-10-CM

## 2023-05-21 DIAGNOSIS — Z86.39 HISTORY OF DIABETES MELLITUS: ICD-10-CM

## 2023-05-21 DIAGNOSIS — E11.9 CONTROLLED TYPE 2 DIABETES MELLITUS WITHOUT COMPLICATION, WITHOUT LONG-TERM CURRENT USE OF INSULIN (HCC): ICD-10-CM

## 2023-05-21 DIAGNOSIS — Z98.890 STATUS POST CRANIOTOMY: Primary | ICD-10-CM

## 2023-05-21 LAB
ANION GAP SERPL CALCULATED.3IONS-SCNC: 3 MMOL/L (ref 4–13)
BASOPHILS # BLD AUTO: 0.04 THOUSANDS/ÂΜL (ref 0–0.1)
BASOPHILS NFR BLD AUTO: 0 % (ref 0–1)
BUN SERPL-MCNC: 15 MG/DL (ref 5–25)
CALCIUM SERPL-MCNC: 8.9 MG/DL (ref 8.3–10.1)
CHLORIDE SERPL-SCNC: 109 MMOL/L (ref 96–108)
CO2 SERPL-SCNC: 26 MMOL/L (ref 21–32)
CREAT SERPL-MCNC: 1.02 MG/DL (ref 0.6–1.3)
CRP SERPL QL: <3 MG/L
EOSINOPHIL # BLD AUTO: 0.03 THOUSAND/ÂΜL (ref 0–0.61)
EOSINOPHIL NFR BLD AUTO: 0 % (ref 0–6)
ERYTHROCYTE [DISTWIDTH] IN BLOOD BY AUTOMATED COUNT: 12.2 % (ref 11.6–15.1)
ERYTHROCYTE [SEDIMENTATION RATE] IN BLOOD: 7 MM/HOUR (ref 0–19)
GFR SERPL CREATININE-BSD FRML MDRD: 82 ML/MIN/1.73SQ M
GLUCOSE SERPL-MCNC: 211 MG/DL (ref 65–140)
GLUCOSE SERPL-MCNC: 222 MG/DL (ref 65–140)
GLUCOSE SERPL-MCNC: 234 MG/DL (ref 65–140)
HCT VFR BLD AUTO: 42.1 % (ref 36.5–49.3)
HGB BLD-MCNC: 15.2 G/DL (ref 12–17)
IMM GRANULOCYTES # BLD AUTO: 0.03 THOUSAND/UL (ref 0–0.2)
IMM GRANULOCYTES NFR BLD AUTO: 0 % (ref 0–2)
LYMPHOCYTES # BLD AUTO: 4.29 THOUSANDS/ÂΜL (ref 0.6–4.47)
LYMPHOCYTES NFR BLD AUTO: 47 % (ref 14–44)
MCH RBC QN AUTO: 32 PG (ref 26.8–34.3)
MCHC RBC AUTO-ENTMCNC: 36.1 G/DL (ref 31.4–37.4)
MCV RBC AUTO: 89 FL (ref 82–98)
MONOCYTES # BLD AUTO: 0.77 THOUSAND/ÂΜL (ref 0.17–1.22)
MONOCYTES NFR BLD AUTO: 8 % (ref 4–12)
NEUTROPHILS # BLD AUTO: 4.23 THOUSANDS/ÂΜL (ref 1.85–7.62)
NEUTS SEG NFR BLD AUTO: 45 % (ref 43–75)
NRBC BLD AUTO-RTO: 0 /100 WBCS
PLATELET # BLD AUTO: 219 THOUSANDS/UL (ref 149–390)
PMV BLD AUTO: 9.4 FL (ref 8.9–12.7)
POTASSIUM SERPL-SCNC: 3.4 MMOL/L (ref 3.5–5.3)
RBC # BLD AUTO: 4.75 MILLION/UL (ref 3.88–5.62)
SODIUM SERPL-SCNC: 138 MMOL/L (ref 135–147)
WBC # BLD AUTO: 9.39 THOUSAND/UL (ref 4.31–10.16)

## 2023-05-21 RX ORDER — INSULIN LISPRO 100 [IU]/ML
1-6 INJECTION, SOLUTION INTRAVENOUS; SUBCUTANEOUS
Status: DISCONTINUED | OUTPATIENT
Start: 2023-05-21 | End: 2023-05-25

## 2023-05-21 RX ORDER — POTASSIUM CHLORIDE 20 MEQ/1
40 TABLET, EXTENDED RELEASE ORAL ONCE
Status: COMPLETED | OUTPATIENT
Start: 2023-05-21 | End: 2023-05-21

## 2023-05-21 RX ORDER — ATORVASTATIN CALCIUM 40 MG/1
40 TABLET, FILM COATED ORAL DAILY
Status: DISCONTINUED | OUTPATIENT
Start: 2023-05-21 | End: 2023-05-26 | Stop reason: HOSPADM

## 2023-05-21 RX ORDER — INSULIN GLARGINE 100 [IU]/ML
15 INJECTION, SOLUTION SUBCUTANEOUS
Status: DISCONTINUED | OUTPATIENT
Start: 2023-05-21 | End: 2023-05-24

## 2023-05-21 RX ORDER — ACETAMINOPHEN 325 MG/1
650 TABLET ORAL EVERY 6 HOURS PRN
Status: DISCONTINUED | OUTPATIENT
Start: 2023-05-21 | End: 2023-05-22

## 2023-05-21 RX ORDER — ENOXAPARIN SODIUM 100 MG/ML
40 INJECTION SUBCUTANEOUS
Status: DISCONTINUED | OUTPATIENT
Start: 2023-05-21 | End: 2023-05-26 | Stop reason: HOSPADM

## 2023-05-21 RX ORDER — HYDROCHLOROTHIAZIDE 12.5 MG/1
12.5 TABLET ORAL DAILY
Status: DISCONTINUED | OUTPATIENT
Start: 2023-05-21 | End: 2023-05-26 | Stop reason: HOSPADM

## 2023-05-21 RX ORDER — LISINOPRIL 10 MG/1
10 TABLET ORAL DAILY
Status: DISCONTINUED | OUTPATIENT
Start: 2023-05-21 | End: 2023-05-26 | Stop reason: HOSPADM

## 2023-05-21 RX ORDER — AMOXICILLIN AND CLAVULANATE POTASSIUM 875; 125 MG/1; MG/1
1 TABLET, FILM COATED ORAL 2 TIMES DAILY
Status: DISCONTINUED | OUTPATIENT
Start: 2023-05-21 | End: 2023-05-26 | Stop reason: HOSPADM

## 2023-05-21 RX ADMIN — INSULIN GLARGINE 15 UNITS: 100 INJECTION, SOLUTION SUBCUTANEOUS at 21:47

## 2023-05-21 RX ADMIN — ACETAMINOPHEN 650 MG: 325 TABLET ORAL at 21:48

## 2023-05-21 RX ADMIN — POTASSIUM CHLORIDE 40 MEQ: 1500 TABLET, EXTENDED RELEASE ORAL at 15:49

## 2023-05-21 RX ADMIN — ENOXAPARIN SODIUM 40 MG: 40 INJECTION SUBCUTANEOUS at 15:48

## 2023-05-21 RX ADMIN — AMOXICILLIN AND CLAVULANATE POTASSIUM 1 TABLET: 875; 125 TABLET, FILM COATED ORAL at 17:10

## 2023-05-21 RX ADMIN — INSULIN LISPRO 2 UNITS: 100 INJECTION, SOLUTION INTRAVENOUS; SUBCUTANEOUS at 17:09

## 2023-05-21 RX ADMIN — ATORVASTATIN CALCIUM 40 MG: 40 TABLET, FILM COATED ORAL at 15:49

## 2023-05-21 NOTE — ED ATTENDING ATTESTATION
5/21/2023  I, Dede Larsen MD, saw and evaluated the patient  I have discussed the patient with the resident/non-physician practitioner and agree with the resident's/non-physician practitioner's findings, Plan of Care, and MDM as documented in the resident's/non-physician practitioner's note, except where noted  All available labs and Radiology studies were reviewed  I was present for key portions of any procedure(s) performed by the resident/non-physician practitioner and I was immediately available to provide assistance  At this point I agree with the current assessment done in the Emergency Department  I have conducted an independent evaluation of this patient a history and physical is as follows:    ED Course         Critical Care Time  Procedures    57 yo male with hx of dm, meningioma, had craniectomy in April, presents with left forehead swelling after sneezing two days ago  Pt with similar hx few weeks ago with pneumocephalus  Pt with pressure, no headache, no nasal drainage  No fever, no weakness, no numbness, tingling, no neck pain  Vss, afebrile, lungs cta, rrr, abdomen soft nontender, no neuro deficits, soft fluid filled collection on frontal scalp, incision c/d/i    Labs, ct head, ct sinus

## 2023-05-21 NOTE — ASSESSMENT & PLAN NOTE
56year old male with PMH of Grade II Meningioma s/p left frontal orbital resection on 4/11/23 without immediate complications, who has recently been admitted for significant pneumocephalus (5/8-5/13) noted on CT Sinus/CTH, managed conservatively without further surgical intervention, discharged on oral augmentin with outpatient followup, now presenting for similar left frontal swelling and pneumocephalus 2/2 sneezing fit 5/19  · 5/21 CT Sinus: Pnuemocephalus and subgaleal air identified slightly increased from the prior CT scan and presumed to extend from the left frontal sinus where fluid and air opacity is noted  There is a direct communication from the left frontal sinus to the left frontal extra-axial air  · 5/21 CTH: Slight interval increase in pneumocephalus and subgaleal air both superficial and deep to the left cranioplasty site  Persistent mild vasogenic edema left frontal lobe     · ESR, CRP WNL    Plan:   · Neurosurgery Consult - Appreciate Recommendations  · Q shift neuro checks  · Monitor Vital Signs  · NPO today pending NS, Jaciel/Cho diet if no planned intervention  · Tylenol PRN for pain  · Continue PTA Augmentin 875-125mg BID

## 2023-05-21 NOTE — ASSESSMENT & PLAN NOTE
· Recurrent left forehead swelling following a sneezing fit on Friday night on 5/19/23  ·  S/p left total craniotomy with craniectomy and orbital osteotomy with resection of extradural mass  Exenteration/obliteration of left frontal sinus and custom synthetic cranioplasty on 4/11/2023 with Dr Earnest Orta  · Imaging reviewed personally and by attending  Final results as below  · CT sinus without contrast 5/21/2023: Slight interval increase in pneumocephalus and subgaleal air both superficial and deep to the left cranioplasty site  Persistent mild vasogenic edema left frontal lobe  Plan  · Continue regular neurologic checks  · Going medical management and pain control per primary team   · Pt remains afebrile  WBC normal    · Eval and mobilize per PT/OT  · DVT PPX: SCDs   · Neurosurgery will continue to follow and discuss further management during rounds tomorrow  Please call with any questions or concerns

## 2023-05-21 NOTE — CONSULTS
[facility]  Consult  Name: Jose Antonio He 56 y o  male I MRN: [id number]  [facility] Date of Admission: 5/21/2023   Date of Service: 5/21/2023 I Hospital Day: 0    Inpatient consult to Neurosurgery  Consult performed by: Frantz Lopez PA-C  Consult ordered by: Jessie Lucio MD          Assessment/Plan   * Pneumocephalus  Assessment & Plan  · Recurrent left forehead swelling following a sneezing fit on Friday night on 5/19/23  ·  S/p left total craniotomy with craniectomy and orbital osteotomy with resection of extradural mass  Exenteration/obliteration of left frontal sinus and custom synthetic cranioplasty on 4/11/2023 with Dr Kinga Tate  · Imaging reviewed personally and by attending  Final results as below  · CT sinus without contrast 5/21/2023: Slight interval increase in pneumocephalus and subgaleal air both superficial and deep to the left cranioplasty site  Persistent mild vasogenic edema left frontal lobe  Plan  · Continue regular neurologic checks  · Going medical management and pain control per primary team   · Pt remains afebrile  WBC normal    · Eval and mobilize per PT/OT  · DVT PPX: SCDs   · Neurosurgery will continue to follow and discuss further management during rounds tomorrow  Please call with any questions or concerns  Status post craniotomy and cranioplasty  Assessment & Plan  See plan above  Meningioma [facility])  Assessment & Plan  S/p resection of Meningioma  See plan above  History of Present Illness   History, ROS and PFSH obtained from pt and chart review  HPI: Jose Antonio He is a 56 y o  male with PMH including  diabetes, hyperlipidemia, hypertension, S/p left total craniotomy with craniectomy and orbital osteotomy with resection of meningioma    Exenteration/obliteration of left frontal sinus and custom synthetic cranioplasty on 4/11/2023 with Dr Kinga Tate who presents with recurrent and increased swelling on the left frontal forehead after a sneezing episode on Friday on 5/19/23  Pt recently admitted on 5/8/23 with similar complaint that was managed conservatively and pt was d/c'ed on abx (Augmentin)  Pt reports that on Saturday and today he noticed increased swelling and came to the ED for further evaluation  Patient reports that prior to Friday his left forehead swelling had been decreasing  He also reports that the nasal drainage had decreased/essentially stopped  He denies nasal drainage at this time  Patient reports mild pressure on the left frontal region but denies any significant headache, dizziness, lightheadedness or visual disturbance  Pt denies tears from his left eye  Patient denies any new numbness, tingling or weakness  Pt denies changes in gait or balance  Pt denies nausea or vomiting  Pt denies any fever or chills  Pt accompanied by his wife and family at bedside during this visit  Review of Systems   Constitutional: Negative for activity change, chills and fever  HENT: Negative for hearing loss and rhinorrhea  Left forehead swelling  No erythema  Eyes: Negative for photophobia, pain, discharge and redness  Respiratory: Negative for chest tightness and shortness of breath  Cardiovascular: Negative for chest pain and palpitations  Gastrointestinal: Negative for abdominal pain, nausea and vomiting  Genitourinary: Negative for difficulty urinating and dysuria  Musculoskeletal: Negative for neck pain and neck stiffness  Skin: Negative for color change, rash and wound  Neurological: Negative for dizziness, seizures, speech difficulty, weakness and light-headedness  Psychiatric/Behavioral: Negative for confusion and decreased concentration         Historical Information   Past Medical History:   Diagnosis Date   • Diabetes mellitus ([facility] [address] )    • Elevated LFTs     last assessed [id number]   • Hyperlipidemia    • Hypertension    • Impaired fasting glucose last assessed [id number]     Past Surgical History:   Procedure Laterality Date   • APPENDECTOMY     • COLONOSCOPY     • TN CRANIEC TREPHINE BONE FLP BRAIN TUMOR SUPRTENTOR Left 4/11/2023    Procedure: IMAGE-GUIDED LEFT FRONTAL CRANIOTOMY FOR RESECTION OF EXTRA-AXIAL & SKULL MASS, WITH CRANIOPLASTY, EXENTERATION/OBLITERATION OF FRONTAL SINUS; ORBITAL OSTEOTOMY;  Surgeon: Sophia Swenson MD;  Location: [facility];  Service: Neurosurgery     Social History     Substance and Sexual Activity   Alcohol Use Not Currently   • Alcohol/week: 3 0 standard drinks   • Types: 3 Glasses of wine per week    Comment: drinks daily      Social History     Substance and Sexual Activity   Drug Use Never     Social History     Tobacco Use   Smoking Status Never   Smokeless Tobacco Never     Family History   Problem Relation Age of Onset   • Diabetes Mother    • Breast cancer Mother    • Diabetes Father        Meds/Allergies   all current active meds have been reviewed, current meds:   Current Facility-Administered Medications   Medication Dose Route Frequency   • acetaminophen (TYLENOL) tablet 650 mg  650 mg Oral Q6H PRN   • amoxicillin-clavulanate (AUGMENTIN) 875-125 mg per tablet 1 tablet  1 tablet Oral BID   • atorvastatin (LIPITOR) tablet 40 mg  40 mg Oral Daily   • enoxaparin (LOVENOX) subcutaneous injection 40 mg  40 mg Subcutaneous Q24H YANNI   • lisinopril (ZESTRIL) tablet 10 mg  10 mg Oral Daily    And   • hydrochlorothiazide (HYDRODIURIL) tablet 12 5 mg  12 5 mg Oral Daily   • insulin glargine (LANTUS) subcutaneous injection 15 Units 0 15 mL  15 Units Subcutaneous HS   • insulin lispro (HumaLOG) 100 units/mL subcutaneous injection 1-6 Units  1-6 Units Subcutaneous TID AC   • levothyroxine tablet 137 mcg  137 mcg Oral Daily    and PTA meds:   Prior to Admission Medications   Prescriptions Last Dose Informant Patient Reported? Taking? Alcohol Swabs 70 % PADS   No No   Sig: May substitute brand based on insurance coverage  Check glucose TID  Blood Glucose Monitoring Suppl (OneTouch Verio Reflect) w/Device KIT   No No   Sig: May substitute brand based on insurance coverage  Check glucose TID  Empagliflozin (Jardiance) 10 MG TABS tablet 5/21/2023  No Yes   Sig: Take 1 tablet (10 mg total) by mouth every morning   Insulin Glargine Solostar (Lantus SoloStar) 100 UNIT/ML SOPN   No No   Sig: Inject 0 15 mL (15 Units total) under the skin daily at bedtime   Insulin Pen Needle (BD Pen Needle Mariela 2nd Gen) 32G X 4 MM MISC   No No   Sig: For use with insulin pen  Pharmacy may dispense brand covered by insurance  Insulin Pen Needle (BD Pen Needle Mariela 2nd Gen) 32G X 4 MM MISC   No No   Sig: For use with insulin pen  Pharmacy may dispense brand covered by insurance  Insulin Pen Needle (BD Pen Needle Mariela 2nd Gen) 32G X 4 MM MISC   No No   Sig: For use with insulin pen  Pharmacy may dispense brand covered by insurance  OneTouch Delica Lancets 98S MISC   No No   Sig: May substitute brand based on insurance coverage  Check glucose TID    acetaminophen (TYLENOL) 325 mg tablet 5/20/2023  No Yes   Sig: Take 2 tablets (650 mg total) by mouth every 6 (six) hours as needed for headaches or mild pain   acetaminophen (TYLENOL) 500 mg tablet 5/20/2023  Yes Yes   Sig: Take 1,000 mg by mouth if needed for mild pain   amoxicillin-clavulanate (AUGMENTIN) 875-125 mg per tablet 5/21/2023  No Yes   Sig: Take 1 tablet by mouth 2 (two) times a day for 21 days   atorvastatin (LIPITOR) 40 mg tablet 5/20/2023  No Yes   Sig: Take 1 tablet (40 mg total) by mouth daily   glucose blood (Accu-Chek Guide) test strip   No No   Sig: Use 1 each 2 (two) times a day Use as instructed   glucose blood (OneTouch Verio) test strip   No No   Sig: Use 1 each 2 (two) times a day Use as instructed   glucose blood (OneTouch Verio) test strip   No No   Sig: May substitute brand based on insurance coverage  Check glucose TID     insulin lispro (HumaLOG KwikPen) 100 units/mL injection pen   No No   Sig: Inject 3 Units under the skin 3 (three) times a day with meals   levothyroxine 137 mcg tablet 5/21/2023  No Yes   Sig: Take 1 tablet by mouth once daily   lisinopril-hydrochlorothiazide (PRINZIDE,ZESTORETIC) 10-12 5 MG per tablet 5/21/2023  No Yes   Sig: Take 1 tablet by mouth daily      Facility-Administered Medications: None     No Known Allergies    Objective   I/O     None          Physical Exam  Constitutional:       Appearance: He is well-developed  HENT:      Head: Normocephalic  Comments: Left forehead swelling  No erythema  Nose:      Comments: No rhinorrhea noted  Eyes:      General: No scleral icterus  Conjunctiva/sclera: Conjunctivae normal       Pupils: Pupils are equal, round, and reactive to light  Comments: No drainage from eyes noted  Neck:      Trachea: No tracheal deviation  Cardiovascular:      Rate and Rhythm: Normal rate  Pulmonary:      Effort: Pulmonary effort is normal    Abdominal:      Palpations: Abdomen is soft  Tenderness: There is no abdominal tenderness  There is no guarding  Musculoskeletal:      Cervical back: Neck supple  Skin:     General: Skin is warm and dry  Coloration: Skin is not pale  Findings: No rash  Neurological:      Mental Status: He is alert and oriented to person, place, and time  Comments: GCS 15, Awake, Alert, Oriented x 3    Motor: LEIGH, strength 5/5 throughout    Sensation:  intact to LT X 4     Reflexes: 2+ and symmetric, no kennedy's or clonus     Coordination: no drift bilateral upper extremities     Psychiatric:         Behavior: Behavior normal        Neurologic Exam     Mental Status   Oriented to person, place, and time  Cranial Nerves     CN III, IV, VI   Pupils are equal, round, and reactive to light  Vitals:Blood pressure 108/69, pulse 77, temperature 97 5 °F (36 4 °C), resp  rate 16, SpO2 98 %  ,There is no height or weight on file to calculate BMI       Lab Results:   Results from last 7 days   Lab Units 05/21/23  1104   WBC Thousand/uL 9 39   HEMOGLOBIN g/dL 15 2   HEMATOCRIT % 42 1   PLATELETS Thousands/uL 219   NEUTROS PCT % 45   MONOS PCT % 8     Results from last 7 days   Lab Units 05/21/23  1104   POTASSIUM mmol/L 3 4*   CHLORIDE mmol/L 109*   CO2 mmol/L 26   BUN mg/dL 15   CREATININE mg/dL 1 02   CALCIUM mg/dL 8 9                   Imaging Studies: I have personally reviewed pertinent reports  and I have personally reviewed pertinent films in PACS    EKG, Pathology, and Other Studies: I have personally reviewed pertinent reports  VTE Prophylaxis: Sequential compression device Louretta Justin)     Code Status: Level 1 - Full Code  Advance Directive and Living Will:      Power of :    POLST:      Counseling / Coordination of Care  I spent 45 minutes with the patient  PLEASE NOTE:  This encounter may have been completed utilizing the [employer] Direct Speech Voice Recognition Software  Grammatical errors, random word insertions, pronoun errors and incomplete sentences are occasional consequences of the system due to software limitations, ambient noise and hardware issues  These may be missed even after proof reading prior to affixing electronic signature  Please do not hesitate to contact me directly if you have any questions or concerns about the content, text or information contained within the body of this dictation

## 2023-05-21 NOTE — ASSESSMENT & PLAN NOTE
Lab Results   Component Value Date    HGBA1C 7 8 (H) 05/12/2023       Recent Labs     05/21/23  1624 05/21/23  2147 05/22/23  0627   POCGLU 222* 211* 157*       Blood Sugar Average: Last 72 hrs:  Last A1c 7 8 on 5/12, admission Glucose 234  On Home Lantus 15u Qhs and Humalog 3u TID with modified home sliding scale, Jardiance 25mg daily  Notably, was changed from Metformin and Jardiance post operatively due to elevated blood sugars treatment, and initially was mistakenly taking lantus 18u TID without reported hypoglycemic episodes      Plan:  · Hold home Jardiance  · Continue Basal Lantus 15u Qhs  · SSI algorithm 3, adjust as needed

## 2023-05-21 NOTE — ED PROVIDER NOTES
History  Chief Complaint   Patient presents with   • Mass     Pt has a hx of mass above left eye that he has been getting treatment on, last night he noticed that there was some increased swelling this morning after sneezing  worse with some pressure at the site  HPI    56-year-old male with past medical history significant for type 2 diabetes mellitus, meningioma status post left craniotomy with craniectomy and orbital osteotomy with resection of extradural mass 4/11/2023 presents to the ED with left sided forehead swelling  Patient reports sneezing 2 days ago and has had increasing swelling ever since  Denies fever, chills, headache, dizziness, neck pain, chest pain, shortness of breath, abdominal pain, nausea, vomiting  Denies any clear drainage from his nose since the incident  Patient was evaluated in the ED for same on 5/8/23, CT head imaging showed pneumocephalus communicating both subgaleal and epidural through synthetic cranioplasty flap  Patient was admitted 5/8 - 5/13 for close neurologic monitoring, dc home on augmentin for which he is still taking as prescribed  Most recent appt with neurosurgery was 5/17 with Dr Kinga Tate  Prior to Admission Medications   Prescriptions Last Dose Informant Patient Reported? Taking? Alcohol Swabs 70 % PADS   No No   Sig: May substitute brand based on insurance coverage  Check glucose TID  Blood Glucose Monitoring Suppl (OneTouch Verio Reflect) w/Device KIT   No No   Sig: May substitute brand based on insurance coverage  Check glucose TID  Empagliflozin (Jardiance) 10 MG TABS tablet 5/21/2023  No Yes   Sig: Take 1 tablet (10 mg total) by mouth every morning   Insulin Glargine Solostar (Lantus SoloStar) 100 UNIT/ML SOPN   No No   Sig: Inject 0 15 mL (15 Units total) under the skin daily at bedtime   Insulin Pen Needle (BD Pen Needle Mariela 2nd Gen) 32G X 4 MM MISC   No No   Sig: For use with insulin pen  Pharmacy may dispense brand covered by insurance  Insulin Pen Needle (BD Pen Needle Mariela 2nd Gen) 32G X 4 MM MISC   No No   Sig: For use with insulin pen  Pharmacy may dispense brand covered by insurance  Insulin Pen Needle (BD Pen Needle Mariela 2nd Gen) 32G X 4 MM MISC   No No   Sig: For use with insulin pen  Pharmacy may dispense brand covered by insurance  OneTouch Delica Lancets 74C MISC   No No   Sig: May substitute brand based on insurance coverage  Check glucose TID    acetaminophen (TYLENOL) 325 mg tablet 5/20/2023  No Yes   Sig: Take 2 tablets (650 mg total) by mouth every 6 (six) hours as needed for headaches or mild pain   acetaminophen (TYLENOL) 500 mg tablet 5/20/2023  Yes Yes   Sig: Take 1,000 mg by mouth if needed for mild pain   amoxicillin-clavulanate (AUGMENTIN) 875-125 mg per tablet 5/21/2023  No Yes   Sig: Take 1 tablet by mouth 2 (two) times a day for 21 days   atorvastatin (LIPITOR) 40 mg tablet 5/20/2023  No Yes   Sig: Take 1 tablet (40 mg total) by mouth daily   glucose blood (Accu-Chek Guide) test strip   No No   Sig: Use 1 each 2 (two) times a day Use as instructed   glucose blood (OneTouch Verio) test strip   No No   Sig: Use 1 each 2 (two) times a day Use as instructed   glucose blood (OneTouch Verio) test strip   No No   Sig: May substitute brand based on insurance coverage  Check glucose TID     insulin lispro (HumaLOG KwikPen) 100 units/mL injection pen   No No   Sig: Inject 3 Units under the skin 3 (three) times a day with meals   levothyroxine 137 mcg tablet 5/21/2023  No Yes   Sig: Take 1 tablet by mouth once daily   lisinopril-hydrochlorothiazide (PRINZIDE,ZESTORETIC) 10-12 5 MG per tablet 5/21/2023  No Yes   Sig: Take 1 tablet by mouth daily      Facility-Administered Medications: None       Past Medical History:   Diagnosis Date   • Diabetes mellitus ([facility] [address] )    • Elevated LFTs     last assessed [id number]   • Hyperlipidemia    • Hypertension    • Impaired fasting glucose     last assessed [id number]       Past Surgical History: Procedure Laterality Date   • APPENDECTOMY     • COLONOSCOPY     • [place] CRANIEC TREPHINE BONE FLP BRAIN TUMOR SUPRTENTOR Left 4/11/2023    Procedure: IMAGE-GUIDED LEFT FRONTAL CRANIOTOMY FOR RESECTION OF EXTRA-AXIAL & SKULL MASS, WITH CRANIOPLASTY, EXENTERATION/OBLITERATION OF FRONTAL SINUS; ORBITAL OSTEOTOMY;  Surgeon: Denise Huffman MD;  Location: [facility];  Service: Neurosurgery       Family History   Problem Relation Age of Onset   • Diabetes Mother    • Breast cancer Mother    • Diabetes Father      I have reviewed and agree with the history as documented  E-Cigarette/Vaping   • E-Cigarette Use Never User      E-Cigarette/Vaping Substances   • Nicotine No    • THC No    • CBD No    • Flavoring No    • Other No    • Unknown No      Social History     Tobacco Use   • Smoking status: Never   • Smokeless tobacco: Never   Vaping Use   • Vaping Use: Never used   Substance Use Topics   • Alcohol use: Not Currently     Alcohol/week: 3 0 standard drinks     Types: 3 Glasses of wine per week     Comment: drinks daily    • Drug use: Never        Review of Systems   Constitutional: Negative for chills and fever  HENT: Negative for congestion, ear pain, sinus pain and sore throat  Eyes: Negative for pain and visual disturbance  Respiratory: Negative for cough and shortness of breath  Cardiovascular: Negative for chest pain and palpitations  Gastrointestinal: Negative for abdominal pain, diarrhea, nausea and vomiting  Genitourinary: Negative for dysuria and hematuria  Musculoskeletal: Negative for arthralgias and back pain  Skin: Negative for color change and rash  Left forehead swelling   Neurological: Negative for dizziness, seizures, syncope, speech difficulty, weakness, light-headedness, numbness and headaches  All other systems reviewed and are negative        Physical Exam  ED Triage Vitals [05/21/23 0917]   Temperature Pulse Respirations Blood Pressure SpO2   97 6 °F (36 4 °C) 104 16 121/72 99 %      Temp Source Heart Rate Source Patient Position - Orthostatic VS BP Location FiO2 (%)   Tympanic -- Lying Right arm --      Pain Score       No Pain             Orthostatic Vital Signs  Vitals:    05/21/23 0917 05/21/23 1340 05/21/23 1512   BP: 121/72 100/57 108/69   Pulse: 104 86 77   Patient Position - Orthostatic VS: Lying         Physical Exam  Vitals and nursing note reviewed  Constitutional:       General: He is not in acute distress  Appearance: Normal appearance  He is well-developed  He is not ill-appearing, toxic-appearing or diaphoretic  HENT:      Head: Atraumatic  Comments: Swelling left forehead, fluctuant, nonerythematous  Nose: Nose normal       Mouth/Throat:      Mouth: Mucous membranes are moist       Pharynx: Oropharynx is clear  No oropharyngeal exudate  Eyes:      General:         Right eye: No discharge  Left eye: No discharge  Extraocular Movements: Extraocular movements intact  Conjunctiva/sclera: Conjunctivae normal       Pupils: Pupils are equal, round, and reactive to light  Cardiovascular:      Rate and Rhythm: Normal rate and regular rhythm  Heart sounds: Normal heart sounds  No murmur heard  Pulmonary:      Effort: Pulmonary effort is normal  No respiratory distress  Breath sounds: Normal breath sounds  Abdominal:      Palpations: Abdomen is soft  Tenderness: There is no abdominal tenderness  There is no guarding or rebound  Musculoskeletal:         General: No swelling  Cervical back: Normal range of motion and neck supple  No rigidity  Skin:     General: Skin is warm and dry  Capillary Refill: Capillary refill takes less than 2 seconds  Neurological:      General: No focal deficit present  Mental Status: He is alert and oriented to person, place, and time  Cranial Nerves: No cranial nerve deficit  Sensory: No sensory deficit  Motor: No weakness     Psychiatric:         Mood and Affect: Mood normal          ED Medications  Medications   acetaminophen (TYLENOL) tablet 650 mg (has no administration in time range)   amoxicillin-clavulanate (AUGMENTIN) 875-125 mg per tablet 1 tablet (1 tablet Oral Given 5/21/23 1710)   atorvastatin (LIPITOR) tablet 40 mg (40 mg Oral Given 5/21/23 1549)   levothyroxine tablet 137 mcg (137 mcg Oral Not Given 5/21/23 1532)   lisinopril (ZESTRIL) tablet 10 mg (10 mg Oral Not Given 5/21/23 1533)     And   hydrochlorothiazide (HYDRODIURIL) tablet 12 5 mg (12 5 mg Oral Not Given 5/21/23 1533)   insulin glargine (LANTUS) subcutaneous injection 15 Units 0 15 mL (has no administration in time range)   insulin lispro (HumaLOG) 100 units/mL subcutaneous injection 1-6 Units (2 Units Subcutaneous Given 5/21/23 1709)   enoxaparin (LOVENOX) subcutaneous injection 40 mg (40 mg Subcutaneous Given 5/21/23 1548)   potassium chloride (K-DUR,KLOR-CON) CR tablet 40 mEq (40 mEq Oral Given 5/21/23 1549)       Diagnostic Studies  Results Reviewed     Procedure Component Value Units Date/Time    C-reactive protein [[id number]]  (Normal) Collected: 05/21/23 1104    Lab Status: Final result Specimen: Blood from Arm, Left Updated: 05/21/23 1452     CRP <3 0 mg/L     Sedimentation rate, automated [[id number]]  (Normal) Collected: 05/21/23 1104    Lab Status: Final result Specimen: Blood from Arm, Left Updated: 05/21/23 1418     Sed Rate 7 mm/hour     Basic metabolic panel [[id number]]  (Abnormal) Collected: 05/21/23 1104    Lab Status: Final result Specimen: Blood from Arm, Left Updated: 05/21/23 1129     Sodium 138 mmol/L      Potassium 3 4 mmol/L      Chloride 109 mmol/L      CO2 26 mmol/L      ANION GAP 3 mmol/L      BUN 15 mg/dL      Creatinine 1 02 mg/dL      Glucose 234 mg/dL      Calcium 8 9 mg/dL      eGFR 82 ml/min/1 73sq m     Narrative:      [place] guidelines for Chronic Kidney Disease (CKD):   •  Stage 1 with normal or high GFR (GFR > 90 mL/min/1 73 square meters)  •  Stage 2 Mild CKD (GFR = 60-89 mL/min/1 73 square meters)  •  Stage 3A Moderate CKD (GFR = 45-59 mL/min/1 73 square meters)  •  Stage 3B Moderate CKD (GFR = 30-44 mL/min/1 73 square meters)  •  Stage 4 Severe CKD (GFR = 15-29 mL/min/1 73 square meters)  •  Stage 5 End Stage CKD (GFR <15 mL/min/1 73 square meters)  Note: GFR calculation is accurate only with a steady state creatinine    CBC and differential [[id number]]  (Abnormal) Collected: 05/21/23 1104    Lab Status: Final result Specimen: Blood from Arm, Left Updated: 05/21/23 1117     WBC 9 39 Thousand/uL      RBC 4 75 Million/uL      Hemoglobin 15 2 g/dL      Hematocrit 42 1 %      MCV 89 fL      MCH 32 0 pg      MCHC 36 1 g/dL      RDW 12 2 %      MPV 9 4 fL      Platelets 992 Thousands/uL      nRBC 0 /100 WBCs      Neutrophils Relative 45 %      Immat GRANS % 0 %      Lymphocytes Relative 47 %      Monocytes Relative 8 %      Eosinophils Relative 0 %      Basophils Relative 0 %      Neutrophils Absolute 4 23 Thousands/µL      Immature Grans Absolute 0 03 Thousand/uL      Lymphocytes Absolute 4 29 Thousands/µL      Monocytes Absolute 0 77 Thousand/µL      Eosinophils Absolute 0 03 Thousand/µL      Basophils Absolute 0 04 Thousands/µL                  CT head wo contrast   Final Result by Giselle Olson DO (05/21 1241)      Slight interval increase in pneumocephalus and subgaleal air both superficial and deep to the left cranioplasty site  Persistent mild vasogenic edema left frontal lobe  Workstation performed: [id number]         CT sinus wo contrast   Final Result by Giselle Olson DO (05/21 1245)      Pneumocephalus and subgaleal air identified slightly increased from the prior CT scan and presumed to extend from the left frontal sinus where fluid and air opacity is noted  There is a direct communication from the left frontal sinus to the left frontal    extra-axial air           Workstation performed: [id number] Procedures  Procedures      ED Course  ED Course as of 05/21/23 1810   Sun May 21, 2023   1055 Blood Pressure: 121/72   1057 Temperature: 97 6 °F (36 4 °C)   1057 Temp Source: Tympanic   1057 Pulse: 104   1057 Respirations: 16   1057 SpO2: 99 %   1219 WBC: 9 39   1219 Hemoglobin: 15 2  No significant leukocytosis or anemia   1255 CT sinus wo contrast  Pneumocephalus and subgaleal air identified slightly increased from the prior CT scan and presumed to extend from the left frontal sinus where fluid and air opacity is noted  There is a direct communication from the left frontal sinus to the left frontal   extra-axial air  1255 CT head wo contrast  Slight interval increase in pneumocephalus and subgaleal air both superficial and deep to the left cranioplasty site  Persistent mild vasogenic edema left frontal lobe  1259 TT neurosurgery AP Amairani Julien   8403 TT neurosurgeon Dr Kristen Coronel   1315 Dr Kristen Coronel recommends CRP and ESR, as well as admission to medicine  They will see tomorrow     1324 TT  resident    [phone] Admitted to [facility] for left forehead swelling 2/2 pneumocephalus                                       Medical Decision Making  Amount and/or Complexity of Data Reviewed  Labs: ordered  Decision-making details documented in ED Course  Radiology: ordered  Decision-making details documented in ED Course  Risk  Decision regarding hospitalization  44-year-old male with past medical history significant for type 2 diabetes mellitus, meningioma status post left craniotomy with craniectomy and orbital osteotomy with resection of extradural mass on 4/11/2023 presents to the ED for evaluation of left-sided forehead swelling after sneezing 2 days ago  Denies any fever, chills, headache, dizziness, neck pain, clear nasal drainage    Patient was admitted here for same 5/8-5/13, CT head/sinus showed pneumocephalus communicating both subgaleal and epidural space through the synthetic cranioplasty flap   He was managed nonoperatively and discharged home on Augmentin for which he has been taking as prescribed  Hemodynamically stable  Afebrile  Patient appears in no acute distress  Normal neurologic exam  Physical exam notable for an area of swelling over the left forehead that is fluctuant and not erythematous  CBC and BMP within normal limits  CT head showed slight interval increase in pneumocephalus and subgaleal air in both the superficial and deep to left cranioplasty site  CT sinus that showed similar findings in addition to a direct communication from the left frontal sinus to the left frontal extra-axial air  Reached out to neurosurgery who recommends getting a CRP and ESR as well as admission to medicine and they will evaluate the patient tomorrow  Patient admitted to family medicine         Disposition  Final diagnoses:   Status post craniotomy and cranioplasty   Meningioma (HCC)   Pneumocephalus   History of diabetes mellitus     Time reflects when diagnosis was documented in both MDM as applicable and the Disposition within this note     Time User Action Codes Description Comment    5/21/2023  1:16 PM Charity Bolus Add [V23 962] Status post craniotomy and cranioplasty     5/21/2023  1:16 PM Verona Carp T Add [D32 9] Meningioma ([address] )     5/21/2023  1:16 PM Verona Carp T Add [G93 89] Pneumocephalus     5/21/2023  2:08 PM Verona Carp T Add [Z86 39] History of diabetes mellitus       ED Disposition     ED Disposition   Admit    Condition   Stable    Date/Time   Sun May 21, 2023  2:08 PM    Comment   Case was discussed with Dr Shannan Tomlinson and the patient's admission status was agreed to be Admission Status: observation status to the service of Dr Jimenez Needs    None         Current Discharge Medication List      CONTINUE these medications which have NOT CHANGED    Details   !! acetaminophen (TYLENOL) 325 mg tablet Take 2 tablets (650 mg total) by mouth every 6 (six) hours as needed for headaches or mild pain  Refills: 0    Associated Diagnoses: Facial swelling; Pneumocephalus; Status post craniotomy      !! acetaminophen (TYLENOL) 500 mg tablet Take 1,000 mg by mouth if needed for mild pain      amoxicillin-clavulanate (AUGMENTIN) 875-125 mg per tablet Take 1 tablet by mouth 2 (two) times a day for 21 days  Qty: 42 tablet, Refills: 0    Associated Diagnoses: Facial swelling; Pneumocephalus; Status post craniotomy      atorvastatin (LIPITOR) 40 mg tablet Take 1 tablet (40 mg total) by mouth daily  Qty: 90 tablet, Refills: 1    Associated Diagnoses: Controlled type 2 diabetes mellitus without complication, without long-term current use of insulin ([facility] [place] [address] ); Hyperlipidemia, unspecified hyperlipidemia type      Empagliflozin (Jardiance) 10 MG TABS tablet Take 1 tablet (10 mg total) by mouth every morning  Qty: 90 tablet, Refills: 1    Associated Diagnoses: Controlled type 2 diabetes mellitus without complication, without long-term current use of insulin ([facility])      levothyroxine 137 mcg tablet Take 1 tablet by mouth once daily  Qty: 60 tablet, Refills: 0    Associated Diagnoses: Hypothyroidism, unspecified type      lisinopril-hydrochlorothiazide (PRINZIDE,ZESTORETIC) 10-12 5 MG per tablet Take 1 tablet by mouth daily  Qty: 90 tablet, Refills: 1    Associated Diagnoses: Essential hypertension      Alcohol Swabs 70 % PADS May substitute brand based on insurance coverage  Check glucose TID  Qty: 100 each, Refills: 0    Associated Diagnoses: Type 2 diabetes mellitus without complication, without long-term current use of insulin ([facility])      Blood Glucose Monitoring Suppl (OneTouch Verio Reflect) w/Device KIT May substitute brand based on insurance coverage  Check glucose TID  Qty: 1 kit, Refills: 0    Associated Diagnoses: Type 2 diabetes mellitus without complication, without long-term current use of insulin ([facility] [place] [address] )      ! ! glucose blood (Accu-Chek Guide) test strip Use 1 each 2 (two) times a day Use as instructed  Qty: 100 each, Refills: 5    Associated Diagnoses: Type 2 diabetes mellitus without complication, without long-term current use of insulin ([address] )      ! ! glucose blood (OneTouch Verio) test strip Use 1 each 2 (two) times a day Use as instructed  Qty: 100 each, Refills: 5    Associated Diagnoses: Type 2 diabetes mellitus without complication, without long-term current use of insulin ([address] )      ! ! glucose blood (OneTouch Verio) test strip May substitute brand based on insurance coverage  Check glucose TID  Qty: 100 each, Refills: 0    Associated Diagnoses: Type 2 diabetes mellitus without complication, without long-term current use of insulin ([facility])      Insulin Glargine Solostar (Lantus SoloStar) 100 UNIT/ML SOPN Inject 0 15 mL (15 Units total) under the skin daily at bedtime  Qty: 4 5 mL, Refills: 1    Associated Diagnoses: Controlled type 2 diabetes mellitus without complication, without long-term current use of insulin ([facility])      insulin lispro (HumaLOG KwikPen) 100 units/mL injection pen Inject 3 Units under the skin 3 (three) times a day with meals  Qty: 15 mL, Refills: 0    Associated Diagnoses: Type 2 diabetes mellitus without complication, without long-term current use of insulin ([address] )      ! ! Insulin Pen Needle (BD Pen Needle Mariela 2nd Gen) 32G X 4 MM MISC For use with insulin pen  Pharmacy may dispense brand covered by insurance  Qty: 100 each, Refills: 0    Associated Diagnoses: Type 2 diabetes mellitus without complication, without long-term current use of insulin ([facility] [address] )      ! ! Insulin Pen Needle (BD Pen Needle Mariela 2nd Gen) 32G X 4 MM MISC For use with insulin pen  Pharmacy may dispense brand covered by insurance  Qty: 100 each, Refills: 0    Associated Diagnoses: Type 2 diabetes mellitus without complication, without long-term current use of insulin ([address] )      ! ! Insulin Pen Needle (BD Pen Needle Mariela 2nd Gen) 32G X 4 MM MISC For use with insulin pen   Pharmacy may dispense brand covered by insurance  Qty: 100 each, Refills: 0    Associated Diagnoses: Controlled type 2 diabetes mellitus without complication, without long-term current use of insulin ([facility])      OneTouch Delica Lancets 99P MISC May substitute brand based on insurance coverage  Check glucose TID  Qty: 100 each, Refills: 0    Associated Diagnoses: Type 2 diabetes mellitus without complication, without long-term current use of insulin ([facility] [address] )       ! ! - Potential duplicate medications found  Please discuss with provider  No discharge procedures on file  PDMP Review     None           ED Provider  Attending physically available and evaluated Mary Ellen Ponce  I managed the patient along with the ED Attending      Electronically Signed by         Edward Soriano MD  05/21/23 9704

## 2023-05-21 NOTE — H&P
H&P - Family Medicine Residency, [address] [place] [address] 1965, 56 y o  male  MRN: [id number]    Unit/Bed#: [facility] 726-01 Encounter: [id number]  Primary Care Provider: Miryam Daley MD    Admission Date: 5/21/2023 1017    Assessments & Plans:   Plans discussed with [facility] team and finalization is pending attending physicianattestation  * Pneumocephalus  Assessment & Plan  56year old male with PMH of Grade II Meningioma s/p left frontal orbital resection on 4/11/23 without immediate complications, who has recently been admitted for significant pneumocephalus (5/8-5/13) noted on CT Sinus/CTH, managed conservatively without further surgical intervention, discharged on oral augmentin with outpatient followup, now presenting for similar left frontal swelling and pneumocephalus 2/2 sneezing fit 5/19  · 5/21 CT Sinus: Pnuemocephalus and subgaleal air identified slightly increased from the prior CT scan and presumed to extend from the left frontal sinus where fluid and air opacity is noted  There is a direct communication from the left frontal sinus to the left frontal extra-axial air  · 5/21 CTH: Slight interval increase in pneumocephalus and subgaleal air both superficial and deep to the left cranioplasty site  Persistent mild vasogenic edema left frontal lobe  Plan:  · Neurosurgery Consult - Appreciate Recommendations  · Follow up ESR, CRP  · Q shift neuro checks  · Monitor Vital Signs  · NPO at midnight pending NS evaluation  · Tylenol PRN for pain  · Continue PTA Augmentin 875-125mg BID      Diabetes (HCC)  Assessment & Plan  Lab Results   Component Value Date    HGBA1C 7 8 (H) 05/12/2023       No results for input(s): POCGLU in the last 72 hours  Blood Sugar Average: Last 72 hrs:  Last A1c 7 8 on 5/12, admission Glucose 234  On Home Lantus 15u Qhs and Humalog 3u TID with modified home sliding scale, Jardiance 25mg daily   Notably, was changed from Metformin and Jardiance post operatively due to elevated blood sugars treatment, and initially was mistakenly taking lantus 18u TID without reported hypoglycemic episodes  Plan:  · Hold home Jardiance  · Continue Basal Lantus 15u Qhs  · SSI algorithm 3, adjust as needed    Other specified hypothyroidism  Assessment & Plan  · Continue home Levothyroxine 137 mcg    Hyperlipidemia  Assessment & Plan  · Continue home Lipitor 40mg    Benign essential hypertension  Assessment & Plan  Continue home Lisinopril-Hydrochlorothiazide  · Lisinopril 10mg, Hydrochlorothiazide 12 5mg       Patient Active Problem List   Diagnosis   • Uncomplicated alcohol dependence (HCC)   • Benign essential hypertension   • Diabetes ([facility] [address] )   • Hyperlipidemia   • Other specified hypothyroidism   • Right shoulder pain   • Adhesive capsulitis of right shoulder   • Elevated AST (SGOT)   • Alcohol abuse   • Cough   • Balanitis   • Fibromatosis of plantar fascia   • Chronic pain in penis   • Candida rash of groin   • Dizziness   • Other specified glaucoma   • Frontal skull lesion   • Dental caries   • Status post appendectomy   • Subacromial impingement, right   • Rotator cuff syndrome, right   • Pneumocephalus   • Left-sided low back pain without sciatica   • Meningioma (HCC)   • Status post craniotomy and cranioplasty       Diet: Diet Regular; Regular House  Diet NPO  VTE Pharm PPX: Enoxaparin (Lovenox)  Code Status:  Level 1 - Full Code    Disposition: Admit to Observation under Med-surg   Consult: IP CONSULT TO NEUROSURGERY    Chief Complaints:   Mass (Pt has a hx of mass above left eye that he has been getting treatment on, last night he noticed that there was some increased swelling this morning after sneezing   worse with some pressure at the site  )    History of Presenting Illness:   56year old male with PMH of TII DM, HTN, HLD, Hypothyroidism and Meningioma s/p craniotomy 4/11 and recent admission from 5/8-5/13 for pneumocephalus, discharged with conservative observation, 21 days of Augmentin and changes to his DMII regimen presenting for new left forehead swelling following a sneezing fit on Friday night  Patient was seen in outpatient office on 5/18 with noted mild periorbital swelling but no acute concerns, however had a sneezing episode on the 19th and has had persistent swelling since  Not associated with fever, lightheadedness, dizziness, N/V/D, shortness of breath, blurred vision, or focal neurologic deficits  Patient tried to watch swelling conservatively at home with pressure and breathing exercises, however swelling did not decrease through Friday night into Saturday  On Sunday morning, patient sneezed again and noticed swelling was getting slightly larger and starting to cross midline, associated with increasing pressure, and presented to the emergency department for evaluation         ED Management:     ED Triage Vitals [05/21/23 0917]   Temperature Pulse Respirations Blood Pressure SpO2   97 6 °F (36 4 °C) 104 16 121/72 99 %      Temp Source Heart Rate Source Patient Position - Orthostatic VS BP Location FiO2 (%)   Tympanic -- Lying Right arm --      Pain Score       No Pain         Medications   acetaminophen (TYLENOL) tablet 650 mg (has no administration in time range)   amoxicillin-clavulanate (AUGMENTIN) 875-125 mg per tablet 1 tablet (has no administration in time range)   atorvastatin (LIPITOR) tablet 40 mg (has no administration in time range)   levothyroxine tablet 137 mcg (has no administration in time range)   lisinopril (ZESTRIL) tablet 10 mg (has no administration in time range)     And   hydrochlorothiazide (HYDRODIURIL) tablet 12 5 mg (has no administration in time range)   insulin glargine (LANTUS) subcutaneous injection 15 Units 0 15 mL (has no administration in time range)   insulin lispro (HumaLOG) 100 units/mL subcutaneous injection 1-6 Units (has no administration in time range)   enoxaparin (LOVENOX) subcutaneous injection 40 mg (has no administration in time range)   potassium chloride (K-DUR,KLOR-CON) CR tablet 40 mEq (has no administration in time range)     Review of System:   Review of Systems   Constitutional: Negative for appetite change, fatigue and fever  HENT: Positive for sneezing  Negative for congestion, ear discharge, ear pain, nosebleeds, rhinorrhea, sinus pain and sore throat  Eyes: Negative for pain, discharge and visual disturbance  Respiratory: Negative for chest tightness and shortness of breath  Cardiovascular: Negative for chest pain and palpitations  Gastrointestinal: Negative for constipation, diarrhea, nausea and vomiting  Genitourinary: Negative for difficulty urinating  Musculoskeletal: Negative for back pain and neck pain  Skin: Negative for rash and wound  Neurological: Positive for headaches  Negative for dizziness, syncope, weakness and light-headedness         History:     Past Medical History:   Diagnosis Date   • Diabetes mellitus ([facility] [address] )    • Elevated LFTs     last assessed 02Nov2015   • Hyperlipidemia    • Hypertension    • Impaired fasting glucose     last assessed 29Jan2014     Past Surgical History:   Procedure Laterality Date   • APPENDECTOMY     • COLONOSCOPY     • [place] CRANIEC TREPHINE BONE FLP BRAIN TUMOR SUPRTENTOR Left 4/11/2023    Procedure: IMAGE-GUIDED LEFT FRONTAL CRANIOTOMY FOR RESECTION OF EXTRA-AXIAL & SKULL MASS, WITH CRANIOPLASTY, EXENTERATION/OBLITERATION OF FRONTAL SINUS; ORBITAL OSTEOTOMY;  Surgeon: Dayami Espino MD;  Location: [facility];  Service: Neurosurgery     Social History     Socioeconomic History   • Marital status: /Civil Union     Spouse name: None   • Number of children: None   • Years of education: None   • Highest education level: None   Occupational History   • None   Tobacco Use   • Smoking status: Never   • Smokeless tobacco: Never   Vaping Use   • Vaping Use: Never used   Substance and Sexual Activity   • Alcohol use: Not Currently     Alcohol/week: 3 0 standard drinks     Types: 3 Glasses of wine per week     Comment: drinks daily    • Drug use: Never   • Sexual activity: Not Currently     Partners: Female   Other Topics Concern   • None   Social History Narrative   • None     Social Determinants of Health     Financial Resource Strain: Not on file   Food Insecurity: No Food Insecurity   • Worried About Running Out of Food in the Last Year: Never true   • Ran Out of Food in the Last Year: Never true   Transportation Needs: No Transportation Needs   • Lack of Transportation (Medical): No   • Lack of Transportation (Non-Medical): No   Physical Activity: Not on file   Stress: Not on file   Social Connections: Not on file   Intimate Partner Violence: Not on file   Housing Stability: Low Risk    • Unable to Pay for Housing in the Last Year: No   • Number of Places Lived in the Last Year: 1   • Unstable Housing in the Last Year: No     Family History   Problem Relation Age of Onset   • Diabetes Mother    • Breast cancer Mother    • Diabetes Father        Medications & Allergies:     PTA meds:   Prior to Admission Medications   Prescriptions Last Dose Informant Patient Reported? Taking? Alcohol Swabs 70 % PADS   No No   Sig: May substitute brand based on insurance coverage  Check glucose TID  Blood Glucose Monitoring Suppl (OneTouch Verio Reflect) w/Device KIT   No No   Sig: May substitute brand based on insurance coverage  Check glucose TID  Empagliflozin (Jardiance) 10 MG TABS tablet 5/21/2023  No Yes   Sig: Take 1 tablet (10 mg total) by mouth every morning   Insulin Glargine Solostar (Lantus SoloStar) 100 UNIT/ML SOPN   No No   Sig: Inject 0 15 mL (15 Units total) under the skin daily at bedtime   Insulin Pen Needle (BD Pen Needle Mariela 2nd Gen) 32G X 4 MM MISC   No No   Sig: For use with insulin pen  Pharmacy may dispense brand covered by insurance  Insulin Pen Needle (BD Pen Needle Mariela 2nd Gen) 32G X 4 MM MISC   No No   Sig: For use with insulin pen   Pharmacy may dispense brand covered by insurance  Insulin Pen Needle (BD Pen Needle Mariela 2nd Gen) 32G X 4 MM MISC   No No   Sig: For use with insulin pen  Pharmacy may dispense brand covered by insurance  OneTouch Delica Lancets 91N MISC   No No   Sig: May substitute brand based on insurance coverage  Check glucose TID    acetaminophen (TYLENOL) 325 mg tablet 5/20/2023  No Yes   Sig: Take 2 tablets (650 mg total) by mouth every 6 (six) hours as needed for headaches or mild pain   acetaminophen (TYLENOL) 500 mg tablet 5/20/2023  Yes Yes   Sig: Take 1,000 mg by mouth if needed for mild pain   amoxicillin-clavulanate (AUGMENTIN) 875-125 mg per tablet 5/21/2023  No Yes   Sig: Take 1 tablet by mouth 2 (two) times a day for 21 days   atorvastatin (LIPITOR) 40 mg tablet 5/20/2023  No Yes   Sig: Take 1 tablet (40 mg total) by mouth daily   glucose blood (Accu-Chek Guide) test strip   No No   Sig: Use 1 each 2 (two) times a day Use as instructed   glucose blood (OneTouch Verio) test strip   No No   Sig: Use 1 each 2 (two) times a day Use as instructed   glucose blood (OneTouch Verio) test strip   No No   Sig: May substitute brand based on insurance coverage  Check glucose TID  insulin lispro (HumaLOG KwikPen) 100 units/mL injection pen   No No   Sig: Inject 3 Units under the skin 3 (three) times a day with meals   levothyroxine 137 mcg tablet 5/21/2023  No Yes   Sig: Take 1 tablet by mouth once daily   lisinopril-hydrochlorothiazide (PRINZIDE,ZESTORETIC) 10-12 5 MG per tablet 5/21/2023  No Yes   Sig: Take 1 tablet by mouth daily      Facility-Administered Medications: None     No Known Allergies    PTA Medications:  No current facility-administered medications on file prior to encounter       Current Outpatient Medications on File Prior to Encounter   Medication Sig Dispense Refill   • acetaminophen (TYLENOL) 325 mg tablet Take 2 tablets (650 mg total) by mouth every 6 (six) hours as needed for headaches or mild pain  0   • acetaminophen (TYLENOL) 500 mg tablet Take 1,000 mg by mouth if needed for mild pain     • amoxicillin-clavulanate (AUGMENTIN) 875-125 mg per tablet Take 1 tablet by mouth 2 (two) times a day for 21 days 42 tablet 0   • atorvastatin (LIPITOR) 40 mg tablet Take 1 tablet (40 mg total) by mouth daily 90 tablet 1   • Empagliflozin (Jardiance) 10 MG TABS tablet Take 1 tablet (10 mg total) by mouth every morning 90 tablet 1   • levothyroxine 137 mcg tablet Take 1 tablet by mouth once daily 60 tablet 0   • lisinopril-hydrochlorothiazide (PRINZIDE,ZESTORETIC) 10-12 5 MG per tablet Take 1 tablet by mouth daily 90 tablet 1   • Alcohol Swabs 70 % PADS May substitute brand based on insurance coverage  Check glucose TID  100 each 0   • Blood Glucose Monitoring Suppl (OneTouch Verio Reflect) w/Device KIT May substitute brand based on insurance coverage  Check glucose TID  1 kit 0   • glucose blood (Accu-Chek Guide) test strip Use 1 each 2 (two) times a day Use as instructed 100 each 5   • glucose blood (OneTouch Verio) test strip Use 1 each 2 (two) times a day Use as instructed 100 each 5   • glucose blood (OneTouch Verio) test strip May substitute brand based on insurance coverage  Check glucose TID  100 each 0   • Insulin Glargine Solostar (Lantus SoloStar) 100 UNIT/ML SOPN Inject 0 15 mL (15 Units total) under the skin daily at bedtime 4 5 mL 1   • insulin lispro (HumaLOG KwikPen) 100 units/mL injection pen Inject 3 Units under the skin 3 (three) times a day with meals 15 mL 0   • Insulin Pen Needle (BD Pen Needle Mariela 2nd Gen) 32G X 4 MM MISC For use with insulin pen  Pharmacy may dispense brand covered by insurance  100 each 0   • Insulin Pen Needle (BD Pen Needle Mariela 2nd Gen) 32G X 4 MM MISC For use with insulin pen  Pharmacy may dispense brand covered by insurance  100 each 0   • Insulin Pen Needle (BD Pen Needle Mariela 2nd Gen) 32G X 4 MM MISC For use with insulin pen  Pharmacy may dispense brand covered by insurance   100 each 0   • OneTouch Delica Lancets 25T MISC May substitute brand based on insurance coverage  Check glucose TID  100 each 0         Objective & Vitals:     Vitals: /69   Pulse 77   Temp 97 5 °F (36 4 °C)   Resp 16   SpO2 98%     No intake or output data in the 24 hours ending 05/21/23 1530    Invasive Devices     Peripheral Intravenous Line  Duration           Peripheral IV 05/21/23 Left;Ventral (anterior) Forearm <1 day                  Labs: I have personally reviewed pertinent reports      Recent Results (from the past 24 hour(s))   CBC and differential    Collection Time: 05/21/23 11:04 AM   Result Value Ref Range    WBC 9 39 4 31 - 10 16 Thousand/uL    RBC 4 75 3 88 - 5 62 Million/uL    Hemoglobin 15 2 12 0 - 17 0 g/dL    Hematocrit 42 1 36 5 - 49 3 %    MCV 89 82 - 98 fL    MCH 32 0 26 8 - 34 3 pg    MCHC 36 1 31 4 - 37 4 g/dL    RDW 12 2 11 6 - 15 1 %    MPV 9 4 8 9 - 12 7 fL    Platelets 522 757 - 154 Thousands/uL    nRBC 0 /100 WBCs    Neutrophils Relative 45 43 - 75 %    Immat GRANS % 0 0 - 2 %    Lymphocytes Relative 47 (H) 14 - 44 %    Monocytes Relative 8 4 - 12 %    Eosinophils Relative 0 0 - 6 %    Basophils Relative 0 0 - 1 %    Neutrophils Absolute 4 23 1 85 - 7 62 Thousands/µL    Immature Grans Absolute 0 03 0 00 - 0 20 Thousand/uL    Lymphocytes Absolute 4 29 0 60 - 4 47 Thousands/µL    Monocytes Absolute 0 77 0 17 - 1 22 Thousand/µL    Eosinophils Absolute 0 03 0 00 - 0 61 Thousand/µL    Basophils Absolute 0 04 0 00 - 0 10 Thousands/µL   Basic metabolic panel    Collection Time: 05/21/23 11:04 AM   Result Value Ref Range    Sodium 138 135 - 147 mmol/L    Potassium 3 4 (L) 3 5 - 5 3 mmol/L    Chloride 109 (H) 96 - 108 mmol/L    CO2 26 21 - 32 mmol/L    ANION GAP 3 (L) 4 - 13 mmol/L    BUN 15 5 - 25 mg/dL    Creatinine 1 02 0 60 - 1 30 mg/dL    Glucose 234 (H) 65 - 140 mg/dL    Calcium 8 9 8 3 - 10 1 mg/dL    eGFR 82 ml/min/1 73sq m   Sedimentation rate, automated    Collection Time: 05/21/23 11:04 AM   Result Value Ref Range    Sed Rate 7 0 - 19 mm/hour   C-reactive protein    Collection Time: 05/21/23 11:04 AM   Result Value Ref Range    CRP <3 0 <3 0 mg/L       Imaging:     I have personally reviewed pertinent reports  CT head wo contrast    Result Date: 5/21/2023  Impression: Slight interval increase in pneumocephalus and subgaleal air both superficial and deep to the left cranioplasty site  Persistent mild vasogenic edema left frontal lobe  Workstation performed: [id number]     CT sinus wo contrast    Result Date: 5/21/2023  Impression: Pneumocephalus and subgaleal air identified slightly increased from the prior CT scan and presumed to extend from the left frontal sinus where fluid and air opacity is noted  There is a direct communication from the left frontal sinus to the left frontal  extra-axial air  Workstation performed: [id number]       EKG, Pathology, and Other Studies:   I have personally reviewed pertinent reports  Physical Exam:   Physical Exam  Constitutional:       General: He is not in acute distress  Appearance: He is normal weight  HENT:      Head:      Comments: Left Supraorbital Swelling     Nose: Nose normal    Eyes:      General: No visual field deficit  Conjunctiva/sclera: Conjunctivae normal    Cardiovascular:      Rate and Rhythm: Normal rate and regular rhythm  Heart sounds: Normal heart sounds  No murmur heard  Pulmonary:      Effort: No respiratory distress  Breath sounds: Normal breath sounds  No wheezing  Abdominal:      General: Bowel sounds are normal       Palpations: Abdomen is soft  Tenderness: There is no abdominal tenderness  Skin:     General: Skin is warm and dry  Capillary Refill: Capillary refill takes less than 2 seconds  Findings: No erythema or rash  Neurological:      General: No focal deficit present  Mental Status: He is alert and oriented to person, place, and time        GCS: GCS eye subscore is 4  GCS verbal subscore is 5  GCS motor subscore is 6  Cranial Nerves: Cranial nerves 2-12 are intact  No dysarthria  Sensory: Sensation is intact  Motor: Motor function is intact  No weakness or pronator drift  Coordination: Coordination is intact     Psychiatric:         Mood and Affect: Mood normal        Aleja Love MD  PGY-1, Family Medicine  05/21/23  3:30 PM

## 2023-05-22 ENCOUNTER — TELEPHONE (OUTPATIENT)
Dept: NEUROSURGERY | Facility: CLINIC | Age: 58
End: 2023-05-22

## 2023-05-22 LAB
ANION GAP SERPL CALCULATED.3IONS-SCNC: 1 MMOL/L (ref 4–13)
BASOPHILS # BLD AUTO: 0.04 THOUSANDS/ÂΜL (ref 0–0.1)
BASOPHILS NFR BLD AUTO: 1 % (ref 0–1)
BUN SERPL-MCNC: 13 MG/DL (ref 5–25)
CALCIUM SERPL-MCNC: 9.2 MG/DL (ref 8.3–10.1)
CHLORIDE SERPL-SCNC: 109 MMOL/L (ref 96–108)
CO2 SERPL-SCNC: 27 MMOL/L (ref 21–32)
CREAT SERPL-MCNC: 0.71 MG/DL (ref 0.6–1.3)
EOSINOPHIL # BLD AUTO: 0.08 THOUSAND/ÂΜL (ref 0–0.61)
EOSINOPHIL NFR BLD AUTO: 1 % (ref 0–6)
ERYTHROCYTE [DISTWIDTH] IN BLOOD BY AUTOMATED COUNT: 12.2 % (ref 11.6–15.1)
GFR SERPL CREATININE-BSD FRML MDRD: 105 ML/MIN/1.73SQ M
GLUCOSE P FAST SERPL-MCNC: 180 MG/DL (ref 65–99)
GLUCOSE SERPL-MCNC: 145 MG/DL (ref 65–140)
GLUCOSE SERPL-MCNC: 157 MG/DL (ref 65–140)
GLUCOSE SERPL-MCNC: 158 MG/DL (ref 65–140)
GLUCOSE SERPL-MCNC: 180 MG/DL (ref 65–140)
GLUCOSE SERPL-MCNC: 223 MG/DL (ref 65–140)
GLUCOSE SERPL-MCNC: 231 MG/DL (ref 65–140)
HCT VFR BLD AUTO: 44.6 % (ref 36.5–49.3)
HGB BLD-MCNC: 16.2 G/DL (ref 12–17)
IMM GRANULOCYTES # BLD AUTO: 0.02 THOUSAND/UL (ref 0–0.2)
IMM GRANULOCYTES NFR BLD AUTO: 0 % (ref 0–2)
LYMPHOCYTES # BLD AUTO: 4.02 THOUSANDS/ÂΜL (ref 0.6–4.47)
LYMPHOCYTES NFR BLD AUTO: 50 % (ref 14–44)
MCH RBC QN AUTO: 32.1 PG (ref 26.8–34.3)
MCHC RBC AUTO-ENTMCNC: 36.3 G/DL (ref 31.4–37.4)
MCV RBC AUTO: 89 FL (ref 82–98)
MONOCYTES # BLD AUTO: 0.59 THOUSAND/ÂΜL (ref 0.17–1.22)
MONOCYTES NFR BLD AUTO: 7 % (ref 4–12)
NEUTROPHILS # BLD AUTO: 3.32 THOUSANDS/ÂΜL (ref 1.85–7.62)
NEUTS SEG NFR BLD AUTO: 41 % (ref 43–75)
NRBC BLD AUTO-RTO: 0 /100 WBCS
PLATELET # BLD AUTO: 224 THOUSANDS/UL (ref 149–390)
PMV BLD AUTO: 9.3 FL (ref 8.9–12.7)
POTASSIUM SERPL-SCNC: 3.6 MMOL/L (ref 3.5–5.3)
RBC # BLD AUTO: 5.04 MILLION/UL (ref 3.88–5.62)
SODIUM SERPL-SCNC: 137 MMOL/L (ref 135–147)
WBC # BLD AUTO: 8.07 THOUSAND/UL (ref 4.31–10.16)

## 2023-05-22 PROCEDURE — 0J903ZZ DRAINAGE OF SCALP SUBCUTANEOUS TISSUE AND FASCIA, PERCUTANEOUS APPROACH: ICD-10-PCS | Performed by: NEUROLOGICAL SURGERY

## 2023-05-22 RX ORDER — OXYCODONE HYDROCHLORIDE 5 MG/1
5 TABLET ORAL EVERY 4 HOURS PRN
Status: DISCONTINUED | OUTPATIENT
Start: 2023-05-22 | End: 2023-05-26 | Stop reason: HOSPADM

## 2023-05-22 RX ORDER — ACETAMINOPHEN 325 MG/1
975 TABLET ORAL EVERY 6 HOURS SCHEDULED
Status: DISCONTINUED | OUTPATIENT
Start: 2023-05-22 | End: 2023-05-26 | Stop reason: HOSPADM

## 2023-05-22 RX ORDER — LIDOCAINE HYDROCHLORIDE AND EPINEPHRINE 20; 5 MG/ML; UG/ML
1 INJECTION, SOLUTION EPIDURAL; INFILTRATION; INTRACAUDAL; PERINEURAL ONCE
Status: COMPLETED | OUTPATIENT
Start: 2023-05-22 | End: 2023-05-22

## 2023-05-22 RX ADMIN — INSULIN GLARGINE 15 UNITS: 100 INJECTION, SOLUTION SUBCUTANEOUS at 22:14

## 2023-05-22 RX ADMIN — ACETAMINOPHEN 975 MG: 325 TABLET ORAL at 14:42

## 2023-05-22 RX ADMIN — ATORVASTATIN CALCIUM 40 MG: 40 TABLET, FILM COATED ORAL at 09:38

## 2023-05-22 RX ADMIN — AMOXICILLIN AND CLAVULANATE POTASSIUM 1 TABLET: 875; 125 TABLET, FILM COATED ORAL at 17:07

## 2023-05-22 RX ADMIN — ACETAMINOPHEN 975 MG: 325 TABLET ORAL at 19:37

## 2023-05-22 RX ADMIN — LIDOCAINE HYDROCHLORIDE,EPINEPHRINE BITARTRATE 1 ML: 20; .005 INJECTION, SOLUTION EPIDURAL; INFILTRATION; INTRACAUDAL; PERINEURAL at 16:00

## 2023-05-22 RX ADMIN — AMOXICILLIN AND CLAVULANATE POTASSIUM 1 TABLET: 875; 125 TABLET, FILM COATED ORAL at 09:38

## 2023-05-22 RX ADMIN — ACETAMINOPHEN 650 MG: 325 TABLET ORAL at 09:36

## 2023-05-22 RX ADMIN — INSULIN LISPRO 2 UNITS: 100 INJECTION, SOLUTION INTRAVENOUS; SUBCUTANEOUS at 17:06

## 2023-05-22 RX ADMIN — LEVOTHYROXINE SODIUM 137 MCG: 25 TABLET ORAL at 09:36

## 2023-05-22 RX ADMIN — Medication 2.5 MG: at 14:42

## 2023-05-22 NOTE — ASSESSMENT & PLAN NOTE
· Recurrent/increased left forehead swelling following a sneezing episode on Friday night on 5/19/23  ·  S/p left total craniotomy with craniectomy and orbital osteotomy with resection of extradural mass  Exenteration/obliteration of left frontal sinus and custom synthetic cranioplasty on 4/11/2023 with Dr Massiel Heart  · Imaging reviewed personally and by attending  Final results as below  · CT sinus without contrast 5/21/2023: Slight interval increase in pneumocephalus and subgaleal air both superficial and deep to the left cranioplasty site  Persistent mild vasogenic edema left frontal lobe  Plan  · Continue regular neurologic checks  · Going medical management and pain control per primary team   · Pt remains afebrile  WBC normal    · Eval and mobilize per PT/OT  · DVT PPX: SCDs   · Dr Massiel Heart with see patient later today and discuss further management  · Neurosurgery will continue to follow  Please call with any questions or concerns

## 2023-05-22 NOTE — PROGRESS NOTES
Patient case with Dr Quin Durham of ENT  We agree that the intervention most likely to be successful at this stage would be an endonasal endoscopic attempt to cover the frontal nasal duct from below, to create an onlay repair that would be more resistant to injections of air during sneezing etc   Dr Quin Durham will meet with the patient during this admission to discuss this option with him, although I do not expect that they will be able to arrange surgery during this admission  We will continue the patient on antibiotics in the meantime, and follow him for any signs or symptoms of neurologic distress  We also discussed the option of aspirating the pneumocephalus to palliate his pressure symptoms etc   I reviewed with him personally the risks and benefits of this and he would like to proceed, verbal consent given  Brief procedure note    The left frontal scalp was prepped with ChloraPrep and allowed to dry  I injected the left frontal scalp with lidocaine with epinephrine  Then utilizing a 20 cc syringe I accessed the subgaleal air and gently aspirated a total of 35 cc  This completely reduced the subgaleal component of the gas collection  The patient tolerated the procedure well with no new complaints  No specimen  No blood loss  Image taken and put in the patient's chart under media to document the post aspiration appearance  We will continue to follow him for recurrence etc   I would not be surprised should he lay flat that it would recur but be more reducible, and reduce should he remain upright    I would recommend him sleeping head of bed greater than 30 degrees, and potentially wrapping scalp

## 2023-05-22 NOTE — PLAN OF CARE
Problem: PAIN - ADULT  Goal: Verbalizes/displays adequate comfort level or baseline comfort level  Description: Interventions:  - Encourage patient to monitor pain and request assistance  - Assess pain using appropriate pain scale  - Administer analgesics based on type and severity of pain and evaluate response  - Implement non-pharmacological measures as appropriate and evaluate response  - Consider cultural and social influences on pain and pain management  - Notify physician/advanced practitioner if interventions unsuccessful or patient reports new pain  Outcome: Progressing     Problem: INFECTION - ADULT  Goal: Absence or prevention of progression during hospitalization  Description: INTERVENTIONS:  - Assess and monitor for signs and symptoms of infection  - Monitor lab/diagnostic results  - Monitor all insertion sites, i e  indwelling lines, tubes, and drains  - Monitor endotracheal if appropriate and nasal secretions for changes in amount and color  - Jewell appropriate cooling/warming therapies per order  - Administer medications as ordered  - Instruct and encourage patient and family to use good hand hygiene technique  - Identify and instruct in appropriate isolation precautions for identified infection/condition  Outcome: Progressing     Problem: SAFETY ADULT  Goal: Patient will remain free of falls  Description: INTERVENTIONS:  - Educate patient/family on patient safety including physical limitations  - Instruct patient to call for assistance with activity   - Consult OT/PT to assist with strengthening/mobility   - Keep Call bell within reach  - Keep bed low and locked with side rails adjusted as appropriate  - Keep care items and personal belongings within reach  - Initiate and maintain comfort rounds  - Make Fall Risk Sign visible to staff  - Apply yellow socks and bracelet for high fall risk patients  - Consider moving patient to room near nurses station  Outcome: Progressing

## 2023-05-22 NOTE — PROGRESS NOTES
"[facility]  Progress Note  Name: Alexei Solis  MRN: [id number]  [facility] Date of Admission: 5/21/2023   Date of Service: 5/22/2023 I Hospital Day: 0    Assessment/Plan   * Pneumocephalus  Assessment & Plan  · Recurrent/increased left forehead swelling following a sneezing episode on Friday night on 5/19/23  ·  S/p left total craniotomy with craniectomy and orbital osteotomy with resection of extradural mass  Exenteration/obliteration of left frontal sinus and custom synthetic cranioplasty on 4/11/2023 with Dr Rehana Tang  · Imaging reviewed personally and by attending  Final results as below  · CT sinus without contrast 5/21/2023: Slight interval increase in pneumocephalus and subgaleal air both superficial and deep to the left cranioplasty site  Persistent mild vasogenic edema left frontal lobe  Plan  · Continue regular neurologic checks  · Going medical management and pain control per primary team   · Pt remains afebrile  WBC normal    · Eval and mobilize per PT/OT  · DVT PPX: SCDs   · Dr Rehana Tang with see patient later today and discuss further management  · Neurosurgery will continue to follow  Please call with any questions or concerns  Status post craniotomy and cranioplasty  Assessment & Plan  See plan above  Meningioma [facility])  Assessment & Plan  S/p resection of Meningioma  See plan above  Subjective/Objective     Chief Complaint: \"My head hurts\"    Subjective: Pt reports he has increased swelling on his left forehead compared to yesterday  He also reports increased headache/ head pressure on his left frontal region  He also reports pressure above his left eye  He denies drainage from his nose  Patient denies any dizziness, lightheadedness or visual disturbance  Patient denies any new numbness, tingling or weakness  Pt denies changes in gait or balance  Pt denies nausea or vomiting  Pt denies fever or chills   " Objective: Alert and awake, No acute distress  I/O     None          Invasive Devices     Peripheral Intravenous Line  Duration           Peripheral IV 05/21/23 Left;Ventral (anterior) Forearm <1 day                Physical Exam:  Vitals: Blood pressure 101/65, pulse 79, temperature 98 2 °F (36 8 °C), temperature source Oral, resp  rate 22, SpO2 98 %  ,There is no height or weight on file to calculate BMI  General appearance:  Appears stated age  Head: Normocephalic, incision CDI  Left forehead with increased swelling  Eyes: EOMI, PERRL  Neck: supple, symmetrical, trachea midline   Lungs: non labored breathing  Heart: regular heart rate  Neurologic:   Mental status: Alert, oriented, thought content appropriate  Cranial nerves: grossly intact (Cranial nerves II-XII)  Sensory: normal to LT X 4  Motor: moving all extremities without focal weakness, Strength 5/5 throughout  Coordination:  no drift in bilateral upper extremities    Lab Results:  Results from last 7 days   Lab Units 05/22/23  0735 05/21/23  1104   WBC Thousand/uL 8 07 9 39   HEMOGLOBIN g/dL 16 2 15 2   HEMATOCRIT % 44 6 42 1   PLATELETS Thousands/uL 224 219   NEUTROS PCT % 41* 45   MONOS PCT % 7 8     Results from last 7 days   Lab Units 05/22/23  0735 05/21/23  1104   POTASSIUM mmol/L 3 6 3 4*   CHLORIDE mmol/L 109* 109*   CO2 mmol/L 27 26   BUN mg/dL 13 15   CREATININE mg/dL 0 71 1 02   CALCIUM mg/dL 9 2 8 9                   Imaging Studies: I have personally reviewed pertinent reports and I have personally reviewed pertinent films in PACS    EKG, Pathology, and Other Studies: I have personally reviewed pertinent reports  PLEASE NOTE:  This encounter may have been completed utilizing the [employer] Direct Speech Voice Recognition Software   Grammatical errors, random word insertions, pronoun errors and incomplete sentences are occasional consequences of the system due to software limitations, ambient noise and hardware issues  These may be missed by proof reading prior to affixing electronic signature  Any questions or concerns about the content, text or information contained within the body of this dictation should be directly addressed to the advanced practitioner or physician for clarification

## 2023-05-22 NOTE — PROGRESS NOTES
[facility]  Progress Note  Name: Navya Cha  MRN: [id number]  Unit/Bed#: [facility] 726-01 I Date of Admission: 5/21/2023   Date of Service: 5/22/2023 I Hospital Day: 0    Assessment/Plan   * Pneumocephalus  Assessment & Plan  56year old male with PMH of Grade II Meningioma s/p left frontal orbital resection on 4/11/23 without immediate complications, who has recently been admitted for significant pneumocephalus (5/8-5/13) noted on CT Sinus/CTH, managed conservatively without further surgical intervention, discharged on oral augmentin with outpatient followup, now presenting for similar left frontal swelling and pneumocephalus 2/2 sneezing fit 5/19  · 5/21 CT Sinus: Pnuemocephalus and subgaleal air identified slightly increased from the prior CT scan and presumed to extend from the left frontal sinus where fluid and air opacity is noted  There is a direct communication from the left frontal sinus to the left frontal extra-axial air  · 5/21 CTH: Slight interval increase in pneumocephalus and subgaleal air both superficial and deep to the left cranioplasty site  Persistent mild vasogenic edema left frontal lobe  · ESR, CRP WNL    Plan:   · Neurosurgery Consult - Appreciate Recommendations  · Q shift neuro checks  · Monitor Vital Signs  · NPO today pending NS, Jaciel/Cho diet if no planned intervention  · Tylenol PRN for pain  · Continue PTA Augmentin 875-125mg BID      Diabetes [facility])  Assessment & Plan  Lab Results   Component Value Date    HGBA1C 7 8 (H) 05/12/2023       Recent Labs     05/21/23  1624 05/21/23  2147 05/22/23  0627   POCGLU 222* 211* 157*       Blood Sugar Average: Last 72 hrs:  Last A1c 7 8 on 5/12, admission Glucose 234  On Home Lantus 15u Qhs and Humalog 3u TID with modified home sliding scale, Jardiance 25mg daily   Notably, was changed from Metformin and Jardiance post operatively due to elevated blood sugars treatment, and initially was mistakenly taking lantus 18u TID without reported hypoglycemic episodes  Plan:  · Hold home Jardiance  · Continue Basal Lantus 15u Qhs  · SSI algorithm 3, adjust as needed    Other specified hypothyroidism  Assessment & Plan  · Continue home Levothyroxine 137 mcg     Hyperlipidemia  Assessment & Plan  · Continue home Lipitor 40mg     Benign essential hypertension  Assessment & Plan  Continue home Lisinopril-Hydrochlorothiazide  · Lisinopril 10mg, Hydrochlorothiazide 12 5mg           PPX: Lovenox  Diet: NPO  Code Status: Full   Dispo: Inpatient    Plan D/W Dr Usha Austin and [facility] Team    Subjective:   Patient seen and assessed at bedside this morning  No OVN events, no acute complaints  Patient overall stating he feels well, still has some headache associated with the swelling however it is not cumbersome and he was able to sleep  Denies fever, chills, N/V/D  No blurred vision, no new discharge  Objective:     Vitals: Blood pressure 107/66, pulse 76, temperature 98 2 °F (36 8 °C), temperature source Oral, resp  rate 22, SpO2 98 %  ,There is no height or weight on file to calculate BMI  No intake or output data in the 24 hours ending 05/22/23 0904    Physical Exam:   Physical Exam  Constitutional:       General: He is not in acute distress  Appearance: He is normal weight  HENT:      Head:      Comments: Left supraorbital swelling     Nose: Nose normal    Eyes:      Conjunctiva/sclera: Conjunctivae normal    Cardiovascular:      Rate and Rhythm: Normal rate and regular rhythm  Heart sounds: Normal heart sounds  No murmur heard  Pulmonary:      Effort: No respiratory distress  Breath sounds: Normal breath sounds  No wheezing  Abdominal:      General: Bowel sounds are normal       Palpations: Abdomen is soft  Tenderness: There is no abdominal tenderness  Skin:     General: Skin is warm and dry  Capillary Refill: Capillary refill takes less than 2 seconds  Findings: No erythema or rash  Neurological:      General: No focal deficit present  Mental Status: He is alert  Mental status is at baseline  Cranial Nerves: Cranial nerves 2-12 are intact  Sensory: Sensation is intact  Motor: Motor function is intact  Coordination: Coordination is intact  Psychiatric:         Mood and Affect: Mood normal        Invasive Devices     Peripheral Intravenous Line  Duration           Peripheral IV 05/21/23 Left;Ventral (anterior) Forearm <1 day                         Lab and other studies:  I have personally reviewed pertinent reports       Admission on 05/21/2023   Component Date Value   • WBC 05/21/2023 9 39    • RBC 05/21/2023 4 75    • Hemoglobin 05/21/2023 15 2    • Hematocrit 05/21/2023 42 1    • MCV 05/21/2023 89    • MCH 05/21/2023 32 0    • MCHC 05/21/2023 36 1    • RDW 05/21/2023 12 2    • MPV 05/21/2023 9 4    • Platelets 04/61/1509 219    • nRBC 05/21/2023 0    • Neutrophils Relative 05/21/2023 45    • Immat GRANS % 05/21/2023 0    • Lymphocytes Relative 05/21/2023 47 (H)    • Monocytes Relative 05/21/2023 8    • Eosinophils Relative 05/21/2023 0    • Basophils Relative 05/21/2023 0    • Neutrophils Absolute 05/21/2023 4 23    • Immature Grans Absolute 05/21/2023 0 03    • Lymphocytes Absolute 05/21/2023 4 29    • Monocytes Absolute 05/21/2023 0 77    • Eosinophils Absolute 05/21/2023 0 03    • Basophils Absolute 05/21/2023 0 04    • Sodium 05/21/2023 138    • Potassium 05/21/2023 3 4 (L)    • Chloride 05/21/2023 109 (H)    • CO2 05/21/2023 26    • ANION GAP 05/21/2023 3 (L)    • BUN 05/21/2023 15    • Creatinine 05/21/2023 1 02    • Glucose 05/21/2023 234 (H)    • Calcium 05/21/2023 8 9    • eGFR 05/21/2023 82    • Sed Rate 05/21/2023 7    • CRP 05/21/2023 <3 0    • POC Glucose 05/21/2023 222 (H)    • POC Glucose 05/21/2023 211 (H)    • WBC 05/22/2023 8 07    • RBC 05/22/2023 5 04    • Hemoglobin 05/22/2023 16 2    • Hematocrit 05/22/2023 44 6    • MCV 05/22/2023 89    • MCH 05/22/2023 32 1    • MCHC 05/22/2023 36 3    • RDW 05/22/2023 12 2    • MPV 05/22/2023 9 3    • Platelets 64/18/2034 224    • nRBC 05/22/2023 0    • Neutrophils Relative 05/22/2023 41 (L)    • Immat GRANS % 05/22/2023 0    • Lymphocytes Relative 05/22/2023 50 (H)    • Monocytes Relative 05/22/2023 7    • Eosinophils Relative 05/22/2023 1    • Basophils Relative 05/22/2023 1    • Neutrophils Absolute 05/22/2023 3 32    • Immature Grans Absolute 05/22/2023 0 02    • Lymphocytes Absolute 05/22/2023 4 02    • Monocytes Absolute 05/22/2023 0 59    • Eosinophils Absolute 05/22/2023 0 08    • Basophils Absolute 05/22/2023 0 04    • Sodium 05/22/2023 137    • Potassium 05/22/2023 3 6    • Chloride 05/22/2023 109 (H)    • CO2 05/22/2023 27    • ANION GAP 05/22/2023 1 (L)    • BUN 05/22/2023 13    • Creatinine 05/22/2023 0 71    • Glucose 05/22/2023 180 (H)    • Glucose, Fasting 05/22/2023 180 (H)    • Calcium 05/22/2023 9 2    • eGFR 05/22/2023 105    • POC Glucose 05/22/2023 157 (H)        Recent Results (from the past 24 hour(s))   CBC and differential    Collection Time: 05/21/23 11:04 AM   Result Value Ref Range    WBC 9 39 4 31 - 10 16 Thousand/uL    RBC 4 75 3 88 - 5 62 Million/uL    Hemoglobin 15 2 12 0 - 17 0 g/dL    Hematocrit 42 1 36 5 - 49 3 %    MCV 89 82 - 98 fL    MCH 32 0 26 8 - 34 3 pg    MCHC 36 1 31 4 - 37 4 g/dL    RDW 12 2 11 6 - 15 1 %    MPV 9 4 8 9 - 12 7 fL    Platelets 468 511 - 979 Thousands/uL    nRBC 0 /100 WBCs    Neutrophils Relative 45 43 - 75 %    Immat GRANS % 0 0 - 2 %    Lymphocytes Relative 47 (H) 14 - 44 %    Monocytes Relative 8 4 - 12 %    Eosinophils Relative 0 0 - 6 %    Basophils Relative 0 0 - 1 %    Neutrophils Absolute 4 23 1 85 - 7 62 Thousands/µL    Immature Grans Absolute 0 03 0 00 - 0 20 Thousand/uL    Lymphocytes Absolute 4 29 0 60 - 4 47 Thousands/µL    Monocytes Absolute 0 77 0 17 - 1 22 Thousand/µL    Eosinophils Absolute 0 03 0 00 - 0 61 Thousand/µL    Basophils Absolute 0  04 0 00 - 0 10 Thousands/µL   Basic metabolic panel    Collection Time: 05/21/23 11:04 AM   Result Value Ref Range    Sodium 138 135 - 147 mmol/L    Potassium 3 4 (L) 3 5 - 5 3 mmol/L    Chloride 109 (H) 96 - 108 mmol/L    CO2 26 21 - 32 mmol/L    ANION GAP 3 (L) 4 - 13 mmol/L    BUN 15 5 - 25 mg/dL    Creatinine 1 02 0 60 - 1 30 mg/dL    Glucose 234 (H) 65 - 140 mg/dL    Calcium 8 9 8 3 - 10 1 mg/dL    eGFR 82 ml/min/1 73sq m   Sedimentation rate, automated    Collection Time: 05/21/23 11:04 AM   Result Value Ref Range    Sed Rate 7 0 - 19 mm/hour   C-reactive protein    Collection Time: 05/21/23 11:04 AM   Result Value Ref Range    CRP <3 0 <3 0 mg/L   Fingerstick Glucose (POCT)    Collection Time: 05/21/23  4:24 PM   Result Value Ref Range    POC Glucose 222 (H) 65 - 140 mg/dl   Fingerstick Glucose (POCT)    Collection Time: 05/21/23  9:47 PM   Result Value Ref Range    POC Glucose 211 (H) 65 - 140 mg/dl   Fingerstick Glucose (POCT)    Collection Time: 05/22/23  6:27 AM   Result Value Ref Range    POC Glucose 157 (H) 65 - 140 mg/dl   CBC and differential    Collection Time: 05/22/23  7:35 AM   Result Value Ref Range    WBC 8 07 4 31 - 10 16 Thousand/uL    RBC 5 04 3 88 - 5 62 Million/uL    Hemoglobin 16 2 12 0 - 17 0 g/dL    Hematocrit 44 6 36 5 - 49 3 %    MCV 89 82 - 98 fL    MCH 32 1 26 8 - 34 3 pg    MCHC 36 3 31 4 - 37 4 g/dL    RDW 12 2 11 6 - 15 1 %    MPV 9 3 8 9 - 12 7 fL    Platelets 785 358 - 583 Thousands/uL    nRBC 0 /100 WBCs    Neutrophils Relative 41 (L) 43 - 75 %    Immat GRANS % 0 0 - 2 %    Lymphocytes Relative 50 (H) 14 - 44 %    Monocytes Relative 7 4 - 12 %    Eosinophils Relative 1 0 - 6 %    Basophils Relative 1 0 - 1 %    Neutrophils Absolute 3 32 1 85 - 7 62 Thousands/µL    Immature Grans Absolute 0 02 0 00 - 0 20 Thousand/uL    Lymphocytes Absolute 4 02 0 60 - 4 47 Thousands/µL    Monocytes Absolute 0 59 0 17 - 1 22 Thousand/µL    Eosinophils Absolute 0 08 0 00 - 0 61 Thousand/µL Basophils Absolute 0 04 0 00 - 0 10 Thousands/µL   Basic metabolic panel    Collection Time: 05/22/23  7:35 AM   Result Value Ref Range    Sodium 137 135 - 147 mmol/L    Potassium 3 6 3 5 - 5 3 mmol/L    Chloride 109 (H) 96 - 108 mmol/L    CO2 27 21 - 32 mmol/L    ANION GAP 1 (L) 4 - 13 mmol/L    BUN 13 5 - 25 mg/dL    Creatinine 0 71 0 60 - 1 30 mg/dL    Glucose 180 (H) 65 - 140 mg/dL    Glucose, Fasting 180 (H) 65 - 99 mg/dL    Calcium 9 2 8 3 - 10 1 mg/dL    eGFR 105 ml/min/1 73sq m     Blood Culture:   Lab Results   Component Value Date    BLOODCX No Growth After 5 Days  05/08/2023    BLOODCX No Growth After 5 Days  05/08/2023   ,   Urinalysis:   Lab Results   Component Value Date    COLORU Colorless 05/08/2023    CLARITYU Clear 05/08/2023    SPECGRAV 1 027 05/08/2023    PHUR 6 0 05/08/2023    LEUKOCYTESUR (A) 05/08/2023     Elevated glucose may cause decreased leukocyte values  See urine microscopic for [facility] result/    NITRITE Negative 05/08/2023    GLUCOSEU >=1000 (1%) (A) 05/08/2023    KETONESU Trace (A) 05/08/2023    BILIRUBINUR Negative 05/08/2023    BLOODU Negative 05/08/2023   ,   Urine Culture:   Lab Results   Component Value Date    URINECX 40,000-49,000 cfu/ml 11/04/2019   ,   Wound Culure: No results found for: WOUNDCULT      Imaging:    CT head wo contrast   Final Result by Giselle Olson DO (05/21 1240)      Slight interval increase in pneumocephalus and subgaleal air both superficial and deep to the left cranioplasty site  Persistent mild vasogenic edema left frontal lobe  Workstation performed: [id number]         CT sinus wo contrast   Final Result by Giselle Olson DO (05/21 6909)      Pneumocephalus and subgaleal air identified slightly increased from the prior CT scan and presumed to extend from the left frontal sinus where fluid and air opacity is noted  There is a direct communication from the left frontal sinus to the left frontal    extra-axial air  Workstation performed: [id number]           VTE Pharmacologic Prophylaxis: Enoxaparin (Lovenox)    Current Facility-Administered Medications   Medication Dose Route Frequency   • acetaminophen (TYLENOL) tablet 650 mg  650 mg Oral Q6H PRN   • amoxicillin-clavulanate (AUGMENTIN) 875-125 mg per tablet 1 tablet  1 tablet Oral BID   • atorvastatin (LIPITOR) tablet 40 mg  40 mg Oral Daily   • enoxaparin (LOVENOX) subcutaneous injection 40 mg  40 mg Subcutaneous Q24H YANNI   • lisinopril (ZESTRIL) tablet 10 mg  10 mg Oral Daily    And   • hydrochlorothiazide (HYDRODIURIL) tablet 12 5 mg  12 5 mg Oral Daily   • insulin glargine (LANTUS) subcutaneous injection 15 Units 0 15 mL  15 Units Subcutaneous HS   • insulin lispro (HumaLOG) 100 units/mL subcutaneous injection 1-6 Units  1-6 Units Subcutaneous TID AC   • levothyroxine tablet 137 mcg  137 mcg Oral Daily     Jeannette Blanca MD  Family Medicine Resident PGY1

## 2023-05-22 NOTE — TELEPHONE ENCOUNTER
05/25/2023- [facility]    05/23/2023-PT STILL IN HOSPITAL    05/22/2023-PT STILL IN HOSPITAL  05/26/2023-POV W/HDM IN [place]  05/31/2023-POV W/HDM IN [place] NO IMAGING    PER RIAZ:  Can cancel both  He got readmitted and is currently in the hospital  Whenever he discharges we will ensure he has the appropriate follow up       BOTH POV APT'S ARE CANCELLED

## 2023-05-22 NOTE — UTILIZATION REVIEW
Initial Clinical Review    Observation 5/21 @ 1409 and changed to Inpatient on 5/23 @ 1438  Pt requiring continued stay for Pneumocephalus and Treat    Admission: Date/Time/Statement:   Admission Orders (From admission, onward)     Ordered        05/23/23 1438  Inpatient Admission  Once            05/21/23 1409  Place in Observation  Once                      Orders Placed This Encounter   Procedures   • Inpatient Admission     Standing Status:   Standing     Number of Occurrences:   1     Order Specific Question:   Level of Care     Answer:   Med Surg [16]     Order Specific Question:   Estimated length of stay     Answer:   More than 2 Midnights     Order Specific Question:   Certification     Answer:   I certify that inpatient services are medically necessary for this patient for a duration of greater than two midnights  See H&P and MD Progress Notes for additional information about the patient's course of treatment  ED Arrival Information     Expected   -    Arrival   5/21/2023 09:15    Acuity   Urgent            Means of arrival   Walk-In    Escorted by   [address] [place] [address]    Admission type   Emergency            Arrival complaint   head swelling           Chief Complaint   Patient presents with   • Mass     Pt has a hx of mass above left eye that he has been getting treatment on, last night he noticed that there was some increased swelling this morning after sneezing  worse with some pressure at the site  Initial Presentation: 56 y o  male to ED presents for for new left forehead swelling following a sneezing fit on Friday night  recent admission from 5/8-5/13 for pneumocephalus, discharged with conservative observation, 21 days of Augmentin and changes to his DMII regimen presenting for new left forehead swelling following a sneezing fit on Friday night   He was seen in outpt office on [id number] with noted mild periorbital swelling but no acute concerns, however had a sneezing episode on the 19th and has had persistent swelling since  Pt tried to watch swelling conservatively at home with pressure and breathing exercises, however swelling did not decrease through Friday night into Saturday  On Sunday morning, patient sneezed again and noticed swelling was getting slightly larger and starting to cross midline, associated with increasing pressure  PMH for TII DM, HTN, HLD, Hypothyroidism and Meningioma s/p craniotomy 4/11  Admit Observation level of care for Pneumocephalus  Neurosurgery consult  F/u ESR and CRP  NPO at [facility] pending Neurosurgery eval  Pain control  Continue PTA Augmentin 875-125mg BID  5/21 CT Sinus: Pnuemocephalus and subgaleal air identified slightly increased from the prior CT scan and presumed to extend from the left frontal sinus where fluid and air opacity is noted  There is a direct communication from the left frontal sinus to the left frontal extra-axial air   5/21 CTH: Slight interval increase in pneumocephalus and subgaleal air both superficial and deep to the left cranioplasty site  Persistent mild vasogenic edema left frontal lobe  5/21  Neurosurgery cons; Pneumocephalus  Recurrent left forehead swelling following a sneezing fit on Friday night on 5/19/23  S/p left total craniotomy with craniectomy and orbital osteotomy with resection of extradural mass  Exenteration/obliteration of left frontal sinus and custom synthetic cranioplasty on 4/11/2023  Continue neuro checks  Continue to follow and discuss further management during rounds tomorrow  Pain control  5/22  Progress notes; NPO today pending Neurosurgery, Jaciel/Cho diet if no planned intervention  Tylenol PRN for pain  Continue po antibiotics  Still has some headache associated with the swelling however it is not cumbersome  Per Neurosurgery; Further management to be discussed later today  Pt reports he has increased swelling on his left forehead compared to yesterday   He also reports increased headache/ head pressure on his left frontal region  He also reports pressure above his left eye      5/23 Changed to Inpatient status  ENT cons; 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  Pt will require joint procedure with ENT and NSGY for possible frontal nasal duct obliteration  Rhinologist to eval pt tomorrow 5/24  Per Neurosurgery; Ongoing medical management and pain  Tentative plan for f/u with ENT  Progress notes; Headwrap of scalp  Will need endoscopic endonasal repair of front nasal duct with ENT  Pt still continues with swelling on L forehead but stated it was smaller than before  Date: 5/24  Day 2:  Progress notes; Rhinologist evaluation today  Swelling is roughly the same to yesterday, continues to endorse 8/10 pain that is closer to pressure, tylenol is helpful but does not totally relieve the pain  Las BM Monday  Per Neurosurgery; Evaluation by Rhinologist today  Will again aspirate collection to alleviate pressure given patient discomfort  Per Pt, yesterday there was a small recurrence of the collection but that it was still soft and not bothersome  This morning patient collection has again enlarged but now has tension across the overlying skin with fullness underneath   This is causing pressure pain across the skin and resulting in moderate discomfort       ED Triage Vitals [05/21/23 0917]   Temperature Pulse Respirations Blood Pressure SpO2   97 6 °F (36 4 °C) 104 16 121/72 99 %      Temp Source Heart Rate Source Patient Position - Orthostatic VS BP Location FiO2 (%)   Tympanic -- Lying Right arm --      Pain Score       No Pain          Wt Readings from Last 1 Encounters:   05/18/23 83 5 kg (184 lb)     Additional Vital Signs:   05/24/23 11:16:47 97 9 °F (36 6 °C) 86 16 102/69 80 98 % -- --   05/24/23 0800 -- -- -- -- -- -- None (Room air) --   05/24/23 07:38:14 98 °F (36 7 °C) 92 16 116/79 91 98 % -- --   05/23/23 22:17:21 97 8 °F (36 6 °C) 71 -- 104/62 76 98 % -- --   05/23/23 2038 -- -- -- -- -- -- None (Room air) --   05/23/23 15:08:38 98 1 °F (36 7 °C) 87 -- 97/69 78 96 % --      05/22/23 07:41:12 98 2 °F (36 8 °C) 76 22 107/66 80 98 % None (Room air) Lying   05/21/23 22:26:35 98 °F (36 7 °C) 69 18 107/59 75 98 % -- --   05/21/23 15:12:45 97 5 °F (36 4 °C) 77 16 108/69 82 98 % -- --   05/21/23 1340 -- 86 16 100/57 72 100 % --      Pertinent Labs/Diagnostic Test Results:   CT head wo contrast   Final Result by Tanmay Domingo DO (05/21 1241)      Slight interval increase in pneumocephalus and subgaleal air both superficial and deep to the left cranioplasty site  Persistent mild vasogenic edema left frontal lobe  Workstation performed: [id number]         CT sinus wo contrast   Final Result by Tanmay Domingo DO (05/21 1245)      Pneumocephalus and subgaleal air identified slightly increased from the prior CT scan and presumed to extend from the left frontal sinus where fluid and air opacity is noted  There is a direct communication from the left frontal sinus to the left frontal    extra-axial air           Workstation performed: [id number]               Results from last 7 days   Lab Units 05/24/23  0521 05/23/23  0531 05/22/23  0735 05/21/23  1104   HEMATOCRIT % 44 1 44 5 44 6 42 1   HEMOGLOBIN g/dL 16 1 15 8 16 2 15 2   NEUTROS ABS Thousands/µL 3 48 3 67 3 32 4 23   PLATELETS Thousands/uL 240 217 224 219   WBC Thousand/uL 7 51 7 76 8 07 9 39         Results from last 7 days   Lab Units 05/24/23  0521 05/23/23  0531 05/22/23  0735 05/21/23  1104   ANION GAP mmol/L 1* 0* 1* 3*   BUN mg/dL 13 15 13 15   CALCIUM mg/dL 9 2 9 1 9 2 8 9   CHLORIDE mmol/L 106 109* 109* 109*   CO2 mmol/L 27 26 27 26   CREATININE mg/dL 0 67 0 62 0 71 1 02   EGFR ml/min/1 73sq m 108 111 105 82   POTASSIUM mmol/L 3 7 3 7 3 6 3 4*   SODIUM mmol/L 134* 135 137 138         Results from last 7 days   Lab Units 05/24/23  1115 05/24/23  0619 05/23/23  2727 05/23/23  1614 05/23/23  1057 05/23/23  0619 05/22/23  2121 05/22/23  1659 05/22/23  1105 05/22/23  0936 05/22/23  0627 05/21/23  2147   POC GLUCOSE mg/dl 219* 199* 288* 241* 222* 180* 231* 223* 145* 158* 157* 211*     Results from last 7 days   Lab Units 05/24/23  0521 05/23/23  0531 05/22/23  0735 05/21/23  1104   GLUCOSE RANDOM mg/dL 219* 187* 180* 234*         Results from last 7 days   Lab Units 05/21/23  1104   CRP mg/L <3 0   SED RATE mm/hour 7       ED Treatment:   Medication Administration from 05/21/2023 0915 to 05/21/2023 1452     None        Past Medical History:   Diagnosis Date   • Diabetes mellitus ([facility] [address] )    • Elevated LFTs     last assessed 02Nov2015   • Hyperlipidemia    • Hypertension    • Impaired fasting glucose     last assessed 29Jan2014     Present on Admission:  • Pneumocephalus  • Benign essential hypertension  • Diabetes (HCC)  • Hyperlipidemia  • Other specified hypothyroidism  • Meningioma [facility])      Admitting Diagnosis: Pneumocephalus [G93 89]  Meningioma ([facility] [address] ) [D32 9]  Status post craniotomy [Z98 890]  History of diabetes mellitus [Z86 39]  Mass of left eye [H57 89]  Age/Sex: 56 y o  male     Admission Orders:  Scheduled Medications:  acetaminophen, 975 mg, Oral, Q6H [facility]  amoxicillin-clavulanate, 1 tablet, Oral, BID  atorvastatin, 40 mg, Oral, Daily  enoxaparin, 40 mg, Subcutaneous, Q24H YANNI  lisinopril, 10 mg, Oral, Daily   And  hydrochlorothiazide, 12 5 mg, Oral, Daily  insulin glargine, 20 Units, Subcutaneous, HS  insulin lispro, 1-6 Units, Subcutaneous, TID AC  insulin lispro, 3 Units, Subcutaneous, TID With Meals  levothyroxine, 137 mcg, Oral, Daily  lidocaine (PF), 5 mL, Infiltration, Once      Continuous IV Infusions: None     PRN Meds:  oxyCODONE, 5 mg, Oral, Q4H PRN 5/24 x1  oxyCODONE, 2 5 mg, Oral, Q4H PRN 5/22 x1        IP CONSULT TO NEUROSURGERY  IP CONSULT TO ENT    Network Utilization Review Department  ATTENTION: Please call with any questions or concerns to [phone] and carefully listen to the prompts so that you are directed to the right person  All voicemails are confidential   [facility] [place] all requests for admission clinical reviews, approved or denied determinations and any other requests to dedicated fax number below belonging to the campus where the patient is receiving treatment   List of dedicated fax numbers for the Facilities:  [address] DENIALS (Administrative/Medical Necessity) [phone]   [address] (Maternity/NICU/Pediatrics) [phone]   [address] [phone]   [facility] [phone]   [address] [address] [place] [address] [phone]   1555 [facility] 134 815 [facility] [phone]

## 2023-05-23 LAB
ANION GAP SERPL CALCULATED.3IONS-SCNC: 0 MMOL/L (ref 4–13)
B2 TRANSFERRIN FLD-MCNC: NORMAL PG/ML
BACTERIA UR QL AUTO: ABNORMAL /HPF
BASOPHILS # BLD AUTO: 0.05 THOUSANDS/ÂΜL (ref 0–0.1)
BASOPHILS NFR BLD AUTO: 1 % (ref 0–1)
BILIRUB UR QL STRIP: NEGATIVE
BUN SERPL-MCNC: 15 MG/DL (ref 5–25)
CALCIUM SERPL-MCNC: 9.1 MG/DL (ref 8.3–10.1)
CHLORIDE SERPL-SCNC: 109 MMOL/L (ref 96–108)
CLARITY UR: CLEAR
CO2 SERPL-SCNC: 26 MMOL/L (ref 21–32)
COLOR UR: ABNORMAL
CREAT SERPL-MCNC: 0.62 MG/DL (ref 0.6–1.3)
EOSINOPHIL # BLD AUTO: 0.12 THOUSAND/ÂΜL (ref 0–0.61)
EOSINOPHIL NFR BLD AUTO: 2 % (ref 0–6)
ERYTHROCYTE [DISTWIDTH] IN BLOOD BY AUTOMATED COUNT: 12 % (ref 11.6–15.1)
GFR SERPL CREATININE-BSD FRML MDRD: 111 ML/MIN/1.73SQ M
GLUCOSE SERPL-MCNC: 180 MG/DL (ref 65–140)
GLUCOSE SERPL-MCNC: 187 MG/DL (ref 65–140)
GLUCOSE SERPL-MCNC: 222 MG/DL (ref 65–140)
GLUCOSE SERPL-MCNC: 241 MG/DL (ref 65–140)
GLUCOSE SERPL-MCNC: 288 MG/DL (ref 65–140)
GLUCOSE UR STRIP-MCNC: ABNORMAL MG/DL
HCT VFR BLD AUTO: 44.5 % (ref 36.5–49.3)
HGB BLD-MCNC: 15.8 G/DL (ref 12–17)
HGB UR QL STRIP.AUTO: NEGATIVE
IMM GRANULOCYTES # BLD AUTO: 0.03 THOUSAND/UL (ref 0–0.2)
IMM GRANULOCYTES NFR BLD AUTO: 0 % (ref 0–2)
KETONES UR STRIP-MCNC: NEGATIVE MG/DL
LEUKOCYTE ESTERASE UR QL STRIP: ABNORMAL
LYMPHOCYTES # BLD AUTO: 3.29 THOUSANDS/ÂΜL (ref 0.6–4.47)
LYMPHOCYTES NFR BLD AUTO: 42 % (ref 14–44)
MCH RBC QN AUTO: 31.4 PG (ref 26.8–34.3)
MCHC RBC AUTO-ENTMCNC: 35.5 G/DL (ref 31.4–37.4)
MCV RBC AUTO: 89 FL (ref 82–98)
MONOCYTES # BLD AUTO: 0.6 THOUSAND/ÂΜL (ref 0.17–1.22)
MONOCYTES NFR BLD AUTO: 8 % (ref 4–12)
MUCOUS THREADS UR QL AUTO: ABNORMAL
NEUTROPHILS # BLD AUTO: 3.67 THOUSANDS/ÂΜL (ref 1.85–7.62)
NEUTS SEG NFR BLD AUTO: 47 % (ref 43–75)
NITRITE UR QL STRIP: NEGATIVE
NON-SQ EPI CELLS URNS QL MICRO: ABNORMAL /HPF
NRBC BLD AUTO-RTO: 0 /100 WBCS
PH UR STRIP.AUTO: 7 [PH]
PLATELET # BLD AUTO: 217 THOUSANDS/UL (ref 149–390)
PMV BLD AUTO: 9.6 FL (ref 8.9–12.7)
POTASSIUM SERPL-SCNC: 3.7 MMOL/L (ref 3.5–5.3)
PROT UR STRIP-MCNC: NEGATIVE MG/DL
RBC # BLD AUTO: 5.03 MILLION/UL (ref 3.88–5.62)
RBC #/AREA URNS AUTO: ABNORMAL /HPF
SODIUM SERPL-SCNC: 135 MMOL/L (ref 135–147)
SP GR UR STRIP.AUTO: 1.01 (ref 1–1.03)
UROBILINOGEN UR STRIP-ACNC: 4 MG/DL
WBC # BLD AUTO: 7.76 THOUSAND/UL (ref 4.31–10.16)
WBC #/AREA URNS AUTO: ABNORMAL /HPF

## 2023-05-23 RX ADMIN — ACETAMINOPHEN 975 MG: 325 TABLET ORAL at 12:38

## 2023-05-23 RX ADMIN — ACETAMINOPHEN 650 MG: 325 TABLET ORAL at 20:38

## 2023-05-23 RX ADMIN — INSULIN LISPRO 2 UNITS: 100 INJECTION, SOLUTION INTRAVENOUS; SUBCUTANEOUS at 12:33

## 2023-05-23 RX ADMIN — ATORVASTATIN CALCIUM 40 MG: 40 TABLET, FILM COATED ORAL at 09:13

## 2023-05-23 RX ADMIN — ENOXAPARIN SODIUM 40 MG: 40 INJECTION SUBCUTANEOUS at 09:13

## 2023-05-23 RX ADMIN — INSULIN GLARGINE 15 UNITS: 100 INJECTION, SOLUTION SUBCUTANEOUS at 21:27

## 2023-05-23 RX ADMIN — INSULIN LISPRO 3 UNITS: 100 INJECTION, SOLUTION INTRAVENOUS; SUBCUTANEOUS at 16:48

## 2023-05-23 RX ADMIN — INSULIN LISPRO 1 UNITS: 100 INJECTION, SOLUTION INTRAVENOUS; SUBCUTANEOUS at 08:59

## 2023-05-23 RX ADMIN — ACETAMINOPHEN 975 MG: 325 TABLET ORAL at 01:07

## 2023-05-23 RX ADMIN — HYDROCHLOROTHIAZIDE 12.5 MG: 12.5 TABLET ORAL at 09:13

## 2023-05-23 RX ADMIN — LEVOTHYROXINE SODIUM 137 MCG: 25 TABLET ORAL at 09:13

## 2023-05-23 RX ADMIN — AMOXICILLIN AND CLAVULANATE POTASSIUM 1 TABLET: 875; 125 TABLET, FILM COATED ORAL at 09:14

## 2023-05-23 RX ADMIN — AMOXICILLIN AND CLAVULANATE POTASSIUM 1 TABLET: 875; 125 TABLET, FILM COATED ORAL at 18:00

## 2023-05-23 RX ADMIN — LISINOPRIL 10 MG: 10 TABLET ORAL at 09:13

## 2023-05-23 NOTE — PROGRESS NOTES
"[facility]  Progress Note  Name: Daryl rBown  MRN: [id number]  Unit/Bed#: PPHP 726-01 I Date of Admission: 5/21/2023   Date of Service: 5/23/2023 I Hospital Day: 0    Assessment/Plan   * Pneumocephalus  Assessment & Plan  · Recurrent/increased left forehead swelling following a sneezing episode on Friday night on 5/19/23  ·  S/p left total craniotomy with craniectomy and orbital osteotomy with resection of extradural mass  Exenteration/obliteration of left frontal sinus and custom synthetic cranioplasty on 4/11/2023 with Dr Shanon Singh  · Imaging reviewed personally and by attending  Final results as below  · CT sinus without contrast 5/21/2023: Slight interval increase in pneumocephalus and subgaleal air both superficial and deep to the left cranioplasty site  Persistent mild vasogenic edema left frontal lobe  Plan  · Continue regular neurologic checks  · Ongoing medical management and pain control per primary team   · Pt remains afebrile  WBC normal    · Eval and mobilize per PT/OT  · DVT PPX: SCDs, Lovenox  · 5/22/23 Dr Shanon Singh had discussed with patient options for management  Tentative plan is for ENT to see patient tomorrow for further evaluation  · Neurosurgery will continue to follow  Please call with any questions or concerns  Status post craniotomy and cranioplasty  Assessment & Plan  See plan above  Meningioma [facility])  Assessment & Plan  S/p resection of Meningioma  See plan above  Subjective/Objective     Chief Complaint: \"My forehead swelling improved yesterday, but it is increased again today\"    Subjective: Pt reported that his left forehead swelling improved yesterday evening after aspiration of subgaleal air, however when he woke up this morning the left forehead swelling had increased again, thought not as much as it was prior to aspiration  He reports a headache that he rated as 8/10 on the pain scale       Objective: " Alert and awake, No acute distress  I/O       05/21 0701 05/22 0700 05/22 0701 05/23 0700 05/23 0701  05/24 0700    P  O   240 338    Total Intake  240 338    Net  +240 +338           Unmeasured Urine Occurrence   2 x    Unmeasured Stool Occurrence   0 x          Invasive Devices     Peripheral Intravenous Line  Duration           Peripheral IV 05/21/23 Left;Ventral (anterior) Forearm 2 days                Physical Exam:  Vitals: Blood pressure 97/69, pulse 87, temperature 98 1 °F (36 7 °C), resp  rate 17, SpO2 96 %  ,There is no height or weight on file to calculate BMI  General appearance:  Appears stated age  Head: Left frontal forehead swelling increased, not as swollen as prior to aspiration  Eyes: EOMI, PERRL  Nose: No rhinorrhea noted  Neck: supple, symmetrical, trachea midline   Lungs: non labored breathing  Heart: regular heart rate  Neurologic:   Mental status: Alert, oriented , thought content appropriate  Cranial nerves: grossly intact (Cranial nerves II-XII)  Sensory: normal to LT X 4  Motor: moving all extremities without focal weakness  Coordination:no drift in bilateral upper extremities      Lab Results:  Results from last 7 days   Lab Units 05/23/23  0531 05/22/23  0735 05/21/23  1104   WBC Thousand/uL 7 76 8 07 9 39   HEMOGLOBIN g/dL 15 8 16 2 15 2   HEMATOCRIT % 44 5 44 6 42 1   PLATELETS Thousands/uL 217 224 219   NEUTROS PCT % 47 41* 45   MONOS PCT % 8 7 8     Results from last 7 days   Lab Units 05/23/23  0531 05/22/23  0735 05/21/23  1104   POTASSIUM mmol/L 3 7 3 6 3 4*   CHLORIDE mmol/L 109* 109* 109*   CO2 mmol/L 26 27 26   BUN mg/dL 15 13 15   CREATININE mg/dL 0 62 0 71 1 02   CALCIUM mg/dL 9 1 9 2 8 9           Imaging Studies: I have personally reviewed pertinent reports and I have personally reviewed pertinent films in PACS    EKG, Pathology, and Other Studies: I have personally reviewed pertinent reports        PLEASE NOTE:  This encounter may have been completed utilizing the M- Mod[employer]Fluency Direct Speech Voice Recognition Software  Grammatical errors, random word insertions, pronoun errors and incomplete sentences are occasional consequences of the system due to software limitations, ambient noise and hardware issues  These may be missed by proof reading prior to affixing electronic signature  Any questions or concerns about the content, text or information contained within the body of this dictation should be directly addressed to the advanced practitioner or physician for clarification

## 2023-05-23 NOTE — PLAN OF CARE
Problem: PAIN - ADULT  Goal: Verbalizes/displays adequate comfort level or baseline comfort level  Description: Interventions:  - Encourage patient to monitor pain and request assistance  - Assess pain using appropriate pain scale  - Administer analgesics based on type and severity of pain and evaluate response  - Implement non-pharmacological measures as appropriate and evaluate response  - Consider cultural and social influences on pain and pain management  - Notify physician/advanced practitioner if interventions unsuccessful or patient reports new pain  Outcome: Progressing     Problem: INFECTION - ADULT  Goal: Absence or prevention of progression during hospitalization  Description: INTERVENTIONS:  - Assess and monitor for signs and symptoms of infection  - Monitor lab/diagnostic results  - Monitor all insertion sites, i e  indwelling lines, tubes, and drains  - Monitor endotracheal if appropriate and nasal secretions for changes in amount and color  - [place] appropriate cooling/warming therapies per order  - Administer medications as ordered  - Instruct and encourage patient and family to use good hand hygiene technique  - Identify and instruct in appropriate isolation precautions for identified infection/condition  Outcome: Progressing  Goal: Absence of fever/infection during neutropenic period  Description: INTERVENTIONS:  - Monitor WBC    Outcome: Progressing     Problem: SAFETY ADULT  Goal: Patient will remain free of falls  Description: INTERVENTIONS:  - Educate patient/family on patient safety including physical limitations  - Instruct patient to call for assistance with activity   - Consult OT/PT to assist with strengthening/mobility   - Keep Call bell within reach  - Keep bed low and locked with side rails adjusted as appropriate  - Keep care items and personal belongings within reach  - Initiate and maintain comfort rounds  - Make Fall Risk Sign visible to staff  - Offer Toileting every 2 Hours, in advance of need  - Initiate/Maintain bed alarm  - Apply yellow socks and bracelet for high fall risk patients  - Consider moving patient to room near nurses station  Outcome: Progressing  Goal: Maintain or return to baseline ADL function  Description: INTERVENTIONS:  -  Assess patient's ability to carry out ADLs; assess patient's baseline for ADL function and identify physical deficits which impact ability to perform ADLs (bathing, care of mouth/teeth, toileting, grooming, dressing, etc )  - Assess/evaluate cause of self-care deficits   - Assess range of motion  - Assess patient's mobility; develop plan if impaired  - Assess patient's need for assistive devices and provide as appropriate  - Encourage maximum independence but intervene and supervise when necessary  - Involve family in performance of ADLs  - Assess for home care needs following discharge   - Consider OT consult to assist with ADL evaluation and planning for discharge  - Provide patient education as appropriate  Outcome: Progressing  Goal: Maintains/Returns to pre admission functional level  Description: INTERVENTIONS:  - Perform BMAT or MOVE assessment daily    - Set and communicate daily mobility goal to care team and patient/family/caregiver  - Collaborate with rehabilitation services on mobility goals if consulted  - Perform Range of Motion 2 times a day  - Reposition patient every 2 hours    - Dangle patient 2 times a day  - Stand patient 2 times a day  - Ambulate patient 2 times a day  - Out of bed to chair 2 times a day   - Out of bed for meals 2 times a day  - Out of bed for toileting  - Record patient progress and toleration of activity level   Outcome: Progressing     Problem: DISCHARGE PLANNING  Goal: Discharge to home or other facility with appropriate resources  Description: INTERVENTIONS:  - Identify barriers to discharge w/patient and caregiver  - Arrange for needed discharge resources and transportation as appropriate  - Identify discharge learning needs (meds, wound care, etc )  - Arrange for interpretive services to assist at discharge as needed  - Refer to Case Management Department for coordinating discharge planning if the patient needs post-hospital services based on physician/advanced practitioner order or complex needs related to functional status, cognitive ability, or social support system  Outcome: Progressing     Problem: Knowledge Deficit  Goal: Patient/family/caregiver demonstrates understanding of disease process, treatment plan, medications, and discharge instructions  Description: Complete learning assessment and assess knowledge base    Interventions:  - Provide teaching at level of understanding  - Provide teaching via preferred learning methods  Outcome: Progressing     Problem: NEUROSENSORY - ADULT  Goal: Achieves stable or improved neurological status  Description: INTERVENTIONS  - Monitor and report changes in neurological status  - Monitor vital signs such as temperature, blood pressure, glucose, and any other labs ordered   - Initiate measures to prevent increased intracranial pressure  - Monitor for seizure activity and implement precautions if appropriate      Outcome: Progressing

## 2023-05-23 NOTE — PLAN OF CARE
Problem: PAIN - ADULT  Goal: Verbalizes/displays adequate comfort level or baseline comfort level  Description: Interventions:  - Encourage patient to monitor pain and request assistance  - Assess pain using appropriate pain scale  - Administer analgesics based on type and severity of pain and evaluate response  - Implement non-pharmacological measures as appropriate and evaluate response  - Consider cultural and social influences on pain and pain management  - Notify physician/advanced practitioner if interventions unsuccessful or patient reports new pain  Outcome: Progressing     Problem: INFECTION - ADULT  Goal: Absence or prevention of progression during hospitalization  Description: INTERVENTIONS:  - Assess and monitor for signs and symptoms of infection  - Monitor lab/diagnostic results  - Monitor all insertion sites, i e  indwelling lines, tubes, and drains  - Monitor endotracheal if appropriate and nasal secretions for changes in amount and color  - [place] appropriate cooling/warming therapies per order  - Administer medications as ordered  - Instruct and encourage patient and family to use good hand hygiene technique  - Identify and instruct in appropriate isolation precautions for identified infection/condition  Outcome: Progressing     Problem: SAFETY ADULT  Goal: Patient will remain free of falls  Description: INTERVENTIONS:  - Educate patient/family on patient safety including physical limitations  - Instruct patient to call for assistance with activity   - Consult OT/PT to assist with strengthening/mobility   - Keep Call bell within reach  - Keep bed low and locked with side rails adjusted as appropriate  - Keep care items and personal belongings within reach  - Initiate and maintain comfort rounds  - Make Fall Risk Sign visible to staff  - Apply yellow socks and bracelet for high fall risk patients  - Consider moving patient to room near nurses station  Outcome: Progressing

## 2023-05-23 NOTE — CONSULTS
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 despite prior obliteration  - patient will likely require joint procedure with ENT and NSGY for possible frontal nasal duct obliteration    - Dr Quin Durham (Rhinologist) will evaluate patient tomorrow 5/24  - Intervention may not occur during this admission  HPI  57 yo M with prior left total craniotomy with craniectomy and orbital osteotomy with resection of meningioma  Patient with subsequent obliteration of left frontal sinus and custom synthetic cranioplasty on 4/11/23 with Dr Agustin Magaña who presents with recurrent left forehead swelling after blowing his nose in early May 2023      [facility] MEDICATIONS  Current Facility-Administered Medications   Medication Dose Route Frequency Provider Last Rate Last Admin   • acetaminophen (TYLENOL) tablet 975 mg  975 mg Oral Q6H [address] Rexann Kocher, MD   975 mg at 05/23/23 0107   • amoxicillin-clavulanate (AUGMENTIN) 875-125 mg per tablet 1 tablet  1 tablet Oral BID Rexann Kocher, MD   1 tablet at 05/22/23 1707   • atorvastatin (LIPITOR) tablet 40 mg  40 mg Oral Daily Rexann Kocher, MD   40 mg at 05/22/23 9726   • enoxaparin (LOVENOX) subcutaneous injection 40 mg  40 mg Subcutaneous Q24H [address] Rexann Kocher, MD   40 mg at 05/21/23 1548   • lisinopril (ZESTRIL) tablet 10 mg  10 mg Oral Daily Rexann Kocher, MD        And   • hydrochlorothiazide (HYDRODIURIL) tablet 12 5 mg  12 5 mg Oral Daily Rexann Kocher, MD       • insulin glargine (LANTUS) subcutaneous injection 15 Units 0 15 mL  15 Units Subcutaneous HS Rexann Kocher, MD   15 Units at 05/22/23 2214   • insulin lispro (HumaLOG) 100 units/mL subcutaneous injection 1-6 Units  1-6 Units Subcutaneous TID [facility] Latisha Ann MD   2 Units at 05/22/23 1706   • levothyroxine tablet 137 mcg  137 mcg Oral Daily Latisha Ann MD   137 mcg at 05/22/23 8500   • oxyCODONE (ROXICODONE) IR tablet 5 mg  5 mg Oral Q4H PRN Latisha Ann MD       • oxyCODONE (ROXICODONE) split tablet 2 5 mg  2 5 mg Oral Q4H PRN Latisha Ann MD   2 5 mg at 05/22/23 1442       REVIEW OF SYSTEMS  As above    HISTORIES  PMH:  Past Medical History:   Diagnosis Date   • Diabetes mellitus ([address] )    • Elevated LFTs     last assessed 02Nov2015   • Hyperlipidemia    • Hypertension    • Impaired fasting glucose     last assessed 29Jan2014       PSH:  Past Surgical History:   Procedure Laterality Date   • APPENDECTOMY     • COLONOSCOPY     • CO CRANIEC TREPHINE BONE FLP BRAIN TUMOR SUPRTENTOR Left 4/11/2023    Procedure: IMAGE-GUIDED LEFT FRONTAL CRANIOTOMY FOR RESECTION OF EXTRA-AXIAL & SKULL MASS, WITH CRANIOPLASTY, EXENTERATION/OBLITERATION OF FRONTAL SINUS; ORBITAL OSTEOTOMY;  Surgeon: Doron Garibay MD;  Location: [facility];  Service: Neurosurgery       SocHx:  Social History     Tobacco Use   • Smoking status: Never   • Smokeless tobacco: Never   Vaping Use   • Vaping Use: Never used   Substance Use Topics   • Alcohol use: Not Currently     Alcohol/week: 3 0 standard drinks     Types: 3 Glasses of wine per week     Comment: drinks daily    • Drug use: Never       FH:  Family History   Problem Relation Age of Onset   • Diabetes Mother    • Breast cancer Mother    • Diabetes Father        ALLERGIES:  No Known Allergies    PHYSICAL EXAM  Visit Vitals  /62   Pulse 73   Temp 98 1 °F (36 7 °C)   Resp 18   SpO2 97%   Smoking Status Never       General: NAD, AOx4  Eyes:  EOMI, PERRL  Ears:  External ears normal in appearance  Nose:  External appearance normal  Oral cavity:  No trismus, no mass/lesions  Neck: Trachea is midline; no thyroid nodules, Salivary glands symmetrical, no masses/abnormality on "palpation  Lymph:  No cervical lymphadenopathy  Skin:  Left subgaleal emphysema  Neuro: Motor and sensory grossly intact  Face symmetrical, no obvious cranial nerve palsies,motor and sensory grossly intact, no focal deficits  Lungs:  Normal work of breathing, symmetrical chest expansion  Vascular: Well perfused      LABORATORY  Reviewed    PROCEDURES  None  RADIOLOGY  CT sinus  \"     Pneumocephalus and subgaleal air identified slightly increased from the prior CT scan and presumed to extend from the left frontal sinus where fluid and air opacity is noted  There is a direct communication from the left frontal sinus to the left frontal   extra-axial air  \"    Patient Active Problem List    Diagnosis Date Noted   • Status post craniotomy and cranioplasty 05/09/2023   • Meningioma ([facility] [address] ) 05/08/2023   • Left-sided low back pain without sciatica 03/20/2023   • Pneumocephalus 11/10/2022   • Subacromial impingement, right 01/12/2022   • Rotator cuff syndrome, right 01/12/2022   • Status post appendectomy 06/28/2021   • Dental caries 04/26/2021   • Frontal skull lesion 12/24/2020   • Dizziness 11/23/2020   • Other specified glaucoma 11/23/2020   • Candida rash of groin 08/17/2020   • Chronic pain in penis 06/12/2020   • 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   • Balanitis 11/15/2019   • Cough 01/22/2019   • Elevated AST (SGOT) 12/20/2018   • Alcohol abuse 12/20/2018   • Adhesive capsulitis of right shoulder 02/21/2018   • Right shoulder pain 80/63/7503   • Uncomplicated alcohol dependence ([facility] [address] ) 11/02/2015   • Diabetes ([facility] [address] ) 11/02/2015   • Benign essential hypertension 04/16/2015   • Hyperlipidemia 01/05/2015   • Other specified hypothyroidism 08/05/2013     Ayana Cantu MD PGY-2  [address] Otolaryngology - Head and Neck Surgery  Available on [facility] Text  Please contact ENT Resident [place] Text Role for any questions or concerns      "

## 2023-05-23 NOTE — ASSESSMENT & PLAN NOTE
Lab Results   Component Value Date    HGBA1C 7 8 (H) 05/12/2023       Recent Labs     05/22/23  1105 05/22/23  1659 05/22/23  2121 05/23/23  0619   POCGLU 145* 223* 231* 180*       Blood Sugar Average: Last 72 hrs:  Last A1c 7 8 on 5/12, admission Glucose 234  On Home Lantus 15u Qhs and Humalog 3u TID with modified home sliding scale, Jardiance 25mg daily  Notably, was changed from Metformin and Jardiance post operatively due to elevated blood sugars treatment, and initially was mistakenly taking lantus 18u TID without reported hypoglycemic episodes      Plan:  · Hold home Jardiance  · Continue Basal Lantus 15u Qhs  · SSI algorithm 3, adjust as needed

## 2023-05-23 NOTE — ASSESSMENT & PLAN NOTE
56year old male with PMH of Grade II Meningioma s/p left frontal orbital resection on 4/11/23 without immediate complications, who has recently been admitted for significant pneumocephalus (5/8-5/13) noted on CT Sinus/CTH, managed conservatively without further surgical intervention, discharged on oral augmentin with outpatient followup, now presenting for similar left frontal swelling and pneumocephalus 2/2 sneezing fit 5/19  · 5/21 CT Sinus: Pnuemocephalus and subgaleal air identified slightly increased from the prior CT scan and presumed to extend from the left frontal sinus where fluid and air opacity is noted  There is a direct communication from the left frontal sinus to the left frontal extra-axial air  · 5/21 CTH: Slight interval increase in pneumocephalus and subgaleal air both superficial and deep to the left cranioplasty site  Persistent mild vasogenic edema left frontal lobe     · ESR, CRP WNL    Plan:   · Neurosurgery Consult   · S/p Needle aspiration of Pneumocephalus 5/22  · HoB >30 degrees  · Headwrap of scalp  · Will need endoscopic endonasal repair of front nasal duct with ENT  · Q shift neuro checks  · Monitor Vital Signs  · NPO today pending NS, Jaciel/Cho diet if no planned intervention  · Tylenol PRN for pain  · Continue PTA Augmentin 875-125mg BID

## 2023-05-23 NOTE — PROGRESS NOTES
[facility]  Progress Note  Name: Anibal Husain  MRN: [id number]  Unit/Bed#: [facility]PHP 726-01 I Date of Admission: 5/21/2023   Date of Service: 5/23/2023 I Hospital Day: 0    Assessment/Plan   * Pneumocephalus  Assessment & Plan  56year old male with PMH of Grade II Meningioma s/p left frontal orbital resection on 4/11/23 without immediate complications, who has recently been admitted for significant pneumocephalus (5/8-5/13) noted on CT Sinus/CTH, managed conservatively without further surgical intervention, discharged on oral augmentin with outpatient followup, now presenting for similar left frontal swelling and pneumocephalus 2/2 sneezing fit 5/19  · 5/21 CT Sinus: Pnuemocephalus and subgaleal air identified slightly increased from the prior CT scan and presumed to extend from the left frontal sinus where fluid and air opacity is noted  There is a direct communication from the left frontal sinus to the left frontal extra-axial air  · 5/21 CTH: Slight interval increase in pneumocephalus and subgaleal air both superficial and deep to the left cranioplasty site  Persistent mild vasogenic edema left frontal lobe     · ESR, CRP WNL    Plan:   · Neurosurgery Consult   · S/p Needle aspiration of Pneumocephalus 5/22  · HoB >30 degrees  · Headwrap of scalp  · Will need endoscopic endonasal repair of front nasal duct with ENT  · Q shift neuro checks  · Monitor Vital Signs  · NPO today pending NS, Jaciel/Cho diet if no planned intervention  · Tylenol PRN for pain  · Continue PTA Augmentin 875-125mg BID      Diabetes [facility])  Assessment & Plan  Lab Results   Component Value Date    HGBA1C 7 8 (H) 05/12/2023       Recent Labs     05/22/23  1105 05/22/23  1659 05/22/23  2121 05/23/23  0619   POCGLU 145* 223* 231* 180*       Blood Sugar Average: Last 72 hrs:  Last A1c 7 8 on 5/12, admission Glucose 234  On Home Lantus 15u Qhs and Humalog 3u TID with modified home sliding scale, Jardiance 25mg daily  Notably, was changed from Metformin and Jardiance post operatively due to elevated blood sugars treatment, and initially was mistakenly taking lantus 18u TID without reported hypoglycemic episodes  Plan:  · Hold home Jardiance  · Continue Basal Lantus 15u Qhs  · SSI algorithm 3, adjust as needed     Other specified hypothyroidism  Assessment & Plan  · Continue home Levothyroxine 137 mcg      Hyperlipidemia  Assessment & Plan  · Continue home Lipitor 40mg      Benign essential hypertension  Assessment & Plan  Continue home Lisinopril-Hydrochlorothiazide  · Lisinopril 10mg, Hydrochlorothiazide 12 5mg            PPX: Lovenox  Diet: Regular  Code Status: Full   Dispo: Inpatient    Plan D/W Dr Lizbeth Kate and [facility] Team    Subjective:   Patient seen and assessed at bedside this morning, no OVN events, no acute complaints  Feels well, however unfortunately swelling has begun to recur  Not painful at the moment  Also endorsing some dysuria and red tinged urine  Objective:     Vitals: Blood pressure 116/71, pulse 90, temperature 98 6 °F (37 °C), resp  rate 17, SpO2 97 %  ,There is no height or weight on file to calculate BMI  Intake/Output Summary (Last 24 hours) at 5/23/2023 1020  Last data filed at 5/23/2023 4922  Gross per 24 hour   Intake 458 ml   Output --   Net 458 ml     Physical Exam:   Physical Exam  Constitutional:       General: He is not in acute distress  Appearance: He is normal weight  HENT:      Head:      Comments: Left supraorbital swelling     Nose: Nose normal    Eyes:      Conjunctiva/sclera: Conjunctivae normal    Cardiovascular:      Rate and Rhythm: Normal rate and regular rhythm  Heart sounds: Normal heart sounds  No murmur heard  Pulmonary:      Effort: No respiratory distress  Breath sounds: Normal breath sounds  No wheezing  Abdominal:      General: Bowel sounds are normal       Palpations: Abdomen is soft  Tenderness: There is no abdominal tenderness  Skin:     General: Skin is warm and dry  Capillary Refill: Capillary refill takes less than 2 seconds  Findings: No erythema or rash  Neurological:      General: No focal deficit present  Mental Status: He is alert  Mental status is at baseline  Cranial Nerves: Cranial nerves 2-12 are intact  Sensory: Sensation is intact  Motor: Motor function is intact  Coordination: Coordination is intact  Psychiatric:         Mood and Affect: Mood normal          Invasive Devices     Peripheral Intravenous Line  Duration           Peripheral IV 05/21/23 Left;Ventral (anterior) Forearm 1 day                         Lab and other studies:  I have personally reviewed pertinent reports       Admission on 05/21/2023   Component Date Value   • WBC 05/21/2023 9 39    • RBC 05/21/2023 4 75    • Hemoglobin 05/21/2023 15 2    • Hematocrit 05/21/2023 42 1    • MCV 05/21/2023 89    • MCH 05/21/2023 32 0    • MCHC 05/21/2023 36 1    • RDW 05/21/2023 12 2    • MPV 05/21/2023 9 4    • Platelets 46/79/4768 219    • nRBC 05/21/2023 0    • Neutrophils Relative 05/21/2023 45    • Immat GRANS % 05/21/2023 0    • Lymphocytes Relative 05/21/2023 47 (H)    • Monocytes Relative 05/21/2023 8    • Eosinophils Relative 05/21/2023 0    • Basophils Relative 05/21/2023 0    • Neutrophils Absolute 05/21/2023 4 23    • Immature Grans Absolute 05/21/2023 0 03    • Lymphocytes Absolute 05/21/2023 4 29    • Monocytes Absolute 05/21/2023 0 77    • Eosinophils Absolute 05/21/2023 0 03    • Basophils Absolute 05/21/2023 0 04    • Sodium 05/21/2023 138    • Potassium 05/21/2023 3 4 (L)    • Chloride 05/21/2023 109 (H)    • CO2 05/21/2023 26    • ANION GAP 05/21/2023 3 (L)    • BUN 05/21/2023 15    • Creatinine 05/21/2023 1 02    • Glucose 05/21/2023 234 (H)    • Calcium 05/21/2023 8 9    • eGFR 05/21/2023 82    • Sed Rate 05/21/2023 7    • CRP 05/21/2023 <3 0    • POC Glucose 05/21/2023 222 (H)    • POC Glucose 05/21/2023 211 (H)    • WBC 05/22/2023 8 07    • RBC 05/22/2023 5 04    • Hemoglobin 05/22/2023 16 2    • Hematocrit 05/22/2023 44 6    • MCV 05/22/2023 89    • MCH 05/22/2023 32 1    • MCHC 05/22/2023 36 3    • RDW 05/22/2023 12 2    • MPV 05/22/2023 9 3    • Platelets 41/99/8307 224    • nRBC 05/22/2023 0    • Neutrophils Relative 05/22/2023 41 (L)    • Immat GRANS % 05/22/2023 0    • Lymphocytes Relative 05/22/2023 50 (H)    • Monocytes Relative 05/22/2023 7    • Eosinophils Relative 05/22/2023 1    • Basophils Relative 05/22/2023 1    • Neutrophils Absolute 05/22/2023 3 32    • Immature Grans Absolute 05/22/2023 0 02    • Lymphocytes Absolute 05/22/2023 4 02    • Monocytes Absolute 05/22/2023 0 59    • Eosinophils Absolute 05/22/2023 0 08    • Basophils Absolute 05/22/2023 0 04    • Sodium 05/22/2023 137    • Potassium 05/22/2023 3 6    • Chloride 05/22/2023 109 (H)    • CO2 05/22/2023 27    • ANION GAP 05/22/2023 1 (L)    • BUN 05/22/2023 13    • Creatinine 05/22/2023 0 71    • Glucose 05/22/2023 180 (H)    • Glucose, Fasting 05/22/2023 180 (H)    • Calcium 05/22/2023 9 2    • eGFR 05/22/2023 105    • POC Glucose 05/22/2023 157 (H)    • POC Glucose 05/22/2023 158 (H)    • POC Glucose 05/22/2023 145 (H)    • POC Glucose 05/22/2023 223 (H)    • POC Glucose 05/22/2023 231 (H)    • WBC 05/23/2023 7 76    • RBC 05/23/2023 5 03    • Hemoglobin 05/23/2023 15 8    • Hematocrit 05/23/2023 44 5    • MCV 05/23/2023 89    • MCH 05/23/2023 31 4    • MCHC 05/23/2023 35 5    • RDW 05/23/2023 12 0    • MPV 05/23/2023 9 6    • Platelets 92/74/5632 217    • nRBC 05/23/2023 0    • Neutrophils Relative 05/23/2023 47    • Immat GRANS % 05/23/2023 0    • Lymphocytes Relative 05/23/2023 42    • Monocytes Relative 05/23/2023 8    • Eosinophils Relative 05/23/2023 2    • Basophils Relative 05/23/2023 1    • Neutrophils Absolute 05/23/2023 3 67    • Immature Grans Absolute 05/23/2023 0 03    • Lymphocytes Absolute 05/23/2023 3 29    • Monocytes Absolute 05/23/2023 0 60    • Eosinophils Absolute 05/23/2023 0 12    • Basophils Absolute 05/23/2023 0 05    • Sodium 05/23/2023 135    • Potassium 05/23/2023 3 7    • Chloride 05/23/2023 109 (H)    • CO2 05/23/2023 26    • ANION GAP 05/23/2023 0 (L)    • BUN 05/23/2023 15    • Creatinine 05/23/2023 0 62    • Glucose 05/23/2023 187 (H)    • Calcium 05/23/2023 9 1    • eGFR 05/23/2023 111    • POC Glucose 05/23/2023 180 (H)        Recent Results (from the past 24 hour(s))   Fingerstick Glucose (POCT)    Collection Time: 05/22/23 11:05 AM   Result Value Ref Range    POC Glucose 145 (H) 65 - 140 mg/dl   Fingerstick Glucose (POCT)    Collection Time: 05/22/23  4:59 PM   Result Value Ref Range    POC Glucose 223 (H) 65 - 140 mg/dl   Fingerstick Glucose (POCT)    Collection Time: 05/22/23  9:21 PM   Result Value Ref Range    POC Glucose 231 (H) 65 - 140 mg/dl   CBC and differential    Collection Time: 05/23/23  5:31 AM   Result Value Ref Range    WBC 7 76 4 31 - 10 16 Thousand/uL    RBC 5 03 3 88 - 5 62 Million/uL    Hemoglobin 15 8 12 0 - 17 0 g/dL    Hematocrit 44 5 36 5 - 49 3 %    MCV 89 82 - 98 fL    MCH 31 4 26 8 - 34 3 pg    MCHC 35 5 31 4 - 37 4 g/dL    RDW 12 0 11 6 - 15 1 %    MPV 9 6 8 9 - 12 7 fL    Platelets 388 742 - 401 Thousands/uL    nRBC 0 /100 WBCs    Neutrophils Relative 47 43 - 75 %    Immat GRANS % 0 0 - 2 %    Lymphocytes Relative 42 14 - 44 %    Monocytes Relative 8 4 - 12 %    Eosinophils Relative 2 0 - 6 %    Basophils Relative 1 0 - 1 %    Neutrophils Absolute 3 67 1 85 - 7 62 Thousands/µL    Immature Grans Absolute 0 03 0 00 - 0 20 Thousand/uL    Lymphocytes Absolute 3 29 0 60 - 4 47 Thousands/µL    Monocytes Absolute 0 60 0 17 - 1 22 Thousand/µL    Eosinophils Absolute 0 12 0 00 - 0 61 Thousand/µL    Basophils Absolute 0 05 0 00 - 0 10 Thousands/µL   Basic metabolic panel    Collection Time: 05/23/23  5:31 AM   Result Value Ref Range    Sodium 135 135 - 147 mmol/L    Potassium 3 7 3 5 - 5 3 mmol/L    Chloride 109 (H) 96 - 108 mmol/L    CO2 26 21 - 32 mmol/L    ANION GAP 0 (L) 4 - 13 mmol/L    BUN 15 5 - 25 mg/dL    Creatinine 0 62 0 60 - 1 30 mg/dL    Glucose 187 (H) 65 - 140 mg/dL    Calcium 9 1 8 3 - 10 1 mg/dL    eGFR 111 ml/min/1 73sq m   Fingerstick Glucose (POCT)    Collection Time: 05/23/23  6:19 AM   Result Value Ref Range    POC Glucose 180 (H) 65 - 140 mg/dl     Blood Culture:   Lab Results   Component Value Date    BLOODCX No Growth After 5 Days  05/08/2023    BLOODCX No Growth After 5 Days  05/08/2023   ,   Urinalysis:   Lab Results   Component Value Date    COLORU Colorless 05/08/2023    CLARITYU Clear 05/08/2023    SPECGRAV 1 027 05/08/2023    PHUR 6 0 05/08/2023    LEUKOCYTESUR (A) 05/08/2023     Elevated glucose may cause decreased leukocyte values  See urine microscopic for [facility] result/    NITRITE Negative 05/08/2023    GLUCOSEU >=1000 (1%) (A) 05/08/2023    KETONESU Trace (A) 05/08/2023    BILIRUBINUR Negative 05/08/2023    BLOODU Negative 05/08/2023   ,   Urine Culture:   Lab Results   Component Value Date    URINECX 40,000-49,000 cfu/ml 11/04/2019   ,   Wound Culure: No results found for: WOUNDCULT      Imaging:    CT head wo contrast   Final Result by Giselle Olson DO (05/21 1241)      Slight interval increase in pneumocephalus and subgaleal air both superficial and deep to the left cranioplasty site  Persistent mild vasogenic edema left frontal lobe  Workstation performed: [id number]         CT sinus wo contrast   Final Result by Giselle Olson DO (05/21 1245)      Pneumocephalus and subgaleal air identified slightly increased from the prior CT scan and presumed to extend from the left frontal sinus where fluid and air opacity is noted  There is a direct communication from the left frontal sinus to the left frontal    extra-axial air           Workstation performed: [id number]           VTE Pharmacologic Prophylaxis: Enoxaparin (Lovenox)  VTE Mechanical Prophylaxis: sequential compression device    Current Facility-Administered Medications   Medication Dose Route Frequency   • acetaminophen (TYLENOL) tablet 975 mg  975 mg Oral Q6H [address]   • amoxicillin-clavulanate (AUGMENTIN) 875-125 mg per tablet 1 tablet  1 tablet Oral BID   • atorvastatin (LIPITOR) tablet 40 mg  40 mg Oral Daily   • enoxaparin (LOVENOX) subcutaneous injection 40 mg  40 mg Subcutaneous Q24H YANNI   • lisinopril (ZESTRIL) tablet 10 mg  10 mg Oral Daily    And   • hydrochlorothiazide (HYDRODIURIL) tablet 12 5 mg  12 5 mg Oral Daily   • insulin glargine (LANTUS) subcutaneous injection 15 Units 0 15 mL  15 Units Subcutaneous HS   • insulin lispro (HumaLOG) 100 units/mL subcutaneous injection 1-6 Units  1-6 Units Subcutaneous TID AC   • levothyroxine tablet 137 mcg  137 mcg Oral Daily   • oxyCODONE (ROXICODONE) IR tablet 5 mg  5 mg Oral Q4H PRN   • oxyCODONE (ROXICODONE) split tablet 2 5 mg  2 5 mg Oral Q4H PRN     Etelvina Rees MD  Family Medicine Resident PGY1

## 2023-05-23 NOTE — ASSESSMENT & PLAN NOTE
· Recurrent/increased left forehead swelling following a sneezing episode on Friday night on 5/19/23  ·  S/p left total craniotomy with craniectomy and orbital osteotomy with resection of extradural mass  Exenteration/obliteration of left frontal sinus and custom synthetic cranioplasty on 4/11/2023 with Dr Eugenia Rajan  · Imaging reviewed personally and by attending  Final results as below  · CT sinus without contrast 5/21/2023: Slight interval increase in pneumocephalus and subgaleal air both superficial and deep to the left cranioplasty site  Persistent mild vasogenic edema left frontal lobe  Plan  · Continue regular neurologic checks  · Ongoing medical management and pain control per primary team   · Pt remains afebrile  WBC normal    · Eval and mobilize per PT/OT  · DVT PPX: SCDs, Lovenox  · 5/22/23 Dr Eugenia Rajan had discussed with patient options for management  Tentative plan is for ENT to see patient tomorrow for further evaluation  · Neurosurgery will continue to follow  Please call with any questions or concerns

## 2023-05-24 LAB
ANION GAP SERPL CALCULATED.3IONS-SCNC: 1 MMOL/L (ref 4–13)
BASOPHILS # BLD AUTO: 0.04 THOUSANDS/ÂΜL (ref 0–0.1)
BASOPHILS NFR BLD AUTO: 1 % (ref 0–1)
BUN SERPL-MCNC: 13 MG/DL (ref 5–25)
CALCIUM SERPL-MCNC: 9.2 MG/DL (ref 8.3–10.1)
CHLORIDE SERPL-SCNC: 106 MMOL/L (ref 96–108)
CO2 SERPL-SCNC: 27 MMOL/L (ref 21–32)
CREAT SERPL-MCNC: 0.67 MG/DL (ref 0.6–1.3)
EOSINOPHIL # BLD AUTO: 0.17 THOUSAND/ÂΜL (ref 0–0.61)
EOSINOPHIL NFR BLD AUTO: 2 % (ref 0–6)
ERYTHROCYTE [DISTWIDTH] IN BLOOD BY AUTOMATED COUNT: 11.8 % (ref 11.6–15.1)
GFR SERPL CREATININE-BSD FRML MDRD: 108 ML/MIN/1.73SQ M
GLUCOSE SERPL-MCNC: 199 MG/DL (ref 65–140)
GLUCOSE SERPL-MCNC: 219 MG/DL (ref 65–140)
GLUCOSE SERPL-MCNC: 219 MG/DL (ref 65–140)
GLUCOSE SERPL-MCNC: 260 MG/DL (ref 65–140)
GLUCOSE SERPL-MCNC: 261 MG/DL (ref 65–140)
HCT VFR BLD AUTO: 44.1 % (ref 36.5–49.3)
HGB BLD-MCNC: 16.1 G/DL (ref 12–17)
IMM GRANULOCYTES # BLD AUTO: 0.02 THOUSAND/UL (ref 0–0.2)
IMM GRANULOCYTES NFR BLD AUTO: 0 % (ref 0–2)
LYMPHOCYTES # BLD AUTO: 3.21 THOUSANDS/ÂΜL (ref 0.6–4.47)
LYMPHOCYTES NFR BLD AUTO: 43 % (ref 14–44)
MCH RBC QN AUTO: 31.6 PG (ref 26.8–34.3)
MCHC RBC AUTO-ENTMCNC: 36.5 G/DL (ref 31.4–37.4)
MCV RBC AUTO: 87 FL (ref 82–98)
MONOCYTES # BLD AUTO: 0.59 THOUSAND/ÂΜL (ref 0.17–1.22)
MONOCYTES NFR BLD AUTO: 8 % (ref 4–12)
NEUTROPHILS # BLD AUTO: 3.48 THOUSANDS/ÂΜL (ref 1.85–7.62)
NEUTS SEG NFR BLD AUTO: 46 % (ref 43–75)
NRBC BLD AUTO-RTO: 0 /100 WBCS
PLATELET # BLD AUTO: 240 THOUSANDS/UL (ref 149–390)
PMV BLD AUTO: 9.7 FL (ref 8.9–12.7)
POTASSIUM SERPL-SCNC: 3.7 MMOL/L (ref 3.5–5.3)
RBC # BLD AUTO: 5.1 MILLION/UL (ref 3.88–5.62)
SODIUM SERPL-SCNC: 134 MMOL/L (ref 135–147)
WBC # BLD AUTO: 7.51 THOUSAND/UL (ref 4.31–10.16)

## 2023-05-24 PROCEDURE — 0J903ZZ DRAINAGE OF SCALP SUBCUTANEOUS TISSUE AND FASCIA, PERCUTANEOUS APPROACH: ICD-10-PCS | Performed by: NEUROLOGICAL SURGERY

## 2023-05-24 RX ORDER — LIDOCAINE HYDROCHLORIDE 10 MG/ML
5 INJECTION, SOLUTION EPIDURAL; INFILTRATION; INTRACAUDAL; PERINEURAL ONCE
Status: COMPLETED | OUTPATIENT
Start: 2023-05-24 | End: 2023-05-24

## 2023-05-24 RX ORDER — INSULIN LISPRO 100 [IU]/ML
3 INJECTION, SOLUTION INTRAVENOUS; SUBCUTANEOUS
Status: DISCONTINUED | OUTPATIENT
Start: 2023-05-24 | End: 2023-05-26

## 2023-05-24 RX ORDER — INSULIN GLARGINE 100 [IU]/ML
20 INJECTION, SOLUTION SUBCUTANEOUS
Status: DISCONTINUED | OUTPATIENT
Start: 2023-05-24 | End: 2023-05-26

## 2023-05-24 RX ADMIN — ENOXAPARIN SODIUM 40 MG: 40 INJECTION SUBCUTANEOUS at 08:12

## 2023-05-24 RX ADMIN — OXYCODONE HYDROCHLORIDE 5 MG: 5 TABLET ORAL at 08:09

## 2023-05-24 RX ADMIN — INSULIN LISPRO 2 UNITS: 100 INJECTION, SOLUTION INTRAVENOUS; SUBCUTANEOUS at 08:11

## 2023-05-24 RX ADMIN — ACETAMINOPHEN 975 MG: 325 TABLET ORAL at 02:24

## 2023-05-24 RX ADMIN — ATORVASTATIN CALCIUM 40 MG: 40 TABLET, FILM COATED ORAL at 08:09

## 2023-05-24 RX ADMIN — ACETAMINOPHEN 975 MG: 325 TABLET ORAL at 08:10

## 2023-05-24 RX ADMIN — AMOXICILLIN AND CLAVULANATE POTASSIUM 1 TABLET: 875; 125 TABLET, FILM COATED ORAL at 16:51

## 2023-05-24 RX ADMIN — INSULIN GLARGINE 20 UNITS: 100 INJECTION, SOLUTION SUBCUTANEOUS at 21:34

## 2023-05-24 RX ADMIN — LEVOTHYROXINE SODIUM 137 MCG: 25 TABLET ORAL at 08:10

## 2023-05-24 RX ADMIN — HYDROCHLOROTHIAZIDE 12.5 MG: 12.5 TABLET ORAL at 08:10

## 2023-05-24 RX ADMIN — INSULIN LISPRO 3 UNITS: 100 INJECTION, SOLUTION INTRAVENOUS; SUBCUTANEOUS at 16:49

## 2023-05-24 RX ADMIN — ACETAMINOPHEN 975 MG: 325 TABLET ORAL at 14:28

## 2023-05-24 RX ADMIN — INSULIN LISPRO 2 UNITS: 100 INJECTION, SOLUTION INTRAVENOUS; SUBCUTANEOUS at 11:39

## 2023-05-24 RX ADMIN — LISINOPRIL 10 MG: 10 TABLET ORAL at 08:10

## 2023-05-24 RX ADMIN — LIDOCAINE HYDROCHLORIDE 5 ML: 10 INJECTION, SOLUTION EPIDURAL; INFILTRATION; INTRACAUDAL; PERINEURAL at 15:42

## 2023-05-24 RX ADMIN — INSULIN LISPRO 3 UNITS: 100 INJECTION, SOLUTION INTRAVENOUS; SUBCUTANEOUS at 11:39

## 2023-05-24 RX ADMIN — AMOXICILLIN AND CLAVULANATE POTASSIUM 1 TABLET: 875; 125 TABLET, FILM COATED ORAL at 08:11

## 2023-05-24 RX ADMIN — ACETAMINOPHEN 975 MG: 325 TABLET ORAL at 21:33

## 2023-05-24 NOTE — PROGRESS NOTES
[address] [facility]  Progress Note  Name: Asa Sachs  MRN: [id number]  Unit/Bed#: [facility] 726-01 I Date of Admission: 5/21/2023   Date of Service: 5/24/2023 I Hospital Day: 1    Assessment/Plan   * Pneumocephalus  Assessment & Plan  · Recurrent/increased left forehead swelling following a sneezing episode on Friday night on 5/19/23  ·  S/p left total craniotomy with craniectomy and orbital osteotomy with resection of extradural mass  Exenteration/obliteration of left frontal sinus and custom synthetic cranioplasty on 4/11/2023 with Dr Christina Vasquez  Imaging:  · CT sinus without contrast 5/21/2023: Slight interval increase in pneumocephalus and subgaleal air both superficial and deep to the left cranioplasty site  Persistent mild vasogenic edema left frontal lobe  Plan  · Continue regular neurologic checks  · Ongoing medical management and pain control per primary team   · Pt remains afebrile  WBC normal    · Eval and mobilize per PT/OT  · DVT PPX: SCDs, Lovenox  · S/p aspiration of pneumocephalus on 5/22 with acute recurrence   · Awaiting ENT evaluation with Dr Kymberly Grimes today   · Will again aspirate collection to alleviate pressure given patient discomfort  · Neurosurgery will continue to follow  Please call with any questions or concerns    Status post craniotomy and cranioplasty  Assessment & Plan  · See plan above  Meningioma [facility])  Assessment & Plan  · S/p resection of Meningioma  · See plan above  Subjective/Objective   Chief Complaint: pressure     Subjective: Patient states yesterday there was a small recurrence of the collection but that it was still soft and not bothersome  This morning patient collection has again enlarged but now has tension across the overlying skin with fullness underneath  This is causing pressure pain across the skin and resulting in moderate discomfort for patient      Objective: Laying in bed in NAD    I/O       05/22 "0701 05/23 0700 05/23 0701 05/24 0700 05/24 0701 05/25 0700    P  O  240 338 260    Total Intake 240 338 260    Net +240 +338 +260           Unmeasured Urine Occurrence  2 x 2 x    Unmeasured Stool Occurrence  0 x           Invasive Devices     Peripheral Intravenous Line  Duration           Peripheral IV 05/21/23 Left;Ventral (anterior) Forearm 2 days                Physical Exam:  Vitals: Blood pressure 116/79, pulse 92, temperature 97 8 °F (36 6 °C), resp  rate 16, SpO2 98 %  ,There is no height or weight on file to calculate BMI  General appearance: alert, appears stated age, cooperative and no distress  Head: large elevated area of skin overlying the left orbit  Mild firmness without significant fluctuation  No erythema overlying  Non tender on palpation      Eyes: EOMI, PERRL  Neck: supple, symmetrical, trachea midline and NT  Back: no kyphosis present, no tenderness to percussion or palpation  Lungs: non labored breathing  Heart: regular heart rate  Neurologic:   Mental status: Alert, oriented x3 , thought content appropriate  Cranial nerves: grossly intact (Cranial nerves II-XII)  Sensory: normal to light touch   Motor: moving all extremities without focal weakness 5/5    Lab Results:  Results from last 7 days   Lab Units 05/24/23  0521 05/23/23  0531 05/22/23  0735   EOS PCT % 2 2 1   HEMATOCRIT % 44 1 44 5 44 6   HEMOGLOBIN g/dL 16 1 15 8 16 2   MONOS PCT % 8 8 7   NEUTROS PCT % 46 47 41*   PLATELETS Thousands/uL 240 217 224   WBC Thousand/uL 7 51 7 76 8 07     Results from last 7 days   Lab Units 05/24/23  0521 05/23/23  0531 05/22/23  0735   BUN mg/dL 13 15 13   CALCIUM mg/dL 9 2 9 1 9 2   CHLORIDE mmol/L 106 109* 109*   CO2 mmol/L 27 26 27   CREATININE mg/dL 0 67 0 62 0 71   POTASSIUM mmol/L 3 7 3 7 3 6                 No results found for: \"TROPONINT\"  ABG:No results found for: \"BEART\", \"UYO1KCH\", \"K6RELTSS\", \"XMY0VOZ\", \"PHART\", \"PO2ART\", \"SOURCE\"    Imaging Studies: I have personally reviewed " pertinent reports  and I have personally reviewed pertinent films in PACS  CT sinus wo contrast    Result Date: 5/21/2023  Impression: Pneumocephalus and subgaleal air identified slightly increased from the prior CT scan and presumed to extend from the left frontal sinus where fluid and air opacity is noted  There is a direct communication from the left frontal sinus to the left frontal  extra-axial air  Workstation performed: [id number]     CT head wo contrast    Result Date: 5/21/2023  Impression: Slight interval increase in pneumocephalus and subgaleal air both superficial and deep to the left cranioplasty site  Persistent mild vasogenic edema left frontal lobe  Workstation performed: [id number]       EKG, Pathology, and Other Studies: I have personally reviewed pertinent reports        VTE Pharmacologic Prophylaxis: Enoxaparin (Lovenox)    VTE Mechanical Prophylaxis: sequential compression device

## 2023-05-24 NOTE — QUICK NOTE
Patient evaluated at bedside during evening rounds  Patient was comfortable  Still continued with swelling on L forehead but stated it was smaller than before  No acute complaints  Will continue to monitor  1.9

## 2023-05-24 NOTE — PLAN OF CARE
Problem: PAIN - ADULT  Goal: Verbalizes/displays adequate comfort level or baseline comfort level  Description: Interventions:  - Encourage patient to monitor pain and request assistance  - Assess pain using appropriate pain scale  - Administer analgesics based on type and severity of pain and evaluate response  - Implement non-pharmacological measures as appropriate and evaluate response  - Consider cultural and social influences on pain and pain management  - Notify physician/advanced practitioner if interventions unsuccessful or patient reports new pain  Outcome: Progressing     Problem: INFECTION - ADULT  Goal: Absence or prevention of progression during hospitalization  Description: INTERVENTIONS:  - Assess and monitor for signs and symptoms of infection  - Monitor lab/diagnostic results  - Monitor all insertion sites, i e  indwelling lines, tubes, and drains  - Monitor endotracheal if appropriate and nasal secretions for changes in amount and color  - [place] appropriate cooling/warming therapies per order  - Administer medications as ordered  - Instruct and encourage patient and family to use good hand hygiene technique  - Identify and instruct in appropriate isolation precautions for identified infection/condition  Outcome: Progressing  Goal: Absence of fever/infection during neutropenic period  Description: INTERVENTIONS:  - Monitor WBC    Outcome: Progressing     Problem: SAFETY ADULT  Goal: Patient will remain free of falls  Description: INTERVENTIONS:  - Educate patient/family on patient safety including physical limitations  - Instruct patient to call for assistance with activity   - Consult OT/PT to assist with strengthening/mobility   - Keep Call bell within reach  - Keep bed low and locked with side rails adjusted as appropriate  - Keep care items and personal belongings within reach  - Initiate and maintain comfort rounds  - Make Fall Risk Sign visible to staff  - Offer Toileting every 2 Hours, in advance of need  - Initiate/Maintain bed alarm  - Apply yellow socks and bracelet for high fall risk patients  - Consider moving patient to room near nurses station  Outcome: Progressing  Goal: Maintain or return to baseline ADL function  Description: INTERVENTIONS:  -  Assess patient's ability to carry out ADLs; assess patient's baseline for ADL function and identify physical deficits which impact ability to perform ADLs (bathing, care of mouth/teeth, toileting, grooming, dressing, etc )  - Assess/evaluate cause of self-care deficits   - Assess range of motion  - Assess patient's mobility; develop plan if impaired  - Assess patient's need for assistive devices and provide as appropriate  - Encourage maximum independence but intervene and supervise when necessary  - Involve family in performance of ADLs  - Assess for home care needs following discharge   - Consider OT consult to assist with ADL evaluation and planning for discharge  - Provide patient education as appropriate  Outcome: Progressing  Goal: Maintains/Returns to pre admission functional level  Description: INTERVENTIONS:  - Perform BMAT or MOVE assessment daily    - Set and communicate daily mobility goal to care team and patient/family/caregiver  - Collaborate with rehabilitation services on mobility goals if consulted  - Perform Range of Motion 2 times a day  - Reposition patient every 2 hours    - Dangle patient 2 times a day  - Stand patient 2 times a day  - Ambulate patient 2 times a day  - Out of bed to chair 2 times a day   - Out of bed for meals 2 times a day  - Out of bed for toileting  - Record patient progress and toleration of activity level   Outcome: Progressing     Problem: DISCHARGE PLANNING  Goal: Discharge to home or other facility with appropriate resources  Description: INTERVENTIONS:  - Identify barriers to discharge w/patient and caregiver  - Arrange for needed discharge resources and transportation as appropriate  - Identify discharge learning needs (meds, wound care, etc )  - Arrange for interpretive services to assist at discharge as needed  - Refer to Case Management Department for coordinating discharge planning if the patient needs post-hospital services based on physician/advanced practitioner order or complex needs related to functional status, cognitive ability, or social support system  Outcome: Progressing     Problem: Knowledge Deficit  Goal: Patient/family/caregiver demonstrates understanding of disease process, treatment plan, medications, and discharge instructions  Description: Complete learning assessment and assess knowledge base    Interventions:  - Provide teaching at level of understanding  - Provide teaching via preferred learning methods  Outcome: Progressing     Problem: NEUROSENSORY - ADULT  Goal: Achieves stable or improved neurological status  Description: INTERVENTIONS  - Monitor and report changes in neurological status  - Monitor vital signs such as temperature, blood pressure, glucose, and any other labs ordered   - Initiate measures to prevent increased intracranial pressure  - Monitor for seizure activity and implement precautions if appropriate      Outcome: Progressing

## 2023-05-24 NOTE — PLAN OF CARE
Problem: PAIN - ADULT  Goal: Verbalizes/displays adequate comfort level or baseline comfort level  Description: Interventions:  - Encourage patient to monitor pain and request assistance  - Assess pain using appropriate pain scale  - Administer analgesics based on type and severity of pain and evaluate response  - Implement non-pharmacological measures as appropriate and evaluate response  - Consider cultural and social influences on pain and pain management  - Notify physician/advanced practitioner if interventions unsuccessful or patient reports new pain  Outcome: Progressing     Problem: INFECTION - ADULT  Goal: Absence or prevention of progression during hospitalization  Description: INTERVENTIONS:  - Assess and monitor for signs and symptoms of infection  - Monitor lab/diagnostic results  - Monitor all insertion sites, i e  indwelling lines, tubes, and drains  - Monitor endotracheal if appropriate and nasal secretions for changes in amount and color  - [place] appropriate cooling/warming therapies per order  - Administer medications as ordered  - Instruct and encourage patient and family to use good hand hygiene technique  - Identify and instruct in appropriate isolation precautions for identified infection/condition  Outcome: Progressing     Problem: SAFETY ADULT  Goal: Patient will remain free of falls  Description: INTERVENTIONS:  - Educate patient/family on patient safety including physical limitations  - Instruct patient to call for assistance with activity   - Consult OT/PT to assist with strengthening/mobility   - Keep Call bell within reach  - Keep bed low and locked with side rails adjusted as appropriate  - Keep care items and personal belongings within reach  - Initiate and maintain comfort rounds  - Make Fall Risk Sign visible to staff  - Offer Toileting every 2 Hours, in advance of need  - Initiate/Maintain 24/7 alarm  - Apply yellow socks and bracelet for high fall risk patients  - Consider moving patient to room near nurses station  Outcome: Progressing

## 2023-05-24 NOTE — PROGRESS NOTES
[facility]  Progress Note  Name: Jace Worrell  MRN: [id number]  Unit/Bed#: PPHP 726-01 I Date of Admission: 5/21/2023   Date of Service: 5/24/2023 I Hospital Day: 1    Assessment/Plan   * Pneumocephalus  Assessment & Plan  56year old male with PMH of Grade II Meningioma s/p left frontal orbital resection on 4/11/23 without immediate complications, who has recently been admitted for significant pneumocephalus (5/8-5/13) noted on CT Sinus/CTH, managed conservatively without further surgical intervention, discharged on oral augmentin with outpatient followup, now presenting for similar left frontal swelling and pneumocephalus 2/2 sneezing fit 5/19  · 5/21 CT Sinus: Pnuemocephalus and subgaleal air identified slightly increased from the prior CT scan and presumed to extend from the left frontal sinus where fluid and air opacity is noted  There is a direct communication from the left frontal sinus to the left frontal extra-axial air  · 5/21 CTH: Slight interval increase in pneumocephalus and subgaleal air both superficial and deep to the left cranioplasty site  Persistent mild vasogenic edema left frontal lobe     · ESR, CRP WNL    Plan:   · Neurosurgery Consult   · S/p Needle aspiration of Pneumocephalus 5/22  · HoB >30 degrees  · Headwrap of scalp  · Evaluation by Rhinologist today - appreciate recommendations  · Q shift neuro checks  · Monitor Vital Signs  · NPO today pending NS, Jaciel/Cho diet if no planned intervention  · Tylenol PRN for pain  · Continue PTA Augmentin 875-125mg BID      Diabetes [facility])  Assessment & Plan  Lab Results   Component Value Date    HGBA1C 7 8 (H) 05/12/2023       Recent Labs     05/23/23  1057 05/23/23  1614 05/23/23  2125 05/24/23  0619   POCGLU 222* 241* 288* 199*       Blood Sugar Average: Last 72 hrs:  Last A1c 7 8 on 5/12, admission Glucose 234  On Home Lantus 15u Qhs and Humalog 3u TID with modified home sliding scale, Jardiance 25mg daily  Notably, was changed from Metformin and Jardiance post operatively due to elevated blood sugars treatment, and initially was mistakenly taking lantus 18u TID without reported hypoglycemic episodes  Plan:  · Hold home Jardiance  · Continue Basal Lantus 15u Qhs  · SSI algorithm 3, adjust as needed      Other specified hypothyroidism  Assessment & Plan  · Continue home Levothyroxine 137 mcg       Hyperlipidemia  Assessment & Plan  · Continue home Lipitor 40mg       Benign essential hypertension  Assessment & Plan  Continue home Lisinopril-Hydrochlorothiazide  · Lisinopril 10mg, Hydrochlorothiazide 12 5mg             PPX: Lovenox  Diet: Regular  Code Status: Full   Dispo: Inpatient     Plan D/W Dr Jasper Jasso and [facility] Team    Subjective:   Patient seen and assessed at bedside this morning  No OVN events  Swelling is roughly the same to yesterday, continues to endorse 8/10 pain that is closer to pressure, tylenol is helpful but does not totally relieve the pain  Is aware of his PRNs but has not requested  Last bowel movement Monday  Says dysuria has improved  Objective:     Vitals: Blood pressure 104/62, pulse 71, temperature 97 8 °F (36 6 °C), resp  rate 17, SpO2 98 %  ,There is no height or weight on file to calculate BMI  Intake/Output Summary (Last 24 hours) at 5/24/2023 3359  Last data filed at 5/23/2023 1300  Gross per 24 hour   Intake 120 ml   Output --   Net 120 ml       Physical Exam:   Physical Exam  Constitutional:       General: He is not in acute distress  Appearance: He is normal weight  HENT:      Head:      Comments: Left supraorbital swelling     Nose: Nose normal    Eyes:      Conjunctiva/sclera: Conjunctivae normal    Cardiovascular:      Rate and Rhythm: Normal rate and regular rhythm  Heart sounds: Normal heart sounds  No murmur heard  Pulmonary:      Effort: No respiratory distress  Breath sounds: Normal breath sounds  No wheezing     Abdominal:      General: Bowel sounds are normal       Palpations: Abdomen is soft  Tenderness: There is no abdominal tenderness  Skin:     General: Skin is warm and dry  Capillary Refill: Capillary refill takes less than 2 seconds  Findings: No erythema or rash  Neurological:      General: No focal deficit present  Mental Status: He is alert  Mental status is at baseline  Cranial Nerves: Cranial nerves 2-12 are intact  Sensory: Sensation is intact  Motor: Motor function is intact  Coordination: Coordination is intact  Psychiatric:         Mood and Affect: Mood normal          Invasive Devices     Peripheral Intravenous Line  Duration           Peripheral IV 05/21/23 Left;Ventral (anterior) Forearm 2 days                           Lab and other studies:  I have personally reviewed pertinent reports       Admission on 05/21/2023   Component Date Value   • WBC 05/21/2023 9 39    • RBC 05/21/2023 4 75    • Hemoglobin 05/21/2023 15 2    • Hematocrit 05/21/2023 42 1    • MCV 05/21/2023 89    • MCH 05/21/2023 32 0    • MCHC 05/21/2023 36 1    • RDW 05/21/2023 12 2    • MPV 05/21/2023 9 4    • Platelets 91/07/9015 219    • nRBC 05/21/2023 0    • Neutrophils Relative 05/21/2023 45    • Immat GRANS % 05/21/2023 0    • Lymphocytes Relative 05/21/2023 47 (H)    • Monocytes Relative 05/21/2023 8    • Eosinophils Relative 05/21/2023 0    • Basophils Relative 05/21/2023 0    • Neutrophils Absolute 05/21/2023 4 23    • Immature Grans Absolute 05/21/2023 0 03    • Lymphocytes Absolute 05/21/2023 4 29    • Monocytes Absolute 05/21/2023 0 77    • Eosinophils Absolute 05/21/2023 0 03    • Basophils Absolute 05/21/2023 0 04    • Sodium 05/21/2023 138    • Potassium 05/21/2023 3 4 (L)    • Chloride 05/21/2023 109 (H)    • CO2 05/21/2023 26    • ANION GAP 05/21/2023 3 (L)    • BUN 05/21/2023 15    • Creatinine 05/21/2023 1 02    • Glucose 05/21/2023 234 (H)    • Calcium 05/21/2023 8 9    • eGFR 05/21/2023 82    • Sed Rate 05/21/2023 7    • CRP 05/21/2023 <3 0    • POC Glucose 05/21/2023 222 (H)    • POC Glucose 05/21/2023 211 (H)    • WBC 05/22/2023 8 07    • RBC 05/22/2023 5 04    • Hemoglobin 05/22/2023 16 2    • Hematocrit 05/22/2023 44 6    • MCV 05/22/2023 89    • MCH 05/22/2023 32 1    • MCHC 05/22/2023 36 3    • RDW 05/22/2023 12 2    • MPV 05/22/2023 9 3    • Platelets 07/56/6538 224    • nRBC 05/22/2023 0    • Neutrophils Relative 05/22/2023 41 (L)    • Immat GRANS % 05/22/2023 0    • Lymphocytes Relative 05/22/2023 50 (H)    • Monocytes Relative 05/22/2023 7    • Eosinophils Relative 05/22/2023 1    • Basophils Relative 05/22/2023 1    • Neutrophils Absolute 05/22/2023 3 32    • Immature Grans Absolute 05/22/2023 0 02    • Lymphocytes Absolute 05/22/2023 4 02    • Monocytes Absolute 05/22/2023 0 59    • Eosinophils Absolute 05/22/2023 0 08    • Basophils Absolute 05/22/2023 0 04    • Sodium 05/22/2023 137    • Potassium 05/22/2023 3 6    • Chloride 05/22/2023 109 (H)    • CO2 05/22/2023 27    • ANION GAP 05/22/2023 1 (L)    • BUN 05/22/2023 13    • Creatinine 05/22/2023 0 71    • Glucose 05/22/2023 180 (H)    • Glucose, Fasting 05/22/2023 180 (H)    • Calcium 05/22/2023 9 2    • eGFR 05/22/2023 105    • POC Glucose 05/22/2023 157 (H)    • POC Glucose 05/22/2023 158 (H)    • POC Glucose 05/22/2023 145 (H)    • POC Glucose 05/22/2023 223 (H)    • POC Glucose 05/22/2023 231 (H)    • WBC 05/23/2023 7 76    • RBC 05/23/2023 5 03    • Hemoglobin 05/23/2023 15 8    • Hematocrit 05/23/2023 44 5    • MCV 05/23/2023 89    • MCH 05/23/2023 31 4    • MCHC 05/23/2023 35 5    • RDW 05/23/2023 12 0    • MPV 05/23/2023 9 6    • Platelets 56/07/6798 217    • nRBC 05/23/2023 0    • Neutrophils Relative 05/23/2023 47    • Immat GRANS % 05/23/2023 0    • Lymphocytes Relative 05/23/2023 42    • Monocytes Relative 05/23/2023 8    • Eosinophils Relative 05/23/2023 2    • Basophils Relative 05/23/2023 1    • Neutrophils Absolute 05/23/2023 3 67 • Immature Grans Absolute 05/23/2023 0 03    • Lymphocytes Absolute 05/23/2023 3 29    • Monocytes Absolute 05/23/2023 0 60    • Eosinophils Absolute 05/23/2023 0 12    • Basophils Absolute 05/23/2023 0 05    • Sodium 05/23/2023 135    • Potassium 05/23/2023 3 7    • Chloride 05/23/2023 109 (H)    • CO2 05/23/2023 26    • ANION GAP 05/23/2023 0 (L)    • BUN 05/23/2023 15    • Creatinine 05/23/2023 0 62    • Glucose 05/23/2023 187 (H)    • Calcium 05/23/2023 9 1    • eGFR 05/23/2023 111    • POC Glucose 05/23/2023 180 (H)    • Color, UA 05/23/2023 Light Yellow    • Clarity, UA 05/23/2023 Clear    • Specific Gravity, UA 05/23/2023 1 015    • pH, UA 05/23/2023 7 0    • Leukocytes, UA 05/23/2023 Trace (A)    • Nitrite, UA 05/23/2023 Negative    • Protein, UA 05/23/2023 Negative    • Glucose, UA 05/23/2023 500 (1/2%) (A)    • Ketones, UA 05/23/2023 Negative    • Urobilinogen, UA 05/23/2023 4 0 (A)    • Bilirubin, UA 05/23/2023 Negative    • Occult Blood, UA 05/23/2023 Negative    • POC Glucose 05/23/2023 222 (H)    • RBC, UA 05/23/2023 None Seen    • WBC, UA 05/23/2023 2-4 (A)    • Epithelial Cells 05/23/2023 Occasional    • Bacteria, UA 05/23/2023 None Seen    • MUCUS THREADS 05/23/2023 Occasional (A)    • POC Glucose 05/23/2023 241 (H)    • POC Glucose 05/23/2023 288 (H)    • WBC 05/24/2023 7 51    • RBC 05/24/2023 5 10    • Hemoglobin 05/24/2023 16 1    • Hematocrit 05/24/2023 44 1    • MCV 05/24/2023 87    • MCH 05/24/2023 31 6    • MCHC 05/24/2023 36 5    • RDW 05/24/2023 11 8    • MPV 05/24/2023 9 7    • Platelets 88/73/1567 240    • nRBC 05/24/2023 0    • Neutrophils Relative 05/24/2023 46    • Immat GRANS % 05/24/2023 0    • Lymphocytes Relative 05/24/2023 43    • Monocytes Relative 05/24/2023 8    • Eosinophils Relative 05/24/2023 2    • Basophils Relative 05/24/2023 1    • Neutrophils Absolute 05/24/2023 3 48    • Immature Grans Absolute 05/24/2023 0 02    • Lymphocytes Absolute 05/24/2023 3 21    • Monocytes Absolute 05/24/2023 0 59    • Eosinophils Absolute 05/24/2023 0 17    • Basophils Absolute 05/24/2023 0 04    • Sodium 05/24/2023 134 (L)    • Potassium 05/24/2023 3 7    • Chloride 05/24/2023 106    • CO2 05/24/2023 27    • ANION GAP 05/24/2023 1 (L)    • BUN 05/24/2023 13    • Creatinine 05/24/2023 0 67    • Glucose 05/24/2023 219 (H)    • Calcium 05/24/2023 9 2    • eGFR 05/24/2023 108    • POC Glucose 05/24/2023 199 (H)        Recent Results (from the past 24 hour(s))   Fingerstick Glucose (POCT)    Collection Time: 05/23/23 10:57 AM   Result Value Ref Range    POC Glucose 222 (H) 65 - 140 mg/dl   UA w Reflex to Microscopic w Reflex to Culture    Collection Time: 05/23/23 12:13 PM    Specimen: Urine, Clean Catch   Result Value Ref Range    Color, UA Light Yellow     Clarity, UA Clear     Specific [place], UA 1 015 1 003 - 1 030    pH, UA 7 0 4 5, 5 0, 5 5, 6 0, 6 5, 7 0, 7 5, 8 0    Leukocytes, UA Trace (A) Negative    Nitrite, UA Negative Negative    Protein, UA Negative Negative mg/dl    Glucose,  (1/2%) (A) Negative mg/dl    Ketones, UA Negative Negative mg/dl    Urobilinogen, UA 4 0 (A) <2 0 mg/dl mg/dl    Bilirubin, UA Negative Negative    Occult Blood, UA Negative Negative   Urine Microscopic    Collection Time: 05/23/23 12:13 PM   Result Value Ref Range    RBC, UA None Seen None Seen, 1-2 /hpf    WBC, UA 2-4 (A) None Seen, 1-2 /hpf    Epithelial Cells Occasional None Seen, Occasional /hpf    Bacteria, UA None Seen None Seen, Occasional /hpf    MUCUS THREADS Occasional (A) None Seen   Fingerstick Glucose (POCT)    Collection Time: 05/23/23  4:14 PM   Result Value Ref Range    POC Glucose 241 (H) 65 - 140 mg/dl   Fingerstick Glucose (POCT)    Collection Time: 05/23/23  9:25 PM   Result Value Ref Range    POC Glucose 288 (H) 65 - 140 mg/dl   CBC and differential    Collection Time: 05/24/23  5:21 AM   Result Value Ref Range    WBC 7 51 4 31 - 10 16 Thousand/uL    RBC 5 10 3 88 - 5 62 Million/uL Hemoglobin 16 1 12 0 - 17 0 g/dL    Hematocrit 44 1 36 5 - 49 3 %    MCV 87 82 - 98 fL    MCH 31 6 26 8 - 34 3 pg    MCHC 36 5 31 4 - 37 4 g/dL    RDW 11 8 11 6 - 15 1 %    MPV 9 7 8 9 - 12 7 fL    Platelets 553 244 - 619 Thousands/uL    nRBC 0 /100 WBCs    Neutrophils Relative 46 43 - 75 %    Immat GRANS % 0 0 - 2 %    Lymphocytes Relative 43 14 - 44 %    Monocytes Relative 8 4 - 12 %    Eosinophils Relative 2 0 - 6 %    Basophils Relative 1 0 - 1 %    Neutrophils Absolute 3 48 1 85 - 7 62 Thousands/µL    Immature Grans Absolute 0 02 0 00 - 0 20 Thousand/uL    Lymphocytes Absolute 3 21 0 60 - 4 47 Thousands/µL    Monocytes Absolute 0 59 0 17 - 1 22 Thousand/µL    Eosinophils Absolute 0 17 0 00 - 0 61 Thousand/µL    Basophils Absolute 0 04 0 00 - 0 10 Thousands/µL   Basic metabolic panel    Collection Time: 05/24/23  5:21 AM   Result Value Ref Range    Sodium 134 (L) 135 - 147 mmol/L    Potassium 3 7 3 5 - 5 3 mmol/L    Chloride 106 96 - 108 mmol/L    CO2 27 21 - 32 mmol/L    ANION GAP 1 (L) 4 - 13 mmol/L    BUN 13 5 - 25 mg/dL    Creatinine 0 67 0 60 - 1 30 mg/dL    Glucose 219 (H) 65 - 140 mg/dL    Calcium 9 2 8 3 - 10 1 mg/dL    eGFR 108 ml/min/1 73sq m   Fingerstick Glucose (POCT)    Collection Time: 05/24/23  6:19 AM   Result Value Ref Range    POC Glucose 199 (H) 65 - 140 mg/dl     Blood Culture:   Lab Results   Component Value Date    BLOODCX No Growth After 5 Days  05/08/2023    BLOODCX No Growth After 5 Days  05/08/2023   ,   Urinalysis:   Lab Results   Component Value Date    BILIRUBINUR Negative 05/23/2023    BLOODU Negative 05/23/2023    CLARITYU Clear 05/23/2023    COLORU Light Yellow 05/23/2023    GLUCOSEU 500 (1/2%) (A) 05/23/2023    KETONESU Negative 05/23/2023    LEUKOCYTESUR Trace (A) 05/23/2023    NITRITE Negative 05/23/2023    PHUR 7 0 05/23/2023    SPECGRAV 1 015 05/23/2023   ,   Urine Culture:   Lab Results   Component Value Date    URINECX 40,000-49,000 cfu/ml 11/04/2019   ,   Wound Culure:  No "results found for: \"WOUNDCULT\"      Imaging:    CT head wo contrast   Final Result by Tanmay Domingo DO (05/21 1241)      Slight interval increase in pneumocephalus and subgaleal air both superficial and deep to the left cranioplasty site  Persistent mild vasogenic edema left frontal lobe  Workstation performed: [id number]         CT sinus wo contrast   Final Result by Tanmay Domingo DO (05/21 1245)      Pneumocephalus and subgaleal air identified slightly increased from the prior CT scan and presumed to extend from the left frontal sinus where fluid and air opacity is noted  There is a direct communication from the left frontal sinus to the left frontal    extra-axial air           Workstation performed: [id number]                  VTE Pharmacologic Prophylaxis: Enoxaparin (Lovenox)  VTE Mechanical Prophylaxis: sequential compression device    Current Facility-Administered Medications   Medication Dose Route Frequency   • acetaminophen (TYLENOL) tablet 975 mg  975 mg Oral Q6H [facility]   • amoxicillin-clavulanate (AUGMENTIN) 875-125 mg per tablet 1 tablet  1 tablet Oral BID   • atorvastatin (LIPITOR) tablet 40 mg  40 mg Oral Daily   • enoxaparin (LOVENOX) subcutaneous injection 40 mg  40 mg Subcutaneous Q24H YANNI   • lisinopril (ZESTRIL) tablet 10 mg  10 mg Oral Daily    And   • hydrochlorothiazide (HYDRODIURIL) tablet 12 5 mg  12 5 mg Oral Daily   • insulin glargine (LANTUS) subcutaneous injection 15 Units 0 15 mL  15 Units Subcutaneous HS   • insulin lispro (HumaLOG) 100 units/mL subcutaneous injection 1-6 Units  1-6 Units Subcutaneous TID AC   • levothyroxine tablet 137 mcg  137 mcg Oral Daily   • oxyCODONE (ROXICODONE) IR tablet 5 mg  5 mg Oral Q4H PRN   • oxyCODONE (ROXICODONE) split tablet 2 5 mg  2 5 mg Oral Q4H PRN       Yuriy Buckner MD  Family Medicine Resident PGY1    "

## 2023-05-24 NOTE — ASSESSMENT & PLAN NOTE
· Recurrent/increased left forehead swelling following a sneezing episode on Friday night on 5/19/23  ·  S/p left total craniotomy with craniectomy and orbital osteotomy with resection of extradural mass  Exenteration/obliteration of left frontal sinus and custom synthetic cranioplasty on 4/11/2023 with Dr Khadar Reed  Imaging:  · CT sinus without contrast 5/21/2023: Slight interval increase in pneumocephalus and subgaleal air both superficial and deep to the left cranioplasty site  Persistent mild vasogenic edema left frontal lobe  Plan  · Continue regular neurologic checks  · Ongoing medical management and pain control per primary team   · Pt remains afebrile  WBC normal    · Eval and mobilize per PT/OT  · DVT PPX: SCDs, Lovenox  · S/p aspiration of pneumocephalus on 5/22 with acute recurrence   · Awaiting ENT evaluation with Dr Benjamin Martinez today   · Will again aspirate collection to alleviate pressure given patient discomfort  · Neurosurgery will continue to follow   Please call with any questions or concerns

## 2023-05-24 NOTE — ASSESSMENT & PLAN NOTE
Lab Results   Component Value Date    HGBA1C 7 8 (H) 05/12/2023       Recent Labs     05/23/23  1057 05/23/23  1614 05/23/23  2125 05/24/23  0619   POCGLU 222* 241* 288* 199*       Blood Sugar Average: Last 72 hrs:  Last A1c 7 8 on 5/12, admission Glucose 234  On Home Lantus 15u Qhs and Humalog 3u TID with modified home sliding scale, Jardiance 25mg daily  Notably, was changed from Metformin and Jardiance post operatively due to elevated blood sugars treatment, and initially was mistakenly taking lantus 18u TID without reported hypoglycemic episodes      Plan:  · Hold home Jardiance  · Continue Basal Lantus 15u Qhs  · SSI algorithm 3, adjust as needed

## 2023-05-24 NOTE — ASSESSMENT & PLAN NOTE
56year old male with PMH of Grade II Meningioma s/p left frontal orbital resection on 4/11/23 without immediate complications, who has recently been admitted for significant pneumocephalus (5/8-5/13) noted on CT Sinus/CTH, managed conservatively without further surgical intervention, discharged on oral augmentin with outpatient followup, now presenting for similar left frontal swelling and pneumocephalus 2/2 sneezing fit 5/19  · 5/21 CT Sinus: Pnuemocephalus and subgaleal air identified slightly increased from the prior CT scan and presumed to extend from the left frontal sinus where fluid and air opacity is noted  There is a direct communication from the left frontal sinus to the left frontal extra-axial air  · 5/21 CTH: Slight interval increase in pneumocephalus and subgaleal air both superficial and deep to the left cranioplasty site  Persistent mild vasogenic edema left frontal lobe     · ESR, CRP WNL    Plan:   · Neurosurgery Consult   · S/p Needle aspiration of Pneumocephalus 5/22  · HoB >30 degrees  · Headwrap of scalp  · Evaluation by Rhinologist today - appreciate recommendations  · Q shift neuro checks  · Monitor Vital Signs  · NPO today pending NS, Jaciel/Cho diet if no planned intervention  · Tylenol PRN for pain  · Continue PTA Augmentin 875-125mg BID

## 2023-05-24 NOTE — QUICK NOTE
In a sterile fashion patients skin was prepped with chlorhexidine stick  The skin was infiltrated with 1cc of 1% lidocaine with epinephrine superficially  Using a 23g needle and a 20cc synringe, 40cc of air were aspirated from the right frontal subgaleal collection  No fluid material was aspirated  Patient tolerated the procedure without issues  No pain  neurological exam at baseline  Family at bedside

## 2023-05-24 NOTE — PROGRESS NOTES
Pt stated to RN this morning around 0600 that he felt that his Left forehead swelling is slightly increased and that the area feels a little tighter, pt stated this happened before prior to pt having the air drained out, RN observed that on pt, pt denied any pain or vision changes, pt is awaiting ENT evaluation this morning

## 2023-05-24 NOTE — CASE MANAGEMENT
Case Management Assessment & Discharge Planning Note    Patient name Yumiko Torres  [facility] MRN [id number]  : 1965 Date 2023       Current Admission Date: 2023  Current Admission Diagnosis:Pneumocephalus   Patient Active Problem List    Diagnosis Date Noted   • Status post craniotomy and cranioplasty 2023   • Meningioma ([facility] [address] ) 2023   • Left-sided low back pain without sciatica 2023   • Pneumocephalus 11/10/2022   • Subacromial impingement, right 2022   • Rotator cuff syndrome, right 2022   • Status post appendectomy 2021   • Dental caries 2021   • Frontal skull lesion 2020   • Dizziness 2020   • Other specified glaucoma 2020   • Candida rash of groin 2020   • Chronic pain in penis 2020   • Fibromatosis of plantar fascia 2019   • Balanitis 11/15/2019   • Cough 2019   • Elevated AST (SGOT) 2018   • Alcohol abuse 2018   • Adhesive capsulitis of right shoulder 2018   • Right shoulder pain    • Uncomplicated alcohol dependence ([facility] [address] ) 2015   • Diabetes ([facility] [address] ) 2015   • Benign essential hypertension 2015   • Hyperlipidemia 2015   • Other specified hypothyroidism 2013      LOS (days): 1  Geometric Mean LOS (GMLOS) (days):   Days to GMLOS:     OBJECTIVE:  PATIENT READMITTED TO HOSPITAL  Risk of Unplanned Readmission Score: 13 41         Current admission status: Inpatient       Preferred Pharmacy:   [facility] [address] MADDI KEARNEY [address] [facility], [address] [place] [address] Road  [address] [place] [address]  Phone: [phone] Fax: [phone]    Primary Care Provider: Yeyo Elder MD    Primary Insurance: Bhupendra Spivey  Secondary Insurance:     ASSESSMENT:  Yunier Jean Proxies    There are no active Health Care Proxies on file                   Readmission Root Cause  30 Day Readmission: Yes  Who directed you to return to the hospital?: Self  Did you understand whom to contact if you had questions or problems?: Yes  Did you get your prescriptions before you left the hospital?: Yes  Were you able to get your prescriptions filled when you left the hospital?: Yes  Did you take your medications as prescribed?: Yes  During previous admission, was a post-acute recommendation made?: No  Patient was readmitted due to: Pneumocephalus  Action Plan: ENT and Neurosurg for medical mgmt    Patient Information  Admitted from[de-identified] Home  Mental Status: Alert  During Assessment patient was accompanied by: Not accompanied during assessment  Assessment information provided by[de-identified] Patient  Primary Caregiver: Self  Support Systems: Spouse/significant other  [place] of Residence: [address] [place] [address] do you live in?: [place]  Type of Current Residence: 2 story home  Upon entering residence, is there a bedroom on the main floor (no further steps)?: Yes  Upon entering residence, is there a bathroom on the main floor (no further steps)?: No  Indicate which floors of current residence have a bathroom (select all the apply):: 2nd Floor  Number of steps to 2nd floor from main floor: One Flight  In the last 12 months, was there a time when you were not able to pay the mortgage or rent on time?: No  In the last 12 months, how many places have you lived?: 1  In the last 12 months, was there a time when you did not have a steady place to sleep or slept in a shelter (including now)?: No  Homeless/housing insecurity resource given?: N/A  Living Arrangements: Lives w/ Spouse/significant other  Is patient a ?: No    Activities of Daily Living Prior to Admission  Functional Status: Independent  Completes ADLs independently?: Yes  Ambulates independently?: Yes  Does patient use assisted devices?: No  Does patient currently own DME?: No  Does patient have a history of Outpatient Therapy (PT/OT)?: No  Does the patient have a history of Short-Term Rehab?: No  Does patient have a history of HHC?: No  Does patient currently have [address]?: No         Patient Information Continued  Income Source: Employed  Does patient have prescription coverage?: Yes  Within the past 12 months, you worried that your food would run out before you got the money to buy more : Never true  Within the past 12 months, the food you bought just didn't last and you didn't have money to get more : Never true  Food insecurity resource given?: N/A  Does patient receive dialysis treatments?: No  Does patient have a history of substance abuse?: No  Does patient have a history of Mental Health Diagnosis?: No    PHQ 2/9 Screening   Reviewed PHQ 2/9 Depression Screening Score?: No    Means of Transportation  Means of Transport to Appts[de-identified] Drives Self  In the past 12 months, has lack of transportation kept you from medical appointments or from getting medications?: No  In the past 12 months, has lack of transportation kept you from meetings, work, or from getting things needed for daily living?: No  Was application for public transport provided?: N/A        DISCHARGE DETAILS:    Discharge planning discussed with[de-identified] CM spoke with the pt  Freedom of Choice: No  Comments - Freedom of Choice: Pt is w/o skilled care needs warranting post acute care referrals at this time  CM contacted family/caregiver?: No- see comments  Were Treatment Team discharge recommendations reviewed with patient/caregiver?: Yes     Were patient/caregiver advised of the risks associated with not following Treatment Team discharge recommendations?: Yes    Contacts  Reason/Outcome: Continuity of Care, Discharge Planning    Requested [address]         Is the patient interested in Odilia Elias at discharge?: No    DME Referral Provided  Referral made for DME?: No    Other Referral/Resources/Interventions Provided:  Interventions: None Indicated    Would you like to participate in our [address] service program?  : No - Declined    Treatment Team Recommendation: Home  Discharge Destination Plan[de-identified] Home  Transport at Discharge : Family

## 2023-05-25 LAB
ANION GAP SERPL CALCULATED.3IONS-SCNC: 1 MMOL/L (ref 4–13)
BASOPHILS # BLD AUTO: 0.04 THOUSANDS/ÂΜL (ref 0–0.1)
BASOPHILS NFR BLD AUTO: 1 % (ref 0–1)
BUN SERPL-MCNC: 11 MG/DL (ref 5–25)
CALCIUM SERPL-MCNC: 9.2 MG/DL (ref 8.3–10.1)
CHLORIDE SERPL-SCNC: 104 MMOL/L (ref 96–108)
CO2 SERPL-SCNC: 28 MMOL/L (ref 21–32)
CREAT SERPL-MCNC: 0.73 MG/DL (ref 0.6–1.3)
EOSINOPHIL # BLD AUTO: 0.17 THOUSAND/ÂΜL (ref 0–0.61)
EOSINOPHIL NFR BLD AUTO: 2 % (ref 0–6)
ERYTHROCYTE [DISTWIDTH] IN BLOOD BY AUTOMATED COUNT: 11.9 % (ref 11.6–15.1)
GFR SERPL CREATININE-BSD FRML MDRD: 104 ML/MIN/1.73SQ M
GLUCOSE SERPL-MCNC: 147 MG/DL (ref 65–140)
GLUCOSE SERPL-MCNC: 173 MG/DL (ref 65–140)
GLUCOSE SERPL-MCNC: 191 MG/DL (ref 65–140)
GLUCOSE SERPL-MCNC: 205 MG/DL (ref 65–140)
GLUCOSE SERPL-MCNC: 237 MG/DL (ref 65–140)
HCT VFR BLD AUTO: 45 % (ref 36.5–49.3)
HGB BLD-MCNC: 15.9 G/DL (ref 12–17)
IMM GRANULOCYTES # BLD AUTO: 0.02 THOUSAND/UL (ref 0–0.2)
IMM GRANULOCYTES NFR BLD AUTO: 0 % (ref 0–2)
LYMPHOCYTES # BLD AUTO: 2.99 THOUSANDS/ÂΜL (ref 0.6–4.47)
LYMPHOCYTES NFR BLD AUTO: 42 % (ref 14–44)
MCH RBC QN AUTO: 31.4 PG (ref 26.8–34.3)
MCHC RBC AUTO-ENTMCNC: 35.3 G/DL (ref 31.4–37.4)
MCV RBC AUTO: 89 FL (ref 82–98)
MONOCYTES # BLD AUTO: 0.56 THOUSAND/ÂΜL (ref 0.17–1.22)
MONOCYTES NFR BLD AUTO: 8 % (ref 4–12)
NEUTROPHILS # BLD AUTO: 3.37 THOUSANDS/ÂΜL (ref 1.85–7.62)
NEUTS SEG NFR BLD AUTO: 47 % (ref 43–75)
NRBC BLD AUTO-RTO: 0 /100 WBCS
PLATELET # BLD AUTO: 240 THOUSANDS/UL (ref 149–390)
PMV BLD AUTO: 10.1 FL (ref 8.9–12.7)
POTASSIUM SERPL-SCNC: 4.3 MMOL/L (ref 3.5–5.3)
RBC # BLD AUTO: 5.06 MILLION/UL (ref 3.88–5.62)
SODIUM SERPL-SCNC: 133 MMOL/L (ref 135–147)
WBC # BLD AUTO: 7.15 THOUSAND/UL (ref 4.31–10.16)

## 2023-05-25 RX ORDER — INSULIN LISPRO 100 [IU]/ML
2-12 INJECTION, SOLUTION INTRAVENOUS; SUBCUTANEOUS
Status: DISCONTINUED | OUTPATIENT
Start: 2023-05-25 | End: 2023-05-26 | Stop reason: HOSPADM

## 2023-05-25 RX ADMIN — ACETAMINOPHEN 975 MG: 325 TABLET ORAL at 08:49

## 2023-05-25 RX ADMIN — INSULIN LISPRO 3 UNITS: 100 INJECTION, SOLUTION INTRAVENOUS; SUBCUTANEOUS at 08:50

## 2023-05-25 RX ADMIN — HYDROCHLOROTHIAZIDE 12.5 MG: 12.5 TABLET ORAL at 08:49

## 2023-05-25 RX ADMIN — AMOXICILLIN AND CLAVULANATE POTASSIUM 1 TABLET: 875; 125 TABLET, FILM COATED ORAL at 17:00

## 2023-05-25 RX ADMIN — ACETAMINOPHEN 975 MG: 325 TABLET ORAL at 03:30

## 2023-05-25 RX ADMIN — AMOXICILLIN AND CLAVULANATE POTASSIUM 1 TABLET: 875; 125 TABLET, FILM COATED ORAL at 08:50

## 2023-05-25 RX ADMIN — INSULIN LISPRO 1 UNITS: 100 INJECTION, SOLUTION INTRAVENOUS; SUBCUTANEOUS at 08:49

## 2023-05-25 RX ADMIN — ACETAMINOPHEN 975 MG: 325 TABLET ORAL at 20:24

## 2023-05-25 RX ADMIN — INSULIN GLARGINE 20 UNITS: 100 INJECTION, SOLUTION SUBCUTANEOUS at 22:11

## 2023-05-25 RX ADMIN — INSULIN LISPRO 3 UNITS: 100 INJECTION, SOLUTION INTRAVENOUS; SUBCUTANEOUS at 12:18

## 2023-05-25 RX ADMIN — ATORVASTATIN CALCIUM 40 MG: 40 TABLET, FILM COATED ORAL at 08:49

## 2023-05-25 RX ADMIN — INSULIN LISPRO 3 UNITS: 100 INJECTION, SOLUTION INTRAVENOUS; SUBCUTANEOUS at 16:53

## 2023-05-25 RX ADMIN — LEVOTHYROXINE SODIUM 137 MCG: 25 TABLET ORAL at 08:49

## 2023-05-25 RX ADMIN — ENOXAPARIN SODIUM 40 MG: 40 INJECTION SUBCUTANEOUS at 08:49

## 2023-05-25 RX ADMIN — LISINOPRIL 10 MG: 10 TABLET ORAL at 08:49

## 2023-05-25 RX ADMIN — INSULIN LISPRO 4 UNITS: 100 INJECTION, SOLUTION INTRAVENOUS; SUBCUTANEOUS at 12:18

## 2023-05-25 NOTE — PROGRESS NOTES
"[address] [facility]  Progress Note  Name: Darleen Funes  MRN: [id number]  Unit/Bed#: [facility] 726-01 I Date of Admission: 5/21/2023   Date of Service: 5/25/2023 I Hospital Day: 2    Assessment/Plan   * Pneumocephalus  Assessment & Plan  · Recurrent/increased left forehead swelling following a sneezing episode on Friday night on 5/19/23  ·  S/p left total craniotomy with craniectomy and orbital osteotomy with resection of extradural mass  Exenteration/obliteration of left frontal sinus and custom synthetic cranioplasty on 4/11/2023 with Dr Cynthia Graham  · s/p aspiration recurrent pneumocephalus on 5/24/23  Mild recurrence of left forehead swelling today  Imaging:  · CT sinus without contrast 5/21/2023: Slight interval increase in pneumocephalus and subgaleal air both superficial and deep to the left cranioplasty site  Persistent mild vasogenic edema left frontal lobe  Plan  · Continue regular neurologic checks  · Ongoing medical management and pain control per primary team   · Pt remains afebrile  WBC 5/25: normal    · Pt on prophylactic Augmentin  · Eval and mobilize per PT/OT  · DVT PPX: SCDs, Lovenox  · Pt had evaluation by ENT on 5/24/23  · Tentative plan is to monitor the forehead swelling tomorrow and evaluate if needs re-aspiration  If pt remains neurologically stable, could consider d/c home tomorrow with plan to re-admit possibly next week for intervention  · Neurosurgery will continue to follow  Please call with any questions or concerns    Status post craniotomy and cranioplasty  Assessment & Plan  · See plan above  Meningioma [facility])  Assessment & Plan  · S/p resection of Meningioma  · See plan above  Subjective/Objective     Chief Complaint: \"The swelling in my forehead increased a little bit this morning\"    Subjective: Patient reports that after the aspiration yesterday, his left forehead had significantly decreased   He reports that this " morning when he work up the left forehead swelling had mildly increased  He denies any new episodes of  Sneezing  Objective: Alert and awake, No acute distress  I/O       05/23 0701 05/24 0700 05/24 0701 05/25 0700 05/25 0701 05/26 0700    P  O  338 600     Total Intake 338 600     Net +338 +600            Unmeasured Urine Occurrence 2 x 3 x     Unmeasured Stool Occurrence 0 x 0 x           Invasive Devices     Peripheral Intravenous Line  Duration           Peripheral IV 05/25/23 Left;Ventral (anterior) Wrist <1 day                Physical Exam:  Vitals: Blood pressure 132/81, pulse 103, temperature 98 6 °F (37 °C), resp  rate 16, SpO2 98 %  ,There is no height or weight on file to calculate BMI  General appearance:  Appears stated age  Head: Normocephalic, left forehead swelling with mild to moderate swelling  Scalp Incision CDI  Eyes: EOMI, PERRL  Neck: supple, symmetrical, trachea midline   Lungs: non labored breathing  Heart: regular heart rate  Neurologic:   Mental status: Alert, oriented x 3, thought content appropriate  Cranial nerves: grossly intact (Cranial nerves II-XII)  Sensory: normal to LT X 4  Motor: moving all extremities without focal weakness, Strength 5/5 throughout     Coordination: no drift in bilateral upper extremities      Lab Results:  Results from last 7 days   Lab Units 05/25/23  0511 05/24/23  0521 05/23/23  0531   EOS PCT % 2 2 2   HEMATOCRIT % 45 0 44 1 44 5   HEMOGLOBIN g/dL 15 9 16 1 15 8   MONOS PCT % 8 8 8   NEUTROS PCT % 47 46 47   PLATELETS Thousands/uL 240 240 217   WBC Thousand/uL 7 15 7 51 7 76     Results from last 7 days   Lab Units 05/25/23  0511 05/24/23  0521 05/23/23  0531   BUN mg/dL 11 13 15   CALCIUM mg/dL 9 2 9 2 9 1   CHLORIDE mmol/L 104 106 109*   CO2 mmol/L 28 27 26   CREATININE mg/dL 0 73 0 67 0 62   POTASSIUM mmol/L 4 3 3 7 3 7                   Imaging Studies: I have personally reviewed pertinent reports and I have personally reviewed pertinent films in PACS    EKG, Pathology, and Other Studies: I have personally reviewed pertinent reports  PLEASE NOTE:  This encounter may have been completed utilizing the M- Mod[employer]Fluency Direct Speech Voice Recognition Software  Grammatical errors, random word insertions, pronoun errors and incomplete sentences are occasional consequences of the system due to software limitations, ambient noise and hardware issues  These may be missed by proof reading prior to affixing electronic signature  Any questions or concerns about the content, text or information contained within the body of this dictation should be directly addressed to the advanced practitioner or physician for clarification

## 2023-05-25 NOTE — PLAN OF CARE
Problem: PAIN - ADULT  Goal: Verbalizes/displays adequate comfort level or baseline comfort level  Description: Interventions:  - Encourage patient to monitor pain and request assistance  - Assess pain using appropriate pain scale  - Administer analgesics based on type and severity of pain and evaluate response  - Implement non-pharmacological measures as appropriate and evaluate response  - Consider cultural and social influences on pain and pain management  - Notify physician/advanced practitioner if interventions unsuccessful or patient reports new pain  Outcome: Progressing     Problem: INFECTION - ADULT  Goal: Absence or prevention of progression during hospitalization  Description: INTERVENTIONS:  - Assess and monitor for signs and symptoms of infection  - Monitor lab/diagnostic results  - Monitor all insertion sites, i e  indwelling lines, tubes, and drains  - Monitor endotracheal if appropriate and nasal secretions for changes in amount and color  - [place] appropriate cooling/warming therapies per order  - Administer medications as ordered  - Instruct and encourage patient and family to use good hand hygiene technique  - Identify and instruct in appropriate isolation precautions for identified infection/condition  Outcome: Progressing  Goal: Absence of fever/infection during neutropenic period  Description: INTERVENTIONS:  - Monitor WBC    Outcome: Progressing     Problem: SAFETY ADULT  Goal: Patient will remain free of falls  Description: INTERVENTIONS:  - Educate patient/family on patient safety including physical limitations  - Instruct patient to call for assistance with activity   - Consult OT/PT to assist with strengthening/mobility   - Keep Call bell within reach  - Keep bed low and locked with side rails adjusted as appropriate  - Keep care items and personal belongings within reach  - Initiate and maintain comfort rounds  - Make Fall Risk Sign visible to staff  - Offer Toileting every  Hours, in advance of need  - Initiate/Maintain alarm  - Obtain necessary fall risk management equipment:   - Apply yellow socks and bracelet for high fall risk patients  - Consider moving patient to room near nurses station  Outcome: Progressing  Goal: Maintain or return to baseline ADL function  Description: INTERVENTIONS:  -  Assess patient's ability to carry out ADLs; assess patient's baseline for ADL function and identify physical deficits which impact ability to perform ADLs (bathing, care of mouth/teeth, toileting, grooming, dressing, etc )  - Assess/evaluate cause of self-care deficits   - Assess range of motion  - Assess patient's mobility; develop plan if impaired  - Assess patient's need for assistive devices and provide as appropriate  - Encourage maximum independence but intervene and supervise when necessary  - Involve family in performance of ADLs  - Assess for home care needs following discharge   - Consider OT consult to assist with ADL evaluation and planning for discharge  - Provide patient education as appropriate  Outcome: Progressing  Goal: Maintains/Returns to pre admission functional level  Description: INTERVENTIONS:  - Perform BMAT or MOVE assessment daily    - Set and communicate daily mobility goal to care team and patient/family/caregiver     - Collaborate with rehabilitation services on mobility goals if consulted  -  Outcome: Progressing     Problem: DISCHARGE PLANNING  Goal: Discharge to home or other facility with appropriate resources  Description: INTERVENTIONS:  - Identify barriers to discharge w/patient and caregiver  - Arrange for needed discharge resources and transportation as appropriate  - Identify discharge learning needs (meds, wound care, etc )  - Arrange for interpretive services to assist at discharge as needed  - Refer to Case Management Department for coordinating discharge planning if the patient needs post-hospital services based on physician/advanced practitioner order or complex needs related to functional status, cognitive ability, or social support system  Outcome: Progressing     Problem: Knowledge Deficit  Goal: Patient/family/caregiver demonstrates understanding of disease process, treatment plan, medications, and discharge instructions  Description: Complete learning assessment and assess knowledge base    Interventions:  - Provide teaching at level of understanding  - Provide teaching via preferred learning methods  Outcome: Progressing     Problem: NEUROSENSORY - ADULT  Goal: Achieves stable or improved neurological status  Description: INTERVENTIONS  - Monitor and report changes in neurological status  - Monitor vital signs such as temperature, blood pressure, glucose, and any other labs ordered   - Initiate measures to prevent increased intracranial pressure  - Monitor for seizure activity and implement precautions if appropriate      Outcome: Progressing

## 2023-05-25 NOTE — PROGRESS NOTES
Around 2230, RN notified by Kindra Aleman that pt's left forehead is swollen, RN went to assess pt and observed same, pt denied any pain or vision impairment, stated felt a little tightness to that area, VSS, RN tiger-texted on-call [facility] provider Suzie Pereira and forwarded msg to on-call Neuro-Surgery provider Nani Burrell, per [facility] Provider will await Neuro-Surg response

## 2023-05-25 NOTE — PLAN OF CARE
Problem: PAIN - ADULT  Goal: Verbalizes/displays adequate comfort level or baseline comfort level  Description: Interventions:  - Encourage patient to monitor pain and request assistance  - Assess pain using appropriate pain scale  - Administer analgesics based on type and severity of pain and evaluate response  - Implement non-pharmacological measures as appropriate and evaluate response  - Consider cultural and social influences on pain and pain management  - Notify physician/advanced practitioner if interventions unsuccessful or patient reports new pain  Outcome: Progressing     Problem: INFECTION - ADULT  Goal: Absence or prevention of progression during hospitalization  Description: INTERVENTIONS:  - Assess and monitor for signs and symptoms of infection  - Monitor lab/diagnostic results  - Monitor all insertion sites, i e  indwelling lines, tubes, and drains  - Monitor endotracheal if appropriate and nasal secretions for changes in amount and color  - [place] appropriate cooling/warming therapies per order  - Administer medications as ordered  - Instruct and encourage patient and family to use good hand hygiene technique  - Identify and instruct in appropriate isolation precautions for identified infection/condition  Outcome: Progressing     Problem: SAFETY ADULT  Goal: Patient will remain free of falls  Description: INTERVENTIONS:  - Educate patient/family on patient safety including physical limitations  - Instruct patient to call for assistance with activity   - Consult OT/PT to assist with strengthening/mobility   - Keep Call bell within reach  - Keep bed low and locked with side rails adjusted as appropriate  - Keep care items and personal belongings within reach  - Initiate and maintain comfort rounds  - Make Fall Risk Sign visible to staff  - Offer Toileting every 2 Hours, in advance of need  - Initiate/Maintain 24/7 alarm  - Apply yellow socks and bracelet for high fall risk patients  - Consider moving patient to room near nurses station  Outcome: Progressing

## 2023-05-25 NOTE — PROGRESS NOTES
[address] [facility]  Progress Note  Name: Amberly Davalos  MRN: [id number]  Unit/Bed#: [facility] 726-01 I Date of Admission: 5/21/2023   Date of Service: 5/25/2023 I Hospital Day: 2    Assessment/Plan   * Pneumocephalus  Assessment & Plan  56year old male with PMH of Grade II Meningioma s/p left frontal orbital resection on 4/11/23 without immediate complications, who has recently been admitted for significant pneumocephalus (5/8-5/13) noted on CT Sinus/CTH, managed conservatively without further surgical intervention, discharged on oral augmentin with outpatient followup, now presenting for similar left frontal swelling and pneumocephalus 2/2 sneezing fit 5/19  · 5/21 CT Sinus: Pnuemocephalus and subgaleal air identified slightly increased from the prior CT scan and presumed to extend from the left frontal sinus where fluid and air opacity is noted  There is a direct communication from the left frontal sinus to the left frontal extra-axial air  · 5/21 CTH: Slight interval increase in pneumocephalus and subgaleal air both superficial and deep to the left cranioplasty site  Persistent mild vasogenic edema left frontal lobe  · ESR, CRP WNL    Plan:   · Neurosurgery Consult, appreciate recommendations  · S/p Needle aspiration of Pneumocephalus 5/22, 5/24  · HoB >30 degrees  · Headwrap of scalp - pressure  · ENT Evaluation 5/24 - appreciate recommendations  · Q shift neuro checks  · Monitor Vital Signs  · Tylenol PRN for pain  · Continue PTA Augmentin 875-125mg BID      Diabetes [facility])  Assessment & Plan  Lab Results   Component Value Date    HGBA1C 7 8 (H) 05/12/2023       Recent Labs     05/24/23  1613 05/24/23  2048 05/25/23  0623 05/25/23  1040   POCGLU 260* 261* 173* 237*       Blood Sugar Average: Last 72 hrs:  Last A1c 7 8 on 5/12, admission Glucose 234  On Home Lantus 15u Qhs and Humalog 3u TID with modified home sliding scale, Jardiance 25mg daily   Notably, was changed from Metformin and Jardiance post operatively due to elevated blood sugars treatment, and initially was mistakenly taking lantus 18u TID without reported hypoglycemic episodes  Plan:  · Hold home Jardiance  · Continue Basal Lantus 15u Qhs  · SSI algorithm 3, adjust as needed       Other specified hypothyroidism  Assessment & Plan  · Continue home Levothyroxine 137 mcg        Hyperlipidemia  Assessment & Plan  · Continue home Lipitor 40mg        Benign essential hypertension  Assessment & Plan  Continue home Lisinopril-Hydrochlorothiazide  · Lisinopril 10mg, Hydrochlorothiazide 12 5mg              PPX: Lovenox  Diet: Jaciel/Cho  Code Status: Full   Dispo: Pending NS recommendations    Plan D/W Dr Jasper Jasso and [facility] Team    Subjective:   Patient seen and evaluated at bedside this AM  No OVN events  Forehead is beginning to swell again but is overall improved  Denies any current pain, lightheadedness  Is eating well, regular bowel movements  No immediate concerns, wonders how long he will have to stay inpatient  Concerns addressed  Objective:     Vitals: Blood pressure 132/81, pulse 103, temperature 98 6 °F (37 °C), resp  rate 16, SpO2 98 %  ,There is no height or weight on file to calculate BMI  Intake/Output Summary (Last 24 hours) at 5/25/2023 1217  Last data filed at 5/24/2023 1700  Gross per 24 hour   Intake 340 ml   Output --   Net 340 ml       Physical Exam:   Physical Exam  Constitutional:       General: He is not in acute distress  Appearance: He is normal weight  HENT:      Head:      Comments: Left supraorbital swelling, improved from yesterday  Nose: Nose normal    Eyes:      Conjunctiva/sclera: Conjunctivae normal    Cardiovascular:      Rate and Rhythm: Normal rate and regular rhythm  Heart sounds: Normal heart sounds  No murmur heard  Pulmonary:      Effort: No respiratory distress  Breath sounds: Normal breath sounds     Abdominal:      General: Bowel sounds are normal  Palpations: Abdomen is soft  Tenderness: There is no abdominal tenderness  Skin:     General: Skin is warm and dry  Capillary Refill: Capillary refill takes less than 2 seconds  Findings: No erythema or rash  Neurological:      General: No focal deficit present  Mental Status: He is alert  Mental status is at baseline  Cranial Nerves: Cranial nerves 2-12 are intact  Sensory: Sensation is intact  Motor: Motor function is intact  Coordination: Coordination is intact  Psychiatric:         Mood and Affect: Mood normal          Invasive Devices     Peripheral Intravenous Line  Duration           Peripheral IV 05/25/23 Left;Ventral (anterior) Wrist <1 day                         Lab and other studies:  I have personally reviewed pertinent reports  No results displayed because visit has over 200 results            Recent Results (from the past 24 hour(s))   Fingerstick Glucose (POCT)    Collection Time: 05/24/23  4:13 PM   Result Value Ref Range    POC Glucose 260 (H) 65 - 140 mg/dl   Fingerstick Glucose (POCT)    Collection Time: 05/24/23  8:48 PM   Result Value Ref Range    POC Glucose 261 (H) 65 - 140 mg/dl   Basic metabolic panel    Collection Time: 05/25/23  5:11 AM   Result Value Ref Range    Sodium 133 (L) 135 - 147 mmol/L    Potassium 4 3 3 5 - 5 3 mmol/L    Chloride 104 96 - 108 mmol/L    CO2 28 21 - 32 mmol/L    ANION GAP 1 (L) 4 - 13 mmol/L    BUN 11 5 - 25 mg/dL    Creatinine 0 73 0 60 - 1 30 mg/dL    Glucose 191 (H) 65 - 140 mg/dL    Calcium 9 2 8 3 - 10 1 mg/dL    eGFR 104 ml/min/1 73sq m   CBC and differential    Collection Time: 05/25/23  5:11 AM   Result Value Ref Range    WBC 7 15 4 31 - 10 16 Thousand/uL    RBC 5 06 3 88 - 5 62 Million/uL    Hemoglobin 15 9 12 0 - 17 0 g/dL    Hematocrit 45 0 36 5 - 49 3 %    MCV 89 82 - 98 fL    MCH 31 4 26 8 - 34 3 pg    MCHC 35 3 31 4 - 37 4 g/dL    RDW 11 9 11 6 - 15 1 %    MPV 10 1 8 9 - 12 7 fL    Platelets 711 447 - "390 Thousands/uL    nRBC 0 /100 WBCs    Neutrophils Relative 47 43 - 75 %    Immat GRANS % 0 0 - 2 %    Lymphocytes Relative 42 14 - 44 %    Monocytes Relative 8 4 - 12 %    Eosinophils Relative 2 0 - 6 %    Basophils Relative 1 0 - 1 %    Neutrophils Absolute 3 37 1 85 - 7 62 Thousands/µL    Immature Grans Absolute 0 02 0 00 - 0 20 Thousand/uL    Lymphocytes Absolute 2 99 0 60 - 4 47 Thousands/µL    Monocytes Absolute 0 56 0 17 - 1 22 Thousand/µL    Eosinophils Absolute 0 17 0 00 - 0 61 Thousand/µL    Basophils Absolute 0 04 0 00 - 0 10 Thousands/µL   Fingerstick Glucose (POCT)    Collection Time: 05/25/23  6:23 AM   Result Value Ref Range    POC Glucose 173 (H) 65 - 140 mg/dl   Fingerstick Glucose (POCT)    Collection Time: 05/25/23 10:40 AM   Result Value Ref Range    POC Glucose 237 (H) 65 - 140 mg/dl     Blood Culture:   Lab Results   Component Value Date    BLOODCX No Growth After 5 Days  05/08/2023    BLOODCX No Growth After 5 Days  05/08/2023   ,   Urinalysis:   Lab Results   Component Value Date    BILIRUBINUR Negative 05/23/2023    BLOODU Negative 05/23/2023    CLARITYU Clear 05/23/2023    COLORU Light Yellow 05/23/2023    GLUCOSEU 500 (1/2%) (A) 05/23/2023    KETONESU Negative 05/23/2023    LEUKOCYTESUR Trace (A) 05/23/2023    NITRITE Negative 05/23/2023    PHUR 7 0 05/23/2023    SPECGRAV 1 015 05/23/2023   ,   Urine Culture:   Lab Results   Component Value Date    URINECX 40,000-49,000 cfu/ml 11/04/2019   ,   Wound Culure: No results found for: \"WOUNDCULT\"      Imaging:    CT head wo contrast   Final Result by Giselle Olson DO (05/21 1241)      Slight interval increase in pneumocephalus and subgaleal air both superficial and deep to the left cranioplasty site  Persistent mild vasogenic edema left frontal lobe                    Workstation performed: [id number]         CT sinus wo contrast   Final Result by Giselle Olson DO (05/21 1245)      Pneumocephalus and subgaleal air identified " slightly increased from the prior CT scan and presumed to extend from the left frontal sinus where fluid and air opacity is noted  There is a direct communication from the left frontal sinus to the left frontal    extra-axial air           Workstation performed: [id number]                VTE Pharmacologic Prophylaxis: Enoxaparin (Lovenox)  VTE Mechanical Prophylaxis: sequential compression device    Current Facility-Administered Medications   Medication Dose Route Frequency   • acetaminophen (TYLENOL) tablet 975 mg  975 mg Oral Q6H [facility]   • amoxicillin-clavulanate (AUGMENTIN) 875-125 mg per tablet 1 tablet  1 tablet Oral BID   • atorvastatin (LIPITOR) tablet 40 mg  40 mg Oral Daily   • enoxaparin (LOVENOX) subcutaneous injection 40 mg  40 mg Subcutaneous Q24H YANNI   • lisinopril (ZESTRIL) tablet 10 mg  10 mg Oral Daily    And   • hydrochlorothiazide (HYDRODIURIL) tablet 12 5 mg  12 5 mg Oral Daily   • insulin glargine (LANTUS) subcutaneous injection 20 Units 0 2 mL  20 Units Subcutaneous HS   • insulin lispro (HumaLOG) 100 units/mL subcutaneous injection 2-12 Units  2-12 Units Subcutaneous TID AC   • insulin lispro (HumaLOG) 100 units/mL subcutaneous injection 3 Units  3 Units Subcutaneous TID With Meals   • levothyroxine tablet 137 mcg  137 mcg Oral Daily   • oxyCODONE (ROXICODONE) IR tablet 5 mg  5 mg Oral Q4H PRN   • oxyCODONE (ROXICODONE) split tablet 2 5 mg  2 5 mg Oral Q4H PRN       Amber Monk MD  Family Medicine Resident PGY1

## 2023-05-25 NOTE — ASSESSMENT & PLAN NOTE
56year old male with PMH of Grade II Meningioma s/p left frontal orbital resection on 4/11/23 without immediate complications, who has recently been admitted for significant pneumocephalus (5/8-5/13) noted on CT Sinus/CTH, managed conservatively without further surgical intervention, discharged on oral augmentin with outpatient followup, now presenting for similar left frontal swelling and pneumocephalus 2/2 sneezing fit 5/19  · 5/21 CT Sinus: Pnuemocephalus and subgaleal air identified slightly increased from the prior CT scan and presumed to extend from the left frontal sinus where fluid and air opacity is noted  There is a direct communication from the left frontal sinus to the left frontal extra-axial air  · 5/21 CTH: Slight interval increase in pneumocephalus and subgaleal air both superficial and deep to the left cranioplasty site  Persistent mild vasogenic edema left frontal lobe     · ESR, CRP WNL    Plan:   · Neurosurgery Consult, appreciate recommendations  · S/p Needle aspiration of Pneumocephalus 5/22, 5/24  · HoB >30 degrees  · Headwrap of scalp - pressure  · ENT Evaluation 5/24 - appreciate recommendations  · Q shift neuro checks  · Monitor Vital Signs  · Tylenol PRN for pain  · Continue PTA Augmentin 875-125mg BID

## 2023-05-25 NOTE — ASSESSMENT & PLAN NOTE
· Recurrent/increased left forehead swelling following a sneezing episode on Friday night on 5/19/23  ·  S/p left total craniotomy with craniectomy and orbital osteotomy with resection of extradural mass  Exenteration/obliteration of left frontal sinus and custom synthetic cranioplasty on 4/11/2023 with Dr Mary Echeverria  · s/p aspiration recurrent pneumocephalus on 5/24/23  Mild recurrence of left forehead swelling today  Imaging:  · CT sinus without contrast 5/21/2023: Slight interval increase in pneumocephalus and subgaleal air both superficial and deep to the left cranioplasty site  Persistent mild vasogenic edema left frontal lobe    Plan  · Continue regular neurologic checks  · Ongoing medical management and pain control per primary team   · Pt remains afebrile  WBC 5/25: normal    · Pt on prophylactic Augmentin- continue at DC  · Eval and mobilize per PT/OT  · DVT PPX: SCDs, Lovenox  · Pt had evaluation by ENT on 5/24/23  · No need for aspiration at this time   · Cleared for DC  Should swelling occurs over the weekend patient directed to present to the ED  · Outpatient appointment scheduled for Wednesday with Dr Mary Echeverria  · Neurosurgery will continue to follow   Please call with any questions or concerns

## 2023-05-25 NOTE — ASSESSMENT & PLAN NOTE
· Recurrent/increased left forehead swelling following a sneezing episode on Friday night on 5/19/23  ·  S/p left total craniotomy with craniectomy and orbital osteotomy with resection of extradural mass  Exenteration/obliteration of left frontal sinus and custom synthetic cranioplasty on 4/11/2023 with Dr Dayton Hudson  · s/p aspiration recurrent pneumocephalus on 5/24/23  Mild recurrence of left forehead swelling today  Imaging:  · CT sinus without contrast 5/21/2023: Slight interval increase in pneumocephalus and subgaleal air both superficial and deep to the left cranioplasty site  Persistent mild vasogenic edema left frontal lobe  Plan  · Continue regular neurologic checks  · Ongoing medical management and pain control per primary team   · Pt remains afebrile  WBC 5/25: normal    · Eval and mobilize per PT/OT  · DVT PPX: SCDs, Lovenox  · Pt had evaluation by ENT on 5/24/23  · Neurosurgery will continue to follow   Please call with any questions or concerns

## 2023-05-25 NOTE — ASSESSMENT & PLAN NOTE
Lab Results   Component Value Date    HGBA1C 7 8 (H) 05/12/2023       Recent Labs     05/24/23  1613 05/24/23  2048 05/25/23  0623 05/25/23  1040   POCGLU 260* 261* 173* 237*       Blood Sugar Average: Last 72 hrs:  Last A1c 7 8 on 5/12, admission Glucose 234  On Home Lantus 15u Qhs and Humalog 3u TID with modified home sliding scale, Jardiance 25mg daily  Notably, was changed from Metformin and Jardiance post operatively due to elevated blood sugars treatment, and initially was mistakenly taking lantus 18u TID without reported hypoglycemic episodes       Plan:  · Hold home Jardiance  · Continue Basal Lantus 15u Qhs  · SSI algorithm 3, adjust as needed

## 2023-05-26 VITALS
RESPIRATION RATE: 16 BRPM | DIASTOLIC BLOOD PRESSURE: 72 MMHG | SYSTOLIC BLOOD PRESSURE: 124 MMHG | HEART RATE: 93 BPM | OXYGEN SATURATION: 97 % | TEMPERATURE: 98.4 F

## 2023-05-26 LAB
ATRIAL RATE: 88 BPM
GLUCOSE SERPL-MCNC: 194 MG/DL (ref 65–140)
GLUCOSE SERPL-MCNC: 265 MG/DL (ref 65–140)
P AXIS: 54 DEGREES
PR INTERVAL: 180 MS
QRS AXIS: 78 DEGREES
QRSD INTERVAL: 74 MS
QT INTERVAL: 358 MS
QTC INTERVAL: 433 MS
T WAVE AXIS: 41 DEGREES
VENTRICULAR RATE: 88 BPM

## 2023-05-26 RX ORDER — INSULIN GLARGINE 100 [IU]/ML
22 INJECTION, SOLUTION SUBCUTANEOUS
Qty: 4.5 ML | Refills: 0 | Status: SHIPPED | OUTPATIENT
Start: 2023-05-26 | End: 2023-06-01 | Stop reason: SDUPTHER

## 2023-05-26 RX ORDER — INSULIN LISPRO 100 [IU]/ML
5 INJECTION, SOLUTION INTRAVENOUS; SUBCUTANEOUS
Qty: 15 ML | Refills: 0 | Status: SHIPPED | OUTPATIENT
Start: 2023-05-26 | End: 2023-06-05

## 2023-05-26 RX ORDER — INSULIN GLARGINE 100 [IU]/ML
22 INJECTION, SOLUTION SUBCUTANEOUS
Status: DISCONTINUED | OUTPATIENT
Start: 2023-05-26 | End: 2023-05-26 | Stop reason: HOSPADM

## 2023-05-26 RX ORDER — INSULIN LISPRO 100 [IU]/ML
5 INJECTION, SOLUTION INTRAVENOUS; SUBCUTANEOUS
Status: DISCONTINUED | OUTPATIENT
Start: 2023-05-26 | End: 2023-05-26 | Stop reason: HOSPADM

## 2023-05-26 RX ADMIN — LEVOTHYROXINE SODIUM 137 MCG: 25 TABLET ORAL at 08:14

## 2023-05-26 RX ADMIN — HYDROCHLOROTHIAZIDE 12.5 MG: 12.5 TABLET ORAL at 08:14

## 2023-05-26 RX ADMIN — LISINOPRIL 10 MG: 10 TABLET ORAL at 08:14

## 2023-05-26 RX ADMIN — INSULIN LISPRO 2 UNITS: 100 INJECTION, SOLUTION INTRAVENOUS; SUBCUTANEOUS at 08:15

## 2023-05-26 RX ADMIN — AMOXICILLIN AND CLAVULANATE POTASSIUM 1 TABLET: 875; 125 TABLET, FILM COATED ORAL at 08:14

## 2023-05-26 RX ADMIN — INSULIN LISPRO 6 UNITS: 100 INJECTION, SOLUTION INTRAVENOUS; SUBCUTANEOUS at 11:19

## 2023-05-26 RX ADMIN — ATORVASTATIN CALCIUM 40 MG: 40 TABLET, FILM COATED ORAL at 08:14

## 2023-05-26 RX ADMIN — ENOXAPARIN SODIUM 40 MG: 40 INJECTION SUBCUTANEOUS at 08:14

## 2023-05-26 RX ADMIN — INSULIN LISPRO 5 UNITS: 100 INJECTION, SOLUTION INTRAVENOUS; SUBCUTANEOUS at 11:19

## 2023-05-26 RX ADMIN — ACETAMINOPHEN 975 MG: 325 TABLET ORAL at 08:14

## 2023-05-26 RX ADMIN — INSULIN LISPRO 3 UNITS: 100 INJECTION, SOLUTION INTRAVENOUS; SUBCUTANEOUS at 08:14

## 2023-05-26 NOTE — DISCHARGE INSTR - AVS FIRST PAGE
If swelling recurs on Weekend or on Memorial day, present to the emergency department and neurosurgery will come down to decompress  If swelling recurs on Weekday, present to the Neurosurgery office and they will perform decompression in the office  Follow up with neurosurgery for surgery plan and timeline    Continue with Daily Antibiotic

## 2023-05-26 NOTE — PROGRESS NOTES
[facility] [facility]  Progress Note  Name: Maryjo Langley  MRN: [id number]  [facility] Date of Admission: 5/21/2023   Date of Service: 5/26/2023 I Hospital Day: 3    Assessment/Plan   * Pneumocephalus  Assessment & Plan  · Recurrent/increased left forehead swelling following a sneezing episode on Friday night on 5/19/23  ·  S/p left total craniotomy with craniectomy and orbital osteotomy with resection of extradural mass  Exenteration/obliteration of left frontal sinus and custom synthetic cranioplasty on 4/11/2023 with Dr Breanna Mensah  · s/p aspiration recurrent pneumocephalus on 5/24/23  Mild recurrence of left forehead swelling today  Imaging:  · CT sinus without contrast 5/21/2023: Slight interval increase in pneumocephalus and subgaleal air both superficial and deep to the left cranioplasty site  Persistent mild vasogenic edema left frontal lobe    Plan  · Continue regular neurologic checks  · Ongoing medical management and pain control per primary team   · Pt remains afebrile  WBC 5/25: normal    · Pt on prophylactic Augmentin- continue at DC  · Eval and mobilize per PT/OT  · DVT PPX: SCDs, Lovenox  · Pt had evaluation by ENT on 5/24/23  · No need for aspiration at this time   · Cleared for DC  Should swelling occurs over the weekend patient directed to present to the ED  · Outpatient appointment scheduled for Wednesday with Dr Breanna Mensah  · Neurosurgery will continue to follow  Please call with any questions or concerns    Status post craniotomy and cranioplasty  Assessment & Plan  · See plan above  Meningioma [facility])  Assessment & Plan  · S/p resection of Meningioma  · See plan above  Subjective/Objective   Chief Complaint: None     Subjective: No headaches at this time  Mild fullness in the L forehead  No significant increase since yesterday per patient  No other acute needs   Wife at bedside     Objective: laying with [facility] "    I/O       05/24 0701 05/25 0700 05/25 0701 05/26 0700 05/26 0701 05/27 0700    P  O  600      Total Intake 600      Net +600             Unmeasured Urine Occurrence 3 x      Unmeasured Stool Occurrence 0 x            Invasive Devices     Peripheral Intravenous Line  Duration           Peripheral IV 05/25/23 Left;Ventral (anterior) Wrist 1 day                Physical Exam:  Vitals: Blood pressure 124/72, pulse 93, temperature 98 4 °F (36 9 °C), resp  rate 16, SpO2 97 %  ,There is no height or weight on file to calculate BMI  General appearance: alert, appears stated age, cooperative and no distress  Head: Normocephalic  Mild fullness but soft to touch  incision healing well  Eyes: EOMI, PERRL  Neck: supple, symmetrical, trachea midline  Back: no kyphosis present  Lungs: non labored breathing  Heart: regular heart rate  Neurologic:   Mental status: Alert, oriented x3, thought content appropriate  Cranial nerves: grossly intact (Cranial nerves II-XII)  Sensory: normal to light touch   Motor: moving all extremities without focal weakness 5/5     Lab Results:  Results from last 7 days   Lab Units 05/25/23  0511 05/24/23  0521 05/23/23  0531   EOS PCT % 2 2 2   HEMATOCRIT % 45 0 44 1 44 5   HEMOGLOBIN g/dL 15 9 16 1 15 8   MONOS PCT % 8 8 8   NEUTROS PCT % 47 46 47   PLATELETS Thousands/uL 240 240 217   WBC Thousand/uL 7 15 7 51 7 76     Results from last 7 days   Lab Units 05/25/23  0511 05/24/23  0521 05/23/23  0531   BUN mg/dL 11 13 15   CALCIUM mg/dL 9 2 9 2 9 1   CHLORIDE mmol/L 104 106 109*   CO2 mmol/L 28 27 26   CREATININE mg/dL 0 73 0 67 0 62   POTASSIUM mmol/L 4 3 3 7 3 7                 No results found for: \"TROPONINT\"  ABG:No results found for: \"BEART\", \"SCB5OSU\", \"A6CPTWPQ\", \"HAO3ZEP\", \"PHART\", \"PO2ART\", \"SOURCE\"    Imaging Studies: I have personally reviewed pertinent reports     and I have personally reviewed pertinent films in PACS  CT sinus wo contrast    Result Date: 5/21/2023  Impression: " Pneumocephalus and subgaleal air identified slightly increased from the prior CT scan and presumed to extend from the left frontal sinus where fluid and air opacity is noted  There is a direct communication from the left frontal sinus to the left frontal  extra-axial air  Workstation performed: [id number]     CT head wo contrast    Result Date: 5/21/2023  Impression: Slight interval increase in pneumocephalus and subgaleal air both superficial and deep to the left cranioplasty site  Persistent mild vasogenic edema left frontal lobe  Workstation performed: [id number]       EKG, Pathology, and Other Studies: I have personally reviewed pertinent reports        VTE Pharmacologic Prophylaxis: Enoxaparin (Lovenox)    VTE Mechanical Prophylaxis: sequential compression device

## 2023-05-26 NOTE — TELEPHONE ENCOUNTER
5/26/23- PER OTTO, PT WILL BE D/C SOMETIME TODAY   KAMLA WANTED TO SEE PT IN CLINIC [facility]  *F/U SCHEDULED 5/31/23

## 2023-05-26 NOTE — ASSESSMENT & PLAN NOTE
56year old male with PMH of Grade II Meningioma s/p left frontal orbital resection on 4/11/23 without immediate complications, who has recently been admitted for significant pneumocephalus (5/8-5/13) noted on CT Sinus/CTH, managed conservatively without further surgical intervention, discharged on oral augmentin with outpatient followup, now presenting for similar left frontal swelling and pneumocephalus 2/2 sneezing fit 5/19  · 5/21 CT Sinus: Pnuemocephalus and subgaleal air identified slightly increased from the prior CT scan and presumed to extend from the left frontal sinus where fluid and air opacity is noted  There is a direct communication from the left frontal sinus to the left frontal extra-axial air  · 5/21 CTH: Slight interval increase in pneumocephalus and subgaleal air both superficial and deep to the left cranioplasty site  Persistent mild vasogenic edema left frontal lobe     · ESR, CRP WNL    Plan:   · S/p Needle aspiration of Pneumocephalus 5/22, 5/24  · HoB >30 degrees  · Patient can follow outpatient for any subsequent aspiration needs  · Continue PTA Augmentin 875-125mg BID

## 2023-05-26 NOTE — PLAN OF CARE
Problem: PAIN - ADULT  Goal: Verbalizes/displays adequate comfort level or baseline comfort level  Description: Interventions:  - Encourage patient to monitor pain and request assistance  - Assess pain using appropriate pain scale  - Administer analgesics based on type and severity of pain and evaluate response  - Implement non-pharmacological measures as appropriate and evaluate response  - Consider cultural and social influences on pain and pain management  - Notify physician/advanced practitioner if interventions unsuccessful or patient reports new pain  Outcome: Progressing     Problem: INFECTION - ADULT  Goal: Absence or prevention of progression during hospitalization  Description: INTERVENTIONS:  - Assess and monitor for signs and symptoms of infection  - Monitor lab/diagnostic results  - Monitor all insertion sites, i e  indwelling lines, tubes, and drains  - Monitor endotracheal if appropriate and nasal secretions for changes in amount and color  - [place] appropriate cooling/warming therapies per order  - Administer medications as ordered  - Instruct and encourage patient and family to use good hand hygiene technique  - Identify and instruct in appropriate isolation precautions for identified infection/condition  Outcome: Progressing     Problem: SAFETY ADULT  Goal: Patient will remain free of falls  Description: INTERVENTIONS:  - Educate patient/family on patient safety including physical limitations  - Instruct patient to call for assistance with activity   - Consult OT/PT to assist with strengthening/mobility   - Keep Call bell within reach  - Keep bed low and locked with side rails adjusted as appropriate  - Keep care items and personal belongings within reach  - Initiate and maintain comfort rounds  - Make Fall Risk Sign visible to staff  - Apply yellow socks and bracelet for high fall risk patients  - Consider moving patient to room near nurses station  Outcome: Progressing

## 2023-05-26 NOTE — DISCHARGE SUMMARY
DISCHARGE NOTE - Family Medicine Residency, [address] [place] [address] 1965, 56 y o  male  MRN: [id number]    [facility]counter: [id number]  Primary Care Provider: Rody Marie MD      Admission Date: 5/21/2023 1017  Discharge Date: 5/26/20235/26/23  Length of Stay: 3 days  Diagnosis: Pneumocephalus [G93 89]  Meningioma [facility]) [D32 9]  Status post craniotomy [Z98 890]  History of diabetes mellitus [Z86 39]  Mass of left eye [H57 89]    Plans finalization pending attending physician attestation  HPI (per admission H&P note on 5/21)     HPI:   56year old male with PMH of TII DM, HTN, HLD, Hypothyroidism and Meningioma s/p craniotomy 4/11 and recent admission from 5/8-5/13 for pneumocephalus, discharged with conservative observation, 21 days of Augmentin and changes to his DMII regimen presenting for new left forehead swelling following a sneezing fit on Friday night  Patient was seen in outpatient office on 5/18 with noted mild periorbital swelling but no acute concerns, however had a sneezing episode on the 19th and has had persistent swelling since  Not associated with fever, lightheadedness, dizziness, N/V/D, shortness of breath, blurred vision, or focal neurologic deficits  Patient tried to watch swelling conservatively at home with pressure and breathing exercises, however swelling did not decrease through Friday night into Saturday  On Sunday morning, patient sneezed again and noticed swelling was getting slightly larger and starting to cross midline, associated with increasing pressure, and presented to the emergency department for evaluation      * Pneumocephalus  Assessment & Plan  56year old male with PMH of Grade II Meningioma s/p left frontal orbital resection on 4/11/23 without immediate complications, who has recently been admitted for significant pneumocephalus (5/8-5/13) noted on CT Sinus/CTH, managed conservatively without further surgical intervention, discharged on oral augmentin with outpatient followup, now presenting for similar left frontal swelling and pneumocephalus 2/2 sneezing fit 5/19  · 5/21 CT Sinus: Pnuemocephalus and subgaleal air identified slightly increased from the prior CT scan and presumed to extend from the left frontal sinus where fluid and air opacity is noted  There is a direct communication from the left frontal sinus to the left frontal extra-axial air  · 5/21 CTH: Slight interval increase in pneumocephalus and subgaleal air both superficial and deep to the left cranioplasty site  Persistent mild vasogenic edema left frontal lobe  · ESR, CRP WNL    Plan:   · S/p Needle aspiration of Pneumocephalus 5/22, 5/24  · HoB >30 degrees  · Patient can follow outpatient for any subsequent aspiration needs  · Continue PTA Augmentin 875-125mg BID      Diabetes [facility])  Assessment & Plan  Lab Results   Component Value Date    HGBA1C 7 8 (H) 05/12/2023       Recent Labs     05/25/23  1632 05/25/23  2115 05/26/23  0559 05/26/23  1104   POCGLU 147* 205* 194* 265*       Blood Sugar Average: Last 72 hrs:  Last A1c 7 8 on 5/12, admission Glucose 234  On Home Lantus 15u Qhs and Humalog 3u TID with modified home sliding scale, Jardiance 25mg daily  Notably, was changed from Metformin and Jardiance post operatively due to elevated blood sugars treatment, and initially was mistakenly taking lantus 18u TID without reported hypoglycemic episodes       Plan:  · Continue home Jardiance  · Take Home Basal Lantus at 22u  · Humalog 5 TID before meals  · Keep sugar log and fu with PCP outpatient         Patient Active Problem List   Diagnosis   • Uncomplicated alcohol dependence ([address] )   • Benign essential hypertension   • Diabetes ([address] )   • Hyperlipidemia   • Other specified hypothyroidism   • Right shoulder pain   • Adhesive capsulitis of right shoulder   • Elevated AST (SGOT)   • Alcohol abuse   • Cough   • Balanitis   • Fibromatosis of plantar fascia   • Chronic pain in penis • Candida rash of groin   • Dizziness   • Other specified glaucoma   • Frontal skull lesion   • Dental caries   • Status post appendectomy   • Subacromial impingement, right   • Rotator cuff syndrome, right   • Pneumocephalus   • Left-sided low back pain without sciatica   • Meningioma [facility])   • Status post craniotomy and cranioplasty       HOSPITAL COURSE:     Hospital Course:   Patient was accepted to the floor and reinitiated on his outpatient antibiotics  He was given scheduled tylenol for pain and his insulin regimen was optimized  He was observed overnight without issue and was evaluated by neurosurgery on the following morning  The swelling was noted to be increasing and neurosurgery performed a bedside pneumocephalus aspiration without complications (7/11)  Patient continued to be monitored inpatient over the next few days and received a second aspiration on the 24th  He was evaluated by ENT Rhinology inpatient however was not able to be scheduled for an inpatient surgery at that time  Case was discussed with neurosurgery team, and given that patient did not have any further acute inpatient needs, A plan was developed to discharge patient to home, and patient could present to either the emergency department or the [address] [place] [address] neurosurgery offices for any further aspiration needs pending an official plan for further operative intervention with neurosurgery and ENT  Plan discussed with patient and patient is agreeable, finalized for discharge on 5/26  CONDITION AT DISCHARGE:   On day of discharge patient is seen and evaluated at bedside  Patient is stable and without concern  Patient denies any pain or SOB  Patient able to tolerate PO food without N/V/D and had bowel movement and baseline urine output  Patient able to ambulate independently without assistance  Patient is aware of current health status and understand plan of treatment and outpatient follow-up   The patient understood and agreed with the plan  All pertinent lab results, imaging studies, procedures, and/or any incidental findings have been disclosed to the patient  All pertinent questions are answered to patient's satisfaction  On day of discharge, the patient was hemodynamically stable and appropriate for discharge home  Condition at Discharge: good     COMPLICATIONS:     Complications: NONE     PROCEDURES:     Procedures Performed:   No orders of the defined types were placed in this encounter  SIGNIFICANT FINDINGS / ABNORMAL RESULTS:     Significant Findings/Abnormal Results with this admission:  · 5/21 CTH: Slight interval increase in pneumocephalus and subgaleal air both superficial and deep to the left cranioplasty site  Persistent mild vasogenic edema left frontal love  · 5/21 CT Sinus:  Pneumocephalus and subgaleal air identified slightly increased from the prior CT scan and presumed to extend from the left frontal sinus where fluid and air opacity is noted  There is a direct communication from the left frontal sinus to the left frontal extra-axial air  VITALS ON DISCHARGE DATE:     Vitals:    05/25/23 2202 05/26/23 0736 05/26/23 0811 05/26/23 0812   BP: 108/71 101/71 124/72 124/72   Pulse: 83 102 87 93   Resp:  16     Temp: 98 6 °F (37 °C) 98 4 °F (36 9 °C)     TempSrc:       SpO2: 97% 97% 98% 97%     Temp:  [98 °F (36 7 °C)-98 6 °F (37 °C)] 98 4 °F (36 9 °C)  HR:  [] 93  Resp:  [16] 16  BP: (101-124)/(63-72) 124/72  Weight (last 2 days) before discharge     None        No intake or output data in the 24 hours ending 05/26/23 1416  Invasive Devices     None                 PHYSICAL EXAM ON DAY OF DISCHARGE:     Physical Exam  Constitutional:       General: He is not in acute distress  Appearance: He is normal weight     HENT:      Head:      Comments: Mild left supraorbital swelling     Nose: Nose normal    Eyes:      Conjunctiva/sclera: Conjunctivae normal    Cardiovascular:      Rate and Rhythm: Normal rate and regular rhythm  Heart sounds: Normal heart sounds  No murmur heard  Pulmonary:      Effort: No respiratory distress  Breath sounds: Normal breath sounds  Abdominal:      General: Bowel sounds are normal       Palpations: Abdomen is soft  Tenderness: There is no abdominal tenderness  Skin:     General: Skin is warm and dry  Capillary Refill: Capillary refill takes less than 2 seconds  Findings: No erythema or rash  Neurological:      General: No focal deficit present  Mental Status: He is alert  Mental status is at baseline  Cranial Nerves: Cranial nerves 2-12 are intact  Sensory: Sensation is intact  Motor: Motor function is intact  Coordination: Coordination is intact  Psychiatric:         Mood and Affect: Mood normal            DISCHARGE MEDICATIONS:     Discharge Medications:  See after visit summary for reconciled discharge medications provided to patient and family  Medication changes made with this admission:  · Change: Lantus to 22u at night  · Humalog to 5u three times daily with meals  · Resume: Antibiotics, prior to admission medications      FOLLOW-UP APPOINTMENTS / INSTRUCTION :     Important Physician Related Follow Up:   · F/U with PCP concerning recent hospital stay, Insulin regimen and home glucose control  · F/U with Neurosurgery for operative planning, any future forehead swelling     Appointment confirmed:  Future Appointments   Date Time Provider Blu Jean   5/31/2023  1:00 PM Michelle Ortiz MD [facility]   6/29/2023  8:30 AM Chari Guerrier MD [address]       Discharge instructions/Information to patient and family:   See after visit summary (AVS) for information provided to patient and family  Provisions for Follow-Up Care:  See after visit summary for information related to follow-up care and any pertinent home health orders          DISPOSITION:     Disposition: Home    Discharge Statement   I spent 30 1 minutes discharging the patient  This time was spent on the day of discharge  I had direct contact with the patient on the day of discharge  Additional documentation is required if more than 30 minutes were spent on discharge       Planned Readmission: Livia Valentin MD  PGY-1, Family Medicine  05/26/23  2:16 PM

## 2023-05-26 NOTE — ASSESSMENT & PLAN NOTE
Lab Results   Component Value Date    HGBA1C 7 8 (H) 05/12/2023       Recent Labs     05/25/23  1632 05/25/23  2115 05/26/23  0559 05/26/23  1104   POCGLU 147* 205* 194* 265*       Blood Sugar Average: Last 72 hrs:  Last A1c 7 8 on 5/12, admission Glucose 234  On Home Lantus 15u Qhs and Humalog 3u TID with modified home sliding scale, Jardiance 25mg daily  Notably, was changed from Metformin and Jardiance post operatively due to elevated blood sugars treatment, and initially was mistakenly taking lantus 18u TID without reported hypoglycemic episodes       Plan:  · Continue home Jardiance  · Take Home Basal Lantus at 22u  · Humalog 5 TID before meals  · Keep sugar log and fu with PCP outpatient

## 2023-05-27 NOTE — CASE MANAGEMENT
Case Management Discharge Planning Note    Patient name Karoline Macario  Location [address] [place] [address] [id number]  : 1965 Date 2023       Current Admission Date: 2023  Current Admission Diagnosis:Pneumocephalus   Patient Active Problem List    Diagnosis Date Noted   • Status post craniotomy and cranioplasty 2023   • Meningioma ([facility] ) 2023   • Left-sided low back pain without sciatica 2023   • Pneumocephalus 11/10/2022   • Subacromial impingement, right 2022   • Rotator cuff syndrome, right 2022   • Status post appendectomy 2021   • Dental caries 2021   • Frontal skull lesion 2020   • Dizziness 2020   • Other specified glaucoma 2020   • Candida rash of groin 2020   • Chronic pain in penis 2020   • Fibromatosis of plantar fascia 2019   • Balanitis 11/15/2019   • Cough 2019   • Elevated AST (SGOT) 2018   • Alcohol abuse 2018   • Adhesive capsulitis of right shoulder 2018   • Right shoulder pain    • Uncomplicated alcohol dependence ([facility] ) 2015   • Diabetes ([facility] ) 2015   • Benign essential hypertension 2015   • Hyperlipidemia 2015   • Other specified hypothyroidism 2013      LOS (days): 3  Geometric Mean LOS (GMLOS) (days):   Days to GMLOS:     OBJECTIVE:  Risk of Unplanned Readmission Score: 13 8         Current admission status: Inpatient   Preferred Pharmacy:   [facility] [address] MADDI KEARNEY [address] [facility], [address] [place] [address]  Phone: [phone] Fax: [phone]    Primary Care Provider: Simi Leach MD    Primary Insurance: Forest Hartman  Secondary Insurance:     DISCHARGE DETAILS:              Additional Comments: CM conferred with Family Medicine provider and is aware the pt will be discharged on this date  Post acute care referrals are not required as the pt is independent   He will discharge home without services  No further CM intervention is required at this time

## 2023-05-29 DIAGNOSIS — M25.511 CHRONIC RIGHT SHOULDER PAIN: ICD-10-CM

## 2023-05-29 DIAGNOSIS — G89.29 CHRONIC RIGHT SHOULDER PAIN: ICD-10-CM

## 2023-05-30 ENCOUNTER — TRANSITIONAL CARE MANAGEMENT (OUTPATIENT)
Dept: FAMILY MEDICINE CLINIC | Facility: CLINIC | Age: 58
End: 2023-05-30

## 2023-05-30 RX ORDER — LIDOCAINE 50 MG/G
PATCH TOPICAL
Qty: 30 PATCH | Refills: 0 | Status: SHIPPED | OUTPATIENT
Start: 2023-05-30

## 2023-05-30 NOTE — UTILIZATION REVIEW
NOTIFICATION OF ADMISSION DISCHARGE   This is a Notification of Discharge from [facility]  Please be advised that this patient has been discharge from our facility  Below you will find the admission and discharge date and time including the patient’s disposition  UTILIZATION REVIEW CONTACT:  Stephany Ariza  Utilization   Network Utilization Review Department  Phone: [phone] x carefully listen to the prompts  All voicemails are confidential   Email: [email or web address]     ADMISSION INFORMATION  PRESENTATION DATE: 5/21/2023 10:17 AM  OBERVATION ADMISSION DATE  INPATIENT ADMISSION DATE: 5/23/23  2:38 PM   DISCHARGE DATE: 5/26/2023  1:46 PM   DISPOSITION:Home/Self Care    IMPORTANT INFORMATION:  Send all requests for admission clinical reviews, approved or denied determinations and any other requests to dedicated fax number below belonging to the campus where the patient is receiving treatment   List of dedicated fax numbers:  [address] DENIALS (Administrative/Medical Necessity) [phone]   [address] (Maternity/NICU/Pediatrics) [phone]   [facility] [phone]   [facility] 87 [phone]   [address] [phone]   [address] [facility] [phone] 90 [facility] [phone]   [address] [place] [address] 119 [phone]   [facility]  [phone]   [facility] [phone]   [facility] [address] [facility] [phone]

## 2023-05-31 ENCOUNTER — OFFICE VISIT (OUTPATIENT)
Dept: NEUROSURGERY | Facility: CLINIC | Age: 58
End: 2023-05-31

## 2023-05-31 VITALS
SYSTOLIC BLOOD PRESSURE: 108 MMHG | DIASTOLIC BLOOD PRESSURE: 74 MMHG | TEMPERATURE: 98.1 F | HEIGHT: 69 IN | BODY MASS INDEX: 27.25 KG/M2 | HEART RATE: 90 BPM | WEIGHT: 184 LBS | RESPIRATION RATE: 16 BRPM

## 2023-05-31 DIAGNOSIS — E11.9 TYPE 2 DIABETES MELLITUS WITHOUT COMPLICATION, WITHOUT LONG-TERM CURRENT USE OF INSULIN (HCC): ICD-10-CM

## 2023-05-31 DIAGNOSIS — R22.0 FACIAL SWELLING: ICD-10-CM

## 2023-05-31 DIAGNOSIS — G93.89 PNEUMOCEPHALUS: ICD-10-CM

## 2023-05-31 DIAGNOSIS — D32.9 MENINGIOMA (HCC): Primary | ICD-10-CM

## 2023-05-31 DIAGNOSIS — Z98.890 STATUS POST CRANIOTOMY: ICD-10-CM

## 2023-05-31 RX ORDER — CEFAZOLIN SODIUM 2 G/50ML
2000 SOLUTION INTRAVENOUS ONCE
Status: CANCELLED | OUTPATIENT
Start: 2023-05-31 | End: 2023-05-31

## 2023-05-31 RX ORDER — AMOXICILLIN AND CLAVULANATE POTASSIUM 875; 125 MG/1; MG/1
1 TABLET, FILM COATED ORAL 2 TIMES DAILY
Qty: 42 TABLET | Refills: 1 | Status: SHIPPED | OUTPATIENT
Start: 2023-05-31 | End: 2023-07-12

## 2023-05-31 RX ORDER — CHLORHEXIDINE GLUCONATE 0.12 MG/ML
15 RINSE ORAL ONCE
Status: CANCELLED | OUTPATIENT
Start: 2023-05-31 | End: 2023-05-31

## 2023-05-31 NOTE — PROGRESS NOTES
Brief procedure note     The left frontal scalp was prepped with ChloraPrep and allowed to dry  I injected the left frontal scalp with lidocaine with epinephrine  Then utilizing a 60 cc syringe I accessed the subgaleal air and gently aspirated a total of 25 cc  There was no fluid component - only clear air  This procedure completely reduced the subgaleal component of the gas collection, and his scalp flattened against his skull/synthetic bone flap  The patient tolerated the procedure well with no new complaints  No specimen  No blood loss  A band aid was placed on the access site, and I instructed him to remove this later today       We will continue to follow him for recurrence etc , see progress note from today  S/P prostatectomy

## 2023-06-01 ENCOUNTER — OFFICE VISIT (OUTPATIENT)
Dept: FAMILY MEDICINE CLINIC | Facility: CLINIC | Age: 58
End: 2023-06-01

## 2023-06-01 VITALS
SYSTOLIC BLOOD PRESSURE: 110 MMHG | BODY MASS INDEX: 27.32 KG/M2 | DIASTOLIC BLOOD PRESSURE: 75 MMHG | WEIGHT: 185 LBS | HEART RATE: 97 BPM | OXYGEN SATURATION: 98 % | TEMPERATURE: 98.2 F | RESPIRATION RATE: 16 BRPM

## 2023-06-01 DIAGNOSIS — I10 ESSENTIAL HYPERTENSION: ICD-10-CM

## 2023-06-01 DIAGNOSIS — E11.9 TYPE 2 DIABETES MELLITUS WITHOUT COMPLICATION, WITHOUT LONG-TERM CURRENT USE OF INSULIN (HCC): ICD-10-CM

## 2023-06-01 DIAGNOSIS — D32.9 MENINGIOMA (HCC): Primary | ICD-10-CM

## 2023-06-01 DIAGNOSIS — I10 BENIGN ESSENTIAL HYPERTENSION: ICD-10-CM

## 2023-06-01 DIAGNOSIS — E11.9 CONTROLLED TYPE 2 DIABETES MELLITUS WITHOUT COMPLICATION, WITHOUT LONG-TERM CURRENT USE OF INSULIN (HCC): ICD-10-CM

## 2023-06-01 DIAGNOSIS — E03.8 OTHER SPECIFIED HYPOTHYROIDISM: ICD-10-CM

## 2023-06-01 RX ORDER — INSULIN GLARGINE 100 [IU]/ML
25 INJECTION, SOLUTION SUBCUTANEOUS
Qty: 8.25 ML | Refills: 0 | Status: SHIPPED | OUTPATIENT
Start: 2023-06-01 | End: 2023-07-04

## 2023-06-01 RX ORDER — LISINOPRIL AND HYDROCHLOROTHIAZIDE 12.5; 1 MG/1; MG/1
TABLET ORAL
Qty: 90 TABLET | Refills: 0 | Status: SHIPPED | OUTPATIENT
Start: 2023-06-01 | End: 2023-06-01 | Stop reason: SDUPTHER

## 2023-06-01 RX ORDER — LISINOPRIL AND HYDROCHLOROTHIAZIDE 12.5; 1 MG/1; MG/1
1 TABLET ORAL DAILY
Qty: 90 TABLET | Refills: 1 | Status: SHIPPED | OUTPATIENT
Start: 2023-06-01

## 2023-06-01 NOTE — PROGRESS NOTES
Assessment & Plan     1  Meningioma [facility])  Assessment & Plan:  Status post meningioma resection per NSG  Continue Augmentin as prescribed  Follow-up with appointment on 6/5      2  Type 2 diabetes mellitus without complication, without long-term current use of insulin (HCC)  Assessment & Plan:    Lab Results   Component Value Date    HGBA1C 7 8 (H) 05/12/2023     FBG log reviewed today ranging 110-167, borderline at goal  Reports compliance with current regimen  Current regimen: Jardiance 25 mg daily, Lantus 22U QHS, Humalog 5U TID, not on metformin, unclear why, GFR 100s  Patient reports he tolerated it well and no concerns identified per chart review   -Discontinue Humalog given that pt will like to do less insulin  -Increase Lantus from 22 to 25U QHS  -Start metformin 500 mg BID for now, increase dose at next visit if indicated  -Continue Jardiance 25 mg daily  -Follow-up in 2 weeks for FBG log review  -Due for eye and foot exam, will address at later date   -Tolerating statin and lisinopril, BP at goal     Orders:  -     metFORMIN (GLUCOPHAGE) 500 mg tablet; Take 1 tablet (500 mg total) by mouth 2 (two) times a day with meals    3  Other specified hypothyroidism  Assessment & Plan:  TSH check 2 months ago WNL  Continue levothyroxine 137 mcg daily      4  Benign essential hypertension  Assessment & Plan:  BP today 110/75, at goal  Continue Prinzide 1 tab daily  5  Controlled type 2 diabetes mellitus without complication, without long-term current use of insulin (HCC)  -     Empagliflozin (Jardiance) 25 MG TABS; Take 1 tablet (25 mg total) by mouth every morning  -     Insulin Glargine Solostar (Lantus SoloStar) 100 UNIT/ML SOPN; Inject 0 25 mL (25 Units total) under the skin daily at bedtime       Subjective     Transitional Care Management Review:   Julieth Tapia is a 56 y o  male here for TCM follow up       During the TCM phone call patient stated:  TCM Call     Date and time call was made 5/30/2023 11:17 AM    Hospital care reviewed  Records reviewed    Patient [facility] hospitial[facility] at  [facility]    Date of Admission  05/21/23    Date of discharge  05/26/23    Diagnosis  pneumocephalus    Disposition  Home    Were the patients medications reviewed and updated  Yes    Current Symptoms  None      TCM Call     Post hospital issues  None    Should patient be enrolled in anticoag monitoring? No    Scheduled for follow up? Yes    Patients specialists  Neurologist    Did you obtain your prescribed medications  Yes    Do you need help managing your prescriptions or medications  No    Is transportation to your appointment needed  No    I have advised the patient to call PCP with any new or worsening symptoms  Madeleine Plummer LPN Clinical Coordinator    Living Arrangements  Spouse or Significiant other; Family members    Support System  Family    Are you recieving any outpatient services  No    Are you recieving home care services  No    Are you using any community resources  No    Current waiver services  No    Have you fallen in the last 12 months  No    Interperter language line needed  No    Counseling  Patient    Counseling topics  Diagnostic results; Importance of RX compliance    Comments  Rommel Gautam         56year old male with PMH of TII DM, HTN, HLD, Hypothyroidism and Meningioma s/p craniotomy 4/11 and recent admission 5/21/2023 - 5/26/2023 (5 days) at [facility]  He was managed for new left forehead swelling following a sneezing fit  The swelling was noted to be increasing following admission warranting a bedside pneumocephalus aspiration without complications (4/83) per NSG before he was discharged with NSG as well as PCP outpatient followup  The patient now has follow-up appointment on in 4 days  He reports no new acute concerns today and is also here to discuss further diabetic management  Patient is here with FBG log which is ranging between 110-1 67    He has been compliant with regimen which includes Jardiance 25 mg daily, Humalog 5U TID and Lantus 22U QHS  He is aware of upcoming appointments  He is compliant with all medications including antibiotics  Review of Systems   Constitutional: Negative for activity change, appetite change, chills and fever  Eyes: Negative for visual disturbance  Respiratory: Negative for cough and shortness of breath  Cardiovascular: Negative for chest pain  Gastrointestinal: Negative for abdominal pain, diarrhea, nausea and vomiting  Neurological: Negative for dizziness and headaches  All other systems reviewed and are negative  Objective     /75   Pulse 97   Temp 98 2 °F (36 8 °C)   Resp 16   Wt 83 9 kg (185 lb)   SpO2 98%   BMI 27 32 kg/m²      Physical Exam  Vitals and nursing note reviewed  Constitutional:       General: He is not in acute distress  Appearance: He is well-developed and normal weight  He is not ill-appearing or diaphoretic  HENT:      Head: Normocephalic and atraumatic  Eyes:      Conjunctiva/sclera: Conjunctivae normal    Cardiovascular:      Rate and Rhythm: Normal rate and regular rhythm  Pulses: Normal pulses  Heart sounds: No murmur heard  Pulmonary:      Effort: Pulmonary effort is normal  No respiratory distress  Abdominal:      Palpations: Abdomen is soft  Tenderness: There is no abdominal tenderness  Musculoskeletal:         General: No swelling  Cervical back: Neck supple  Skin:     General: Skin is warm and dry  Capillary Refill: Capillary refill takes less than 2 seconds  Neurological:      Mental Status: He is alert     Psychiatric:         Mood and Affect: Mood normal        Medications have been reviewed by provider in current encounter    Lilian Self MD

## 2023-06-01 NOTE — ASSESSMENT & PLAN NOTE
Status post meningioma resection per NSG  Continue Augmentin as prescribed    Follow-up with appointment on 6/5

## 2023-06-01 NOTE — ASSESSMENT & PLAN NOTE
Lab Results   Component Value Date    HGBA1C 7 8 (H) 05/12/2023     FBG log reviewed today ranging 110-167, borderline at goal  Reports compliance with current regimen  Current regimen: Jardiance 25 mg daily, Lantus 22U QHS, Humalog 5U TID, not on metformin, unclear why, GFR 100s  Patient reports he tolerated it well and no concerns identified per chart review   -Discontinue Humalog given that pt will like to do less insulin  -Increase Lantus from 22 to 25U QHS  -Start metformin 500 mg BID for now, increase dose at next visit if indicated    -Continue Jardiance 25 mg daily  -Follow-up in 2 weeks for FBG log review  -Due for eye and foot exam, will address at later date   -Tolerating statin and lisinopril, BP at goal

## 2023-06-05 NOTE — PRE-PROCEDURE INSTRUCTIONS
Pre-Surgery Instructions:   Medication Instructions   • acetaminophen (TYLENOL) 325 mg tablet Uses PRN- OK to take day of surgery   • amoxicillin-clavulanate (AUGMENTIN) 875-125 mg per tablet Take day of surgery  • atorvastatin (LIPITOR) 40 mg tablet Take night before surgery   • Empagliflozin (Jardiance) 25 MG TABS Stop taking 4 days prior to surgery  • Insulin Glargine Solostar (Lantus SoloStar) 100 UNIT/ML SOPN Take night before surgery   • levothyroxine 137 mcg tablet Take day of surgery  • lidocaine (LIDODERM) 5 % Hold day of surgery  • lisinopril-hydrochlorothiazide (PRINZIDE,ZESTORETIC) 10-12 5 MG per tablet Hold day of surgery  • metFORMIN (GLUCOPHAGE) 500 mg tablet Hold day of surgery  Medication instructions for day surgery reviewed  Please use only a sip of water to take your instructed medications  Avoid all over the counter vitamins, supplements and NSAIDS for one week prior to surgery per anesthesia guidelines  Tylenol is ok to take as needed  You will receive a call one business day prior to surgery with an arrival time and hospital directions  If your surgery is scheduled on a Monday, the hospital will be calling you on the Friday prior to your surgery  If you have not heard from anyone by 8pm, please call the hospital supervisor through the hospital  at [phone]  Kimberlyn Bowles [phone])  Do not eat or drink anything after midnight the night before your surgery, including candy, mints, lifesavers, or chewing gum  Do not drink alcohol 24hrs before your surgery  Try not to smoke at least 24hrs before your surgery  Follow the pre surgery showering instructions as listed in the [facility] Surgical Experience Booklet” or otherwise provided by your surgeon's office  Do not shave the surgical area 24 hours before surgery  Do not apply any lotions, creams, including makeup, cologne, deodorant, or perfumes after showering on the day of your surgery       No contact lenses, eye make-up, or artificial eyelashes  Remove nail polish, including gel polish, and any artificial, gel, or acrylic nails if possible  Remove all jewelry including rings and body piercing jewelry  Wear causal clothing that is easy to take on and off  Consider your type of surgery  Keep any valuables, jewelry, piercings at home  Please bring any specially ordered equipment (sling, braces) if indicated  Arrange for a responsible person to drive you to and from the hospital on the day of your surgery  Visitor Guidelines discussed  Call the surgeon's office with any new illnesses, exposures, or additional questions prior to surgery  Please reference your [facility] Surgical Experience Booklet” for additional information to prepare for your upcoming surgery

## 2023-06-06 ENCOUNTER — HOSPITAL ENCOUNTER (EMERGENCY)
Facility: HOSPITAL | Age: 58
Discharge: HOME | End: 2023-06-06
Attending: EMERGENCY MEDICINE | Admitting: EMERGENCY MEDICINE
Payer: COMMERCIAL

## 2023-06-06 ENCOUNTER — TELEPHONE (OUTPATIENT)
Dept: OTHER | Facility: HOSPITAL | Age: 58
End: 2023-06-06

## 2023-06-06 ENCOUNTER — ANESTHESIA EVENT (OUTPATIENT)
Dept: PERIOP | Facility: HOSPITAL | Age: 58
DRG: 027 | End: 2023-06-06
Payer: COMMERCIAL

## 2023-06-06 ENCOUNTER — APPOINTMENT (EMERGENCY)
Dept: CT IMAGING | Facility: HOSPITAL | Age: 58
End: 2023-06-06
Payer: COMMERCIAL

## 2023-06-06 ENCOUNTER — TELEPHONE (OUTPATIENT)
Dept: OTHER | Facility: OTHER | Age: 58
End: 2023-06-06

## 2023-06-06 VITALS
BODY MASS INDEX: 27.32 KG/M2 | WEIGHT: 185 LBS | TEMPERATURE: 97.9 F | HEART RATE: 99 BPM | RESPIRATION RATE: 18 BRPM | OXYGEN SATURATION: 97 % | SYSTOLIC BLOOD PRESSURE: 110 MMHG | DIASTOLIC BLOOD PRESSURE: 66 MMHG

## 2023-06-06 DIAGNOSIS — R22.0 LOCAL SUPERFICIAL SWELLING OF HEAD: Primary | ICD-10-CM

## 2023-06-06 DIAGNOSIS — Z86.018 HISTORY OF MENINGIOMA: ICD-10-CM

## 2023-06-06 PROCEDURE — 99284 EMERGENCY DEPT VISIT MOD MDM: CPT

## 2023-06-06 NOTE — TELEPHONE ENCOUNTER
Received TT from , regarding patient c/o swelling on his left eye region and face  I called the patient  No answering  Left message to call me back with call back #/or go to ER for work ups of let complications  Also advised to put some ice-pack on the swelling intermittently

## 2023-06-06 NOTE — TELEPHONE ENCOUNTER
Pt called, requesting a call back from on call provider, regarding swelling on left side of face   Provider paged via [facility]

## 2023-06-07 ENCOUNTER — TELEPHONE (OUTPATIENT)
Dept: NEUROSURGERY | Facility: CLINIC | Age: 58
End: 2023-06-07

## 2023-06-07 NOTE — ED PROVIDER NOTES
"History  Chief Complaint   Patient presents with   • Post-op Problem     Pt comes to ED because of swelling to post surgical site on forehead starting tonight  States he had brain surgery 2 months ago and since surgery he has been having issues with swelling  He has had this complication before and needed it decompressed  +pain at site  Denies fever and chills      Patient is a 53-year-old male with history of diabetes mellitus, hypothyroidism, lipidemia, hypertension, grade 2 meningioma status post left frontal orbital resection on 5/14/6431 without complications presents emerged apartment with left frontal head swelling for 1 day  Patient with recent ED to hospital admission on 5/21/2023 for pneumocephalus; CT sinus 5/21 shown pneumocephalus and subclinical air identified slightly increased from prior CT scan and \"presumed to extend from the left frontal sinus with fluid and air opacity is noted    Meche Rae \"; status post needle aspiration of pneumocephalus 5/22, 5/24 with PTA Augmentin 875-125 mg twice daily for 21 days  Patient with outpatient follow-up to neurosurgery last note on 5/31/2023  Patient is GCS 15 alert and oriented x3  Patient denies palliative or provocative factors  Patient has noneffective treatment  Patient denies fevers, chills, nausea, vomiting, diarrhea, constipation and urinary symptoms  Patient has recent full recent trauma  Patient denies sick contacts recent travel  Patient denies headaches, tinnitus, dizziness, nausea, and vertiginous symptoms  Patient denies chest pain, shortness of breath, and abdominal pain  History provided by:  Patient   used: No        Prior to Admission Medications   Prescriptions Last Dose Informant Patient Reported? Taking? Alcohol Swabs 70 % PADS  Self No No   Sig: May substitute brand based on insurance coverage  Check glucose TID     Blood Glucose Monitoring Suppl (OneTouch Verio Reflect) w/Device KIT  Self No No   Sig: May " substitute brand based on insurance coverage  Check glucose TID  Empagliflozin (Jardiance) 25 MG TABS   No No   Sig: Take 1 tablet (25 mg total) by mouth every morning   Insulin Glargine Solostar (Lantus SoloStar) 100 UNIT/ML SOPN   No No   Sig: Inject 0 25 mL (25 Units total) under the skin daily at bedtime   Insulin Pen Needle (BD Pen Needle Mariela 2nd Gen) 32G X 4 MM MISC  Self No No   Sig: For use with insulin pen  Pharmacy may dispense brand covered by insurance  Insulin Pen Needle (BD Pen Needle Mariela 2nd Gen) 32G X 4 MM MISC  Self No No   Sig: For use with insulin pen  Pharmacy may dispense brand covered by insurance  Insulin Pen Needle (BD Pen Needle Mariela 2nd Gen) 32G X 4 MM MISC  Self No No   Sig: For use with insulin pen  Pharmacy may dispense brand covered by insurance  OneTouch Delica Lancets 92L MISC  Self No No   Sig: May substitute brand based on insurance coverage  Check glucose TID    acetaminophen (TYLENOL) 325 mg tablet  Self No No   Sig: Take 2 tablets (650 mg total) by mouth every 6 (six) hours as needed for headaches or mild pain   amoxicillin-clavulanate (AUGMENTIN) 875-125 mg per tablet   No No   Sig: Take 1 tablet by mouth 2 (two) times a day   atorvastatin (LIPITOR) 40 mg tablet  Self No No   Sig: Take 1 tablet (40 mg total) by mouth daily   glucose blood (Accu-Chek Guide) test strip  Self No No   Sig: Use 1 each 2 (two) times a day Use as instructed   glucose blood (OneTouch Verio) test strip  Self No No   Sig: Use 1 each 2 (two) times a day Use as instructed   glucose blood (OneTouch Verio) test strip  Self No No   Sig: May substitute brand based on insurance coverage  Check glucose TID    levothyroxine 137 mcg tablet  Self No No   Sig: Take 1 tablet by mouth once daily   lidocaine (LIDODERM) 5 %  Self No No   Sig: APPLY ONE PATCH TO AFFECTED AREA DAILY   REMOVE AND DISCARD PATCH WITHIN 12 HOURS OR AS DIRECTED BY DOCTOR   lisinopril-hydrochlorothiazide (PRINZIDE,ZESTORETIC) 10-12 5 MG per tablet   No No   Sig: Take 1 tablet by mouth daily   metFORMIN (GLUCOPHAGE) 500 mg tablet   No No   Sig: Take 1 tablet (500 mg total) by mouth 2 (two) times a day with meals      Facility-Administered Medications: None       Past Medical History:   Diagnosis Date   • Diabetes mellitus ([facility] [address] )    • Disease of thyroid gland    • Elevated LFTs     last assessed [id number]   • Hyperlipidemia    • Hypertension    • Impaired fasting glucose     last assessed [id number]       Past Surgical History:   Procedure Laterality Date   • APPENDECTOMY     • COLONOSCOPY     • IN CRANIEC TREPHINE BONE FLP BRAIN TUMOR SUPRTENTOR Left 4/11/2023    Procedure: IMAGE-GUIDED LEFT FRONTAL CRANIOTOMY FOR RESECTION OF EXTRA-AXIAL & SKULL MASS, WITH CRANIOPLASTY, EXENTERATION/OBLITERATION OF FRONTAL SINUS; ORBITAL OSTEOTOMY;  Surgeon: Aydee Sloan MD;  Location: [facility];  Service: Neurosurgery       Family History   Problem Relation Age of Onset   • Diabetes Mother    • Breast cancer Mother    • Diabetes Father      I have reviewed and agree with the history as documented  E-Cigarette/Vaping   • E-Cigarette Use Never User      E-Cigarette/Vaping Substances   • Nicotine No    • THC No    • CBD No    • Flavoring No    • Other No    • Unknown No      Social History     Tobacco Use   • Smoking status: Never   • Smokeless tobacco: Never   Vaping Use   • Vaping Use: Never used   Substance Use Topics   • Alcohol use: Not Currently   • Drug use: Never       Review of Systems   Constitutional: Negative for activity change, appetite change, chills and fever  HENT: Negative for congestion, postnasal drip, rhinorrhea, sinus pressure, sinus pain, sore throat and tinnitus  Eyes: Negative for photophobia and visual disturbance  Respiratory: Negative for cough, chest tightness and shortness of breath  Cardiovascular: Negative for chest pain and palpitations     Gastrointestinal: Negative for abdominal pain, constipation, diarrhea, nausea and vomiting  Genitourinary: Negative for difficulty urinating, dysuria, flank pain, frequency and urgency  Musculoskeletal: Negative for back pain, gait problem, neck pain and neck stiffness  Skin: Negative for pallor and rash  Left frontal head swelling  Allergic/Immunologic: Negative for environmental allergies and food allergies  Neurological: Negative for dizziness, weakness, numbness and headaches  Psychiatric/Behavioral: Negative for confusion  All other systems reviewed and are negative  Physical Exam  Physical Exam  Vitals and nursing note reviewed  Constitutional:       General: He is awake  He is not in acute distress  Appearance: Normal appearance  He is well-developed and normal weight  He is not ill-appearing, toxic-appearing or diaphoretic  Comments: /66 (BP Location: Right arm)   Pulse 99   Temp 97 9 °F (36 6 °C) (Oral)   Resp 18   Wt 83 9 kg (185 lb)   SpO2 97%   BMI 27 32 kg/m²      HENT:      Head: Normocephalic and atraumatic  Jaw: There is normal jaw occlusion  Right Ear: Hearing, tympanic membrane and external ear normal  No decreased hearing noted  No drainage, swelling or tenderness  No mastoid tenderness  Left Ear: Hearing, tympanic membrane and external ear normal  No decreased hearing noted  No drainage, swelling or tenderness  No mastoid tenderness  Nose: Nose normal       Mouth/Throat:      Lips: Pink  Mouth: Mucous membranes are moist       Pharynx: Oropharynx is clear  Uvula midline  Eyes:      General: Lids are normal  Vision grossly intact  Gaze aligned appropriately  Right eye: No discharge  Left eye: No discharge  Extraocular Movements: Extraocular movements intact  Conjunctiva/sclera: Conjunctivae normal       Pupils: Pupils are equal, round, and reactive to light  Neck:      Vascular: No JVD        Trachea: Trachea and phonation normal  No tracheal tenderness or tracheal deviation  Cardiovascular:      Rate and Rhythm: Normal rate and regular rhythm  Pulses: Normal pulses  Radial pulses are 2+ on the right side and 2+ on the left side  Posterior tibial pulses are 2+ on the right side and 2+ on the left side  Heart sounds: Normal heart sounds  No murmur heard  Pulmonary:      Effort: Pulmonary effort is normal  No respiratory distress  Breath sounds: Normal breath sounds and air entry  No stridor  No decreased breath sounds, wheezing, rhonchi or rales  Chest:      Chest wall: No tenderness  Abdominal:      General: Abdomen is flat  Bowel sounds are normal  There is no distension  Palpations: Abdomen is soft  Abdomen is not rigid  Tenderness: There is no abdominal tenderness  There is no guarding or rebound  Musculoskeletal:         General: No swelling  Normal range of motion  Cervical back: Full passive range of motion without pain, normal range of motion and neck supple  No rigidity  No spinous process tenderness or muscular tenderness  Normal range of motion  Feet:      Right foot:      Toenail Condition: Right toenails are normal       Left foot:      Toenail Condition: Left toenails are normal    Lymphadenopathy:      Head:      Right side of head: No submental, submandibular, tonsillar, preauricular, posterior auricular or occipital adenopathy  Left side of head: No submental, submandibular, tonsillar, preauricular, posterior auricular or occipital adenopathy  Cervical: No cervical adenopathy  Right cervical: No superficial, deep or posterior cervical adenopathy  Left cervical: No superficial, deep or posterior cervical adenopathy  Skin:     General: Skin is warm and dry  Capillary Refill: Capillary refill takes less than 2 seconds  Findings: No rash  Neurological:      General: No focal deficit present  Mental Status: He is alert and oriented to person, place, and time  Mental status is at baseline  GCS: GCS eye subscore is 4  GCS verbal subscore is 5  GCS motor subscore is 6  Cranial Nerves: Cranial nerves 2-12 are intact  Sensory: Sensation is intact  No sensory deficit  Motor: Motor function is intact  Coordination: Coordination is intact  Gait: Gait is intact  Deep Tendon Reflexes: Reflexes are normal and symmetric  Reflex Scores:       Patellar reflexes are 2+ on the right side and 2+ on the left side  Psychiatric:         Attention and Perception: Attention normal          Mood and Affect: Mood normal          Speech: Speech normal          Behavior: Behavior normal  Behavior is cooperative  Thought Content: Thought content normal          Judgment: Judgment normal          Vital Signs  ED Triage Vitals [06/06/23 1922]   Temperature Pulse Respirations Blood Pressure SpO2   97 9 °F (36 6 °C) 99 18 110/66 97 %      Temp Source Heart Rate Source Patient Position - Orthostatic VS BP Location FiO2 (%)   Oral Monitor Sitting Right arm --      Pain Score       8           Vitals:    06/06/23 1922   BP: 110/66   Pulse: 99   Patient Position - Orthostatic VS: Sitting         Visual Acuity  Visual Acuity    Flowsheet Row Most Recent Value   L Pupil Size (mm) 3   R Pupil Size (mm) 3          ED Medications  Medications - No data to display    Diagnostic Studies  Results Reviewed     None                 No orders to display              Procedures  Procedures         ED Course                                             Medical Decision Making  Patient is a 19-year-old male with history of diabetes mellitus, hypothyroidism, lipidemia, hypertension, grade 2 meningioma status post left frontal orbital resection on 1/05/7619 without complications presents emerged apartment with left frontal head swelling for 1 day     No sirs  Patient hemodynamically stable and afebrile  No sirs  Patient verbalizes refusal for continued emergency department treatment for ED presentation of left frontal forehead swelling  Patient verbally demonstrates mental competency and understanding of all clinical laboratory and diagnostic findings during current ED visit  Risks and benefits, alternatives to treatment and follow-up with medical providers were verbally discussed and patient verbalized understanding of aforementioned  Patient was given sufficient time for questions and concerns, and was encouraged to return to the ED for further evaluation and treatment  Patient hemodynamically stable and in no acute distress at time of final assessment and departure from ED  Patient GCS 15 alert and oriented x3, no focal neurological deficits, gross vision intact bilaterally  AMA paperwork completed and filed  Patient reported that he will call neurosurgery tomorrow and schedule outpatient visit for left frontal cranial swelling  Follow-up with PCP in the emergency department should symptoms persist or exacerbate          Amount and/or Complexity of Data Reviewed  Labs: ordered            Disposition  Final diagnoses:   Local superficial swelling of head   History of meningioma     Time reflects when diagnosis was documented in both MDM as applicable and the Disposition within this note     Time User Action Codes Description Comment    6/6/2023  8:37 PM Rolinda Callas Add [R22 0] Local superficial swelling of head     6/7/2023 12:12 AM Rolinda Callas Add [Z98 [id number]] History of facial surgery     6/7/2023 12:12 AM Rolinda Callas Remove [Z98 [id number]] History of facial surgery     6/7/2023 12:12 AM Rolinda Callas Add [Z86 [id number]] History of meningioma       ED Disposition     ED Disposition   AMA    Condition   --    Date/Time   Tue Jun 6, 2023  8:37 PM    Comment   Date: 6/6/2023  Patient: Lory Spurling  Admitted: 6/6/2023  7:31 PM  Attending Provider: Anurag Scott MD    Nanettey Spurling or his authorized caregiver has made the decision for the patient to leave the emergency department against th e advice of Francine Rodriguez III, PA-C  He or his authorized caregiver has been informed and understands the inherent risks, including death, sepsis  He or his authorized caregiver has decided to accept the responsibility for this decision  Zechariah Cotton and all necessary parties have been advised that he may return for further evaluation or treatment  His condition at time of discharge was stable  Zechariah Cotton had current vital signs as follows:  /66 (BP Location: Right arm)    Pulse 99   Temp 97 9 °F (36 6 °C) (Oral)   Resp 18   Wt 83 9 kg (185 lb)            Follow-up Information     Follow up With Specialties Details Why Contact Info Additional Information    Debra Hester MD Family Medicine   [place] [address] Emergency Department Emergency Medicine   [address] [place] [id number] [facility] 160 [id number] [facility] Emergency Department, Wes Gregory, [address], MD Neurosurgery In 1 day for further evaluation of symptoms [address] [facility] 110  [address] [place] [address]  [phone]             Discharge Medication List as of 6/6/2023  8:41 PM      CONTINUE these medications which have NOT CHANGED    Details   acetaminophen (TYLENOL) 325 mg tablet Take 2 tablets (650 mg total) by mouth every 6 (six) hours as needed for headaches or mild pain, Starting Sat 5/13/2023, No Print      Alcohol Swabs 70 % PADS May substitute brand based on insurance coverage   Check glucose TID , Normal      amoxicillin-clavulanate (AUGMENTIN) 875-125 mg per tablet Take 1 tablet by mouth 2 (two) times a day, Starting Wed 5/31/2023, Until Wed 7/12/2023, Normal      atorvastatin (LIPITOR) 40 mg tablet Take 1 tablet (40 mg total) by mouth daily, Starting Fri 9/10/2021, Normal      Blood Glucose Monitoring Suppl (OneTouch Verio Reflect) w/Device KIT May substitute brand based on insurance coverage  Check glucose TID , Normal      Empagliflozin (Jardiance) 25 MG TABS Take 1 tablet (25 mg total) by mouth every morning, Starting Thu 6/1/2023, Until Sat 7/1/2023, Normal      !! glucose blood (Accu-Chek Guide) test strip Use 1 each 2 (two) times a day Use as instructed, Starting Mon 12/7/2020, Normal      !! glucose blood (OneTouch Verio) test strip Use 1 each 2 (two) times a day Use as instructed, Starting Fri 9/10/2021, Normal      !! glucose blood (OneTouch Verio) test strip May substitute brand based on insurance coverage  Check glucose TID , Normal      Insulin Glargine Solostar (Lantus SoloStar) 100 UNIT/ML SOPN Inject 0 25 mL (25 Units total) under the skin daily at bedtime, Starting Thu 6/1/2023, Until Tue 7/4/2023, Normal      !! Insulin Pen Needle (BD Pen Needle Mariela 2nd Gen) 32G X 4 MM MISC For use with insulin pen  Pharmacy may dispense brand covered by insurance , Normal      !! Insulin Pen Needle (BD Pen Needle Mariela 2nd Gen) 32G X 4 MM MISC For use with insulin pen  Pharmacy may dispense brand covered by insurance , Normal      !! Insulin Pen Needle (BD Pen Needle Mariela 2nd Gen) 32G X 4 MM MISC For use with insulin pen  Pharmacy may dispense brand covered by insurance , Normal      levothyroxine 137 mcg tablet Take 1 tablet by mouth once daily, Normal      lidocaine (LIDODERM) 5 % APPLY ONE PATCH TO AFFECTED AREA DAILY  REMOVE AND DISCARD PATCH WITHIN 12 HOURS OR AS DIRECTED BY DOCTOR, Normal      lisinopril-hydrochlorothiazide (PRINZIDE,ZESTORETIC) 10-12 5 MG per tablet Take 1 tablet by mouth daily, Starting Thu 6/1/2023, Normal      metFORMIN (GLUCOPHAGE) 500 mg tablet Take 1 tablet (500 mg total) by mouth 2 (two) times a day with meals, Starting Thu 6/1/2023, Until Sat 7/1/2023, Normal      OneTouch Delica Lancets 45Y MISC May substitute brand based on insurance coverage   Check glucose TID , Normal       !! - Potential duplicate medications found  Please discuss with provider  No discharge procedures on file      PDMP Review     None          ED Provider  Electronically Signed by           Brenna Cranker, PA-C  06/07/23 0013

## 2023-06-07 NOTE — TELEPHONE ENCOUNTER
Received a call from patient requesting a call back regarding a new build up of air  Returned call to patient who stated he again is having swelling on the left side as he had prior to the aspiration on 5/23  Discussed this with HDM who stated no appt is needed today and will be handled in the OR tomorrow as scheduled  Relayed the above info to the patient  He stated an understanding and was appreciative of the call back

## 2023-06-08 ENCOUNTER — ANESTHESIA (OUTPATIENT)
Dept: PERIOP | Facility: HOSPITAL | Age: 58
DRG: 027 | End: 2023-06-08
Payer: COMMERCIAL

## 2023-06-08 ENCOUNTER — HOSPITAL ENCOUNTER (INPATIENT)
Facility: HOSPITAL | Age: 58
LOS: 1 days | Discharge: HOME | DRG: 027 | End: 2023-06-09
Attending: OTOLARYNGOLOGY | Admitting: NEUROLOGICAL SURGERY
Payer: COMMERCIAL

## 2023-06-08 DIAGNOSIS — E11.9 TYPE 2 DIABETES MELLITUS WITHOUT COMPLICATION, WITHOUT LONG-TERM CURRENT USE OF INSULIN (HCC): Primary | ICD-10-CM

## 2023-06-08 LAB
ABO GROUP BLD: NORMAL
BLD GP AB SCN SERPL QL: NEGATIVE
GLUCOSE SERPL-MCNC: 163 MG/DL (ref 65–140)
GLUCOSE SERPL-MCNC: 174 MG/DL (ref 65–140)
GLUCOSE SERPL-MCNC: 230 MG/DL (ref 65–140)
GLUCOSE SERPL-MCNC: 262 MG/DL (ref 65–140)
GLUCOSE SERPL-MCNC: 355 MG/DL (ref 65–140)
RH BLD: NEGATIVE
SPECIMEN EXPIRATION DATE: NORMAL

## 2023-06-08 PROCEDURE — 82948 REAGENT STRIP/BLOOD GLUCOSE: CPT

## 2023-06-08 PROCEDURE — 86900 BLOOD TYPING SEROLOGIC ABO: CPT | Performed by: OTOLARYNGOLOGY

## 2023-06-08 PROCEDURE — 61782 SCAN PROC CRANIAL EXTRA: CPT | Performed by: OTOLARYNGOLOGY

## 2023-06-08 PROCEDURE — 61618 REPAIR DURA: CPT | Performed by: OTOLARYNGOLOGY

## 2023-06-08 PROCEDURE — C1781 MESH (IMPLANTABLE): HCPCS | Performed by: OTOLARYNGOLOGY

## 2023-06-08 PROCEDURE — 99254 IP/OBS CNSLTJ NEW/EST MOD 60: CPT | Performed by: INTERNAL MEDICINE

## 2023-06-08 PROCEDURE — 31256 EXPLORATION MAXILLARY SINUS: CPT | Performed by: OTOLARYNGOLOGY

## 2023-06-08 PROCEDURE — 00Q64ZZ REPAIR CEREBRAL VENTRICLE, PERCUTANEOUS ENDOSCOPIC APPROACH: ICD-10-PCS | Performed by: NEUROLOGICAL SURGERY

## 2023-06-08 PROCEDURE — 31255 NSL/SINS NDSC W/TOT ETHMDCT: CPT | Performed by: OTOLARYNGOLOGY

## 2023-06-08 PROCEDURE — 86850 RBC ANTIBODY SCREEN: CPT | Performed by: OTOLARYNGOLOGY

## 2023-06-08 PROCEDURE — 8E09XBZ COMPUTER ASSISTED PROCEDURE OF HEAD AND NECK REGION: ICD-10-PCS | Performed by: NEUROLOGICAL SURGERY

## 2023-06-08 PROCEDURE — 86901 BLOOD TYPING SEROLOGIC RH(D): CPT | Performed by: OTOLARYNGOLOGY

## 2023-06-08 DEVICE — DURA 62106 SUBSTITUTE DUREPAIR 1X3IN NCE
Type: IMPLANTABLE DEVICE | Site: CRANIAL | Status: FUNCTIONAL
Brand: DUREPAIR®

## 2023-06-08 RX ORDER — LIDOCAINE HYDROCHLORIDE 20 MG/ML
INJECTION, SOLUTION EPIDURAL; INFILTRATION; INTRACAUDAL; PERINEURAL AS NEEDED
Status: DISCONTINUED | OUTPATIENT
Start: 2023-06-08 | End: 2023-06-08

## 2023-06-08 RX ORDER — SODIUM CHLORIDE, SODIUM LACTATE, POTASSIUM CHLORIDE, CALCIUM CHLORIDE 600; 310; 30; 20 MG/100ML; MG/100ML; MG/100ML; MG/100ML
20 INJECTION, SOLUTION INTRAVENOUS CONTINUOUS
Status: DISCONTINUED | OUTPATIENT
Start: 2023-06-08 | End: 2023-06-09 | Stop reason: HOSPADM

## 2023-06-08 RX ORDER — ONDANSETRON 2 MG/ML
4 INJECTION INTRAMUSCULAR; INTRAVENOUS ONCE AS NEEDED
Status: DISCONTINUED | OUTPATIENT
Start: 2023-06-08 | End: 2023-06-08

## 2023-06-08 RX ORDER — GLYCOPYRROLATE 0.2 MG/ML
INJECTION INTRAMUSCULAR; INTRAVENOUS AS NEEDED
Status: DISCONTINUED | OUTPATIENT
Start: 2023-06-08 | End: 2023-06-08

## 2023-06-08 RX ORDER — INSULIN GLARGINE 100 [IU]/ML
18 INJECTION, SOLUTION SUBCUTANEOUS
Status: DISCONTINUED | OUTPATIENT
Start: 2023-06-08 | End: 2023-06-09 | Stop reason: HOSPADM

## 2023-06-08 RX ORDER — SODIUM CHLORIDE 9 MG/ML
INJECTION, SOLUTION INTRAVENOUS CONTINUOUS PRN
Status: DISCONTINUED | OUTPATIENT
Start: 2023-06-08 | End: 2023-06-08

## 2023-06-08 RX ORDER — CHLORHEXIDINE GLUCONATE 0.12 MG/ML
15 RINSE ORAL ONCE
Status: COMPLETED | OUTPATIENT
Start: 2023-06-08 | End: 2023-06-08

## 2023-06-08 RX ORDER — MIDAZOLAM HYDROCHLORIDE 2 MG/2ML
INJECTION, SOLUTION INTRAMUSCULAR; INTRAVENOUS AS NEEDED
Status: DISCONTINUED | OUTPATIENT
Start: 2023-06-08 | End: 2023-06-08

## 2023-06-08 RX ORDER — ATORVASTATIN CALCIUM 40 MG/1
40 TABLET, FILM COATED ORAL
Status: DISCONTINUED | OUTPATIENT
Start: 2023-06-08 | End: 2023-06-09 | Stop reason: HOSPADM

## 2023-06-08 RX ORDER — SODIUM CHLORIDE, SODIUM LACTATE, POTASSIUM CHLORIDE, CALCIUM CHLORIDE 600; 310; 30; 20 MG/100ML; MG/100ML; MG/100ML; MG/100ML
INJECTION, SOLUTION INTRAVENOUS CONTINUOUS PRN
Status: DISCONTINUED | OUTPATIENT
Start: 2023-06-08 | End: 2023-06-08

## 2023-06-08 RX ORDER — PROPOFOL 10 MG/ML
INJECTION, EMULSION INTRAVENOUS CONTINUOUS PRN
Status: DISCONTINUED | OUTPATIENT
Start: 2023-06-08 | End: 2023-06-08

## 2023-06-08 RX ORDER — OXYMETAZOLINE HYDROCHLORIDE 0.05 G/100ML
SPRAY NASAL AS NEEDED
Status: DISCONTINUED | OUTPATIENT
Start: 2023-06-08 | End: 2023-06-08 | Stop reason: HOSPADM

## 2023-06-08 RX ORDER — AMOXICILLIN AND CLAVULANATE POTASSIUM 875; 125 MG/1; MG/1
1 TABLET, FILM COATED ORAL EVERY 12 HOURS SCHEDULED
Status: DISCONTINUED | OUTPATIENT
Start: 2023-06-09 | End: 2023-06-09 | Stop reason: HOSPADM

## 2023-06-08 RX ORDER — ONDANSETRON 2 MG/ML
INJECTION INTRAMUSCULAR; INTRAVENOUS AS NEEDED
Status: DISCONTINUED | OUTPATIENT
Start: 2023-06-08 | End: 2023-06-08

## 2023-06-08 RX ORDER — EPINEPHRINE 1 MG/ML
INJECTION, SOLUTION, CONCENTRATE INTRAVENOUS AS NEEDED
Status: DISCONTINUED | OUTPATIENT
Start: 2023-06-08 | End: 2023-06-08 | Stop reason: HOSPADM

## 2023-06-08 RX ORDER — CEFAZOLIN SODIUM 1 G/50ML
1000 SOLUTION INTRAVENOUS EVERY 8 HOURS
Status: COMPLETED | OUTPATIENT
Start: 2023-06-08 | End: 2023-06-09

## 2023-06-08 RX ORDER — COCAINE HYDROCHLORIDE 40 MG/ML
SOLUTION NASAL AS NEEDED
Status: DISCONTINUED | OUTPATIENT
Start: 2023-06-08 | End: 2023-06-08 | Stop reason: HOSPADM

## 2023-06-08 RX ORDER — ACETAMINOPHEN 325 MG/1
975 TABLET ORAL EVERY 8 HOURS SCHEDULED
Status: DISCONTINUED | OUTPATIENT
Start: 2023-06-08 | End: 2023-06-09 | Stop reason: HOSPADM

## 2023-06-08 RX ORDER — LIDOCAINE HYDROCHLORIDE AND EPINEPHRINE 10; 10 MG/ML; UG/ML
INJECTION, SOLUTION INFILTRATION; PERINEURAL AS NEEDED
Status: DISCONTINUED | OUTPATIENT
Start: 2023-06-08 | End: 2023-06-08 | Stop reason: HOSPADM

## 2023-06-08 RX ORDER — BACITRACIN, NEOMYCIN, POLYMYXIN B 400; 3.5; 5 [USP'U]/G; MG/G; [USP'U]/G
OINTMENT TOPICAL AS NEEDED
Status: DISCONTINUED | OUTPATIENT
Start: 2023-06-08 | End: 2023-06-08 | Stop reason: HOSPADM

## 2023-06-08 RX ORDER — LISINOPRIL 10 MG/1
10 TABLET ORAL DAILY
Status: DISCONTINUED | OUTPATIENT
Start: 2023-06-09 | End: 2023-06-09 | Stop reason: HOSPADM

## 2023-06-08 RX ORDER — HEPARIN SODIUM 5000 [USP'U]/ML
5000 INJECTION, SOLUTION INTRAVENOUS; SUBCUTANEOUS EVERY 8 HOURS SCHEDULED
Status: DISCONTINUED | OUTPATIENT
Start: 2023-06-09 | End: 2023-06-09 | Stop reason: HOSPADM

## 2023-06-08 RX ORDER — OXYCODONE HYDROCHLORIDE 5 MG/1
5 TABLET ORAL EVERY 4 HOURS PRN
Status: DISCONTINUED | OUTPATIENT
Start: 2023-06-08 | End: 2023-06-09 | Stop reason: HOSPADM

## 2023-06-08 RX ORDER — INSULIN LISPRO 100 [IU]/ML
1-6 INJECTION, SOLUTION INTRAVENOUS; SUBCUTANEOUS
Status: DISCONTINUED | OUTPATIENT
Start: 2023-06-09 | End: 2023-06-09 | Stop reason: HOSPADM

## 2023-06-08 RX ORDER — PROPOFOL 10 MG/ML
INJECTION, EMULSION INTRAVENOUS AS NEEDED
Status: DISCONTINUED | OUTPATIENT
Start: 2023-06-08 | End: 2023-06-08

## 2023-06-08 RX ORDER — SODIUM CHLORIDE 9 MG/ML
100 INJECTION, SOLUTION INTRAVENOUS CONTINUOUS
Status: DISCONTINUED | OUTPATIENT
Start: 2023-06-08 | End: 2023-06-09 | Stop reason: HOSPADM

## 2023-06-08 RX ORDER — CEFAZOLIN SODIUM 2 G/50ML
2000 SOLUTION INTRAVENOUS ONCE
Status: COMPLETED | OUTPATIENT
Start: 2023-06-08 | End: 2023-06-08

## 2023-06-08 RX ORDER — INSULIN LISPRO 100 [IU]/ML
1-5 INJECTION, SOLUTION INTRAVENOUS; SUBCUTANEOUS
Status: DISCONTINUED | OUTPATIENT
Start: 2023-06-08 | End: 2023-06-09 | Stop reason: HOSPADM

## 2023-06-08 RX ORDER — DOCUSATE SODIUM 100 MG/1
100 CAPSULE, LIQUID FILLED ORAL 2 TIMES DAILY
Status: DISCONTINUED | OUTPATIENT
Start: 2023-06-09 | End: 2023-06-09 | Stop reason: HOSPADM

## 2023-06-08 RX ORDER — ROCURONIUM BROMIDE 10 MG/ML
INJECTION, SOLUTION INTRAVENOUS AS NEEDED
Status: DISCONTINUED | OUTPATIENT
Start: 2023-06-08 | End: 2023-06-08

## 2023-06-08 RX ORDER — FENTANYL CITRATE 50 UG/ML
INJECTION, SOLUTION INTRAMUSCULAR; INTRAVENOUS AS NEEDED
Status: DISCONTINUED | OUTPATIENT
Start: 2023-06-08 | End: 2023-06-08

## 2023-06-08 RX ORDER — DEXMEDETOMIDINE HYDROCHLORIDE 100 UG/ML
INJECTION, SOLUTION INTRAVENOUS AS NEEDED
Status: DISCONTINUED | OUTPATIENT
Start: 2023-06-08 | End: 2023-06-08

## 2023-06-08 RX ORDER — HYDROCHLOROTHIAZIDE 12.5 MG/1
12.5 TABLET ORAL DAILY
Status: DISCONTINUED | OUTPATIENT
Start: 2023-06-09 | End: 2023-06-09 | Stop reason: HOSPADM

## 2023-06-08 RX ORDER — INSULIN LISPRO 100 [IU]/ML
2 INJECTION, SOLUTION INTRAVENOUS; SUBCUTANEOUS ONCE
Status: COMPLETED | OUTPATIENT
Start: 2023-06-08 | End: 2023-06-08

## 2023-06-08 RX ORDER — PHENYLEPHRINE HCL IN 0.9% NACL 1 MG/10 ML
SYRINGE (ML) INTRAVENOUS AS NEEDED
Status: DISCONTINUED | OUTPATIENT
Start: 2023-06-08 | End: 2023-06-08

## 2023-06-08 RX ORDER — SENNOSIDES 8.6 MG
1 TABLET ORAL DAILY
Status: DISCONTINUED | OUTPATIENT
Start: 2023-06-09 | End: 2023-06-09 | Stop reason: HOSPADM

## 2023-06-08 RX ORDER — INSULIN LISPRO 100 [IU]/ML
6 INJECTION, SOLUTION INTRAVENOUS; SUBCUTANEOUS ONCE
COMMUNITY
Start: 2023-06-07 | End: 2023-06-09

## 2023-06-08 RX ORDER — CALCIUM CARBONATE 500 MG/1
1000 TABLET, CHEWABLE ORAL DAILY PRN
Status: DISCONTINUED | OUTPATIENT
Start: 2023-06-08 | End: 2023-06-09 | Stop reason: HOSPADM

## 2023-06-08 RX ORDER — FENTANYL CITRATE/PF 50 MCG/ML
25 SYRINGE (ML) INJECTION
Status: DISCONTINUED | OUTPATIENT
Start: 2023-06-08 | End: 2023-06-08

## 2023-06-08 RX ORDER — DEXAMETHASONE SODIUM PHOSPHATE 10 MG/ML
INJECTION, SOLUTION INTRAMUSCULAR; INTRAVENOUS AS NEEDED
Status: DISCONTINUED | OUTPATIENT
Start: 2023-06-08 | End: 2023-06-08

## 2023-06-08 RX ADMIN — INSULIN GLARGINE 18 UNITS: 100 INJECTION, SOLUTION SUBCUTANEOUS at 22:23

## 2023-06-08 RX ADMIN — SODIUM CHLORIDE, SODIUM LACTATE, POTASSIUM CHLORIDE, AND CALCIUM CHLORIDE: .6; .31; .03; .02 INJECTION, SOLUTION INTRAVENOUS at 07:49

## 2023-06-08 RX ADMIN — Medication 100 MCG: at 09:11

## 2023-06-08 RX ADMIN — FENTANYL CITRATE 25 MCG: 50 INJECTION INTRAMUSCULAR; INTRAVENOUS at 12:38

## 2023-06-08 RX ADMIN — FENTANYL CITRATE 50 MCG: 50 INJECTION INTRAMUSCULAR; INTRAVENOUS at 08:26

## 2023-06-08 RX ADMIN — OXYCODONE HYDROCHLORIDE 5 MG: 5 TABLET ORAL at 23:43

## 2023-06-08 RX ADMIN — DEXAMETHASONE SODIUM PHOSPHATE 10 MG: 10 INJECTION, SOLUTION INTRAMUSCULAR; INTRAVENOUS at 07:51

## 2023-06-08 RX ADMIN — FENTANYL CITRATE 25 MCG: 50 INJECTION INTRAMUSCULAR; INTRAVENOUS at 13:55

## 2023-06-08 RX ADMIN — CEFAZOLIN SODIUM 2000 MG: 2 SOLUTION INTRAVENOUS at 08:06

## 2023-06-08 RX ADMIN — PROPOFOL 50 MG: 10 INJECTION, EMULSION INTRAVENOUS at 07:52

## 2023-06-08 RX ADMIN — ROCURONIUM BROMIDE 50 MG: 10 INJECTION, SOLUTION INTRAVENOUS at 07:51

## 2023-06-08 RX ADMIN — ROCURONIUM BROMIDE 10 MG: 10 INJECTION, SOLUTION INTRAVENOUS at 09:04

## 2023-06-08 RX ADMIN — ATORVASTATIN CALCIUM 40 MG: 40 TABLET, FILM COATED ORAL at 19:21

## 2023-06-08 RX ADMIN — DEXMEDETOMIDINE HCL 8 MCG: 100 INJECTION INTRAVENOUS at 08:32

## 2023-06-08 RX ADMIN — SUGAMMADEX 200 MG: 100 INJECTION, SOLUTION INTRAVENOUS at 09:43

## 2023-06-08 RX ADMIN — MIDAZOLAM 2 MG: 1 INJECTION INTRAMUSCULAR; INTRAVENOUS at 07:47

## 2023-06-08 RX ADMIN — ONDANSETRON 4 MG: 2 INJECTION INTRAMUSCULAR; INTRAVENOUS at 09:29

## 2023-06-08 RX ADMIN — INSULIN LISPRO 2 UNITS: 100 INJECTION, SOLUTION INTRAVENOUS; SUBCUTANEOUS at 20:21

## 2023-06-08 RX ADMIN — OXYCODONE HYDROCHLORIDE 5 MG: 5 TABLET ORAL at 19:21

## 2023-06-08 RX ADMIN — PROPOFOL 200 MG: 10 INJECTION, EMULSION INTRAVENOUS at 07:51

## 2023-06-08 RX ADMIN — FENTANYL CITRATE 50 MCG: 50 INJECTION INTRAMUSCULAR; INTRAVENOUS at 07:51

## 2023-06-08 RX ADMIN — CEFAZOLIN SODIUM 1000 MG: 1 SOLUTION INTRAVENOUS at 19:22

## 2023-06-08 RX ADMIN — FENTANYL CITRATE 25 MCG: 50 INJECTION INTRAMUSCULAR; INTRAVENOUS at 11:23

## 2023-06-08 RX ADMIN — SODIUM CHLORIDE 100 ML/HR: 0.9 INJECTION, SOLUTION INTRAVENOUS at 14:13

## 2023-06-08 RX ADMIN — CHLORHEXIDINE GLUCONATE 15 ML: 1.2 SOLUTION ORAL at 06:28

## 2023-06-08 RX ADMIN — INSULIN LISPRO 2 UNITS: 100 INJECTION, SOLUTION INTRAVENOUS; SUBCUTANEOUS at 22:24

## 2023-06-08 RX ADMIN — GLYCOPYRROLATE 0.1 MG: 0.2 INJECTION, SOLUTION INTRAMUSCULAR; INTRAVENOUS at 07:47

## 2023-06-08 RX ADMIN — PHENYLEPHRINE HYDROCHLORIDE 30 MCG/MIN: 10 INJECTION INTRAVENOUS at 09:03

## 2023-06-08 RX ADMIN — ACETAMINOPHEN 975 MG: 325 TABLET, FILM COATED ORAL at 22:23

## 2023-06-08 RX ADMIN — Medication 100 MCG: at 09:02

## 2023-06-08 RX ADMIN — PROPOFOL 100 MCG/KG/MIN: 10 INJECTION, EMULSION INTRAVENOUS at 07:58

## 2023-06-08 RX ADMIN — LIDOCAINE HYDROCHLORIDE 100 MG: 20 INJECTION, SOLUTION EPIDURAL; INFILTRATION; INTRACAUDAL; PERINEURAL at 07:51

## 2023-06-08 RX ADMIN — SODIUM CHLORIDE: 0.9 INJECTION, SOLUTION INTRAVENOUS at 07:49

## 2023-06-08 NOTE — H&P
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 despite prior obliteration       - patient will likely require joint procedure with ENT and NSGY for possible frontal nasal duct obliteration    - Dr Justin Carvajal (Rhinologist) will evaluate patient tomorrow 5/24  - Intervention may not occur during this admission      HPI  55 yo M with prior left total craniotomy with craniectomy and orbital osteotomy with resection of meningioma   Patient with subsequent obliteration of left frontal sinus and custom synthetic cranioplasty on 4/11/23 with Dr Charlette Cunningham who presents with recurrent left forehead swelling after blowing his nose in early May 2023      CURRENT HOSPITAL MEDICATIONS  Current Medications             Current Facility-Administered Medications   Medication Dose Route Frequency Provider Last Rate Last Admin   • acetaminophen (TYLENOL) tablet 975 mg  975 mg Oral Q6H Tonio Galloway MD   975 mg at 05/23/23 0107   • amoxicillin-clavulanate (AUGMENTIN) 875-125 mg per tablet 1 tablet  1 tablet Oral BID Elli Galloway MD   1 tablet at 05/22/23 1707   • atorvastatin (LIPITOR) tablet 40 mg  40 mg Oral Daily Elli Galloway MD   40 mg at 05/22/23 9637   • enoxaparin (LOVENOX) subcutaneous injection 40 mg  40 mg Subcutaneous Q24H Tonio Galloway MD   40 mg at 05/21/23 1548   • lisinopril (ZESTRIL) tablet 10 mg  10 mg Oral Daily Elli Galloway MD         And   • hydrochlorothiazide (HYDRODIURIL) tablet 12 5 mg  12 5 mg Oral Daily Elli Galloway MD       • insulin glargine (LANTUS) subcutaneous injection 15 Units 0 15 mL  15 Units Subcutaneous HS Elli Galloway MD   15 Units at 05/22/23 2214   • insulin lispro (HumaLOG) 100 units/mL subcutaneous injection 1-6 Units  1-6 Units Subcutaneous TID [facility] Martina Will MD   2 Units at 05/22/23 1706   • levothyroxine tablet 137 mcg  137 mcg Oral Daily Martina Will MD   137 mcg at 05/22/23 8898   • oxyCODONE (ROXICODONE) IR tablet 5 mg  5 mg Oral Q4H PRN Martina Will MD       • oxyCODONE (ROXICODONE) split tablet 2 5 mg  2 5 mg Oral Q4H PRN Martina Will MD   2 5 mg at 05/22/23 1442            REVIEW OF SYSTEMS  As above     HISTORIES  PMH:  Medical History        Past Medical History:   Diagnosis Date   • Diabetes mellitus ([facility] [address] )     • Elevated LFTs       last assessed [id number]   • Hyperlipidemia     • Hypertension     • Impaired fasting glucose       last assessed 29Jan2014            PSH:  Surgical History         Past Surgical History:   Procedure Laterality Date   • APPENDECTOMY       • COLONOSCOPY       • NE CRANIEC TREPHINE BONE FLP BRAIN TUMOR SUPRTENTOR Left 4/11/2023     Procedure: IMAGE-GUIDED LEFT FRONTAL CRANIOTOMY FOR RESECTION OF EXTRA-AXIAL & SKULL MASS, WITH CRANIOPLASTY, EXENTERATION/OBLITERATION OF FRONTAL SINUS; ORBITAL OSTEOTOMY;  Surgeon: Colleen Slater MD;  Location[facility];  Service: Neurosurgery            SocHx:  Social History            Tobacco Use   • Smoking status: Never   • Smokeless tobacco: Never   Vaping Use   • Vaping Use: Never used   Substance Use Topics   • Alcohol use: Not Currently       Alcohol/week: 3 0 standard drinks       Types: 3 Glasses of wine per week       Comment: drinks daily    • Drug use: Never         FH:  Family History         Family History   Problem Relation Age of Onset   • Diabetes Mother     • Breast cancer Mother     • Diabetes Father              ALLERGIES:  No Known Allergies     PHYSICAL EXAM  Visit Vitals  /62   Pulse 73   Temp 98 1 °F (36 7 °C)   Resp 18   SpO2 97%   Smoking Status Never         General: NAD, AOx4  Eyes:  EOMI, PERRL  Ears:  External ears normal in appearance  Nose:  External "appearance normal  Oral cavity:  No trismus, no mass/lesions  Neck: Trachea is midline; no thyroid nodules, Salivary glands symmetrical, no masses/abnormality on palpation  Lymph:  No cervical lymphadenopathy  Skin:  Left subgaleal emphysema  Neuro: Motor and sensory grossly intact  Face symmetrical, no obvious cranial nerve palsies,motor and sensory grossly intact, no focal deficits    Lungs:  Normal work of breathing, symmetrical chest expansion  Vascular: Well perfused        LABORATORY  Reviewed     PROCEDURES  None      RADIOLOGY  CT sinus  \"     Pneumocephalus and subgaleal air identified slightly increased from the prior CT scan and presumed to extend from the left frontal sinus where fluid and air opacity is noted  There is a direct communication from the left frontal sinus to the left frontal   extra-axial air    \"          Patient Active Problem List     Diagnosis Date Noted   • Status post craniotomy and cranioplasty 05/09/2023   • Meningioma ([facility] ) 05/08/2023   • Left-sided low back pain without sciatica 03/20/2023   • Pneumocephalus 11/10/2022   • Subacromial impingement, right 01/12/2022   • Rotator cuff syndrome, right 01/12/2022   • Status post appendectomy 06/28/2021   • Dental caries 04/26/2021   • Frontal skull lesion 12/24/2020   • Dizziness 11/23/2020   • Other specified glaucoma 11/23/2020   • Candida rash of groin 08/17/2020   • Chronic pain in penis 06/12/2020   • 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   • Balanitis 11/15/2019   • Cough 01/22/2019   • Elevated AST (SGOT) 12/20/2018   • Alcohol abuse 12/20/2018   • Adhesive capsulitis of right shoulder 02/21/2018   • Right shoulder pain 62/77/4004   • Uncomplicated alcohol dependence ([facility] [address] ) 11/02/2015   • Diabetes ([facility] ) 11/02/2015   • Benign essential hypertension 04/16/2015   • Hyperlipidemia 01/05/2015   • Other specified hypothyroidism 08/05/2013        "

## 2023-06-08 NOTE — OP NOTE
Neurosurgery Operative Room Note    April Cools  6/8/2023    Pre-op Diagnosis:   Pneumocephalus [G93 89]  Facial swelling [R22 0]  Status post craniotomy [Z98 890]  Type 2 diabetes mellitus without complication, without long-term current use of insulin ([facility] [address] ) [E11 9]    Post-op Dignosis:     Post-Op Diagnosis Codes:     * Pneumocephalus [G93 89]     * Facial swelling [R22 0]     * Status post craniotomy [Z98 890]     * Type 2 diabetes mellitus without complication, without long-term current use of insulin ([facility]) [E11 9]    Procedure:  Aspiration subgaleal air, left frontal scalp     Surgeon: Surgeon(s) and Role:     * Pradip Andujar MD - Primary     Anesthesia: General    Staff:   Circulator: Liza Edmonds RN  Scrub Person: Bryanna Izaguirre CST  No qualified Resident was available  Estimated Blood Loss: None    Specimens:                Order Name Source Comment Collection Info Order Time   TYPE AND SCREEN Hand, Left  Collected By: Allison Hernandez 6/8/2023  6:30 AM     Has the patient been transfused in the last 3 months? Unknown          Explanatory Comment:   preop          Medical or Pre-op   PreOp          Where is Surgery Scheduled? [facility]            Drains:  none    Findings:  pneumocephalus    Complications:  none    OR note:    Details of Procedure    Brief procedure note     The patient was under anesthesia for his procedure with Dr Rosalia Ferrari  The left frontal scalp was prepped with betadine and allowed to dry   Utilizing a 60 cc syringe I accessed the subgaleal air and gently aspirated a total of 35 cc  There was a slight trace of straw colored clear fluid, less than 1 cc  This procedure completely reduced the subgaleal component of the gas collection, and his scalp flattened against his skull/synthetic bone flap   No specimen   No blood loss   A Band Aid was placed at the site           Pradip Andujar MD     Date: 6/8/2023  Time: 9:29 AM

## 2023-06-08 NOTE — OP NOTE
OPERATIVE REPORT  PATIENT NAME: Van Matthew    :  1965  MRN: [id number]  Pt Location: [facility] OR ROOM 09    SURGERY DATE: 2023    Surgeon(s) and Role:  Panel 1:     * Bruce Botello MD - Primary  Panel 2:     * Elida Mon MD - Primary    Preop Diagnosis:  Pneumocephalus [G93 89]  Facial swelling [R22 0]  Status post craniotomy [Z98 890]  Type 2 diabetes mellitus without complication, without long-term current use of insulin ([address] ) [E11 9]    Post-Op Diagnosis Codes:     * Pneumocephalus [G93 89]     * Facial swelling [R22 0]     * Status post craniotomy [Z98 890]     * Type 2 diabetes mellitus without complication, without long-term current use of insulin ([address] ) [E11 9]    Procedure(s):  1  Image guided endoscopic transnasal transethmoidal skull base secondary repair with vascularized pedicled flap    Left - Subgaleal aspiration    Specimen(s):  * No specimens in log *    Estimated Blood Loss:   Minimal    Drains:  * No LDAs found *    Anesthesia Type:   General    Operative Indications:  Pneumocephalus [G93 89]  Facial swelling [R22 0]  Status post craniotomy [Z98 890]  Type 2 diabetes mellitus without complication, without long-term current use of insulin ([address] ) [E11 9]      Operative Findings:  Small air transmission pocket noted within the frontal recess    Complications:   None    Procedure and Technique:  The patient was identified and brought to the operating room and placed on the operating table in a supine position  General anesthesia was induced  The image guidance headset was applied and calibrated and referenced throughout the case  The nose was packed on the left side with 4% soaked cocaine pledgets  The patient was prepped and draped in the usual fashion  A time-out was performed and the operation was begun  The nasal pledgets were removed and a 0 degree endoscope was introduced into the left nasal cavity   The region of the sphenopalatine artery was injected with lidocaine and epinephrine for hemostasis, as was the lateral half of the vertical lamella of the middle turbinate  The uncinate process was outfractured and removed, revealing the maixllary sinus natural os  This was widened with through cutting sinus instruments  Next a complete ethmoidectomy was performed, dissecting with the microdebrider through the basal lamella/ethmoid bulla to the face of the sphenoid sinus  Bony septations along the skull base were removed, using the image guidance suction to reference the lamina paprycea and skull base  These structures were not transgressed  Next, using the 70 degree endoscope and frontal sinus image guidance suction, a frontal recess dissection was performed to localize the frontal sinus ostium and overlying pericranial flap from the original surgery  This was identified  A bubble of air was seen to be emanating from the superior lateral portion of the frontal recess whilst palpating the air pocket on the forehead  The skull base repair then commenced  The middle turbinate vertical lamella was incised, taking care to avoid injuring the vascular supply of the medial half of the lamella  The bone of the lamella and lateral mucosal surface was meticulously dissected, allowing the medial half to swing freely  The vertical lamella was detached from its inferiormost attachments  Next a bilayered Durepair button was created and inserted into the frontal recess skull base defect in an underlay/onlay fashion  Subsequent palpation of the air pocket along the forehead produced no additional bubbles, confirming the seal   A small layer of Duraseal was then applied  The skull base mucosa was sufficiently demucosalized to allow the vascularized middle turbinate flap to lay flush against the bony skull base, completely covering the frontal recess Durepair repair  This was pressed flush against the skull base with pledgets, which were then removed   A covington layer of Duraseal was applied to seal the flap in place  Layers of gelfoam, soaked in water were applied onto the Duraseal to create a breakaway layer  After this step, a Bacitracin covered 10 cm merocel was placed endoscopically into the nose, hydrated with Afrin, to support the repair  Dr Amira Oliva then aspirated the subgaleal air  Dictated separately  The patient was then gently awakened from anesthesia  He tolerated this well  I was present for the entire procedure  and A co-surgeon was required because of skills and techniques relevant to speciality      Patient Disposition:  PACU         SIGNATURE: Mitchell Johnson MD  DATE: June 8, 2023  TIME: 10:30 AM

## 2023-06-08 NOTE — ANESTHESIA PREPROCEDURE EVALUATION
Procedure:  REPAIR CSF LEAK TRANSNASAL (Nose)    Relevant Problems   CARDIO   (+) Benign essential hypertension   (+) Hyperlipidemia      ENDO   (+) Other specified hypothyroidism      MUSCULOSKELETAL   (+) Adhesive capsulitis of right shoulder   (+) Left-sided low back pain without sciatica      Endocrine   (+) Diabetes (HCC)      Nervous and Auditory   (+) Meningioma (HCC)   (+) Pneumocephalus      Musculoskeletal and Integument   (+) Rotator cuff syndrome, right      Other   (+) Frontal skull lesion   (+) Status post craniotomy and cranioplasty   (+) Uncomplicated alcohol dependence (HCC)        Physical Exam    Airway    Mallampati score: III  TM Distance: >3 FB  Neck ROM: full     Dental   No notable dental hx     Cardiovascular  Cardiovascular exam normal    Pulmonary  Pulmonary exam normal     Other Findings        Anesthesia Plan  ASA Score- 3     Anesthesia Type- general with ASA Monitors  Additional Monitors:   Airway Plan: ETT  Plan Factors-Exercise tolerance (METS): >4 METS  Chart reviewed  EKG reviewed  Imaging results reviewed  Existing labs reviewed  Patient summary reviewed  Induction- intravenous  Postoperative Plan- Plan for postoperative opioid use  Planned trial extubation    Informed Consent- Anesthetic plan and risks discussed with patient  I personally reviewed this patient with the CRNA  Discussed and agreed on the Anesthesia Plan with the CRNA  Trini Buck

## 2023-06-08 NOTE — PROGRESS NOTES
"Progress Note - Neurosurgery   Arianne Ga 56 y o  male MRN: [id number]  Unit/Bed#: [facility] 623-01 Encounter: [id number]    Assessment/Plan:    · POD #0 from Procedure(s): Image guided endoscope transnasal transethmoidal skull base secondary repair with vascularized pedicled flap, and aspiration of subgaleal air  · Appears well, scalp flat, pain appears relatively well controlled  · Counseled patient that his current postop drainage is typical  · Again re-educated patient on sinus precautions - patient should not 'sniff,' blow nose, use straws, and should he need to cough or sneeze, expel that through his mouth without attempting to restrain/contain it in his sinuses - all this to protect his repair  Subjective:     Some slight sanguinous nasal and post nasal drainage      No Known Allergies    Objective:     Vitals: Blood pressure 126/77, pulse 86, temperature 97 8 °F (36 6 °C), resp  rate 18, height 5' 9\" (1 753 m), weight 83 9 kg (184 lb 15 5 oz), SpO2 98 %  ,Body mass index is 27 31 kg/m²  AA, FC  NAD  No obvious rhinorrhea   Left nare steri's in place, CDI  FS  LEIGH    "

## 2023-06-08 NOTE — ANESTHESIA POSTPROCEDURE EVALUATION
Post-Op Assessment Note    CV Status:  Stable       Mental Status:  Arousable   Hydration Status:  Stable   PONV Controlled:  Controlled   Airway Patency:  Patent   There is a medical reason for not screening for obstructive sleep apnea and/or for not using two or more mitigation strategies   Post Op Vitals Reviewed: Yes      Staff: CRNA         No notable events documented      /71 (06/08/23 1011)    Temp 97 8 °F (36 6 °C) (06/08/23 1011)    Pulse 78 (06/08/23 1011)   Resp 13 (06/08/23 1011)    SpO2 100 % (06/08/23 1011)

## 2023-06-08 NOTE — INTERVAL H&P NOTE
"/88   Pulse 85   Temp (!) 95 6 °F (35 3 °C) (Tympanic)   Resp 16   Ht 5' 9\" (1 753 m)   Wt 83 9 kg (184 lb 15 5 oz)   SpO2 100%   BMI 27 31 kg/m²   Head: atruamatic, normencephalic  CV: RRR  RESP: CTAB  ABD: soft and supple  Extremities: no cyanosis, clubbing or edema      H&P reviewed  After examining the patient I find no changes in the patients condition since the H&P had been written      Vitals:    06/08/23 0603   BP: 118/88   Pulse: 85   Resp: 16   Temp: (!) 95 6 °F (35 3 °C)   SpO2: 100%     "

## 2023-06-08 NOTE — CONSULTS
Consultation - Aime Arevalo 56 y o  male MRN: [id number]    Unit/Bed#: [facility] 623-01 Encounter: [id number]      Assessment/Plan     Assessment: This is a 56y o -year-old male with T2DM admitted with pneumocephalus s/p neurosurgery intervention  Plan:  #T2DM  A1c: 7 8%  Home regimen Lantus 25 units daily at bedtime, metformin 500 mg twice daily, Jardiance 25 mg daily  Inpatient regimen Lantus 18 units daily at bedtime with Premeal correctional insulin    Recommendations: Agree with Lantus 18 units daily at bedtime, and premeal correctional insulin  Monitor Accu-Cheks and adjust insulin regimen as needed  CC: Diabetes Consult    History of Present Illness     HPI: Aime Arevalo is a 56y o  year old male with history of type 2 diabetes, meningioma s/p resection, craniotomy and cranioplasty admitted with pneumocephalus s/p subgaleal air aspiration today by Dr Angelina Vásquez  Endocrinology service consulted for diabetes management  On evaluation, patient was mildly lethargic postop  Complained of throat discomfort  Further history obtained from wife met at bedside  She reports that patient is on basal insulin 25 units daily at bedtime (Insulin was recently started during last hospital admission here in 05/2023 per Endocrinology recommendations), metformin 500 mg twice daily and Jardiance 25 mg daily for diabetes management at home  Last dose of Jardiance was 3 days ago prior to planned surgery  Reports he checks sugars regularly at least twice daily  Inpatient consult to Endocrinology  Consult performed by: Malcom Chadwick MD  Consult ordered by: WALDO Brooks  Constitutional: Negative for appetite change  Respiratory: Negative for shortness of breath, wheezing, cough  Cardiovascular: Negative for chest pain and palpitations  Gastrointestinal: Negative for abdominal pain, nausea and vomiting  Musculoskeletal: Negative for arthralgias  "  Neurological: Head discomfort  All other ROS reviewed and negative  Abebe Rollins Historical Information   Past Medical History:   Diagnosis Date   • Diabetes mellitus ([facility] [address] )    • Disease of thyroid gland    • Elevated LFTs     last assessed 02Nov2015   • Hyperlipidemia    • Hypertension    • Impaired fasting glucose     last assessed 29Jan2014     Past Surgical History:   Procedure Laterality Date   • APPENDECTOMY     • COLONOSCOPY     • [place] CRANIEC TREPHINE BONE FLP BRAIN TUMOR SUPRTENTOR Left 4/11/2023    Procedure: IMAGE-GUIDED LEFT FRONTAL CRANIOTOMY FOR RESECTION OF EXTRA-AXIAL & SKULL MASS, WITH CRANIOPLASTY, EXENTERATION/OBLITERATION OF FRONTAL SINUS; ORBITAL OSTEOTOMY;  Surgeon: Natividad Garcia MD;  Location: [facility];  Service: Neurosurgery     Social History   Social History     Substance and Sexual Activity   Alcohol Use Not Currently     Social History     Substance and Sexual Activity   Drug Use Never     Social History     Tobacco Use   Smoking Status Never   Smokeless Tobacco Never     Family History:   Family History   Problem Relation Age of Onset   • Diabetes Mother    • Breast cancer Mother    • Diabetes Father        Meds/Allergies   Current Facility-Administered Medications   Medication Dose Route Frequency Provider Last Rate Last Admin   • insulin glargine (LANTUS) subcutaneous injection 18 Units 0 18 mL  18 Units Subcutaneous HS Clair Martinez PA-C       • lactated ringers infusion  20 mL/hr Intravenous Continuous Kylah Oconnell, CRNA   Stopped at 06/08/23 1413   • sodium chloride 0 9 % infusion  100 mL/hr Intravenous Continuous Clair Martinez PA-C 100 mL/hr at 06/08/23 1413 100 mL/hr at 06/08/23 1413     No Known Allergies    Objective   Vitals: Blood pressure 126/77, pulse 86, temperature 97 8 °F (36 6 °C), resp  rate 18, height 5' 9\" (1 753 m), weight 83 9 kg (184 lb 15 5 oz), SpO2 98 %      Intake/Output Summary (Last 24 hours) at 6/8/2023 1803  Last data filed at 6/8/2023 3851  Gross " "per 24 hour   Intake 850 ml   Output 25 ml   Net 825 ml     Invasive Devices     Peripheral Intravenous Line  Duration           Peripheral IV 06/08/23 Right;Dorsal (posterior) Hand <1 day                Physical Exam   Physical exam:   Constitutional:Oriented to person, place, and time  Appears well-developed and well-nourished  Not in any acute distress  HENT:   Head: Normocephalic and atraumatic  Neck: Normal range of motion  Supple, No thyromegaly  Pulmonary/Chest: Effort normal/ breathing comfortably on room air  CTAB   CVS:  Regular rate and rhythm, S1-S2 +  Abdomen: soft, nondistended, nontender  Musculoskeletal: Normal range of motion  Neurological: Alert and oriented to person, place, and time  Skin:  Warm, no rash  Extremities:  No pedal edema  Psychiatric: Normal mood and affect  Behavior is normal        The history was obtained from the review of the chart, patient and family    Lab Results:       Lab Results   Component Value Date    HCT 45 0 05/25/2023    HGB 15 9 05/25/2023    MCV 89 05/25/2023     05/25/2023    WBC 7 15 05/25/2023     Lab Results   Component Value Date/Time    ALB 3 7 05/08/2023 09:22 AM    ALB 4 1 10/26/2015 09:00 AM    ALT 33 05/08/2023 09:22 AM    ALT 35 05/01/2020 07:08 AM    AST 16 05/08/2023 09:22 AM    AST 27 05/01/2020 07:08 AM    BUN 11 05/25/2023 05:11 AM    BUN 9 05/01/2020 07:08 AM     05/25/2023 05:11 AM     05/01/2020 07:08 AM    CO2 28 05/25/2023 05:11 AM    CO2 30 05/01/2020 07:08 AM    CREATININE 0 73 05/25/2023 05:11 AM    CREATININE 0 90 10/26/2015 09:00 AM    GLOB 2 3 05/01/2020 07:08 AM    K 4 3 05/25/2023 05:11 AM    K 4 8 05/01/2020 07:08 AM     10/26/2015 09:00 AM    TP 7 2 05/08/2023 09:22 AM    TP 6 7 05/01/2020 07:08 AM     No results for input(s): \"CHOL\", \"HDL\", \"LDL\", \"TRIG\", \"VLDL\" in the last 72 hours    No results found for: \"AWBC10RPN\", \"MICROALBUR\"  POC Glucose (mg/dl)   Date Value   06/08/2023 230 (H)   06/08/2023 " 174 (H)   06/08/2023 163 (H)   05/26/2023 265 (H)   05/26/2023 194 (H)   05/25/2023 205 (H)   05/25/2023 147 (H)   05/25/2023 237 (H)   05/25/2023 173 (H)   05/24/2023 261 (H)       Imaging Studies: I have personally reviewed pertinent reports  Portions of the record may have been created with voice recognition software

## 2023-06-08 NOTE — PROGRESS NOTES
Neurosurgery Post Op Note    Day of Surgery Procedure(s):  Image guided endoscopic transnasal skull base repair  Subgaleal aspiration (Dr Carlee Whiteside, 6/8/2023)    Subjective:    awaking from anesthesia - no complaints offered    Objective:  General appearance: drowsy but interactive, appears stated age, cooperative and no distress  Head: Normocephalic, without obvious abnormality, forehead flat  bandaid in palce  Eyes: attempts eye opening-limited, PERRL  Neck: supple, symmetrical, trachea midline   Lungs: non labored breathing  Heart: regular heart rate  Neurologic:   Mental status: drowsy but interactive, following commands x 4  Cranial nerves: grossly intact (Cranial nerves II-XII) - limited eye opening as emerging from anesthesia  Sensory: normal to crude touch x 4  Motor: moving all extremities without focal weakness     Plan:  • Continue to monitor neurological exam  • Imaging - CT head only if clinical concerns, swelling of forehead of neuro complaints  • No Drains  • Pain control - Tylenol scheduled  Oxycodone prn  • Bowel regimen - colace/senna  • Additional pertinent meds - Ancef x 24H post op, continue augmentin  • Special instructions - Monitor forehead for swelling  • Labs / vitals - labs in AM  Neuro checks  • Post op abx - ancef x 24H  Continue Augmentin  • DVT ppx - SCDs only for now, will reassess pharm dvt ppx tomorrow  • Mobilize as tolerated with assistance, PT / OT evaluation pending for tomorrow  • Dispo - medsurg    Neurosurgery will follow as primary team  Please call with questions or concerns

## 2023-06-09 ENCOUNTER — TELEPHONE (OUTPATIENT)
Dept: NEUROSURGERY | Facility: CLINIC | Age: 58
End: 2023-06-09

## 2023-06-09 VITALS
RESPIRATION RATE: 16 BRPM | HEIGHT: 69 IN | SYSTOLIC BLOOD PRESSURE: 117 MMHG | TEMPERATURE: 97.8 F | DIASTOLIC BLOOD PRESSURE: 73 MMHG | WEIGHT: 192.9 LBS | BODY MASS INDEX: 28.57 KG/M2 | OXYGEN SATURATION: 97 % | HEART RATE: 77 BPM

## 2023-06-09 LAB
ANION GAP SERPL CALCULATED.3IONS-SCNC: 3 MMOL/L (ref 4–13)
APTT PPP: 28 SECONDS (ref 23–37)
BUN SERPL-MCNC: 20 MG/DL (ref 5–25)
CALCIUM SERPL-MCNC: 8.8 MG/DL (ref 8.3–10.1)
CHLORIDE SERPL-SCNC: 107 MMOL/L (ref 96–108)
CO2 SERPL-SCNC: 27 MMOL/L (ref 21–32)
CREAT SERPL-MCNC: 0.86 MG/DL (ref 0.6–1.3)
ERYTHROCYTE [DISTWIDTH] IN BLOOD BY AUTOMATED COUNT: 12 % (ref 11.6–15.1)
GFR SERPL CREATININE-BSD FRML MDRD: 97 ML/MIN/1.73SQ M
GLUCOSE SERPL-MCNC: 198 MG/DL (ref 65–140)
GLUCOSE SERPL-MCNC: 210 MG/DL (ref 65–140)
GLUCOSE SERPL-MCNC: 280 MG/DL (ref 65–140)
HCT VFR BLD AUTO: 44.3 % (ref 36.5–49.3)
HGB BLD-MCNC: 15.2 G/DL (ref 12–17)
INR PPP: 1.17 (ref 0.84–1.19)
MCH RBC QN AUTO: 31 PG (ref 26.8–34.3)
MCHC RBC AUTO-ENTMCNC: 34.3 G/DL (ref 31.4–37.4)
MCV RBC AUTO: 90 FL (ref 82–98)
PLATELET # BLD AUTO: 192 THOUSANDS/UL (ref 149–390)
PMV BLD AUTO: 10.4 FL (ref 8.9–12.7)
POTASSIUM SERPL-SCNC: 3.8 MMOL/L (ref 3.5–5.3)
PROTHROMBIN TIME: 15.1 SECONDS (ref 11.6–14.5)
RBC # BLD AUTO: 4.91 MILLION/UL (ref 3.88–5.62)
SODIUM SERPL-SCNC: 137 MMOL/L (ref 135–147)
WBC # BLD AUTO: 11.5 THOUSAND/UL (ref 4.31–10.16)

## 2023-06-09 PROCEDURE — 82948 REAGENT STRIP/BLOOD GLUCOSE: CPT

## 2023-06-09 PROCEDURE — 85610 PROTHROMBIN TIME: CPT | Performed by: PHYSICIAN ASSISTANT

## 2023-06-09 PROCEDURE — 80048 BASIC METABOLIC PNL TOTAL CA: CPT | Performed by: PHYSICIAN ASSISTANT

## 2023-06-09 PROCEDURE — 85730 THROMBOPLASTIN TIME PARTIAL: CPT | Performed by: PHYSICIAN ASSISTANT

## 2023-06-09 PROCEDURE — 97162 PT EVAL MOD COMPLEX 30 MIN: CPT

## 2023-06-09 PROCEDURE — 85027 COMPLETE CBC AUTOMATED: CPT | Performed by: PHYSICIAN ASSISTANT

## 2023-06-09 RX ADMIN — SODIUM CHLORIDE 100 ML/HR: 0.9 INJECTION, SOLUTION INTRAVENOUS at 01:24

## 2023-06-09 RX ADMIN — CEFAZOLIN SODIUM 1000 MG: 1 SOLUTION INTRAVENOUS at 11:28

## 2023-06-09 RX ADMIN — OXYCODONE HYDROCHLORIDE 5 MG: 5 TABLET ORAL at 10:23

## 2023-06-09 RX ADMIN — SENNOSIDES 8.6 MG: 8.6 TABLET, FILM COATED ORAL at 09:04

## 2023-06-09 RX ADMIN — INSULIN LISPRO 1 UNITS: 100 INJECTION, SOLUTION INTRAVENOUS; SUBCUTANEOUS at 09:06

## 2023-06-09 RX ADMIN — DOCUSATE SODIUM 100 MG: 100 CAPSULE, LIQUID FILLED ORAL at 09:04

## 2023-06-09 RX ADMIN — INSULIN LISPRO 4 UNITS: 100 INJECTION, SOLUTION INTRAVENOUS; SUBCUTANEOUS at 11:28

## 2023-06-09 RX ADMIN — HEPARIN SODIUM 5000 UNITS: 5000 INJECTION INTRAVENOUS; SUBCUTANEOUS at 09:04

## 2023-06-09 RX ADMIN — LEVOTHYROXINE SODIUM 137 MCG: 25 TABLET ORAL at 06:08

## 2023-06-09 RX ADMIN — LISINOPRIL 10 MG: 10 TABLET ORAL at 09:04

## 2023-06-09 RX ADMIN — HYDROCHLOROTHIAZIDE 12.5 MG: 12.5 TABLET ORAL at 09:04

## 2023-06-09 RX ADMIN — ACETAMINOPHEN 975 MG: 325 TABLET, FILM COATED ORAL at 06:08

## 2023-06-09 RX ADMIN — CEFAZOLIN SODIUM 1000 MG: 1 SOLUTION INTRAVENOUS at 02:53

## 2023-06-09 NOTE — PROGRESS NOTES
"OTOLARYNGOLOGY PROGRESS NOTE    Date of Service: 6/9/2023 7:01 AM    HPI  55 yo  Male with prior left total craniotomy with craniectomy and orbital osteotomy with resection of meningioma  Patient with subsequent obliteration of left frontal sinus and custom synthetic cranioplasty on 4/11/23 with Dr Audelia Salgado who presents with recurrent left forehead swelling after blowing his nose in early May 2023  S/p image guided endoscopic transnasal transethmoid skull base secondary repair with vascularized pedicle flap with subgaleal aspiration  Overnight Events:     Patient endorses mild headache well controlled with tylenol  He endorses left sided epiphora that is bothersome  No eye pain, increased forehead swelling, double vision or blurry vision  PHYSICAL EXAM:  Vitals:    06/09/23 0648   BP: 117/73   Pulse: 77   Resp: 16   Temp: 97 8 °F (36 6 °C)   SpO2: 97%        General: No acute distress, AOx4  HEENT: EOMI no pain or double vision, PERRL, no pain with palpation of the forehead and orbital rim, epiphora from L eye  Nasal packing in left nare taped to face  Prior craniotomy scar  Neurology: No focal deficits  Lungs: Breathing easy, unlabored and even on room air  Cardio: RRR, well perfused  Abd: soft, NT, ND        Intake/Output Summary (Last 24 hours) at 6/9/2023 0701  Last data filed at 6/9/2023 0601  Gross per 24 hour   Intake 2311 67 ml   Output 825 ml   Net 1486 67 ml         LABORATORY    No results for input(s): \"HCT\", \"HGB\", \"PLT\", \"WBC\" in the last 72 hours  No results for input(s): \"BUN\", \"CL\", \"CREAT\", \"K\", \"MG\", \"NA\", \"PHOS\" in the last 72 hours      Invalid input(s): \"CA\", \"CAIONIZ\", \"GLU\", \"TCO2\"    Invalid input(s): \"APTTMNNM\", \"APTTPAT\", \"PTINR\", \"PTPAT\"      Patient Active Problem List   Diagnosis   • Uncomplicated alcohol dependence ([address] )   • Benign essential hypertension   • Diabetes ([facility] )   • Hyperlipidemia   • Other specified hypothyroidism   • Right shoulder pain   • Adhesive capsulitis " of right shoulder   • Elevated AST (SGOT)   • Alcohol abuse   • Cough   • Balanitis   • Fibromatosis of plantar fascia   • Chronic pain in penis   • Candida rash of groin   • Dizziness   • Other specified glaucoma   • Frontal skull lesion   • Dental caries   • Status post appendectomy   • Subacromial impingement, right   • Rotator cuff syndrome, right   • Pneumocephalus   • Left-sided low back pain without sciatica   • Meningioma [facility])   • Status post craniotomy and cranioplasty         ASSESSMENT  Patient is a 56 y o  male w/ acute and chronic problems as above, who presents with penumocephalus after left total craniotomy with craniectomy and orbital osteotomy with resection of meningioma  Patient with subsequent obliteration of left frontal sinus and custom synthetic cranioplasty on 4/11/23 with Dr Yuriy Novoa who presents with recurrent left forehead swelling after blowing his nose in early May 2023  S/p image guided endoscopic transnasal transethmoid skull base secondary repair with vascularized pedicle flap with subgaleal aspiration         PLAN  -d/c today on antistaphylococcal abx  - continue sinus precautions  - packing will be removed in 10 days, antistaphylococcal abx until packing is removed  -rest of care per NSGY

## 2023-06-09 NOTE — PLAN OF CARE
Problem: PAIN - ADULT  Goal: Verbalizes/displays adequate comfort level or baseline comfort level  Description: Interventions:  - Encourage patient to monitor pain and request assistance  - Assess pain using appropriate pain scale  - Administer analgesics based on type and severity of pain and evaluate response  - Implement non-pharmacological measures as appropriate and evaluate response  - Consider cultural and social influences on pain and pain management  - Notify physician/advanced practitioner if interventions unsuccessful or patient reports new pain  Outcome: Adequate for Discharge     Problem: INFECTION - ADULT  Goal: Absence or prevention of progression during hospitalization  Description: INTERVENTIONS:  - Assess and monitor for signs and symptoms of infection  - Monitor lab/diagnostic results  - Monitor all insertion sites, i e  indwelling lines, tubes, and drains  - Monitor endotracheal if appropriate and nasal secretions for changes in amount and color  - [place] appropriate cooling/warming therapies per order  - Administer medications as ordered  - Instruct and encourage patient and family to use good hand hygiene technique  - Identify and instruct in appropriate isolation precautions for identified infection/condition  Outcome: Adequate for Discharge  Goal: Absence of fever/infection during neutropenic period  Description: INTERVENTIONS:  - Monitor WBC    Outcome: Adequate for Discharge     Problem: SAFETY ADULT  Goal: Patient will remain free of falls  Description: INTERVENTIONS:  - Educate patient/family on patient safety including physical limitations  - Instruct patient to call for assistance with activity   - Consult OT/PT to assist with strengthening/mobility   - Keep Call bell within reach  - Keep bed low and locked with side rails adjusted as appropriate  - Keep care items and personal belongings within reach  - Initiate and maintain comfort rounds  - Make Fall Risk Sign visible to staff  - Offer Toileting every 1 Hours, in advance of need  - Initiate/Maintain alarm  - Obtain necessary fall risk management equipment  - Apply yellow socks and bracelet for high fall risk patients  - Consider moving patient to room near nurses station  Outcome: Adequate for Discharge  Goal: Maintain or return to baseline ADL function  Description: INTERVENTIONS:  -  Assess patient's ability to carry out ADLs; assess patient's baseline for ADL function and identify physical deficits which impact ability to perform ADLs (bathing, care of mouth/teeth, toileting, grooming, dressing, etc )  - Assess/evaluate cause of self-care deficits   - Assess range of motion  - Assess patient's mobility; develop plan if impaired  - Assess patient's need for assistive devices and provide as appropriate  - Encourage maximum independence but intervene and supervise when necessary  - Involve family in performance of ADLs  - Assess for home care needs following discharge   - Consider OT consult to assist with ADL evaluation and planning for discharge  - Provide patient education as appropriate  Outcome: Adequate for Discharge  Goal: Maintains/Returns to pre admission functional level  Description: INTERVENTIONS:  - Perform BMAT or MOVE assessment daily    - Set and communicate daily mobility goal to care team and patient/family/caregiver  - Collaborate with rehabilitation services on mobility goals if consulted  - Perform Range of Motion 3 times a day  - Reposition patient every 2 hours    - Dangle patient 3 times a day  - Stand patient 3 times a day  - Ambulate patient 3 times a day  - Out of bed to chair 3 times a day   - Out of bed for meals 3 times a day  - Out of bed for toileting  - Record patient progress and toleration of activity level   Outcome: Adequate for Discharge     Problem: DISCHARGE PLANNING  Goal: Discharge to home or other facility with appropriate resources  Description: INTERVENTIONS:  - Identify barriers to discharge w/patient and caregiver  - Arrange for needed discharge resources and transportation as appropriate  - Identify discharge learning needs (meds, wound care, etc )  - Arrange for interpretive services to assist at discharge as needed  - Refer to Case Management Department for coordinating discharge planning if the patient needs post-hospital services based on physician/advanced practitioner order or complex needs related to functional status, cognitive ability, or social support system  Outcome: Adequate for Discharge     Problem: Knowledge Deficit  Goal: Patient/family/caregiver demonstrates understanding of disease process, treatment plan, medications, and discharge instructions  Description: Complete learning assessment and assess knowledge base    Interventions:  - Provide teaching at level of understanding  - Provide teaching via preferred learning methods  Outcome: Adequate for Discharge     Problem: MOBILITY - ADULT  Goal: Maintain or return to baseline ADL function  Description: INTERVENTIONS:  -  Assess patient's ability to carry out ADLs; assess patient's baseline for ADL function and identify physical deficits which impact ability to perform ADLs (bathing, care of mouth/teeth, toileting, grooming, dressing, etc )  - Assess/evaluate cause of self-care deficits   - Assess range of motion  - Assess patient's mobility; develop plan if impaired  - Assess patient's need for assistive devices and provide as appropriate  - Encourage maximum independence but intervene and supervise when necessary  - Involve family in performance of ADLs  - Assess for home care needs following discharge   - Consider OT consult to assist with ADL evaluation and planning for discharge  - Provide patient education as appropriate  Outcome: Adequate for Discharge  Goal: Maintains/Returns to pre admission functional level  Description: INTERVENTIONS:  - Perform BMAT or MOVE assessment daily    - Set and communicate daily mobility goal to care team and patient/family/caregiver  - Collaborate with rehabilitation services on mobility goals if consulted  - Perform Range of Motion 3 times a day  - Reposition patient every 2 hours    - Dangle patient 3 times a day  - Stand patient 3 times a day  - Ambulate patient 3 times a day  - Out of bed to chair 3 times a day   - Out of bed for meals 3 times a day  - Out of bed for toileting  - Record patient progress and toleration of activity level   Outcome: Adequate for Discharge

## 2023-06-09 NOTE — DISCHARGE INSTR - AVS FIRST PAGE
Avoid coughing / sneezing / straws  Keep nasal packing in place  Follow up with ENT in 10 days for removal  Continue antibiotic until packing removed  Call with worsening symptoms

## 2023-06-09 NOTE — PLAN OF CARE
Problem: INFECTION - ADULT  Goal: Absence of fever/infection during neutropenic period  Description: INTERVENTIONS:  - Monitor WBC    Outcome: Progressing     Problem: SAFETY ADULT  Goal: Patient will remain free of falls  Description: INTERVENTIONS:  - Educate patient/family on patient safety including physical limitations  - Instruct patient to call for assistance with activity   - Consult OT/PT to assist with strengthening/mobility   - Keep Call bell within reach  - Keep bed low and locked with side rails adjusted as appropriate  - Keep care items and personal belongings within reach    - Consider moving patient to room near nurses station  Outcome: Progressing    Problem: Knowledge Deficit  Goal: Patient/family/caregiver demonstrates understanding of disease process, treatment plan, medications, and discharge instructions  Description: Complete learning assessment and assess knowledge base  Interventions:  - Provide teaching at level of understanding  - Provide teaching via preferred learning methods  Outcome: Progressing     Problem: SAFETY ADULT  Goal: Maintains/Returns to pre admission functional level  Description: INTERVENTIONS:  - Perform BMAT or MOVE assessment daily    - Set and communicate daily mobility goal to care team and patient/family/caregiver     - Collaborate with rehabilitation services on mobility goals if consulted  ctivity level   Outcome: Progressing     Problem: INFECTION - ADULT  Goal: Absence of fever/infection during neutropenic period  Description: INTERVENTIONS:  - Monitor WBC    Outcome: Progressing

## 2023-06-09 NOTE — UTILIZATION REVIEW
"Initial Clinical Review    Elective Inpatient surgical procedure  Age/Sex: 56 y o  male  Surgery Date: 6/8/23  Procedure: S/p Aspiration subgaleal air, left frontal scalp   S/p Image guided endoscopic transnasal transethmoidal skull base secondary repair with vascularized pedicled flap  Anesthesia: General  Operative Findings:  Small air transmission pocket noted within the frontal recess    Admission Orders: Date/Time/Statement:   Admission Orders (From admission, onward)     Ordered        06/08/23 0943  Inpatient Admission  Once                      Orders Placed This Encounter   Procedures   • Inpatient Admission     Standing Status:   Standing     Number of Occurrences:   1     Order Specific Question:   Level of Care     Answer:   Med Surg [16]     Order Specific Question:   Estimated length of stay     Answer:   Inpatient Only Surgery     Vital Signs: /73   Pulse 77   Temp 97 8 °F (36 6 °C)   Resp 16   Ht 5' 9\" (1 753 m)   Wt 87 5 kg (192 lb 14 4 oz)   SpO2 97%   BMI 28 49 kg/m²     Pertinent Labs/Diagnostic Test Results:   No orders to display         Results from last 7 days   Lab Units 06/09/23  0605   HEMATOCRIT % 44 3   HEMOGLOBIN g/dL 15 2   PLATELETS Thousands/uL 192   WBC Thousand/uL 11 50*         Results from last 7 days   Lab Units 06/09/23  0605   ANION GAP mmol/L 3*   BUN mg/dL 20   CALCIUM mg/dL 8 8   CHLORIDE mmol/L 107   CO2 mmol/L 27   CREATININE mg/dL 0 86   EGFR ml/min/1 73sq m 97   POTASSIUM mmol/L 3 8   SODIUM mmol/L 137         Results from last 7 days   Lab Units 06/09/23  0747 06/08/23  2221 06/08/23  2000 06/08/23  1646 06/08/23  1009 06/08/23  0606   POC GLUCOSE mg/dl 198* 262* 355* 230* 174* 163*     Results from last 7 days   Lab Units 06/09/23  0605   GLUCOSE RANDOM mg/dL 210*         Results from last 7 days   Lab Units 06/09/23  0605   INR  1 17   PROTIME seconds 15 1*   PTT seconds 28       Diet: Consistent Carb  Mobility: OOB  DVT Prophylaxis: " SCD    Medications/Pain Control:   Scheduled Medications:  acetaminophen, 975 mg, Oral, Q8H YANNI  amoxicillin-clavulanate, 1 tablet, Oral, Q12H YANNI  atorvastatin, 40 mg, Oral, After Dinner  cefazolin, 1,000 mg, Intravenous, Q8H  docusate sodium, 100 mg, Oral, BID  heparin (porcine), 5,000 Units, Subcutaneous, Q8H YANNI  lisinopril, 10 mg, Oral, Daily   And  hydrochlorothiazide, 12 5 mg, Oral, Daily  insulin glargine, 18 Units, Subcutaneous, HS  insulin lispro, 1-5 Units, Subcutaneous, HS  insulin lispro, 1-6 Units, Subcutaneous, TID AC  levothyroxine, 137 mcg, Oral, Early Morning  senna, 1 tablet, Oral, Daily      Continuous IV Infusions:  lactated ringers, 20 mL/hr, Intravenous, Continuous  sodium chloride, 100 mL/hr, Intravenous, Continuous      PRN Meds:  calcium carbonate, 1,000 mg, Oral, Daily PRN  oxyCODONE, 2 5 mg, Oral, Q4H PRN   Or  oxyCODONE, 5 mg, Oral, Q4H PRN        Network Utilization Review Department  ATTENTION: Please call with any questions or concerns to [phone] and carefully listen to the prompts so that you are directed to the right person  All voicemails are confidential   Jim Mathew all requests for admission clinical reviews, approved or denied determinations and any other requests to dedicated fax number below belonging to the campus where the patient is receiving treatment   List of dedicated fax numbers for the Facilities:  [address] DENIALS (Administrative/Medical Necessity) [phone]   [address] (Maternity/NICU/Pediatrics) [phone]   [address] [facility] 150 [facility] [place] [address] [place] [address] [address] [place] [address] [address] [facility] 134 644 [facility] [phone]

## 2023-06-09 NOTE — OCCUPATIONAL THERAPY NOTE
OCCUPATIONAL THERAPY SCREEN:    ORDERS RECEIVED  CHART REVIEW COMPLETED  PT SEEN BY PHYSICAL THERAPY WHO REPORTS BE ID FUNCTIONING AT AN OVERALL INDEPENDENT LEVEL WITH PLAN TO RETURN HOME WITH FAMILY WHEN MEDICALLY CLEARED  NO ACUTE CARE OT NEEDS AT THIS TIME  RECONSULT AS APPROPRIATE  THANK YOU      STEPHANIA Pedersen, OTR/L

## 2023-06-09 NOTE — CASE MANAGEMENT
Case Management Assessment & Discharge Planning Note    Patient name Venus Landeros  [facility] [address] [facility] [id number]  : 1965 Date 2023       Current Admission Date: 2023  Current Admission Diagnosis:Status post craniotomy and cranioplasty  Patient Active Problem List    Diagnosis Date Noted   • Status post craniotomy and cranioplasty 2023   • Meningioma ([facility] ) 2023   • Left-sided low back pain without sciatica 2023   • Pneumocephalus 11/10/2022   • Subacromial impingement, right 2022   • Rotator cuff syndrome, right 2022   • Status post appendectomy 2021   • Dental caries 2021   • Frontal skull lesion 2020   • Dizziness 2020   • Other specified glaucoma 2020   • Candida rash of groin 2020   • Chronic pain in penis 2020   • Fibromatosis of plantar fascia 2019   • Balanitis 11/15/2019   • Cough 2019   • Elevated AST (SGOT) 2018   • Alcohol abuse 2018   • Adhesive capsulitis of right shoulder 2018   • Right shoulder pain    • Uncomplicated alcohol dependence ([facility] ) 2015   • Diabetes ([facility] ) 2015   • Benign essential hypertension 2015   • Hyperlipidemia 2015   • Other specified hypothyroidism 2013      LOS (days): 1  Geometric Mean LOS (GMLOS) (days): 1 80  Days to GMLOS:0 8     OBJECTIVE:  PATIENT READMITTED TO HOSPITAL  Risk of Unplanned Readmission Score: 17 28         Current admission status: Inpatient       Preferred Pharmacy:   [facility] [address] [facility] [address] [facility] [address] [address] [place] [address] 53  [address] [place] [address]  Phone: [phone] Fax: [phone]    Primary Care Provider: Dmitry Harrell MD    Primary Insurance: [employer]  Secondary Insurance:     ASSESSMENT:   [facility], [address] Representative - Sister   Primary Phone: [phone] [facility])               Readmission Root Cause  30 Day Readmission: Yes  Who directed you to return to the hospital?: Specialist  Did you understand whom to contact if you had questions or problems?: Yes  Did you get your prescriptions before you left the hospital?: Yes  Were you able to get your prescriptions filled when you left the hospital?: Yes  Did you take your medications as prescribed?: Yes  Were you able to get to your follow-up appointments?: Yes  During previous admission, was a post-acute recommendation made?: No  Patient was readmitted due to: need for OR  Action Plan: awaiting medical stability, may need therapy recs    Patient Information  Admitted from[de-identified] Home  Mental Status: Alert  During Assessment patient was accompanied by: Not accompanied during assessment  Assessment information provided by[de-identified] Patient  Primary Caregiver: Self  Support Systems: Spouse/significant other  [place] of Residence: [address] [place] [address] do you live in?: [facility]  Type of Current Residence: [address] [place] home  Upon entering residence, is there a bedroom on the main floor (no further steps)?: Yes  Upon entering residence, is there a bathroom on the main floor (no further steps)?: No  Indicate which floors of current residence have a bathroom (select all the apply):: 2nd Floor  Number of steps to 2nd floor from main floor: One Flight  In the last 12 months, was there a time when you were not able to pay the mortgage or rent on time?: No  In the last 12 months, how many places have you lived?: 1  In the last 12 months, was there a time when you did not have a steady place to sleep or slept in a shelter (including now)?: No  Homeless/housing insecurity resource given?: N/A  Living Arrangements: Lives w/ Spouse/significant other  Is patient a ?: No    Activities of Daily Living Prior to Admission  Functional Status: Independent  Completes ADLs independently?: Yes  Ambulates independently?: Yes  Does patient use assisted devices?: No  Does patient currently own DME?: No  Does patient have a history of Outpatient Therapy (PT/OT)?: No  Does the patient have a history of Short-Term Rehab?: No  Does patient have a history of HHC?: No  Does patient currently have [place] ?: No    Patient Information Continued  Income Source: Unknown  Does patient have prescription coverage?: Yes  Within the past 12 months, you worried that your food would run out before you got the money to buy more : Never true  Within the past 12 months, the food you bought just didn't last and you didn't have money to get more : Never true  Food insecurity resource given?: N/A  Does patient receive dialysis treatments?: No  Does patient have a history of substance abuse?: No  Does patient have a history of Mental Health Diagnosis?: No    Means of Transportation  Means of Transport to Appts[de-identified] Drives Self  In the past 12 months, has lack of transportation kept you from medical appointments or from getting medications?: No  In the past 12 months, has lack of transportation kept you from meetings, work, or from getting things needed for daily living?: No  Was application for public transport provided?: N/A        DISCHARGE DETAILS:    Discharge planning discussed with[de-identified] Patient  Freedom of Choice: Yes  Comments - Freedom of Choice: Discussed FOC  CM contacted family/caregiver?: No- see comments (Declined)     Contacts  Patient Contacts: Veverly Harder  Relationship to Patient[de-identified] Family  Contact Method: Phone  Reason/Outcome: Continuity of Care, Discharge Planning, Emergency Contact     CM reviewed d/c planning process including the following: identifying help at home, patient preference for d/c planning needs, Discharge ung, [facility] Meds to Bed program, availability of treatment team to discuss questions or concerns patient and/or family may have regarding understanding medications and recognizing signs and symptoms once discharged    CM also encouraged patient to follow up with all recommended appointments after discharge  Patient advised of importance for patient and family to participate in managing patient’s medical well being

## 2023-06-09 NOTE — DISCHARGE SUMMARY
[address] [facility]  Discharge- Rachael Maker 1965, 56 y o  male MRN: [id number]  Unit/Bed#: [facility] 623-01 Encounter: [id number]  Primary Care Provider: Savannah Michelle MD   Date and time admitted to hospital: 6/8/2023  5:38 AM    * Status post craniotomy and cranioplasty  Assessment & Plan  POD 1 image guided endoscopic transnasal transethmoid skull base secondary repair with vascularized pedicle flap with subgaleal aspiration with ENT Claudette Forward, 6/9)  · H/o image guided left frontal craniotomy for resection of extra-axial and scalp mass, with cranioplasty, exenteration/obliteration of frontal sinus (Dr Whyte [facility],  4/11/23)  Pathology c/w grade 2 meningioma  · Initial post op course complicated by pneumocephalus after sneezing episode 5/7  Has been maintained on augmentin  · Currently left frontal swelling significantly improved  Mild HA and left eye watering post op    Imaging:  · No post op imaging to review    Plan:  · Continue to monitor exam  · Continue nasal packing for 10 days  · Continue Augmentin until 6 week post op appointment  · Reassess need for refill at 2 week appointment  · Call with symptoms of GI distress or loose stools  · Continue sinus precautions  · Tylenol for HA  · No treatment for watery eye at this time as patient denies eye pain, photophobia, vision changes, or purulent drainage  · No additional imaging indicated at this time  · Follow up with ENT as scheduled in 10 days for packing removal     Patient is stable for discharge today  Please call with questions or concerns  Medical Problems     Resolved Problems  Date Reviewed: 6/9/2023   None         Discharge Date: 6/9/23    Admitting Diagnosis: Pneumocephalus [G93 89]  Facial swelling [R22 0]  Status post craniotomy [Z98 890]  Type 2 diabetes mellitus without complication, without long-term current use of insulin ([address] ) [E11 9]    Discharge Diagnosis:   1  Pneumocephalus  2   S/p craniotomy  3  Meningioma  4  DM2    Attending: Katina    Consulting Physician(s): Antony    Procedures Performed: Image guided endoscopic transnasal skull base repair  Subgaleal aspiration    Pathology: none    Hospital Course: Saúl Harding is a 57 y/o male who presented to the outpatient office to review imaging which showed a left frontal dural based mass with erosion of the left frontal bone  This was discovered incidentally during a work up for dizziness  He remained asymptomatic but imaging surveillance showed increase in size over a period of 2-3 years  After presenting to the hospital in 2023 with left forehead pain he agreed to proceed with surgical resection  He underwent craniotomy with tumor resection and synthetic flap placement on 23; post op course was notable for sinus breech on  after sneezing  Imaging at that time showed significant pneumocephalus  He was maintained on augmentin  Patient underwent the above procedure 23 with Dr Theo Apgar and Dr Antony Small under general anesthesia with minimal blood loss and no complications  Patient was kept in the PACU until stable and then moved to the floor  Nasal packing was placed intraoperatively and will be removed POD 10  Pain well controlled with tylenol  Patient was cleared medically for discharge  Prior to discharge, postoperative instructions were discussed with patient  During that time, all questions and concerns were addressed  Patient will follow up outpatient in 2 weeks for an incision check and 6 weeks for post op appointment  In addition he has a scheduled appointment with ENT in 10 days  He is to remain on antibiotics for another 6 weeks  Physical exam:  General appearance: alert, appears stated age, cooperative and no distress  Head: bandaid on left forehead  No obvious facial swelling  Eyes: EOMI, PERRL, conjugate gaze   OS watering, no purulence or injected sclera  Lungs: non labored breathing  Heart: regular heart rate  Neurologic:   Mental status: Alert, oriented, thought content appropriate, soft spoken but speech clear and fluent  Cranial nerves: grossly intact (Cranial nerves II-XII)  Sensory: normal to LT  Motor: moving all extremities without focal weakness     Condition at Discharge: good     Discharge instructions/Information to patient and family:   See after visit summary for information provided to patient and family  Provisions for Follow-Up Care:  See after visit summary for information related to follow-up care and any pertinent home health orders  Disposition: Home        Planned Readmission: No    Discharge Statement   I spent 20 minutes discharging the patient  This time was spent on the day of discharge  I had direct contact with the patient on the day of discharge  Additional documentation is required if more than 30 minutes were spent on discharge  Discharge Medications:  See after visit summary for reconciled discharge medications provided to patient and family

## 2023-06-09 NOTE — TELEPHONE ENCOUNTER
06/09/2023-PT STILL IN HOSPITAL  06/30/2023-2 WK POV-VIRTUAL W/HDM  07/24/2023-6 WK POV W/HDM IN [facility]      Michele Samuel PA-C   Please schedule virtual appointment with Dr Francine Meade in 2 weeks (after he comes back) for POV   Also schedule 6 week POV with Moulding in person     Surgery 6/8 (with lolita)   Thanks!

## 2023-06-09 NOTE — PHYSICAL THERAPY NOTE
Physical Therapy Evaluation     Patient's Name: Radha Flor    Admitting Diagnosis  Pneumocephalus [G93 89]  Facial swelling [R22 0]  Status post craniotomy [Z98 890]  Type 2 diabetes mellitus without complication, without long-term current use of insulin ([address] ) [E11 9]    Problem List  Patient Active Problem List   Diagnosis    Uncomplicated alcohol dependence ([address] )    Benign essential hypertension    Diabetes ([facility] )    Hyperlipidemia    Other specified hypothyroidism    Right shoulder pain    Adhesive capsulitis of right shoulder    Elevated AST (SGOT)    Alcohol abuse    Cough    Balanitis    Fibromatosis of plantar fascia    Chronic pain in penis    Candida rash of groin    Dizziness    Other specified glaucoma    Frontal skull lesion    Dental caries    Status post appendectomy    Subacromial impingement, right    Rotator cuff syndrome, right    Pneumocephalus    Left-sided low back pain without sciatica    Meningioma ([address] )    Status post craniotomy and cranioplasty       Past Medical History  Past Medical History:   Diagnosis Date    Diabetes mellitus ([address] )     Disease of thyroid gland     Elevated LFTs     last assessed 02Nov2015    Hyperlipidemia     Hypertension     Impaired fasting glucose     last assessed 29Jan2014       Past Surgical History  Past Surgical History:   Procedure Laterality Date    APPENDECTOMY      BRAIN BIOPSY Left 6/8/2023    Procedure: Subgaleal aspiration;  Surgeon: Riley Muse MD;  [facility];  Service: Neurosurgery    COLONOSCOPY      HI CRANIEC TREPHINE BONE FLP BRAIN TUMOR SUPRTENTOR Left 4/11/2023    Procedure: IMAGE-GUIDED LEFT FRONTAL CRANIOTOMY FOR RESECTION OF EXTRA-AXIAL & SKULL MASS, WITH CRANIOPLASTY, EXENTERATION/OBLITERATION OF FRONTAL SINUS; ORBITAL OSTEOTOMY;  Surgeon: Riley Muse MD;  [facility];  Service: Neurosurgery    HI SECONDARY RPR DURA CSF LEAK FREE TISSUE GRAFT N/A 6/8/2023    Procedure: Image guided endoscopic "transnasal skull base repair;  Surgeon: Yinka Yao MD;  [facility];  Service: ENT        06/09/23 1059   PT Last Visit   PT Visit Date 06/09/23   Note Type   Note type Evaluation   Pain Assessment   Pain Assessment Tool 0-10   Pain Score 7   Pain Location/Orientation Location: Head   Hospital Pain Intervention(s) Ambulation/increased activity   Restrictions/Precautions   Weight Bearing Precautions Per Order No   Braces or Orthoses   (none)   Other Precautions Pain   Home Living   Type of [address] Two level   Home Equipment   (denies)   Additional Comments Prior to this admission patient resided with family in a 2 level home  Most recently at his baseline she is I with mobility (no use of AD), ADLS, and iADLs  Has resumed driving  Prior Function   Level of [place] Independent with ADLs; Independent with IADLS; Independent with functional mobility   Lives With Spouse; Other (Comment)  (father)   Shi Edmonds Help From Family   IADLs Independent with driving; Independent with meal prep; Independent with medication management   Falls in the last 6 months 0   Vocational   (not employed at this time)   General   Additional Pertinent History 56 y o  male admitted to [facility] on 6/8/2023 for the following planned neurosurgical procedure: \"image guided endoscopic transnasal transethmoid skull base secondary repair with vascularized pedicle flap with subgaleal aspiration with ENT\"  Patient has h/o left frontal craniotomy with cranioplasty (04/2023)  Initial post-op course complicated by pneumocephalus after sneezing episode 5/7     Family/Caregiver Present No   Cognition   Overall Cognitive Status WFL   Arousal/Participation Alert   Orientation Level Oriented X4   Memory Within functional limits   Following Commands Follows all commands and directions without difficulty   Subjective   Subjective \"The nurse told me not to get out of bed on my own\"   RUE Assessment   RUE Assessment WFL " "  LUE Assessment   LUE Assessment WFL   RLE Assessment   RLE Assessment WFL   LLE Assessment   LLE Assessment WFL   Bed Mobility   Supine to Sit 7  Independent   Sit to Supine Unable to assess   Additional Comments patient seated in chair post eval   Transfers   Sit to Stand 7  Independent   Stand to Sit 7  Independent   Ambulation/Elevation   Gait pattern WNL  (reduced gait speed)   Gait Assistance 7  Independent   Assistive Device None   Distance 50 feet x2   Stair Management Assistance 5  Supervision   Stair Management Technique One rail R;Foreward;Reciprocal;Nonreciprocal  (non-reciprocal during descent)   Number of Stairs 7   Balance   Static Sitting Normal   Static Standing Good   Ambulatory Fair +   Activity Tolerance   Activity Tolerance Patient tolerated treatment well   Nurse Made Aware lenore to see per CONNER Keyes and f/u post   Assessment   Prognosis Good   Problem List Pain   Assessment PT completed evaluation of 56 y o  male admitted to [facility] on 6/8/2023 for the following planned neurosurgical procedure: \"image guided endoscopic transnasal transethmoid skull base secondary repair with vascularized pedicle flap with subgaleal aspiration with ENT\"  Patient has h/o left frontal craniotomy with cranioplasty (04/2023)  Initial post-op course complicated by pneumocephalus after sneezing episode 5/7  Patient's emerging status includes continuous O2/HR monitoring and sinus precautions as per ENT  PMH is significant for DM and HTN  Prior to this admission patient resided with family in a 2 level home  Most recently at his baseline she is I with mobility (no use of AD), ADLS, and iADLs  Has resumed driving  Current impairments include pain, decreased activity tolerance and gait deviations  During PT evaluation patient was independent with bed mobility, functional transfers and ambulation and S level on stairs  PT d/c recommendation is for home with family, he does not require any f/u PT   " Encouraged patient to ambulate this admission  Will d/c inpt PT  Goals   Patient Goals to go home today   PT Treatment Day 0   Plan   PT Frequency Other (Comment)  (d/c inpt PT)   Recommendation   PT Discharge Recommendation No rehabilitation needs   Equipment Recommended   (none)   AM-PAC Basic Mobility Inpatient   Turning in Flat Bed Without Bedrails 4   Lying on Back to Sitting on Edge of Flat Bed Without Bedrails 4   Moving Bed to Chair 4   Standing Up From Chair Using Arms 4   Walk in Room 4   Climb 3-5 Stairs With Railing 4   Basic Mobility Inpatient Raw Score 24   Basic Mobility Standardized Score 57 68   Highest Level Of Mobility   JH-HLM Goal 8: Walk 250 feet or more   JH-HLM Achieved 7: Walk 25 feet or more     The patient's AM-PAC Basic Mobility Inpatient Standardized Score is greater than 42 9, suggesting this patient may benefit from discharge to home   Please also refer to the recommendation of the Physical Therapist for safe discharge planning      Madhav Bianchi, PT, DPT

## 2023-06-10 DIAGNOSIS — E11.9 CONTROLLED TYPE 2 DIABETES MELLITUS WITHOUT COMPLICATION, WITHOUT LONG-TERM CURRENT USE OF INSULIN (HCC): ICD-10-CM

## 2023-06-10 DIAGNOSIS — E78.5 HYPERLIPIDEMIA, UNSPECIFIED HYPERLIPIDEMIA TYPE: ICD-10-CM

## 2023-06-12 RX ORDER — ATORVASTATIN CALCIUM 40 MG/1
TABLET, FILM COATED ORAL
Qty: 90 TABLET | Refills: 0 | Status: SHIPPED | OUTPATIENT
Start: 2023-06-12

## 2023-06-12 NOTE — UTILIZATION REVIEW
NOTIFICATION OF ADMISSION DISCHARGE   This is a Notification of Discharge from [address]  Please be advised that this patient has been discharge from our facility  Below you will find the admission and discharge date and time including the patient’s disposition  UTILIZATION REVIEW CONTACT:  Amarilis Sosa  Utilization   Network Utilization Review Department  Phone: [phone] x carefully listen to the prompts  All voicemails are confidential   Email: [email or web address]     ADMISSION INFORMATION  PRESENTATION DATE: 6/8/2023  5:38 AM  OBERVATION ADMISSION DATE:   INPATIENT ADMISSION DATE: 6/8/23  9:43 AM   DISCHARGE DATE: 6/9/2023  1:30 PM   DISPOSITION:Home/Self Care    IMPORTANT INFORMATION:  Send all requests for admission clinical reviews, approved or denied determinations and any other requests to dedicated fax number below belonging to the campus where the patient is receiving treatment   List of dedicated fax numbers:  [address] DENIALS (Administrative/Medical Necessity) [phone]   [address] (Maternity/NICU/Pediatrics) [phone]   [facility] [phone]   [facility] [phone]   [address] [phone]   220 [facility] [phone]   90 [facility] [phone]   [address] [place] [address] [phone]   [facility]  [phone]   [address] [facility] [phone]   412 [facility] [address] [facility] [phone]

## 2023-06-13 ENCOUNTER — TRANSITIONAL CARE MANAGEMENT (OUTPATIENT)
Dept: FAMILY MEDICINE CLINIC | Facility: CLINIC | Age: 58
End: 2023-06-13

## 2023-06-13 NOTE — TELEPHONE ENCOUNTER
1st attempt - Called Yaneth Quinteros on primary contact number after surgery 6/8/2023 to check in on recovery and provide post surgical instructions  Got voicemail, left message to callback  Will make another attempt if no callback is received

## 2023-06-15 ENCOUNTER — OFFICE VISIT (OUTPATIENT)
Dept: FAMILY MEDICINE CLINIC | Facility: CLINIC | Age: 58
End: 2023-06-15

## 2023-06-15 ENCOUNTER — TELEPHONE (OUTPATIENT)
Dept: FAMILY MEDICINE CLINIC | Facility: CLINIC | Age: 58
End: 2023-06-15

## 2023-06-15 VITALS
HEIGHT: 69 IN | SYSTOLIC BLOOD PRESSURE: 119 MMHG | OXYGEN SATURATION: 98 % | RESPIRATION RATE: 18 BRPM | TEMPERATURE: 97.6 F | DIASTOLIC BLOOD PRESSURE: 81 MMHG | BODY MASS INDEX: 26.81 KG/M2 | WEIGHT: 181 LBS | HEART RATE: 104 BPM

## 2023-06-15 DIAGNOSIS — E11.9 TYPE 2 DIABETES MELLITUS WITHOUT COMPLICATION, WITHOUT LONG-TERM CURRENT USE OF INSULIN (HCC): ICD-10-CM

## 2023-06-15 DIAGNOSIS — I10 BENIGN ESSENTIAL HYPERTENSION: Primary | ICD-10-CM

## 2023-06-15 DIAGNOSIS — E03.8 OTHER SPECIFIED HYPOTHYROIDISM: ICD-10-CM

## 2023-06-15 DIAGNOSIS — R30.0 BURNING WITH URINATION: ICD-10-CM

## 2023-06-15 DIAGNOSIS — K59.00 CONSTIPATION, UNSPECIFIED CONSTIPATION TYPE: ICD-10-CM

## 2023-06-15 DIAGNOSIS — K12.1 ORAL ULCER: ICD-10-CM

## 2023-06-15 DIAGNOSIS — N48.1 BALANITIS: ICD-10-CM

## 2023-06-15 DIAGNOSIS — E11.9 TYPE 2 DIABETES MELLITUS WITHOUT COMPLICATION, WITH LONG-TERM CURRENT USE OF INSULIN (HCC): ICD-10-CM

## 2023-06-15 DIAGNOSIS — Z98.890 STATUS POST CRANIOTOMY: ICD-10-CM

## 2023-06-15 DIAGNOSIS — Z79.4 TYPE 2 DIABETES MELLITUS WITHOUT COMPLICATION, WITH LONG-TERM CURRENT USE OF INSULIN (HCC): ICD-10-CM

## 2023-06-15 PROBLEM — K59.09 OTHER CONSTIPATION: Status: ACTIVE | Noted: 2023-06-15

## 2023-06-15 LAB
BACTERIA UR QL AUTO: NORMAL /HPF
BILIRUB UR QL STRIP: NEGATIVE
CLARITY UR: CLEAR
COLOR UR: ABNORMAL
GLUCOSE UR STRIP-MCNC: ABNORMAL MG/DL
HGB UR QL STRIP.AUTO: NEGATIVE
KETONES UR STRIP-MCNC: NEGATIVE MG/DL
LEUKOCYTE ESTERASE UR QL STRIP: ABNORMAL
NITRITE UR QL STRIP: NEGATIVE
NON-SQ EPI CELLS URNS QL MICRO: NORMAL /HPF
PH UR STRIP.AUTO: 5 [PH]
PROT UR STRIP-MCNC: NEGATIVE MG/DL
RBC #/AREA URNS AUTO: NORMAL /HPF
SP GR UR STRIP.AUTO: 1.03 (ref 1–1.03)
UROBILINOGEN UR STRIP-ACNC: <2 MG/DL
WBC #/AREA URNS AUTO: NORMAL /HPF

## 2023-06-15 PROCEDURE — 99495 TRANSJ CARE MGMT MOD F2F 14D: CPT | Performed by: FAMILY MEDICINE

## 2023-06-15 PROCEDURE — 81001 URINALYSIS AUTO W/SCOPE: CPT | Performed by: FAMILY MEDICINE

## 2023-06-15 RX ORDER — DIPHENHYDRAMINE HYDROCHLORIDE AND LIDOCAINE HYDROCHLORIDE AND ALUMINUM HYDROXIDE AND MAGNESIUM HYDRO
10 KIT EVERY 4 HOURS PRN
Qty: 110 ML | Refills: 1 | Status: SHIPPED | OUTPATIENT
Start: 2023-06-15

## 2023-06-15 RX ORDER — DOCUSATE SODIUM 100 MG/1
100 CAPSULE, LIQUID FILLED ORAL 2 TIMES DAILY
Qty: 30 CAPSULE | Refills: 1 | Status: SHIPPED | OUTPATIENT
Start: 2023-06-15

## 2023-06-15 RX ORDER — CLOTRIMAZOLE 1 %
CREAM (GRAM) TOPICAL 2 TIMES DAILY
Qty: 30 G | Refills: 0 | Status: SHIPPED | OUTPATIENT
Start: 2023-06-15

## 2023-06-15 NOTE — ASSESSMENT & PLAN NOTE
Lab Results   Component Value Date    HGBA1C 7 8 (H) 05/12/2023       Currently using metformin 500 mg twice daily  Lantus 25 U at night   Jardiance 25 mg    Fasting in range of 140-170, takes midnight snack likes fruit cereal, because has been having constipation  He avoids dinner, and then takes midnight snack  Patient is also complaining of discomfort while urination, has been using clotrimazole for balanitis  Discussed it could be either because of high sugar levels or side effect of Jardiance  We will discontinue Jardiance at this time, increase metformin to 1000 mg twice a day and continue Lantus 25 units at night  Recommended small meals at regular intervals  Recommended to continue keeping blood sugar log  Also placed referral for pharmacist to follow-up on diabetes  Patient to follow-up in 2 weeks  Continue lifestyle modification with diet and exercise

## 2023-06-15 NOTE — ASSESSMENT & PLAN NOTE
nasal packing for 10 days, will see ENT on 6/19/23, taking tylenol for pain control  Continue follow-up with ENT and neurosurgery as scheduled

## 2023-06-15 NOTE — ASSESSMENT & PLAN NOTE
No erythema seen on penis, continue clotrimazole we will trial off of Jardiance to see if there are improvement in symptoms    We will also send UA

## 2023-06-15 NOTE — PROGRESS NOTES
Assessment & Plan     1  Benign essential hypertension  Assessment & Plan: Within goal, continue current regimen      2  Other specified hypothyroidism    3  Status post craniotomy and cranioplasty  Assessment & Plan:  nasal packing for 10 days, will see ENT on 6/19/23, taking tylenol for pain control  Continue follow-up with ENT and neurosurgery as scheduled  4  Type 2 diabetes mellitus without complication, without long-term current use of insulin ([facility])  Assessment & Plan:    Lab Results   Component Value Date    HGBA1C 7 8 (H) 05/12/2023       Currently using metformin 500 mg twice daily  Lantus 25 U at night   Jardiance 25 mg    Fasting in range of 140-170, takes midnight snack likes fruit cereal, because has been having constipation  He avoids dinner, and then takes midnight snack  Patient is also complaining of discomfort while urination, has been using clotrimazole for balanitis  Discussed it could be either because of high sugar levels or side effect of Jardiance  We will discontinue Jardiance at this time, increase metformin to 1000 mg twice a day and continue Lantus 25 units at night  Recommended small meals at regular intervals  Recommended to continue keeping blood sugar log  Also placed referral for pharmacist to follow-up on diabetes  Patient to follow-up in 2 weeks  Continue lifestyle modification with diet and exercise  Orders:  -     metFORMIN (GLUCOPHAGE) 1000 MG tablet; Take 1 tablet (1,000 mg total) by mouth 2 (two) times a day with meals    5  Balanitis  Assessment & Plan:  No erythema seen on penis, continue clotrimazole we will trial off of Jardiance to see if there are improvement in symptoms  We will also send UA    Orders:  -     clotrimazole (LOTRIMIN) 1 % cream; Apply topically 2 (two) times a day    6  Constipation, unspecified constipation type  -     docusate sodium (COLACE) 100 mg capsule; Take 1 capsule (100 mg total) by mouth 2 (two) times a day    7   Burning with urination  -     UA w Reflex to Microscopic w Reflex to Culture - Clinic Collect    8  Oral ulcer  Assessment & Plan:  Recommended hydration  May use Magic mouthwash as needed  Orders:  -     diphenhydramine, lidocaine, Al/Mg hydroxide, simethicone (Magic Mouthwash) SUSP; Swish and spit 10 mL every 4 (four) hours as needed for mouth pain or discomfort    9  Type 2 diabetes mellitus without complication, with long-term current use of insulin ([facility])  Assessment & Plan:    Lab Results   Component Value Date    HGBA1C 7 8 (H) 05/12/2023       Currently using metformin 500 mg twice daily  Lantus 25 U at night   Jardiance 25 mg    Fasting in range of 140-170, takes midnight snack likes fruit cereal, because has been having constipation  He avoids dinner, and then takes midnight snack  Patient is also complaining of discomfort while urination, has been using clotrimazole for balanitis  Discussed it could be either because of high sugar levels or side effect of Jardiance  We will discontinue Jardiance at this time, increase metformin to 1000 mg twice a day and continue Lantus 25 units at night  Recommended small meals at regular intervals  Recommended to continue keeping blood sugar log  Also placed referral for pharmacist to follow-up on diabetes  Patient to follow-up in 2 weeks  Continue lifestyle modification with diet and exercise  Orders:  -     Ambulatory referral to clinical pharmacy; Future       Subjective     Transitional Care Management Review:   Rachid Mckeon is a 56 y o  male here for TCM follow up       During the TCM phone call patient stated:  TCM Call     Date and time call was made  6/13/2023  8:52 AM    Hospital care reviewed  Records reviewed    Patient was hospitial[facility] at  [facility]    Date of Admission  06/08/23    Date of discharge  06/09/23    Diagnosis  status post cranutomyand cranioplasty    Disposition  Home    Were the patients medications reviewed and updated  Yes Current Symptoms  --  sinus issuescant sleep      TCM Call     Post hospital issues  None    Should patient be enrolled in anticoag monitoring? No    Scheduled for follow up? Yes    Patients specialists  Neurologist    Did you obtain your prescribed medications  Yes    Do you need help managing your prescriptions or medications  No    Is transportation to your appointment needed  No    I have advised the patient to call PCP with any new or worsening symptoms  Silvia West LPN Clinical Coordinator    Living Arrangements  Spouse or Significiant other; Family members    Support System  Family    Are you recieving any outpatient services  No    Are you recieving home care services  No    Are you using any community resources  No    Current waiver services  No    Have you fallen in the last 12 months  No    Interperter language line needed  No    Counseling  Patient    Counseling topics  Diagnostic results; Importance of RX compliance    Comments  Saturnino Wyatt presented to office for transition of care visit  He is status post guided endoscopic transnasal transethmoid skull base secondary repair with vascularized pedicle flap with subgaleal aspiration with ENT on 6/9  He reports he still has packing in place, and will be seeing ENT on 19th  He is also on Augmentin as per neurosurgery  The medications were reviewed with the patient  He is complaining of oral ulcers today, and is requesting some irrigation for it  He also has constipation, because of which he has been taking midnight snack making his fasting blood sugars to go up  He has blood sugar log with him today, and his fasting sugars are in the range of 1 30-1 70  He takes metformin 500 mg twice a day, Jardiance 25 mg daily and Lantus 25 units at night  He is also complaining of burning with urination and discomfort in his penis  He was previously [place] it clotrimazole for balanitis, which he has been using  " He takes Tylenol for pain control    Review of Systems   Constitutional: Positive for activity change, appetite change and fatigue  HENT: Positive for rhinorrhea  Gastrointestinal: Positive for constipation  Genitourinary: Positive for dysuria  Neurological: Negative for dizziness and headaches  Objective     /81 (BP Location: Left arm, Patient Position: Sitting, Cuff Size: Standard)   Pulse 104   Temp 97 6 °F (36 4 °C) (Temporal)   Resp 18   Ht 5' 9\" (1 753 m)   Wt 82 1 kg (181 lb)   SpO2 98%   BMI 26 73 kg/m²      Physical Exam  Vitals reviewed  Constitutional:       Appearance: Normal appearance  He is not ill-appearing  HENT:      Nose:      Comments: Packing in place     Mouth/Throat:      Comments: Oral ulcers  Eyes:      Extraocular Movements: Extraocular movements intact  Pupils: Pupils are equal, round, and reactive to light  Cardiovascular:      Rate and Rhythm: Normal rate  Pulses: Normal pulses  Pulmonary:      Effort: Pulmonary effort is normal       Breath sounds: Normal breath sounds  No wheezing  Genitourinary:     Penis: Normal        Comments: No erythema  Musculoskeletal:      Cervical back: Normal range of motion  Skin:     General: Skin is warm  Neurological:      Mental Status: He is alert         Medications have been reviewed by provider in current encounter    Pamela Gan MD       "

## 2023-06-16 DIAGNOSIS — K12.1 ORAL ULCER: Primary | ICD-10-CM

## 2023-06-16 RX ORDER — LIDOCAINE HYDROCHLORIDE 20 MG/ML
15 SOLUTION OROPHARYNGEAL 4 TIMES DAILY PRN
Qty: 100 ML | Refills: 0 | Status: SHIPPED | OUTPATIENT
Start: 2023-06-16

## 2023-06-25 DIAGNOSIS — E03.9 HYPOTHYROIDISM, UNSPECIFIED TYPE: ICD-10-CM

## 2023-06-26 RX ORDER — LEVOTHYROXINE SODIUM 137 UG/1
TABLET ORAL
Qty: 60 TABLET | Refills: 0 | Status: SHIPPED | OUTPATIENT
Start: 2023-06-26

## 2023-06-29 ENCOUNTER — OFFICE VISIT (OUTPATIENT)
Dept: FAMILY MEDICINE CLINIC | Facility: CLINIC | Age: 58
End: 2023-06-29

## 2023-06-29 VITALS
DIASTOLIC BLOOD PRESSURE: 65 MMHG | HEART RATE: 99 BPM | TEMPERATURE: 98.1 F | BODY MASS INDEX: 27.62 KG/M2 | WEIGHT: 187 LBS | SYSTOLIC BLOOD PRESSURE: 100 MMHG

## 2023-06-29 DIAGNOSIS — Z79.4 TYPE 2 DIABETES MELLITUS WITHOUT COMPLICATION, WITH LONG-TERM CURRENT USE OF INSULIN (HCC): Primary | ICD-10-CM

## 2023-06-29 DIAGNOSIS — E11.9 TYPE 2 DIABETES MELLITUS WITHOUT COMPLICATION, WITH LONG-TERM CURRENT USE OF INSULIN (HCC): Primary | ICD-10-CM

## 2023-06-29 DIAGNOSIS — Z98.890 STATUS POST CRANIOTOMY: ICD-10-CM

## 2023-06-29 DIAGNOSIS — I10 BENIGN ESSENTIAL HYPERTENSION: ICD-10-CM

## 2023-06-29 RX ORDER — FLASH GLUCOSE SENSOR
1 KIT MISCELLANEOUS
Qty: 2 EACH | Refills: 1 | Status: SHIPPED | OUTPATIENT
Start: 2023-06-29

## 2023-06-29 NOTE — PROGRESS NOTES
Name: Jennifer iDana      : 1965      MRN: [id number]  Encounter Provider: George Horta MD  Encounter Date: 2023   Encounter department: [address] [place] [address]  Type 2 diabetes mellitus without complication, with long-term current use of insulin ([facility])  Assessment & Plan:    Lab Results   Component Value Date    HGBA1C 7 8 (H) 2023     Current regimen includes Mertformin 1000 mg bid  Jardiance 10 mg   Lantus 15U     -143  -200    -Plan to continue current regimen and follow up in 3 months with repeat HBA1C  At that time if HBA1C is stable, will consider weaning off insulin and initiating alternative medication such as Januvia or Glipizide  Orders:  -     HEMOGLOBIN A1C W/ EAG ESTIMATION; Future  -     Ambulatory Referral to Ophthalmology; Future  -     Continuous Blood Gluc Sensor (FreeStyle Khanh 14 Day Sensor) MISC; Use 1 each every 14 (fourteen) days  -     Lipid panel; Future  -     Comprehensive metabolic panel; Future  -     Albumin / creatinine urine ratio; Future    2  Benign essential hypertension  Assessment & Plan:  Blood pressure in clinic today was 100/65  Plan to recheck BP at follow-up visit in 3 months  If  persistently low will consider discontinuing HCTZ 12 5 mg        3  Status post craniotomy and cranioplasty  Assessment & Plan:  Pt reports doing well post left ethmoid and maxillary sinus endoscopic sinus debridement  He is doing saline rinses per ENT instructions, and is able to relieve any discomfort with application of ice  Plan to continue current regimen and will follow-up with ENT in 3 weeks as scheduled  BMI Counseling: Body mass index is 27 62 kg/m²  The BMI is above normal  Nutrition recommendations include decreasing portion sizes, encouraging healthy choices of fruits and vegetables and decreasing fast food intake   Exercise recommendations include moderate physical activity 150 minutes/week  No pharmacotherapy was ordered  Rationale for BMI follow-up plan is due to patient being overweight or obese  Depression Screening and Follow-up Plan: Patient was screened for depression during today's encounter  They screened negative with a PHQ-2 score of 0  Subjective      Patient presents for follow up of diabetes management  Patient left his log at home, but reports -143 in morning and ranges from 175-200 at night  He has reduced his nightly Lantus dose to 15 units which he take around 9 or 10 pm  He is no longer eating midnight snacks and stops eating for the night around 7:30 pm  His urinary symptoms have resolved since reducing Jardiance to 10 mg  Review of Systems   Constitutional: Negative for fatigue and fever  Respiratory: Negative for chest tightness and shortness of breath  Cardiovascular: Negative for chest pain  Genitourinary: Negative for difficulty urinating, dysuria and frequency  Neurological: Negative for dizziness and light-headedness  Current Outpatient Medications on File Prior to Visit   Medication Sig   • acetaminophen (TYLENOL) 325 mg tablet Take 2 tablets (650 mg total) by mouth every 6 (six) hours as needed for headaches or mild pain   • Alcohol Swabs 70 % PADS May substitute brand based on insurance coverage  Check glucose TID  • atorvastatin (LIPITOR) 40 mg tablet Take 1 tablet by mouth once daily   • Blood Glucose Monitoring Suppl (OneTouch Verio Reflect) w/Device KIT May substitute brand based on insurance coverage  Check glucose TID  • clotrimazole (LOTRIMIN) 1 % cream Apply topically 2 (two) times a day   • docusate sodium (COLACE) 100 mg capsule Take 1 capsule (100 mg total) by mouth 2 (two) times a day   • glucose blood (OneTouch Verio) test strip May substitute brand based on insurance coverage  Check glucose TID     • Insulin Glargine Solostar (Lantus SoloStar) 100 UNIT/ML SOPN Inject 0 25 mL (25 Units total) under the skin daily at bedtime   • Insulin Pen Needle (BD Pen Needle Mariela 2nd Gen) 32G X 4 MM MISC For use with insulin pen  Pharmacy may dispense brand covered by insurance  • levothyroxine 137 mcg tablet Take 1 tablet by mouth once daily   • lidocaine (LIDODERM) 5 % APPLY ONE PATCH TO AFFECTED AREA DAILY  REMOVE AND DISCARD PATCH WITHIN 12 HOURS OR AS DIRECTED BY DOCTOR   • lisinopril-hydrochlorothiazide (PRINZIDE,ZESTORETIC) 10-12 5 MG per tablet Take 1 tablet by mouth daily   • metFORMIN (GLUCOPHAGE) 1000 MG tablet Take 1 tablet (1,000 mg total) by mouth 2 (two) times a day with meals   • OneTouch Delica Lancets 70L MISC May substitute brand based on insurance coverage  Check glucose TID  • [DISCONTINUED] amoxicillin-clavulanate (AUGMENTIN) 875-125 mg per tablet Take 1 tablet by mouth 2 (two) times a day   • diphenhydramine, lidocaine, Al/Mg hydroxide, simethicone (Magic Mouthwash) SUSP Swish and spit 10 mL every 4 (four) hours as needed for mouth pain or discomfort (Patient not taking: Reported on 6/29/2023)   • glucose blood (Accu-Chek Guide) test strip Use 1 each 2 (two) times a day Use as instructed (Patient not taking: Reported on 6/29/2023)   • glucose blood (OneTouch Verio) test strip Use 1 each 2 (two) times a day Use as instructed (Patient not taking: Reported on 6/15/2023)   • Insulin Pen Needle (BD Pen Needle Mariela 2nd Gen) 32G X 4 MM MISC For use with insulin pen  Pharmacy may dispense brand covered by insurance  (Patient not taking: Reported on 6/15/2023)   • Insulin Pen Needle (BD Pen Needle Mariela 2nd Gen) 32G X 4 MM MISC For use with insulin pen  Pharmacy may dispense brand covered by insurance   (Patient not taking: Reported on 6/15/2023)   • Lidocaine Viscous HCl (XYLOCAINE) 2 % mucosal solution Swish and spit 15 mL 4 (four) times a day as needed for mouth pain or discomfort (Patient not taking: Reported on 6/29/2023)   • sulfamethoxazole-trimethoprim (BACTRIM DS) 800-160 mg per tablet Take 1 tablet by mouth every 12 (twelve) hours for 10 days (Patient not taking: Reported on 6/29/2023)       Objective     /65 (BP Location: Right arm, Patient Position: Sitting, Cuff Size: Large)   Pulse 99   Temp 98 1 °F (36 7 °C) (Temporal)   Wt 84 8 kg (187 lb)   BMI 27 62 kg/m²     Physical Exam  Vitals reviewed  Constitutional:       General: He is not in acute distress  Appearance: Normal appearance  He is not ill-appearing  HENT:      Head: Normocephalic and atraumatic  Right Ear: External ear normal       Left Ear: External ear normal    Eyes:      Extraocular Movements: Extraocular movements intact  Conjunctiva/sclera: Conjunctivae normal    Cardiovascular:      Rate and Rhythm: Normal rate and regular rhythm  Pulses: Normal pulses  Heart sounds: Normal heart sounds  Pulmonary:      Effort: Pulmonary effort is normal  No respiratory distress  Breath sounds: Normal breath sounds  Musculoskeletal:         General: No swelling  Cervical back: Neck supple  No tenderness  Right lower leg: No edema  Left lower leg: No edema  Lymphadenopathy:      Cervical: No cervical adenopathy  Neurological:      General: No focal deficit present  Mental Status: He is alert and oriented to person, place, and time     Psychiatric:         Mood and Affect: Mood normal          Behavior: Behavior normal        Debra Hester MD

## 2023-06-29 NOTE — ASSESSMENT & PLAN NOTE
Lab Results   Component Value Date    HGBA1C 7 8 (H) 05/12/2023     Current regimen includes Mertformin 1000 mg bid  Jardiance 10 mg   Lantus 15U     -143  -200    -Plan to continue current regimen and follow up in 3 months with repeat HBA1C  At that time if HBA1C is stable, will consider weaning off insulin and initiating alternative medication such as Januvia or Glipizide

## 2023-06-29 NOTE — ASSESSMENT & PLAN NOTE
Pt reports doing well post left ethmoid and maxillary sinus endoscopic sinus debridement  He is doing saline rinses per ENT instructions, and is able to relieve any discomfort with application of ice  Plan to continue current regimen and will follow-up with ENT in 3 weeks as scheduled

## 2023-06-29 NOTE — ASSESSMENT & PLAN NOTE
Blood pressure in clinic today was 100/65  Plan to recheck BP at follow-up visit in 3 months   If  persistently low will consider discontinuing HCTZ 12 5 mg  The patient has received written discharge instructions for Coumadin/Warfarin, including the Coumadin/Warfarin discharge booklet, which contains all of the information listed below. Coumadin/Warfarin is used to prevent new blood clots, and keep existing ones from getting bigger. Never skip a dose of Coumadin. If you forget to take your Coumadin/Warfarin, DO NOT take an extra pill to 'catch up'. Notify your doctor that you missed a dose.    NEVER TAKE DOUBLE DOSE    Take Coumadin/Warfarin in the evening at the same time    Coumadin/Warfarin may be taken with other medications or food

## 2023-06-30 ENCOUNTER — TELEMEDICINE (OUTPATIENT)
Dept: NEUROSURGERY | Facility: CLINIC | Age: 58
End: 2023-06-30

## 2023-06-30 DIAGNOSIS — Z98.890 S/P CRANIOTOMY: ICD-10-CM

## 2023-06-30 DIAGNOSIS — D42.0 ATYPICAL MENINGIOMA OF BRAIN (HCC): Primary | ICD-10-CM

## 2023-06-30 PROCEDURE — 99024 POSTOP FOLLOW-UP VISIT: CPT | Performed by: NEUROLOGICAL SURGERY

## 2023-06-30 NOTE — PROGRESS NOTES
Virtual Regular Visit    Verification of patient location:    Patient is located at Home in the following state in which I hold an active license [place]      Assessment/Plan:    Problem List Items Addressed This Visit    None  Visit Diagnoses     Atypical meningioma of brain [facility])    -  Primary    Relevant Orders    MRI brain w wo contrast    S/P craniotomy        Relevant Orders    MRI brain w wo contrast        Status post left/semicoronal craniotomy with craniectomy and orbital osteotomy with resection of extradural mass left frontal, with exenteration/obliteration frontal sinus and custom synthetic cranioplasty on 4/11/2023  Now s/p endoscopic skull base repair with Dr Halle Daly on 6/8/23      Pathology who grade 2 meningioma     Between those procedures, he had been admitted twice with subgaleal/extradural air collection  Has not had fevers, chills, profound rhinorrhea, including today  The collection spontaneously resolved/reduced on its own after his first admission, but after this second admission, it has waxed and waned  We aspirated out the air on a few occasions, including during his repair with Dr Halle Daly      Office notes from Dr Halle Daly reviewed, who was performed debridement twice in the office since his repair with him in the OR  At the first session, he had some mucopus, and was switched from Augmentin to Bactrim  At his second agreement on 6/27/2023, there was no pus, and his Bactrim was discontinued  The patient states he has been off antibiotics since yesterday  On video, the patient appears to have a flat forehead over the bone flap  There is no obvious subgaleal collection  He states there is some mild swelling when he lays down at times, but this is not nearly as noticeable or as profound as previously  Again denies fevers and chills  Has some headache at times  Mentions some mild periorbital swelling at times that responds to ice or Tylenol or ibuprofen    Mentions some left eye photophobia which is not new, has been present for some time  Extraocular movements appear intact  The patient appears to be progressing well  We will continue him off antibiotics  I will plan to see him back in about 2 weeks with a new MRI scan of the brain  This will be his 3-month post resection MRI scan, for surveillance of his meningioma  This will also serve to assess the bone flap, epidural space, etc  for any suggestions of infection off antibiotics  01/11/23 Metrics: EQ5D5L 55542=5 000; ; MOCA 25/30     03/10/23 Metrics: EQ5D5L 15137=1 000;      03/22/23 Metrics: EQ5D5L 52554=3 000;      05/01/23 Metrics: EQ5D5L 95530=8 000;      05/17/23 Metrics: EQ5D5L 45176=2 861; VAS 80     05/31/23 Metrics: EQ5D5L 12829=0 827; VAS 85         Reason for visit is   Chief Complaint   Patient presents with   • Virtual Regular Visit        Encounter provider Ismael Mohs, MD    Provider located at [address] [place] [id number]      Recent Visits  Date Type Provider Dept   06/29/23 Office Visit Velma Wheat MD [facility]   Showing recent visits within past 7 days and meeting all other requirements  Today's Visits  Date Type Provider Dept   06/30/23 Telemedicine Ismael Mohs, MD [facility] today's visits and meeting all other requirements  Future Appointments  No visits were found meeting these conditions  Showing future appointments within next 150 days and meeting all other requirements       The patient was identified by name and date of birth  [facility] was informed that this is a telemedicine visit and that the visit is being conducted through the [address] [facility] platform  He agrees to proceed     My office door was closed  No one else was in the room  He acknowledged consent and understanding of privacy and security of the video platform  The patient has agreed to participate and understands they can discontinue the visit at any time  Patient is aware this is a billable service  Katrina Whyte is a 56 y o  male         HPI     Past Medical History:   Diagnosis Date   • Diabetes mellitus ([address] )    • Disease of thyroid gland    • Elevated LFTs     last assessed 02Nov2015   • Hyperlipidemia    • Hypertension    • Impaired fasting glucose     last assessed 29Jan2014       Past Surgical History:   Procedure Laterality Date   • APPENDECTOMY     • BRAIN BIOPSY Left 6/8/2023    Procedure: Subgaleal aspiration;  Surgeon: Jose M Sheth MD;  Location: [facility];  Service: Neurosurgery   • COLONOSCOPY     • NC CRANIEC TREPHINE BONE FLP BRAIN TUMOR SUPRTENTOR Left 4/11/2023    Procedure: IMAGE-GUIDED LEFT FRONTAL CRANIOTOMY FOR RESECTION OF EXTRA-AXIAL & SKULL MASS, WITH CRANIOPLASTY, EXENTERATION/OBLITERATION OF FRONTAL SINUS; ORBITAL OSTEOTOMY;  Surgeon: Jose M Sheth MD;  Location: [facility];  Service: Neurosurgery   • NC SECONDARY RPR DURA CSF LEAK FREE TISSUE GRAFT N/A 6/8/2023    Procedure: Image guided endoscopic transnasal skull base repair;  Surgeon: Vipul De Dios MD;  Location: [facility];  Service: ENT       Current Outpatient Medications   Medication Sig Dispense Refill   • acetaminophen (TYLENOL) 325 mg tablet Take 2 tablets (650 mg total) by mouth every 6 (six) hours as needed for headaches or mild pain  0   • atorvastatin (LIPITOR) 40 mg tablet Take 1 tablet by mouth once daily 90 tablet 0   • clotrimazole (LOTRIMIN) 1 % cream Apply topically 2 (two) times a day 30 g 0   • docusate sodium (COLACE) 100 mg capsule Take 1 capsule (100 mg total) by mouth 2 (two) times a day 30 capsule 1   • Insulin Glargine Solostar (Lantus SoloStar) 100 UNIT/ML SOPN Inject 0 25 mL (25 Units total) under the skin daily at bedtime 8 25 mL 0   • levothyroxine 137 mcg tablet Take 1 tablet by mouth once daily 60 tablet 0   • lidocaine (LIDODERM) 5 % APPLY ONE PATCH TO AFFECTED AREA DAILY  REMOVE AND DISCARD PATCH WITHIN 12 HOURS OR AS DIRECTED BY DOCTOR 30 patch 0   • lisinopril-hydrochlorothiazide (PRINZIDE,ZESTORETIC) 10-12 5 MG per tablet Take 1 tablet by mouth daily 90 tablet 1   • metFORMIN (GLUCOPHAGE) 1000 MG tablet Take 1 tablet (1,000 mg total) by mouth 2 (two) times a day with meals 60 tablet 0   • Alcohol Swabs 70 % PADS May substitute brand based on insurance coverage  Check glucose TID  100 each 0   • Blood Glucose Monitoring Suppl (OneTouch Verio Reflect) w/Device KIT May substitute brand based on insurance coverage  Check glucose TID  1 kit 0   • Continuous Blood Gluc Sensor (FreeStyle Khanh 14 Day Sensor) MISC Use 1 each every 14 (fourteen) days 2 each 1   • diphenhydramine, lidocaine, Al/Mg hydroxide, simethicone (Magic Mouthwash) SUSP Swish and spit 10 mL every 4 (four) hours as needed for mouth pain or discomfort (Patient not taking: Reported on 6/29/2023) 110 mL 1   • glucose blood (Accu-Chek Guide) test strip Use 1 each 2 (two) times a day Use as instructed (Patient not taking: Reported on 6/29/2023) 100 each 5   • glucose blood (OneTouch Verio) test strip Use 1 each 2 (two) times a day Use as instructed (Patient not taking: Reported on 6/15/2023) 100 each 5   • glucose blood (OneTouch Verio) test strip May substitute brand based on insurance coverage  Check glucose TID  100 each 0   • Insulin Pen Needle (BD Pen Needle Mariela 2nd Gen) 32G X 4 MM MISC For use with insulin pen  Pharmacy may dispense brand covered by insurance  100 each 0   • Insulin Pen Needle (BD Pen Needle Mariela 2nd Gen) 32G X 4 MM MISC For use with insulin pen  Pharmacy may dispense brand covered by insurance  (Patient not taking: Reported on 6/15/2023) 100 each 0   • Insulin Pen Needle (BD Pen Needle Mariela 2nd Gen) 32G X 4 MM MISC For use with insulin pen  Pharmacy may dispense brand covered by insurance   (Patient not taking: Reported on 6/15/2023) 100 each 0   • Lidocaine Viscous HCl (XYLOCAINE) 2 % mucosal solution Swish and spit 15 mL 4 (four) times a day as needed for mouth pain or discomfort (Patient not taking: Reported on 6/29/2023) 100 mL 0   • OneTouch Delica Lancets 89Q MISC May substitute brand based on insurance coverage  Check glucose TID  100 each 0     No current facility-administered medications for this visit  No Known Allergies    Review of Systems   Constitutional: Negative  HENT: Positive for rhinorrhea (improving since last visit)  Eyes: Positive for pain (unchanged since surgery, left eye) and visual disturbance (unchanged since surgery, left eye blurry vision, started since surgery)  Light sensitivity  Respiratory: Negative  Cardiovascular: Negative  Gastrointestinal: Negative  Endocrine: Negative  Genitourinary: Negative  Musculoskeletal: Negative  Skin: Negative  Allergic/Immunologic: Negative  Neurological: Positive for numbness (unchanged since last visit, currently also left side of head and top numbness and noted at lat visit, )  Negative for dizziness, seizures, syncope, weakness and headaches (unchanged since last visit, and more pressure then pain, left side head (temple/ear/left side of head))  Currently forehead to the left side swollen left eye/forehead    Frontal skull lesion   Hematological: Negative  Psychiatric/Behavioral: Negative  Video Exam    There were no vitals filed for this visit  Physical Exam     See discussion  MDM    Dr Rose Benz office notes reviewed, see discussion

## 2023-07-13 ENCOUNTER — HOSPITAL ENCOUNTER (OUTPATIENT)
Dept: RADIOLOGY | Age: 58
Discharge: HOME | End: 2023-07-13
Payer: COMMERCIAL

## 2023-07-13 DIAGNOSIS — D42.0 ATYPICAL MENINGIOMA OF BRAIN (HCC): ICD-10-CM

## 2023-07-13 DIAGNOSIS — Z98.890 S/P CRANIOTOMY: ICD-10-CM

## 2023-07-13 PROCEDURE — 70553 MRI BRAIN STEM W/O & W/DYE: CPT

## 2023-07-13 PROCEDURE — A9585 GADOBUTROL INJECTION: HCPCS | Performed by: NEUROLOGICAL SURGERY

## 2023-07-13 PROCEDURE — G1004 CDSM NDSC: HCPCS

## 2023-07-13 RX ADMIN — GADOBUTROL 8 ML: 604.72 INJECTION INTRAVENOUS at 14:54

## 2023-07-17 ENCOUNTER — TRANSCRIBE ORDERS (OUTPATIENT)
Dept: NEUROSURGERY | Facility: CLINIC | Age: 58
End: 2023-07-17

## 2023-07-17 ENCOUNTER — OFFICE VISIT (OUTPATIENT)
Dept: NEUROSURGERY | Facility: CLINIC | Age: 58
End: 2023-07-17

## 2023-07-17 ENCOUNTER — TELEPHONE (OUTPATIENT)
Dept: FAMILY MEDICINE CLINIC | Facility: CLINIC | Age: 58
End: 2023-07-17

## 2023-07-17 VITALS
WEIGHT: 187 LBS | HEIGHT: 69 IN | BODY MASS INDEX: 27.7 KG/M2 | TEMPERATURE: 98.6 F | HEART RATE: 85 BPM | DIASTOLIC BLOOD PRESSURE: 83 MMHG | RESPIRATION RATE: 16 BRPM | SYSTOLIC BLOOD PRESSURE: 128 MMHG

## 2023-07-17 DIAGNOSIS — D42.0 ATYPICAL MENINGIOMA OF BRAIN (HCC): Primary | ICD-10-CM

## 2023-07-17 DIAGNOSIS — D32.9 MENINGIOMA (HCC): Primary | ICD-10-CM

## 2023-07-17 DIAGNOSIS — Z98.890 S/P CRANIOTOMY: ICD-10-CM

## 2023-07-17 PROCEDURE — 99024 POSTOP FOLLOW-UP VISIT: CPT | Performed by: NEUROLOGICAL SURGERY

## 2023-07-17 NOTE — PROGRESS NOTES
Neurosurgery Office Note  Shu Rodriguez 56 y.o. male MRN: [id number]      Assessment/Plan      Diagnoses and all orders for this visit:    Atypical meningioma of brain [facility])  -     MRI brain w wo contrast; Future      Discussion:    Pain Score: 0-No pain    Status post left/semicoronal craniotomy with craniectomy and orbital osteotomy with resection of extradural mass left frontal, with exenteration/obliteration frontal sinus and custom synthetic cranioplasty on 4/11/2023. Now s/p endoscopic skull base repair with Dr. Dawson Verdin on 6/8/23.     Pathology who grade 2 meningioma     Between those procedures, he had been admitted twice with subgaleal/extradural air collection. Has did not have fevers, chills, profound rhinorrhea. The collection spontaneously resolved/reduced on its own after his first admission, but after this second admission, it waxed and waned. We aspirated out the air on a few occasions, including during his repair with Dr. Dawson Verdin.     Patient states he has been off antibiotics since 6/29/23. Denies F/C, or persistent H/A. Occasional L frontal/pterional H/A, massages it, wihich relieves it, perhaps takes occasional ibuprofen. Mentions some left eye photophobia which is not new, has been present for some time. He mentions some slight dependent swelling at either the lateral or medial edge of the synthetic bone flap, depending on how he lays. No tension, or air collection. I can palpate a small amount of fluid in around his bone flap, but no erythema edema etc., and this is less than previously. I expect this is expected postop healing, that should scar down over the next weeks/months. Very mild left frontalis palsy. Extraocular movements appear intact. New MRI scan of the brain, which serves as his 3-month postop MRI scan, is unfortunately not yet reported. On my review, it shows no overt residual or recurrence.   There is a considerable enhancing area, consistent with the paracranial flap. There is some persistent encephalomalacia in the left frontal lobe, and some fluid around the synthetic bone flap. No overt diffusion restriction. The patient appears to be progressing well. We will continue him off antibiotics. I will plan to see him back in 3 months with a new MRI scan of the brain. I counseled the patient he could begin to work back towards increasing activity, work, etc.  He will call us with any new symptoms or concerns. 01/11/23 Metrics: EQ5D5L 93746=1.000; ; MOCA 25/30     03/10/23 Metrics: EQ5D5L 78643=8.000;      03/22/23 Metrics: EQ5D5L 86764=3.000;      05/01/23 Metrics: EQ5D5L 78265=1.000;      05/17/23 Metrics: EQ5D5L 21136=1.861; VAS 80     05/31/23 Metrics: EQ5D5L 86972=9.827; VAS 85    07/17/23 Metrics: EQ5D5L 64814=0.000; VAS 90      CHIEF COMPLAINT    Chief Complaint   Patient presents with   • Follow-up     2 wk f/u after MRI BRAIN 7/13/23       HISTORY    History of Present Illness     56y.o. year old male     HPI    See Discussion    REVIEW OF SYSTEMS    Review of Systems   Constitutional: Negative. HENT: Negative for rhinorrhea (improving since last visit). Left side nostril still healing   Eyes: Positive for photophobia (left eye, wears sunglasses) and visual disturbance (unchanged since surgery, left eye blurry vision, started since surgery). Negative for pain (improved since surgery, left eye). Light sensitivity. Respiratory: Negative. Cardiovascular: Negative. Gastrointestinal: Negative. Endocrine: Negative. Genitourinary: Negative. Musculoskeletal: Negative. Skin: Negative. Forehead tender/soft since surgery   Allergic/Immunologic: Negative.     Neurological: Positive for numbness (unchanged since last visit, currently also left side of head and top numbness and noted at lat visit, ) and headaches (unchanged since last visit, and more pressure then pain, left side head (temple/ear/left side of head)). Negative for dizziness, seizures, syncope and weakness. Currently forehead to the left side swollen left eye/forehead    Frontal skull lesion   Hematological: Negative. Psychiatric/Behavioral: Negative. Meds/Allergies     Current Outpatient Medications   Medication Sig Dispense Refill   • acetaminophen (TYLENOL) 325 mg tablet Take 2 tablets (650 mg total) by mouth every 6 (six) hours as needed for headaches or mild pain  0   • Alcohol Swabs 70 % PADS May substitute brand based on insurance coverage. Check glucose TID. 100 each 0   • atorvastatin (LIPITOR) 40 mg tablet Take 1 tablet by mouth once daily 90 tablet 0   • Blood Glucose Monitoring Suppl (OneTouch Verio Reflect) w/Device KIT May substitute brand based on insurance coverage. Check glucose TID. 1 kit 0   • clotrimazole (LOTRIMIN) 1 % cream Apply topically 2 (two) times a day 30 g 0   • Continuous Blood Gluc Sensor (FreeStyle Khanh 14 Day Sensor) MISC Use 1 each every 14 (fourteen) days 2 each 1   • Empagliflozin (Jardiance) 10 MG TABS tablet Take 10 mg by mouth daily     • glucose blood (OneTouch Verio) test strip May substitute brand based on insurance coverage. Check glucose TID. 100 each 0   • Insulin Pen Needle (BD Pen Needle Mariela 2nd Gen) 32G X 4 MM MISC For use with insulin pen. Pharmacy may dispense brand covered by insurance. 100 each 0   • levothyroxine 137 mcg tablet Take 1 tablet by mouth once daily 60 tablet 0   • lidocaine (LIDODERM) 5 % APPLY ONE PATCH TO AFFECTED AREA DAILY. REMOVE AND DISCARD PATCH WITHIN 12 HOURS OR AS DIRECTED BY DOCTOR 30 patch 0   • lisinopril-hydrochlorothiazide (PRINZIDE,ZESTORETIC) 10-12.5 MG per tablet Take 1 tablet by mouth daily 90 tablet 1   • metFORMIN (GLUCOPHAGE) 1000 MG tablet Take 1 tablet (1,000 mg total) by mouth 2 (two) times a day with meals 60 tablet 0   • OneTouch Delica Lancets 11H MISC May substitute brand based on insurance coverage.  Check glucose TID. 100 each 0   • diphenhydramine, lidocaine, Al/Mg hydroxide, simethicone (Magic Mouthwash) SUSP Swish and spit 10 mL every 4 (four) hours as needed for mouth pain or discomfort (Patient not taking: Reported on 6/29/2023) 110 mL 1   • docusate sodium (COLACE) 100 mg capsule Take 1 capsule (100 mg total) by mouth 2 (two) times a day (Patient not taking: Reported on 7/17/2023) 30 capsule 1   • glucose blood (Accu-Chek Guide) test strip Use 1 each 2 (two) times a day Use as instructed (Patient not taking: Reported on 6/29/2023) 100 each 5   • glucose blood (OneTouch Verio) test strip Use 1 each 2 (two) times a day Use as instructed (Patient not taking: Reported on 6/15/2023) 100 each 5   • Insulin Pen Needle (BD Pen Needle Mariela 2nd Gen) 32G X 4 MM MISC For use with insulin pen. Pharmacy may dispense brand covered by insurance. (Patient not taking: Reported on 6/15/2023) 100 each 0   • Insulin Pen Needle (BD Pen Needle Mariela 2nd Gen) 32G X 4 MM MISC For use with insulin pen. Pharmacy may dispense brand covered by insurance. (Patient not taking: Reported on 6/15/2023) 100 each 0   • Lidocaine Viscous HCl (XYLOCAINE) 2 % mucosal solution Swish and spit 15 mL 4 (four) times a day as needed for mouth pain or discomfort (Patient not taking: Reported on 6/29/2023) 100 mL 0     No current facility-administered medications for this visit.        No Known Allergies    PAST HISTORY    Past Medical History:   Diagnosis Date   • Diabetes mellitus ([address])    • Disease of thyroid gland    • Elevated LFTs     last assessed 02Nov2015   • Hyperlipidemia    • Hypertension    • Impaired fasting glucose     last assessed 29Jan2014       Past Surgical History:   Procedure Laterality Date   • APPENDECTOMY     • BRAIN BIOPSY Left 6/8/2023    Procedure: Subgaleal aspiration;  Surgeon: Alethea Lugo MD;  [facility];  Service: Neurosurgery   • COLONOSCOPY     • NE CRANIEC TREPHINE BONE FLP BRAIN TUMOR SUPRTENTOR Left 4/11/2023    Procedure: IMAGE-GUIDED LEFT FRONTAL CRANIOTOMY FOR RESECTION OF EXTRA-AXIAL & SKULL MASS, WITH CRANIOPLASTY, EXENTERATION/OBLITERATION OF FRONTAL SINUS; ORBITAL OSTEOTOMY;  Surgeon: Alethea Lugo MD;  [facility];  Service: Neurosurgery   • [place] SECONDARY RPR DURA CSF LEAK FREE TISSUE GRAFT N/A 6/8/2023    Procedure: Image guided endoscopic transnasal skull base repair;  Surgeon: Edi Gross MD;  [facility];  Service: ENT       Social History     Tobacco Use   • Smoking status: Never   • Smokeless tobacco: Never   Vaping Use   • Vaping Use: Never used   Substance Use Topics   • Alcohol use: Not Currently   • Drug use: Never       Family History   Problem Relation Age of Onset   • Diabetes Mother    • Breast cancer Mother    • Diabetes Father          The following portions of the patient's history were reviewed in this encounter and updated as appropriate: Past medical, surgical, family, and social history, as well as medications, allergies, and review of systems. EXAM    Vitals:Blood pressure 128/83, pulse 85, temperature 98.6 °F (37 °C), resp. rate 16, height 5' 9" (1.753 m), weight 84.8 kg (187 lb). ,Body mass index is 27.62 kg/m². Physical Exam    Neurologic Exam      MEDICAL DECISION MAKING    Imaging Studies:     No results found. I have personally reviewed pertinent films in PACS      PLEASE NOTE:  This encounter may have been completed utilizing the [employer] Direct Speech Voice Recognition Software. Grammatical errors, random word insertions, pronoun errors and incomplete sentences are occasional consequences of the system due to software limitations, ambient noise and hardware issues. These may be missed by proof reading prior to affixing electronic signature.  Any questions or concerns about the content, text or information contained within the body of this dictation should be directly addressed to the advanced practitioner or physician for clarification.

## 2023-07-19 DIAGNOSIS — Z79.4 TYPE 2 DIABETES MELLITUS WITHOUT COMPLICATION, WITH LONG-TERM CURRENT USE OF INSULIN (HCC): Primary | ICD-10-CM

## 2023-07-19 DIAGNOSIS — E11.9 TYPE 2 DIABETES MELLITUS WITHOUT COMPLICATION, WITH LONG-TERM CURRENT USE OF INSULIN (HCC): Primary | ICD-10-CM

## 2023-08-13 DIAGNOSIS — Z79.4 TYPE 2 DIABETES MELLITUS WITHOUT COMPLICATION, WITH LONG-TERM CURRENT USE OF INSULIN (HCC): ICD-10-CM

## 2023-08-13 DIAGNOSIS — E11.9 TYPE 2 DIABETES MELLITUS WITHOUT COMPLICATION, WITH LONG-TERM CURRENT USE OF INSULIN (HCC): ICD-10-CM

## 2023-08-14 RX ORDER — FLASH GLUCOSE SENSOR
KIT MISCELLANEOUS
Qty: 2 EACH | Refills: 0 | Status: SHIPPED | OUTPATIENT
Start: 2023-08-14

## 2023-09-06 ENCOUNTER — APPOINTMENT (OUTPATIENT)
Dept: LAB | Facility: CLINIC | Age: 58
End: 2023-09-06
Payer: COMMERCIAL

## 2023-09-06 DIAGNOSIS — Z79.4 TYPE 2 DIABETES MELLITUS WITHOUT COMPLICATION, WITH LONG-TERM CURRENT USE OF INSULIN (HCC): ICD-10-CM

## 2023-09-06 DIAGNOSIS — D32.9 MENINGIOMA (HCC): ICD-10-CM

## 2023-09-06 DIAGNOSIS — E11.9 TYPE 2 DIABETES MELLITUS WITHOUT COMPLICATION, WITH LONG-TERM CURRENT USE OF INSULIN (HCC): ICD-10-CM

## 2023-09-06 LAB
ALBUMIN SERPL BCP-MCNC: 4.2 G/DL (ref 3.5–5)
ALP SERPL-CCNC: 71 U/L (ref 34–104)
ALT SERPL W P-5'-P-CCNC: 34 U/L (ref 7–52)
ANION GAP SERPL CALCULATED.3IONS-SCNC: 6 MMOL/L
AST SERPL W P-5'-P-CCNC: 25 U/L (ref 13–39)
BILIRUB SERPL-MCNC: 0.86 MG/DL (ref 0.2–1)
BUN SERPL-MCNC: 12 MG/DL (ref 5–25)
CALCIUM SERPL-MCNC: 9.3 MG/DL (ref 8.4–10.2)
CHLORIDE SERPL-SCNC: 101 MMOL/L (ref 96–108)
CHOLEST SERPL-MCNC: 127 MG/DL
CO2 SERPL-SCNC: 31 MMOL/L (ref 21–32)
CREAT SERPL-MCNC: 0.83 MG/DL (ref 0.6–1.3)
CREAT UR-MCNC: 181.8 MG/DL
EST. AVERAGE GLUCOSE BLD GHB EST-MCNC: 143 MG/DL
GFR SERPL CREATININE-BSD FRML MDRD: 99 ML/MIN/1.73SQ M
GLUCOSE P FAST SERPL-MCNC: 152 MG/DL (ref 65–99)
HBA1C MFR BLD: 6.6 %
HDLC SERPL-MCNC: 52 MG/DL
LDLC SERPL CALC-MCNC: 61 MG/DL (ref 0–100)
MICROALBUMIN UR-MCNC: 14.4 MG/L
MICROALBUMIN/CREAT 24H UR: 8 MG/G CREATININE (ref 0–30)
NONHDLC SERPL-MCNC: 75 MG/DL
POTASSIUM SERPL-SCNC: 4.2 MMOL/L (ref 3.5–5.3)
PROT SERPL-MCNC: 7.1 G/DL (ref 6.4–8.4)
SODIUM SERPL-SCNC: 138 MMOL/L (ref 135–147)
TRIGL SERPL-MCNC: 70 MG/DL

## 2023-09-06 PROCEDURE — 80061 LIPID PANEL: CPT

## 2023-09-06 PROCEDURE — 82570 ASSAY OF URINE CREATININE: CPT

## 2023-09-06 PROCEDURE — 80053 COMPREHEN METABOLIC PANEL: CPT

## 2023-09-06 PROCEDURE — 82043 UR ALBUMIN QUANTITATIVE: CPT

## 2023-09-06 PROCEDURE — 83036 HEMOGLOBIN GLYCOSYLATED A1C: CPT

## 2023-09-06 PROCEDURE — 36415 COLL VENOUS BLD VENIPUNCTURE: CPT

## 2023-09-07 ENCOUNTER — OFFICE VISIT (OUTPATIENT)
Dept: FAMILY MEDICINE CLINIC | Facility: CLINIC | Age: 58
End: 2023-09-07

## 2023-09-07 VITALS
HEIGHT: 69 IN | DIASTOLIC BLOOD PRESSURE: 88 MMHG | SYSTOLIC BLOOD PRESSURE: 133 MMHG | TEMPERATURE: 98.3 F | BODY MASS INDEX: 28.38 KG/M2 | RESPIRATION RATE: 16 BRPM | WEIGHT: 191.6 LBS | HEART RATE: 94 BPM

## 2023-09-07 DIAGNOSIS — Z00.00 ANNUAL PHYSICAL EXAM: Primary | ICD-10-CM

## 2023-09-07 DIAGNOSIS — Z98.890 STATUS POST CRANIOTOMY: ICD-10-CM

## 2023-09-07 DIAGNOSIS — E03.8 OTHER SPECIFIED HYPOTHYROIDISM: ICD-10-CM

## 2023-09-07 DIAGNOSIS — K02.9 DENTAL CARIES: ICD-10-CM

## 2023-09-07 DIAGNOSIS — E11.9 TYPE 2 DIABETES MELLITUS WITHOUT COMPLICATION, WITH LONG-TERM CURRENT USE OF INSULIN (HCC): ICD-10-CM

## 2023-09-07 DIAGNOSIS — Z23 ENCOUNTER FOR IMMUNIZATION: ICD-10-CM

## 2023-09-07 DIAGNOSIS — Z79.4 TYPE 2 DIABETES MELLITUS WITHOUT COMPLICATION, WITH LONG-TERM CURRENT USE OF INSULIN (HCC): ICD-10-CM

## 2023-09-07 DIAGNOSIS — Z12.5 SCREENING FOR PROSTATE CANCER: ICD-10-CM

## 2023-09-07 PROCEDURE — 99396 PREV VISIT EST AGE 40-64: CPT | Performed by: FAMILY MEDICINE

## 2023-09-07 PROCEDURE — 90471 IMMUNIZATION ADMIN: CPT | Performed by: FAMILY MEDICINE

## 2023-09-07 PROCEDURE — 90677 PCV20 VACCINE IM: CPT | Performed by: FAMILY MEDICINE

## 2023-09-07 NOTE — ASSESSMENT & PLAN NOTE
Blood pressure on arrival 150/92, on recheck 133/88. Continue lisinopril hydrochlorothiazide 10-12.5 at this time, will follow-up in next visit.

## 2023-09-07 NOTE — ASSESSMENT & PLAN NOTE
Lab Results   Component Value Date    HGBA1C 6.6 (H) 09/06/2023     Currently taking metformin 1000 mg twice daily, patient stopped Jardiance because of increased penile candidiasis. He is also not taking insulin anymore. Reports he stopped both medications a month ago. A1c within goal at this time.   Continue lifestyle modification with diet and exercise, will recheck hemoglobin A1c in 3 months  Diabetic foot exam completed today, no loss of sensation on filament testing  Referral for ophthalmology given as well for fundus exam.

## 2023-09-07 NOTE — PROGRESS NOTES
[address] [facility] [place]    NAME: Elise Ferreira  AGE: 56 y.o. SEX: male  : 1965     DATE: 2023     Assessment and Plan:     Problem List Items Addressed This Visit        Digestive    Dental caries     Referral to dentist provided. Patient does not have dental insurance         Relevant Orders    Ambulatory Referral to Dentistry       Endocrine    Type 2 diabetes mellitus, with long-term current use of insulin ([address])       Lab Results   Component Value Date    HGBA1C 6.6 (H) 2023     Currently taking metformin 1000 mg twice daily, patient stopped Jardiance because of increased penile candidiasis. He is also not taking insulin anymore. Reports he stopped both medications a month ago. A1c within goal at this time. Continue lifestyle modification with diet and exercise, will recheck hemoglobin A1c in 3 months  Diabetic foot exam completed today, no loss of sensation on filament testing  Referral for ophthalmology given as well for fundus exam.           Relevant Orders    HEMOGLOBIN A1C W/ EAG ESTIMATION    Other specified hypothyroidism     Currently on 137 mcg of levothyroxine, will recheck TSH. Relevant Orders    TSH, 3rd generation       Other    Status post craniotomy and cranioplasty     Patient diagnosed with meningioma, he does have follow-up with neurosurgery scheduled. He is complaining of blurring of vision when in sun, He reports neurosurgery is aware. patient will discuss with neurosurgery, referral for ophthalmology given as well. Other Visit Diagnoses     Annual physical exam    -  Primary    Encounter for immunization        Relevant Orders    Pneumococcal Conjugate Vaccine 20-valent (Pcv20) (Completed)    Screening for prostate cancer        Relevant Orders    PSA, Total Screen          Immunizations and preventive care screenings were discussed with patient today. Appropriate education was printed on patient's after visit summary. Discussed risks and benefits of prostate cancer screening. We discussed the controversial history of PSA screening for prostate cancer in the [facility] as well as the risk of over detection and over treatment of prostate cancer by way of PSA screening. The patient understands that PSA blood testing is an imperfect way to screen for prostate cancer and that elevated PSA levels in the blood may also be caused by infection, inflammation, prostatic trauma or manipulation, urological procedures, or by benign prostatic enlargement. The role of the digital rectal examination in prostate cancer screening was also discussed and I discussed with him that there is large interobserver variability in the findings of digital rectal examination. Counseling:  · Exercise: the importance of regular exercise/physical activity was discussed. Recommend exercise 3-5 times per week for at least 30 minutes. Return in 3 months (on 12/7/2023) for BP recheck . Chief Complaint:     Chief Complaint   Patient presents with   • Physical Exam     After surgery on his head his left eye bothers him in the heat      History of Present Illness:     Adult Annual Physical   Patient here for a comprehensive physical exam. The patient reports no problems. Blurring of vision on left side juliana in bright light. Is also reporting pain in right leg, which improved with massage, reports its better now. He does have follow-up with neurosurgery scheduled next month. Diet and Physical Activity  · Diet/Nutrition: well balanced diet. · Exercise: walking. Depression Screening  PHQ-2/9 Depression Screening         General Health  · Sleep: sleeps well. · Hearing: normal - bilateral.  · Vision: vision problems: blurring of vision . · Dental: no dental visits for >1 year.         Health  · Symptoms include: none     Review of Systems:     Review of Systems Past Medical History:     Past Medical History:   Diagnosis Date   • Diabetes mellitus ([address])    • Disease of thyroid gland    • Elevated LFTs     last assessed 02Nov2015   • Hyperlipidemia    • Hypertension    • Impaired fasting glucose     last assessed 29Jan2014      Past Surgical History:     Past Surgical History:   Procedure Laterality Date   • APPENDECTOMY     • BRAIN BIOPSY Left 6/8/2023    Procedure: Subgaleal aspiration;  Surgeon: Vijay Anne MD;  [facility];  Service: Neurosurgery   • COLONOSCOPY     • NE CRANIEC TREPHINE BONE FLP BRAIN TUMOR SUPRTENTOR Left 4/11/2023    Procedure: IMAGE-GUIDED LEFT FRONTAL CRANIOTOMY FOR RESECTION OF EXTRA-AXIAL & SKULL MASS, WITH CRANIOPLASTY, EXENTERATION/OBLITERATION OF FRONTAL SINUS; ORBITAL OSTEOTOMY;  Surgeon: Vijay Anne MD;  [facility];  Service: Neurosurgery   • [place] SECONDARY RPR DURA CSF LEAK FREE TISSUE GRAFT N/A 6/8/2023    Procedure: Image guided endoscopic transnasal skull base repair;  Surgeon: Williams Ceballos MD;  [facility];  Service: ENT      Family History:     Family History   Problem Relation Age of Onset   • Diabetes Mother    • Breast cancer Mother    • Diabetes Father       Social History:     Social History     Socioeconomic History   • Marital status: /Civil Union     Spouse name: None   • Number of children: None   • Years of education: None   • Highest education level: None   Occupational History   • None   Tobacco Use   • Smoking status: Never   • Smokeless tobacco: Never   Vaping Use   • Vaping Use: Never used   Substance and Sexual Activity   • Alcohol use: Not Currently   • Drug use: Never   • Sexual activity: Not Currently     Partners: Female   Other Topics Concern   • None   Social History Narrative   • None     Social Determinants of Health     Financial Resource Strain: Low Risk  (6/29/2023)    Overall Financial Resource Strain (CARDIA)    • Difficulty of Paying Living Expenses: Not hard at all   Food Insecurity: No Food Insecurity (6/9/2023)    Hunger Vital Sign    • Worried About Running Out of Food in the Last Year: Never true    • Ran Out of Food in the Last Year: Never true   Transportation Needs: No Transportation Needs (6/9/2023)    PRAPARE - Transportation    • Lack of Transportation (Medical): No    • Lack of Transportation (Non-Medical): No   Physical Activity: Inactive (6/15/2023)    Exercise Vital Sign    • Days of Exercise per Week: 0 days    • Minutes of Exercise per Session: 0 min   Stress: No Stress Concern Present (9/7/2023)    [facility]    • Feeling of Stress : Not at all   Social Connections: Not on file   Intimate Partner Violence: Not At Risk (6/15/2023)    Humiliation, Afraid, Rape, and Kick questionnaire    • Fear of Current or Ex-Partner: No    • Emotionally Abused: No    • Physically Abused: No    • Sexually Abused: No   Housing Stability: Low Risk  (6/9/2023)    Housing Stability Vital Sign    • Unable to Pay for Housing in the Last Year: No    • Number of [address] in the Last Year: 1    • Unstable Housing in the Last Year: No      Current Medications:     Current Outpatient Medications   Medication Sig Dispense Refill   • acetaminophen (TYLENOL) 325 mg tablet Take 2 tablets (650 mg total) by mouth every 6 (six) hours as needed for headaches or mild pain  0   • Alcohol Swabs 70 % PADS May substitute brand based on insurance coverage. Check glucose TID. 100 each 0   • atorvastatin (LIPITOR) 40 mg tablet Take 1 tablet by mouth once daily 90 tablet 0   • Blood Glucose Monitoring Suppl (OneTouch Verio Reflect) w/Device KIT May substitute brand based on insurance coverage.  Check glucose TID. 1 kit 0   • clotrimazole (LOTRIMIN) 1 % cream Apply topically 2 (two) times a day 30 g 0   • Continuous Blood Gluc Sensor (FreeStyle Khanh 14 Day Sensor) MISC USE 1 EACH EVERY 14 DAYS 2 each 0   • Empagliflozin (Jardiance) 10 MG TABS tablet Take 1 tablet (10 mg total) by mouth daily 90 tablet 0   • levothyroxine 137 mcg tablet Take 1 tablet by mouth once daily 60 tablet 0   • lidocaine (LIDODERM) 5 % APPLY ONE PATCH TO AFFECTED AREA DAILY. REMOVE AND DISCARD PATCH WITHIN 12 HOURS OR AS DIRECTED BY DOCTOR 30 patch 0   • lisinopril-hydrochlorothiazide (PRINZIDE,ZESTORETIC) 10-12.5 MG per tablet Take 1 tablet by mouth daily 90 tablet 1   • OneTouch Delica Lancets 11B MISC May substitute brand based on insurance coverage. Check glucose TID. 100 each 0   • diphenhydramine, lidocaine, Al/Mg hydroxide, simethicone (Magic Mouthwash) SUSP Swish and spit 10 mL every 4 (four) hours as needed for mouth pain or discomfort (Patient not taking: Reported on 6/29/2023) 110 mL 1   • glucose blood (Accu-Chek Guide) test strip Use 1 each 2 (two) times a day Use as instructed (Patient not taking: Reported on 6/29/2023) 100 each 5   • glucose blood (OneTouch Verio) test strip Use 1 each 2 (two) times a day Use as instructed (Patient not taking: Reported on 6/15/2023) 100 each 5   • Lidocaine Viscous HCl (XYLOCAINE) 2 % mucosal solution Swish and spit 15 mL 4 (four) times a day as needed for mouth pain or discomfort (Patient not taking: Reported on 6/29/2023) 100 mL 0   • metFORMIN (GLUCOPHAGE) 1000 MG tablet Take 1 tablet (1,000 mg total) by mouth 2 (two) times a day with meals 60 tablet 0     No current facility-administered medications for this visit. Allergies:     No Known Allergies   Physical Exam:     /88   Pulse 94   Temp 98.3 °F (36.8 °C) (Temporal)   Resp 16   Ht 5' 9" (1.753 m)   Wt 86.9 kg (191 lb 9.6 oz)   BMI 28.29 kg/m²     Physical Exam  Vitals and nursing note reviewed. Constitutional:       General: He is not in acute distress. Appearance: He is well-developed. HENT:      Head: Normocephalic and atraumatic. Eyes:      Extraocular Movements: Extraocular movements intact.       Conjunctiva/sclera: Conjunctivae normal.      Pupils: Pupils are equal, round, and reactive to light. Cardiovascular:      Rate and Rhythm: Normal rate and regular rhythm. Pulses: no weak pulses          Dorsalis pedis pulses are 2+ on the right side and 2+ on the left side. Posterior tibial pulses are 2+ on the right side and 2+ on the left side. Heart sounds: No murmur heard. Pulmonary:      Effort: Pulmonary effort is normal. No respiratory distress. Breath sounds: Normal breath sounds. Abdominal:      Palpations: Abdomen is soft. Tenderness: There is no abdominal tenderness. Musculoskeletal:         General: No swelling. Cervical back: Neck supple. Feet:      Right foot:      Skin integrity: No ulcer, skin breakdown, erythema, warmth, callus or dry skin. Left foot:      Skin integrity: No ulcer, skin breakdown, erythema, warmth, callus or dry skin. Skin:     General: Skin is warm and dry. Capillary Refill: Capillary refill takes less than 2 seconds. Neurological:      Mental Status: He is alert. Psychiatric:         Mood and Affect: Mood normal.        Patient's shoes and socks removed. Right Foot/Ankle   Right Foot Inspection  Skin Exam: skin normal and skin intact. No dry skin, no warmth, no callus, no erythema, no maceration, no abnormal color, no pre-ulcer, no ulcer and no callus. Toe Exam: ROM and strength within normal limits. No tenderness and  no right toe deformity    Sensory   Vibration: intact  Proprioception: intact  Monofilament testing: intact    Vascular  Capillary refills: < 3 seconds  The right DP pulse is 2+. The right PT pulse is 2+. Left Foot/Ankle  Left Foot Inspection  Skin Exam: skin normal and skin intact. No dry skin, no warmth, no erythema, no maceration, normal color, no pre-ulcer, no ulcer and no callus. Toe Exam: ROM and strength within normal limits. No tenderness and no left toe deformity.      Sensory   Vibration: intact  Proprioception: intact  Monofilament testing: intact    Vascular  Capillary refills: < 3 seconds  The left DP pulse is 2+. The left PT pulse is 2+.      Assign Risk Category  No deformity present  No loss of protective sensation  No weak pulses  Risk: 0      Ranjit Emery MD  [facility]

## 2023-09-07 NOTE — ASSESSMENT & PLAN NOTE
Patient diagnosed with meningioma, he does have follow-up with neurosurgery scheduled. He is complaining of blurring of vision when in sun, He reports neurosurgery is aware. patient will discuss with neurosurgery, referral for ophthalmology given as well.

## 2023-09-12 ENCOUNTER — LAB (OUTPATIENT)
Dept: LAB | Facility: CLINIC | Age: 58
End: 2023-09-12
Payer: COMMERCIAL

## 2023-09-12 DIAGNOSIS — E11.9 TYPE 2 DIABETES MELLITUS WITHOUT COMPLICATION, WITH LONG-TERM CURRENT USE OF INSULIN (HCC): ICD-10-CM

## 2023-09-12 DIAGNOSIS — E03.8 OTHER SPECIFIED HYPOTHYROIDISM: ICD-10-CM

## 2023-09-12 DIAGNOSIS — Z12.5 SCREENING FOR PROSTATE CANCER: ICD-10-CM

## 2023-09-12 DIAGNOSIS — Z79.4 TYPE 2 DIABETES MELLITUS WITHOUT COMPLICATION, WITH LONG-TERM CURRENT USE OF INSULIN (HCC): ICD-10-CM

## 2023-09-12 DIAGNOSIS — E03.8 OTHER SPECIFIED HYPOTHYROIDISM: Primary | ICD-10-CM

## 2023-09-12 LAB
EST. AVERAGE GLUCOSE BLD GHB EST-MCNC: 151 MG/DL
HBA1C MFR BLD: 6.9 %
PSA SERPL-MCNC: 0.74 NG/ML (ref 0–4)
TSH SERPL DL<=0.05 MIU/L-ACNC: 11.73 UIU/ML (ref 0.45–4.5)

## 2023-09-12 PROCEDURE — 84443 ASSAY THYROID STIM HORMONE: CPT

## 2023-09-12 PROCEDURE — G0103 PSA SCREENING: HCPCS

## 2023-09-12 PROCEDURE — 36415 COLL VENOUS BLD VENIPUNCTURE: CPT

## 2023-09-12 PROCEDURE — 83036 HEMOGLOBIN GLYCOSYLATED A1C: CPT

## 2023-09-12 NOTE — RESULT ENCOUNTER NOTE
Discussed results with the patient, he reports that he forgets levothyroxine sometimes, and has not taken it for the last 3 days. His TSH is elevated to 11.7, will keep him at current dose and repeat TSH in 6 weeks.   Advised to take levothyroxine every day before breakfast.

## 2023-09-19 ENCOUNTER — OFFICE VISIT (OUTPATIENT)
Dept: FAMILY MEDICINE CLINIC | Facility: CLINIC | Age: 58
End: 2023-09-19

## 2023-09-19 VITALS
WEIGHT: 192.8 LBS | HEART RATE: 91 BPM | TEMPERATURE: 98.4 F | SYSTOLIC BLOOD PRESSURE: 147 MMHG | DIASTOLIC BLOOD PRESSURE: 92 MMHG | BODY MASS INDEX: 28.47 KG/M2 | OXYGEN SATURATION: 98 %

## 2023-09-19 DIAGNOSIS — E03.9 HYPOTHYROIDISM, UNSPECIFIED TYPE: ICD-10-CM

## 2023-09-19 DIAGNOSIS — Z79.4 TYPE 2 DIABETES MELLITUS WITHOUT COMPLICATION, WITH LONG-TERM CURRENT USE OF INSULIN (HCC): ICD-10-CM

## 2023-09-19 DIAGNOSIS — E11.9 TYPE 2 DIABETES MELLITUS WITHOUT COMPLICATION, WITH LONG-TERM CURRENT USE OF INSULIN (HCC): ICD-10-CM

## 2023-09-19 DIAGNOSIS — M89.8X1 PAIN OF RIGHT SCAPULA: Primary | ICD-10-CM

## 2023-09-19 PROCEDURE — 3077F SYST BP >= 140 MM HG: CPT | Performed by: FAMILY MEDICINE

## 2023-09-19 PROCEDURE — 99213 OFFICE O/P EST LOW 20 MIN: CPT | Performed by: FAMILY MEDICINE

## 2023-09-19 PROCEDURE — 3080F DIAST BP >= 90 MM HG: CPT | Performed by: FAMILY MEDICINE

## 2023-09-19 RX ORDER — LEVOTHYROXINE SODIUM 137 UG/1
137 TABLET ORAL DAILY
Qty: 90 TABLET | Refills: 3 | Status: SHIPPED | OUTPATIENT
Start: 2023-09-19

## 2023-09-19 RX ORDER — GABAPENTIN 300 MG/1
300 CAPSULE ORAL 3 TIMES DAILY
Qty: 90 CAPSULE | Refills: 1 | Status: SHIPPED | OUTPATIENT
Start: 2023-09-19

## 2023-09-19 RX ORDER — FLASH GLUCOSE SENSOR
1 KIT MISCELLANEOUS DAILY
Qty: 2 EACH | Refills: 0 | Status: SHIPPED | OUTPATIENT
Start: 2023-09-19

## 2023-09-20 NOTE — PROGRESS NOTES
Name: Alyssa Chauhan      : 1965      MRN: [id number]  Encounter Provider: Godfrey Pereira MD  Encounter Date: 2023   Encounter department: [address]     1. Pain of right scapula  Assessment & Plan:  - Hx of R adhesive capsulitis   - C/o 5 days of sharp neck and R scapula pain  - Exacerbated pain with movement  - Denies trauma, new/exaurated  movements   - Home treated with ibuprofen, icy-hot patches, hot water bottle  - Previously visited [facility] for osteopathic care; found very helpful but pain returned  - PE: mild swelling noticed under scapula, tender; pain exacerbated with arm movements, unable to fully lift R arm due to pain. L side normal.  · Gabapentin 300 mg TID  · Continue ibuprofen prn, icy-hot, water bottle  · Referral to OMT          Orders:  -     gabapentin (Neurontin) 300 mg capsule; Take 1 capsule (300 mg total) by mouth 3 (three) times a day    2. Hypothyroidism, unspecified type  -     levothyroxine 137 mcg tablet; Take 1 tablet (137 mcg total) by mouth daily    3. Type 2 diabetes mellitus without complication, with long-term current use of insulin (HCC)  -     Continuous Blood Gluc Sensor (FreeStyle Khanh 14 Day Sensor) MISC; 1 each by Device route in the morning         Subjective      53-year-old male with past medical history of recent hospitalization for meningioma meningioma, hypothyroidism, type 2 diabetes, right shoulder adhesive capsulitis presents for black elevated right mid back/scapular pain for the past 5 days. Pain is sharp, and is exacerbated by right arm movements. Denies history of trauma, or unusual exaggerated movements. Self treated at home with ibuprofen, IcyHot patches and hot water bottle massage. 3 days ago, patient also visited [facility] for osteopathic care which he found very helpful however the pain still returned. Is interested in returning for more osteopathic care.   Also has had intermittent lower back pain. Requested refills for levothyroxine, diabetic glucose monitor. Already scheduled for follow-up with PCP for blood work. Review of Systems   Constitutional: Negative for chills and fever. HENT: Negative for ear pain and sore throat. Eyes: Negative for pain and visual disturbance. Respiratory: Negative for cough and shortness of breath. Cardiovascular: Negative for chest pain and palpitations. Gastrointestinal: Negative for abdominal pain and vomiting. Genitourinary: Negative for dysuria and hematuria. Musculoskeletal: Positive for arthralgias, back pain and neck pain. Skin: Negative for color change and rash. Neurological: Negative for seizures and syncope. All other systems reviewed and are negative. Current Outpatient Medications on File Prior to Visit   Medication Sig   • acetaminophen (TYLENOL) 325 mg tablet Take 2 tablets (650 mg total) by mouth every 6 (six) hours as needed for headaches or mild pain   • Alcohol Swabs 70 % PADS May substitute brand based on insurance coverage. Check glucose TID. • atorvastatin (LIPITOR) 40 mg tablet Take 1 tablet by mouth once daily   • Blood Glucose Monitoring Suppl (OneTouch Verio Reflect) w/Device KIT May substitute brand based on insurance coverage. Check glucose TID. • clotrimazole (LOTRIMIN) 1 % cream Apply topically 2 (two) times a day   • Empagliflozin (Jardiance) 10 MG TABS tablet Take 1 tablet (10 mg total) by mouth daily   • lidocaine (LIDODERM) 5 % APPLY ONE PATCH TO AFFECTED AREA DAILY. REMOVE AND DISCARD PATCH WITHIN 12 HOURS OR AS DIRECTED BY DOCTOR   • lisinopril-hydrochlorothiazide (PRINZIDE,ZESTORETIC) 10-12.5 MG per tablet Take 1 tablet by mouth daily   • OneTouch Delica Lancets 67R MISC May substitute brand based on insurance coverage. Check glucose TID.    • [DISCONTINUED] Continuous Blood Gluc Sensor (FreeStyle Khanh 14 Day Sensor) MISC USE 1 EACH EVERY 14 DAYS   • [DISCONTINUED] levothyroxine 137 mcg tablet Take 1 tablet by mouth once daily   • diphenhydramine, lidocaine, Al/Mg hydroxide, simethicone (Magic Mouthwash) SUSP Swish and spit 10 mL every 4 (four) hours as needed for mouth pain or discomfort (Patient not taking: Reported on 6/29/2023)   • glucose blood (Accu-Chek Guide) test strip Use 1 each 2 (two) times a day Use as instructed (Patient not taking: Reported on 6/29/2023)   • glucose blood (OneTouch Verio) test strip Use 1 each 2 (two) times a day Use as instructed (Patient not taking: Reported on 6/15/2023)   • Lidocaine Viscous HCl (XYLOCAINE) 2 % mucosal solution Swish and spit 15 mL 4 (four) times a day as needed for mouth pain or discomfort (Patient not taking: Reported on 6/29/2023)   • metFORMIN (GLUCOPHAGE) 1000 MG tablet Take 1 tablet (1,000 mg total) by mouth 2 (two) times a day with meals       Objective     /92 (BP Location: Right arm, Patient Position: Sitting, Cuff Size: Standard)   Pulse 91   Temp 98.4 °F (36.9 °C) (Temporal)   Wt 87.5 kg (192 lb 12.8 oz)   SpO2 98%   BMI 28.47 kg/m²     Physical Exam  Vitals reviewed. Constitutional:       General: He is awake. He is not in acute distress. Appearance: Normal appearance. He is not ill-appearing. HENT:      Head: Normocephalic and atraumatic. Right Ear: External ear normal.      Left Ear: External ear normal.      Nose: Nose normal.      Mouth/Throat:      Mouth: Mucous membranes are moist.      Pharynx: Oropharynx is clear. Eyes:      Extraocular Movements: Extraocular movements intact. Cardiovascular:      Rate and Rhythm: Normal rate and regular rhythm. Pulses: Normal pulses. Heart sounds: Normal heart sounds. No murmur heard. Pulmonary:      Effort: Pulmonary effort is normal. No respiratory distress. Breath sounds: Normal breath sounds. No wheezing or rales. Abdominal:      General: Bowel sounds are normal. There is no distension. Palpations: Abdomen is soft. Tenderness: There is no abdominal tenderness. Musculoskeletal:         General: No swelling, deformity or signs of injury. Right shoulder: Swelling and tenderness present. No deformity, effusion, laceration, bony tenderness or crepitus. Decreased range of motion. Decreased strength. Normal pulse. Cervical back: Normal range of motion. No rigidity. Comments: R scapula    Skin:     General: Skin is warm and dry. Capillary Refill: Capillary refill takes less than 2 seconds. Findings: No rash. Neurological:      General: No focal deficit present. Mental Status: He is alert and oriented to person, place, and time. Mental status is at baseline. Psychiatric:         Mood and Affect: Mood normal.         Behavior: Behavior normal. Behavior is cooperative.        Godfrey Pereira MD

## 2023-09-20 NOTE — ASSESSMENT & PLAN NOTE
- Hx of R adhesive capsulitis   - C/o 5 days of sharp neck and R scapula pain  - Exacerbated pain with movement  - Denies trauma, new/exaurated  movements   - Home treated with ibuprofen, icy-hot patches, hot water bottle  - Previously visited [facility] for [facility]opathic care; found very helpful but pain returned  - PE: mild swelling noticed under scapula, tender; pain exacerbated with arm movements, unable to fully lift R arm due to pain.  L side normal.  · Gabapentin 300 mg TID  · Continue ibuprofen prn, icy-hot, water bottle  · Referral to OMT

## 2023-09-25 ENCOUNTER — OFFICE VISIT (OUTPATIENT)
Dept: FAMILY MEDICINE CLINIC | Facility: CLINIC | Age: 58
End: 2023-09-25

## 2023-09-25 VITALS
WEIGHT: 193.6 LBS | HEART RATE: 100 BPM | DIASTOLIC BLOOD PRESSURE: 83 MMHG | SYSTOLIC BLOOD PRESSURE: 128 MMHG | BODY MASS INDEX: 28.59 KG/M2

## 2023-09-25 DIAGNOSIS — M99.07 SOMATIC DYSFUNCTION OF UPPER EXTREMITY: Primary | ICD-10-CM

## 2023-09-25 DIAGNOSIS — M25.511 RIGHT SHOULDER PAIN, UNSPECIFIED CHRONICITY: ICD-10-CM

## 2023-09-25 DIAGNOSIS — M89.8X1 PAIN OF RIGHT SCAPULA: ICD-10-CM

## 2023-09-25 DIAGNOSIS — M99.02 SOMATIC DYSFUNCTION OF SPINE, THORACIC: ICD-10-CM

## 2023-09-25 DIAGNOSIS — E11.9 TYPE 2 DIABETES MELLITUS WITHOUT COMPLICATION, WITHOUT LONG-TERM CURRENT USE OF INSULIN (HCC): ICD-10-CM

## 2023-09-25 DIAGNOSIS — M99.01 SOMATIC DYSFUNCTION OF CERVICAL REGION: ICD-10-CM

## 2023-09-25 RX ORDER — LIDOCAINE 50 MG/G
1 PATCH TOPICAL DAILY
Qty: 7 PATCH | Refills: 0 | Status: SHIPPED | OUTPATIENT
Start: 2023-09-25

## 2023-09-25 RX ORDER — METHOCARBAMOL 500 MG/1
500 TABLET, FILM COATED ORAL 4 TIMES DAILY PRN
Qty: 30 TABLET | Refills: 0 | Status: SHIPPED | OUTPATIENT
Start: 2023-09-25

## 2023-09-25 NOTE — PROGRESS NOTES
Assessment/Plan     This is a 56 y.o. male who presents for OMT follow-up for:  1. Somatic dysfunction of upper extremity        2. Somatic dysfunction of spine, thoracic        3. Somatic dysfunction of cervical region        4. Right shoulder pain, unspecified chronicity  methocarbamol (ROBAXIN) 500 mg tablet    lidocaine (LIDODERM) 5 %      5. Type 2 diabetes mellitus without complication, without long-term current use of insulin (HCC)  metFORMIN (GLUCOPHAGE) 1000 MG tablet      6. Pain of right scapula            Plan:   1. Patient tolerated OMT well for the above problems,  advised patient to drink fluids and can use NSAID for soreness after treatment. We will also trial course of muscle relaxants, patient instructed to take before bed to see if that will help ease some of the tension in his neck as well. We will also trial lidocaine patches. We did review options for PT, steroid injections as they have worked in the past, as well as referral to orthopedic surgery if symptoms do not improve. We will monitor conservatively for now but will need to consider imaging including MRI in the future if no improvement. Patient encouraged to continue home exercises; instructional information about exercises also provided today. 2. OMT Follow up in 2 weeks. Paul Feliciano is a 56 y.o. male and is here for a OMT follow up. The patient reports R shoudler pain worse in scapula - first noticed about 1.5 weeks ago. Woke up with pain, first pain in neck and then with shoulder. Denies any inciting event. Does note pain is worse when he is lying down his left side and trying to lift his neck. His have limited flexion and abduction of his right shoulder. patient does have a history of adhesive capsulitis of the right shoulder and has completed physical therapy twice and has received steroid injections twice that have lasted for about a year each.   Reports that this pain is different from his previous pain. Did have an MRI of the shoulder 2/2022 which showed a small infraspinatus tear and some tendinosis of the supraspinatus and infraspinatus muscles. Currently he is only taking gabapentin 300 mg 3 times daily and using some IcyHot patches intermittently. He had initially use ibuprofen but has not needed since gabapentin. Is the patient taking Pain medication? Yes, as above. Has the patient completed physical therapy for this condition? Has completed in the past for adhesive capsulitis  Did Patient symptoms improve from last OMT appointment? This is his first treatment    The following portions of the patient's history were reviewed and updated as appropriate: allergies, current medications, past family history, past medical history, past social history, past surgical history and problem list.    Review of Systems  Do you have pain that bothers you in your daily life? yes  Review of Systems   Musculoskeletal: Positive for arthralgias, back pain and neck pain.         R shoulder, medial to scapula       Objective     OMT Exam   Thoracic T1-4:   Somatic Dysfunction:  Tissue Texture Changes, Asymmetry  and Restriction  Severity :  2  Osteopathic Findings: Hypertonic muscles  Treatment Method: ST and MFR  Response: improved  Thoracic T5-9:   Somatic Dysfunction:  Tissue Texture Changes, Asymmetry , Restriction and Tenderness  Severity :  2  Osteopathic Findings: hypertonic muscles  Treatment Method: ST and MFR  Response: improved  Right Upper Extremity:  Somatic Dysfunction:  Tissue Texture Changes, Asymmetry , Restriction and Tenderness  Severity :  3  Osteopathic Findings: Restriction in flexion and abduction, positive Pritchett sign, positive empty can test  Treatment Method: ME, ST and MFR  Response: stayed the same   Cervical Exam: somatic dysfunction -tissue texture changes  Severity: 1  Osteopathic finding: Mildly hypertonic muscle on right side  Treatment: ST and MFR  Response: improved Lisandra Engel, [address] [address]  2:11 PM

## 2023-10-02 ENCOUNTER — TELEPHONE (OUTPATIENT)
Dept: NEUROSURGERY | Facility: CLINIC | Age: 58
End: 2023-10-02

## 2023-10-02 ENCOUNTER — DOCUMENTATION (OUTPATIENT)
Dept: NEUROSURGERY | Facility: CLINIC | Age: 58
End: 2023-10-02

## 2023-10-02 NOTE — PROGRESS NOTES
Peer to peer completed for MRI brain - APPROVED however unable to provide auth number as patient's insurance is in pending status. They will need to contact patient to ensure proper coverage before getting auth number. Office staff to follow up with patient and insurance to ensure he is able to get study before appointment.

## 2023-10-02 NOTE — TELEPHONE ENCOUNTER
10/2/23:  PER [facility]:  10/02/2023 10:40AM The member's eligibility cannot be verified. This request is no longer active. Please call us at [phone] if you have any questions.      FF ALSO CALLED PT, L/M TO CALL BACK RE:  INSURANCE FOR MRI BRAIN 10/17/23

## 2023-10-03 NOTE — TELEPHONE ENCOUNTER
Spoke with patient over the phone. He was dropped from his insurance because he was unable to work for a few months due to having multiple neurosurgical procedures. I provided patient with the phone number for . I also informed him he could reach out to the [place] de[place] of health to look into medicaid. I also provided this patient with a letter to provide his job stating why he was unable to work in case they are able to extend his insurance coverage. Patient was appreciative for the coordination.

## 2023-10-05 DIAGNOSIS — E11.9 TYPE 2 DIABETES MELLITUS WITHOUT COMPLICATION, WITHOUT LONG-TERM CURRENT USE OF INSULIN (HCC): ICD-10-CM

## 2023-11-03 DIAGNOSIS — M25.511 RIGHT SHOULDER PAIN, UNSPECIFIED CHRONICITY: ICD-10-CM

## 2023-11-03 RX ORDER — METHOCARBAMOL 500 MG/1
TABLET, FILM COATED ORAL
Qty: 30 TABLET | Refills: 0 | Status: SHIPPED | OUTPATIENT
Start: 2023-11-03

## 2024-01-22 ENCOUNTER — OFFICE VISIT (OUTPATIENT)
Dept: FAMILY MEDICINE CLINIC | Facility: CLINIC | Age: 59
End: 2024-01-22

## 2024-01-22 VITALS
TEMPERATURE: 98.2 F | BODY MASS INDEX: 30.07 KG/M2 | HEART RATE: 109 BPM | HEIGHT: 69 IN | SYSTOLIC BLOOD PRESSURE: 138 MMHG | OXYGEN SATURATION: 97 % | WEIGHT: 203 LBS | DIASTOLIC BLOOD PRESSURE: 89 MMHG

## 2024-01-22 DIAGNOSIS — D32.9 MENINGIOMA (HCC): ICD-10-CM

## 2024-01-22 DIAGNOSIS — M75.01 ADHESIVE CAPSULITIS OF RIGHT SHOULDER: ICD-10-CM

## 2024-01-22 DIAGNOSIS — F10.10 ALCOHOL ABUSE: ICD-10-CM

## 2024-01-22 DIAGNOSIS — E11.9 TYPE 2 DIABETES MELLITUS WITHOUT COMPLICATION, WITHOUT LONG-TERM CURRENT USE OF INSULIN (HCC): ICD-10-CM

## 2024-01-22 DIAGNOSIS — E11.9 TYPE 2 DIABETES MELLITUS WITHOUT COMPLICATION, WITH LONG-TERM CURRENT USE OF INSULIN (HCC): Primary | ICD-10-CM

## 2024-01-22 DIAGNOSIS — E03.8 OTHER SPECIFIED HYPOTHYROIDISM: ICD-10-CM

## 2024-01-22 DIAGNOSIS — Z79.4 TYPE 2 DIABETES MELLITUS WITHOUT COMPLICATION, WITH LONG-TERM CURRENT USE OF INSULIN (HCC): Primary | ICD-10-CM

## 2024-01-22 DIAGNOSIS — I10 BENIGN ESSENTIAL HYPERTENSION: ICD-10-CM

## 2024-01-22 PROCEDURE — 99214 OFFICE O/P EST MOD 30 MIN: CPT | Performed by: FAMILY MEDICINE

## 2024-01-22 RX ORDER — BLOOD SUGAR DIAGNOSTIC
1 STRIP MISCELLANEOUS 2 TIMES DAILY
Qty: 100 EACH | Refills: 5 | Status: SHIPPED | OUTPATIENT
Start: 2024-01-22

## 2024-01-22 NOTE — ASSESSMENT & PLAN NOTE
Lab Results   Component Value Date    HGBA1C 6.9 (H) 09/12/2023     He reports he is only taking metformin 1000 mg twice daily, sometimes skips evening dose.  Will recheck hemoglobin A1c, lifestyle modification with diet and exercise

## 2024-01-22 NOTE — ASSESSMENT & PLAN NOTE
Blood pressure 138/89 today, within goal.  Currently on lisinopril hydrochlorothiazide 10-12.5mg.  Encouraged compliance.  Lifestyle modification with diet and exercise.

## 2024-01-22 NOTE — PROGRESS NOTES
Name: Joseph Arnold      : 1965      MRN: [id number]  Encounter Provider: Joanie Bishop MD  Encounter Date: 2024   Encounter department: [facility]    Assessment & Plan     1. Type 2 diabetes mellitus without complication, with long-term current use of insulin (HCC)  Assessment & Plan:    Lab Results   Component Value Date    HGBA1C 6.9 (H) 2023     He reports he is only taking metformin 1000 mg twice daily, sometimes skips evening dose.  Will recheck hemoglobin A1c, lifestyle modification with diet and exercise    Orders:  -     Hemoglobin A1C; Future  -     Albumin / creatinine urine ratio; Future  -     Lipid panel; Future; Expected date: 2024    2. Other specified hypothyroidism  Assessment & Plan:  Currently on 137 mcg of levothyroxine, will check TSH    Orders:  -     TSH, 3rd generation; Future    3. Benign essential hypertension  Assessment & Plan:  Blood pressure 138/89 today, within goal.  Currently on lisinopril hydrochlorothiazide 10-12.5mg.  Encouraged compliance.  Lifestyle modification with diet and exercise.    Orders:  -     Comprehensive metabolic panel; Future  -     Albumin / creatinine urine ratio; Future    4. Adhesive capsulitis of right shoulder  Assessment & Plan:  Improved, may take Tylenol as needed.      5. Meningioma (HCC)  Assessment & Plan:  Continue follow-up with neurosurgery, MRI brain scheduled      6. Type 2 diabetes mellitus without complication, without long-term current use of insulin (HCC)  -     glucose blood (OneTouch Verio) test strip; Use 1 each 2 (two) times a day Use as instructed    7. Alcohol abuse  Assessment & Plan:  Patient is working on cutting back on alcohol, will check CMP.             Jaciel Ruiz presented to the office for follow-up of diabetes, hypertension and hypothyroidism.  He also needs labs for upcoming MRI brain for meningioma.  He reports he is doing well, has no  acute concerns today.  He reports that he does not take his cholesterol medication every day, also does not take his blood pressure medications every day.  He drinks 2 shots of whiskey every day, but reports he is working on cutting back.  He had lost insurance, but recently was included under his wife's insurance plan        Review of Systems   Constitutional:  Negative for chills and fever.   HENT:  Negative for congestion.    Respiratory:  Negative for shortness of breath.    Cardiovascular:  Negative for chest pain.   Gastrointestinal:  Negative for diarrhea, nausea and vomiting.   Genitourinary:  Negative for difficulty urinating.   Neurological:  Negative for headaches.       Current Outpatient Medications on File Prior to Visit   Medication Sig    acetaminophen (TYLENOL) 325 mg tablet Take 2 tablets (650 mg total) by mouth every 6 (six) hours as needed for headaches or mild pain    Alcohol Swabs 70 % PADS May substitute brand based on insurance coverage. Check glucose TID.    atorvastatin (LIPITOR) 40 mg tablet Take 1 tablet by mouth once daily    Blood Glucose Monitoring Suppl (OneTouch Verio Reflect) w/Device KIT May substitute brand based on insurance coverage. Check glucose TID.    clotrimazole (LOTRIMIN) 1 % cream Apply topically 2 (two) times a day    Continuous Blood Gluc Sensor (FreeStyle Khanh 14 Day Sensor) MISC 1 each by Device route in the morning    levothyroxine 137 mcg tablet Take 1 tablet (137 mcg total) by mouth daily    lidocaine (LIDODERM) 5 % Apply 1 patch topically over 12 hours daily Remove & Discard patch within 12 hours or as directed by MD    lisinopril-hydrochlorothiazide (PRINZIDE,ZESTORETIC) 10-12.5 MG per tablet Take 1 tablet by mouth daily    metFORMIN (GLUCOPHAGE) 1000 MG tablet Take 1 tablet (1,000 mg total) by mouth 2 (two) times a day with meals    methocarbamol (ROBAXIN) 500 mg tablet TAKE 1 TABLET BY MOUTH 4 TIMES DAILY AS NEEDED FOR MUSCLE SPASMS    OneTouch Delica Lancets  "33G MISC May substitute brand based on insurance coverage. Check glucose TID.    [DISCONTINUED] Empagliflozin (Jardiance) 10 MG TABS tablet Take 1 tablet (10 mg total) by mouth daily    [DISCONTINUED] gabapentin (Neurontin) 300 mg capsule Take 1 capsule (300 mg total) by mouth 3 (three) times a day    diphenhydramine, lidocaine, Al/Mg hydroxide, simethicone (Magic Mouthwash) SUSP Swish and spit 10 mL every 4 (four) hours as needed for mouth pain or discomfort (Patient not taking: Reported on 1/22/2024)    glucose blood (Accu-Chek Guide) test strip Use 1 each 2 (two) times a day Use as instructed (Patient not taking: Reported on 6/29/2023)    Lidocaine Viscous HCl (XYLOCAINE) 2 % mucosal solution Swish and spit 15 mL 4 (four) times a day as needed for mouth pain or discomfort (Patient not taking: Reported on 6/29/2023)    [DISCONTINUED] glucose blood (OneTouch Verio) test strip Use 1 each 2 (two) times a day Use as instructed (Patient not taking: Reported on 6/15/2023)       Objective     /89 (BP Location: Right arm, Patient Position: Sitting, Cuff Size: Large)   Pulse (!) 109   Temp 98.2 °F (36.8 °C) (Temporal)   Ht 5' 9\" (1.753 m)   Wt 92.1 kg (203 lb)   SpO2 97%   BMI 29.98 kg/m²     Physical Exam  Constitutional:       Appearance: He is well-developed.   HENT:      Head: Normocephalic and atraumatic.      Right Ear: External ear normal.      Left Ear: External ear normal.   Eyes:      Conjunctiva/sclera: Conjunctivae normal.      Pupils: Pupils are equal, round, and reactive to light.   Cardiovascular:      Rate and Rhythm: Normal rate and regular rhythm.      Heart sounds: Normal heart sounds. No murmur heard.     No friction rub.   Pulmonary:      Effort: Pulmonary effort is normal. No respiratory distress.      Breath sounds: Normal breath sounds. No wheezing or rales.   Musculoskeletal:         General: Normal range of motion.      Cervical back: Normal range of motion and neck supple.   Skin:     " General: Skin is warm and dry.   Neurological:      Mental Status: He is alert and oriented to person, place, and time.       Joanie Bishop MD

## 2024-02-06 ENCOUNTER — APPOINTMENT (OUTPATIENT)
Dept: LAB | Facility: CLINIC | Age: 59
End: 2024-02-06
Payer: COMMERCIAL

## 2024-02-06 DIAGNOSIS — Z79.4 TYPE 2 DIABETES MELLITUS WITHOUT COMPLICATION, WITH LONG-TERM CURRENT USE OF INSULIN (HCC): ICD-10-CM

## 2024-02-06 DIAGNOSIS — E11.9 TYPE 2 DIABETES MELLITUS WITHOUT COMPLICATION, WITH LONG-TERM CURRENT USE OF INSULIN (HCC): ICD-10-CM

## 2024-02-06 DIAGNOSIS — I10 BENIGN ESSENTIAL HYPERTENSION: ICD-10-CM

## 2024-02-06 DIAGNOSIS — E03.8 OTHER SPECIFIED HYPOTHYROIDISM: ICD-10-CM

## 2024-02-06 LAB
ALBUMIN SERPL BCP-MCNC: 4.4 G/DL (ref 3.5–5)
ALP SERPL-CCNC: 73 U/L (ref 34–104)
ALT SERPL W P-5'-P-CCNC: 55 U/L (ref 7–52)
ANION GAP SERPL CALCULATED.3IONS-SCNC: 6 MMOL/L
AST SERPL W P-5'-P-CCNC: 34 U/L (ref 13–39)
BILIRUB SERPL-MCNC: 0.98 MG/DL (ref 0.2–1)
BUN SERPL-MCNC: 12 MG/DL (ref 5–25)
CALCIUM SERPL-MCNC: 9.8 MG/DL (ref 8.4–10.2)
CHLORIDE SERPL-SCNC: 101 MMOL/L (ref 96–108)
CHOLEST SERPL-MCNC: 122 MG/DL
CO2 SERPL-SCNC: 32 MMOL/L (ref 21–32)
CREAT SERPL-MCNC: 0.84 MG/DL (ref 0.6–1.3)
CREAT UR-MCNC: 52.3 MG/DL
EST. AVERAGE GLUCOSE BLD GHB EST-MCNC: 186 MG/DL
GFR SERPL CREATININE-BSD FRML MDRD: 97 ML/MIN/1.73SQ M
GLUCOSE P FAST SERPL-MCNC: 184 MG/DL (ref 65–99)
HBA1C MFR BLD: 8.1 %
HDLC SERPL-MCNC: 50 MG/DL
LDLC SERPL CALC-MCNC: 55 MG/DL (ref 0–100)
MICROALBUMIN UR-MCNC: <7 MG/L
MICROALBUMIN/CREAT 24H UR: <13 MG/G CREATININE (ref 0–30)
NONHDLC SERPL-MCNC: 72 MG/DL
POTASSIUM SERPL-SCNC: 5.1 MMOL/L (ref 3.5–5.3)
PROT SERPL-MCNC: 7.6 G/DL (ref 6.4–8.4)
SODIUM SERPL-SCNC: 139 MMOL/L (ref 135–147)
TRIGL SERPL-MCNC: 87 MG/DL
TSH SERPL DL<=0.05 MIU/L-ACNC: 3.65 UIU/ML (ref 0.45–4.5)

## 2024-02-06 PROCEDURE — 80053 COMPREHEN METABOLIC PANEL: CPT

## 2024-02-06 PROCEDURE — 82570 ASSAY OF URINE CREATININE: CPT

## 2024-02-06 PROCEDURE — 84443 ASSAY THYROID STIM HORMONE: CPT

## 2024-02-06 PROCEDURE — 82043 UR ALBUMIN QUANTITATIVE: CPT

## 2024-02-06 PROCEDURE — 36415 COLL VENOUS BLD VENIPUNCTURE: CPT

## 2024-02-06 PROCEDURE — 80061 LIPID PANEL: CPT

## 2024-02-06 PROCEDURE — 83036 HEMOGLOBIN GLYCOSYLATED A1C: CPT

## 2024-02-19 ENCOUNTER — OFFICE VISIT (OUTPATIENT)
Dept: FAMILY MEDICINE CLINIC | Facility: CLINIC | Age: 59
End: 2024-02-19

## 2024-02-19 VITALS
OXYGEN SATURATION: 98 % | BODY MASS INDEX: 29.33 KG/M2 | SYSTOLIC BLOOD PRESSURE: 145 MMHG | WEIGHT: 198 LBS | HEIGHT: 69 IN | TEMPERATURE: 98.3 F | HEART RATE: 110 BPM | DIASTOLIC BLOOD PRESSURE: 90 MMHG

## 2024-02-19 DIAGNOSIS — E11.9 TYPE 2 DIABETES MELLITUS WITHOUT COMPLICATION, WITH LONG-TERM CURRENT USE OF INSULIN (HCC): ICD-10-CM

## 2024-02-19 DIAGNOSIS — I10 BENIGN ESSENTIAL HYPERTENSION: Primary | ICD-10-CM

## 2024-02-19 DIAGNOSIS — R21 RASH: ICD-10-CM

## 2024-02-19 DIAGNOSIS — E03.8 OTHER SPECIFIED HYPOTHYROIDISM: ICD-10-CM

## 2024-02-19 DIAGNOSIS — R74.8 ELEVATED LIVER ENZYMES: ICD-10-CM

## 2024-02-19 DIAGNOSIS — Z79.4 TYPE 2 DIABETES MELLITUS WITHOUT COMPLICATION, WITH LONG-TERM CURRENT USE OF INSULIN (HCC): ICD-10-CM

## 2024-02-19 PROCEDURE — 99214 OFFICE O/P EST MOD 30 MIN: CPT | Performed by: FAMILY MEDICINE

## 2024-02-19 RX ORDER — BENZOCAINE/MENTHOL 6 MG-10 MG
LOZENGE MUCOUS MEMBRANE 2 TIMES DAILY
Qty: 1.5 G | Refills: 0 | Status: SHIPPED | OUTPATIENT
Start: 2024-02-19

## 2024-02-19 RX ORDER — FLASH GLUCOSE SENSOR
1 KIT MISCELLANEOUS DAILY
Qty: 2 EACH | Refills: 0 | Status: SHIPPED | OUTPATIENT
Start: 2024-02-19

## 2024-02-19 NOTE — PROGRESS NOTES
Name: Joseph Arnold      : 1965      MRN: [id number]  Encounter Provider: Joanie Bishop MD  Encounter Date: 2024   Encounter department: [facility]    Assessment & Plan     1. Benign essential hypertension  Assessment & Plan:  Blood pressure 145/90 today, goal blood pressure less than 140/90.  Slightly uncontrolled  Currently on lisinopril hydrochlorothiazide 10 to 12.5 mg combination  Discussed increase in lisinopril versus lifestyle modification and recheck in 4 weeks  Patient opted for blood pressure recheck in 4 weeks  Will follow-up, continue current regimen at this time      2. Type 2 diabetes mellitus without complication, with long-term current use of insulin ([facility])  Assessment & Plan:    Lab Results   Component Value Date    HGBA1C 8.1 (H) 2024     Uncontrolled, goal hemoglobin A1c less than 7.  Patient is currently on metformin 1000 mg daily, he reports he misses the evening dose because he drinks alcohol at this time.  Counseled for alcohol cessation patient is agreeable, reports he will start taking metformin twice a day  Also counseled on lifestyle modification with diet and exercise  Will repeat hemoglobin A1c in 3 months    Orders:  -     Continuous Blood Gluc Sensor (FreeStyle Khanh 14 Day Sensor) MISC; 1 each by Device route in the morning    3. Other specified hypothyroidism  Assessment & Plan:  TSH 3.655, within normal limits.  Continue levothyroxine at 137 mcg/day      4. Rash  Assessment & Plan:  Scaly rash on the flanks, differentials include eczema versus tinea.  No erythema, leaning towards eczema.  Will trial hydrocortisone 1% cream topically.  Also recommended liberal lubrication with thick emollient    Orders:  -     hydrocortisone 1 % cream; Apply topically 2 (two) times a day    5. Elevated liver enzymes  Assessment & Plan:  ALT slightly elevated to 55, patient does drink alcohol every day.  Counseled on alcohol cessation.   Will repeat LFTs in 4 weeks    Orders:  -     Comprehensive metabolic panel; Future           Subjective      Mr Clayton presented to office for follow-up of diabetes as well as review of labs.  He also brought his pill bottles for medications to be reconciled.  He reports he takes his blood pressure medications everyday, takes metformin 1000 mg only once a day, he misses levothyroxine sometimes.  He does drink alcohol every day, usually it scotch and 2-3 pegs.  He also reports gabapentin is not helping and would like to come off of it.      Review of Systems   Constitutional:  Negative for chills and fever.   HENT:  Negative for congestion.    Respiratory:  Negative for shortness of breath.    Cardiovascular:  Negative for chest pain.   Gastrointestinal:  Negative for diarrhea, nausea and vomiting.   Genitourinary:  Negative for difficulty urinating.   Skin:  Positive for rash.   Neurological:  Negative for headaches.       Current Outpatient Medications on File Prior to Visit   Medication Sig    acetaminophen (TYLENOL) 325 mg tablet Take 2 tablets (650 mg total) by mouth every 6 (six) hours as needed for headaches or mild pain    Alcohol Swabs 70 % PADS May substitute brand based on insurance coverage. Check glucose TID.    atorvastatin (LIPITOR) 40 mg tablet Take 1 tablet by mouth once daily    Blood Glucose Monitoring Suppl (OneTouch Verio Reflect) w/Device KIT May substitute brand based on insurance coverage. Check glucose TID.    levothyroxine 137 mcg tablet Take 1 tablet (137 mcg total) by mouth daily    metFORMIN (GLUCOPHAGE) 1000 MG tablet Take 1 tablet (1,000 mg total) by mouth 2 (two) times a day with meals    methocarbamol (ROBAXIN) 500 mg tablet TAKE 1 TABLET BY MOUTH 4 TIMES DAILY AS NEEDED FOR MUSCLE SPASMS    [DISCONTINUED] Continuous Blood Gluc Sensor (FreeStyle Khanh 14 Day Sensor) MISC 1 each by Device route in the morning    [DISCONTINUED] lisinopril-hydrochlorothiazide (PRINZIDE,ZESTORETIC)  "10-12.5 MG per tablet Take 1 tablet by mouth daily    clotrimazole (LOTRIMIN) 1 % cream Apply topically 2 (two) times a day (Patient not taking: Reported on 2/19/2024)    diphenhydramine, lidocaine, Al/Mg hydroxide, simethicone (Magic Mouthwash) SUSP Swish and spit 10 mL every 4 (four) hours as needed for mouth pain or discomfort (Patient not taking: Reported on 1/22/2024)    glucose blood (Accu-Chek Guide) test strip Use 1 each 2 (two) times a day Use as instructed (Patient not taking: Reported on 6/29/2023)    glucose blood (OneTouch Verio) test strip Use 1 each 2 (two) times a day Use as instructed (Patient not taking: Reported on 2/19/2024)    lidocaine (LIDODERM) 5 % Apply 1 patch topically over 12 hours daily Remove & Discard patch within 12 hours or as directed by MD (Patient not taking: Reported on 2/19/2024)    Lidocaine Viscous HCl (XYLOCAINE) 2 % mucosal solution Swish and spit 15 mL 4 (four) times a day as needed for mouth pain or discomfort (Patient not taking: Reported on 6/29/2023)    OneTouch Delica Lancets 33G MISC May substitute brand based on insurance coverage. Check glucose TID. (Patient not taking: Reported on 2/19/2024)       Objective     /90   Pulse (!) 110   Temp 98.3 °F (36.8 °C) (Temporal)   Ht 5' 9\" (1.753 m)   Wt 89.8 kg (198 lb)   SpO2 98%   BMI 29.24 kg/m²     Physical Exam  Constitutional:       Appearance: He is well-developed.   HENT:      Head: Normocephalic and atraumatic.      Right Ear: External ear normal.      Left Ear: External ear normal.   Eyes:      Conjunctiva/sclera: Conjunctivae normal.      Pupils: Pupils are equal, round, and reactive to light.   Cardiovascular:      Rate and Rhythm: Normal rate and regular rhythm.      Heart sounds: Normal heart sounds. No murmur heard.     No friction rub.   Pulmonary:      Effort: Pulmonary effort is normal. No respiratory distress.      Breath sounds: Normal breath sounds. No wheezing or rales.   Musculoskeletal:    "      General: Normal range of motion.      Cervical back: Normal range of motion and neck supple.   Skin:     General: Skin is warm and dry.      Comments: Scaly rash on flanks, no erythema   Neurological:      Mental Status: He is alert and oriented to person, place, and time.       Joanie Bishop MD

## 2024-02-19 NOTE — ASSESSMENT & PLAN NOTE
ALT slightly elevated to 55, patient does drink alcohol every day.  Counseled on alcohol cessation.  Will repeat LFTs in 4 weeks

## 2024-02-19 NOTE — ASSESSMENT & PLAN NOTE
Blood pressure 145/90 today, goal blood pressure less than 140/90.  Slightly uncontrolled  Currently on lisinopril hydrochlorothiazide 10 to 12.5 mg combination  Discussed increase in lisinopril versus lifestyle modification and recheck in 4 weeks  Patient opted for blood pressure recheck in 4 weeks  Will follow-up, continue current regimen at this time

## 2024-02-19 NOTE — ASSESSMENT & PLAN NOTE
Scaly rash on the flanks, differentials include eczema versus tinea.  No erythema, leaning towards eczema.  Will trial hydrocortisone 1% cream topically.  Also recommended liberal lubrication with thick emollient

## 2024-02-19 NOTE — ASSESSMENT & PLAN NOTE
Lab Results   Component Value Date    HGBA1C 8.1 (H) 02/06/2024     Uncontrolled, goal hemoglobin A1c less than 7.  Patient is currently on metformin 1000 mg daily, he reports he misses the evening dose because he drinks alcohol at this time.  Counseled for alcohol cessation patient is agreeable, reports he will start taking metformin twice a day  Also counseled on lifestyle modification with diet and exercise  Will repeat hemoglobin A1c in 3 months

## 2024-02-21 PROBLEM — R05.9 COUGH: Status: RESOLVED | Noted: 2019-01-22 | Resolved: 2024-02-21

## 2024-03-01 ENCOUNTER — HOSPITAL ENCOUNTER (OUTPATIENT)
Dept: MRI IMAGING | Facility: HOSPITAL | Age: 59
Discharge: HOME | End: 2024-03-01
Attending: NEUROLOGICAL SURGERY
Payer: COMMERCIAL

## 2024-03-01 DIAGNOSIS — D42.0 ATYPICAL MENINGIOMA OF BRAIN (HCC): ICD-10-CM

## 2024-03-01 PROCEDURE — A9585 GADOBUTROL INJECTION: HCPCS | Performed by: FAMILY MEDICINE

## 2024-03-01 PROCEDURE — G1004 CDSM NDSC: HCPCS

## 2024-03-01 PROCEDURE — 70553 MRI BRAIN STEM W/O & W/DYE: CPT

## 2024-03-01 RX ORDER — GADOBUTROL 604.72 MG/ML
9 INJECTION INTRAVENOUS
Status: COMPLETED | OUTPATIENT
Start: 2024-03-01 | End: 2024-03-01

## 2024-03-01 RX ADMIN — GADOBUTROL 9 ML: 604.72 INJECTION INTRAVENOUS at 16:00

## 2024-03-06 ENCOUNTER — TRANSCRIBE ORDERS (OUTPATIENT)
Dept: NEUROSURGERY | Facility: CLINIC | Age: 59
End: 2024-03-06

## 2024-03-06 ENCOUNTER — OFFICE VISIT (OUTPATIENT)
Dept: NEUROSURGERY | Facility: CLINIC | Age: 59
End: 2024-03-06
Payer: COMMERCIAL

## 2024-03-06 VITALS
TEMPERATURE: 98.3 F | HEIGHT: 69 IN | RESPIRATION RATE: 16 BRPM | HEART RATE: 89 BPM | SYSTOLIC BLOOD PRESSURE: 126 MMHG | OXYGEN SATURATION: 98 % | BODY MASS INDEX: 29.09 KG/M2 | WEIGHT: 196.4 LBS | DIASTOLIC BLOOD PRESSURE: 72 MMHG

## 2024-03-06 DIAGNOSIS — D42.0 ATYPICAL MENINGIOMA OF BRAIN (HCC): Primary | ICD-10-CM

## 2024-03-06 DIAGNOSIS — D32.9 MENINGIOMA (HCC): Primary | ICD-10-CM

## 2024-03-06 DIAGNOSIS — Z98.890 S/P CRANIOTOMY: ICD-10-CM

## 2024-03-06 PROCEDURE — 99214 OFFICE O/P EST MOD 30 MIN: CPT | Performed by: NEUROLOGICAL SURGERY

## 2024-03-06 NOTE — PROGRESS NOTES
Neurosurgery Office Note  Joseph Arnold 58 y.o. male MRN: [id number]    Assessment/Plan      Diagnoses and all orders for this visit:    Atypical meningioma of brain (HCC)  -     MRI brain w wo contrast; Future      Discussion:    Pain Score: 0-No pain    Status post left/semicoronal craniotomy with craniectomy and orbital osteotomy with resection of extradural mass left frontal, with exenteration/obliteration frontal sinus and custom synthetic cranioplasty on 4/11/2023. S/p endoscopic skull base repair with Dr. Hardy on 6/8/23.     Pathology who grade 2 meningioma     Between those procedures, he was admitted twice with subgaleal/extradural air collection. Did not have fevers, chills, profound rhinorrhea. The collection spontaneously resolved/reduced on its own after his first admission, but after the second admission, it waxed and waned.  We aspirated out the air on a few occasions, including during his repair with Dr. Hardy. Went off antibiotics 6/29/23, with no signs of infection.     Had occasional L frontal/pterional H/As for some time after surgery, but on follow-up today states these are better. Continues to have some left eye photophobia, but this is not new, and is not worsening.     No swelling over the bone flap (mentioned some at his prior visit).     New MRI scan of the brain, is unfortunately not yet reported.  On my review, it shows no overt residual or recurrence.  There is some persistent encephalomalacia in the left frontal lobe, but the enhancement in the frontal lobe has improved compared with 5/8/23.     The patient appears quite well. I have recommended close surveillance as his pathology was grade 2. I have suggested a follow-up MRI in 6 months, and he agrees. I will see him back afterward.     01/11/23 Metrics: EQ5D5L 64930=1.000; ; MOCA 25/30     03/10/23 Metrics: EQ5D5L 94785=1.000;      03/22/23 Metrics: EQ5D5L 91172=7.000;      05/01/23 Metrics: EQ5D5L  98797=6.000;      05/17/23 Metrics: EQ5D5L 56247=7.861; VAS 80     05/31/23 Metrics: EQ5D5L 11596=9.827; VAS 85    07/17/23 Metrics: EQ5D5L 51467=5.000; VAS 90    03/06/24 Metrics: EQ5D5L 79809=8.860; VAS 95;         CHIEF COMPLAINT    Chief Complaint   Patient presents with   • Follow-up     AFTER MRI BRAIN 3/1/24       HISTORY    History of Present Illness     58 y.o. year old male     See Discussion    REVIEW OF SYSTEMS    Review of Systems   Constitutional: Negative.    HENT:  Negative for rhinorrhea.    Eyes:  Positive for photophobia (left eye, wears sunglasses). Negative for pain.   Respiratory: Negative.     Cardiovascular: Negative.    Gastrointestinal: Negative.    Endocrine: Negative.    Genitourinary: Negative.    Musculoskeletal: Negative.    Skin: Negative.    Allergic/Immunologic: Negative.    Neurological:  Negative for dizziness, seizures, syncope, weakness, numbness and headaches (improved).   Hematological: Negative.    Psychiatric/Behavioral: Negative.     All other systems reviewed and are negative.        Meds/Allergies     Current Outpatient Medications   Medication Sig Dispense Refill   • atorvastatin (LIPITOR) 40 mg tablet Take 1 tablet by mouth once daily 90 tablet 0   • clotrimazole (LOTRIMIN) 1 % cream Apply topically 2 (two) times a day (Patient taking differently: Apply topically if needed) 30 g 0   • levothyroxine 137 mcg tablet Take 1 tablet (137 mcg total) by mouth daily 90 tablet 3   • lidocaine (LIDODERM) 5 % Apply 1 patch topically over 12 hours daily Remove & Discard patch within 12 hours or as directed by MD (Patient taking differently: Apply 1 patch topically if needed Remove & Discard patch within 12 hours or as directed by MD) 7 patch 0   • metFORMIN (GLUCOPHAGE) 1000 MG tablet Take 1 tablet (1,000 mg total) by mouth 2 (two) times a day with meals 180 tablet 1   • acetaminophen (TYLENOL) 325 mg tablet Take 2 tablets (650 mg total) by mouth every 6 (six) hours as needed  for headaches or mild pain (Patient not taking: Reported on 3/6/2024)  0   • Alcohol Swabs 70 % PADS May substitute brand based on insurance coverage. Check glucose TID. (Patient not taking: Reported on 3/6/2024) 100 each 0   • Blood Glucose Monitoring Suppl (OneTouch Verio Reflect) w/Device KIT May substitute brand based on insurance coverage. Check glucose TID. (Patient not taking: Reported on 3/6/2024) 1 kit 0   • Continuous Blood Gluc Sensor (FreeStyle Khanh 14 Day Sensor) MISC 1 each by Device route in the morning (Patient not taking: Reported on 3/6/2024) 2 each 0   • diphenhydramine, lidocaine, Al/Mg hydroxide, simethicone (Magic Mouthwash) SUSP Swish and spit 10 mL every 4 (four) hours as needed for mouth pain or discomfort (Patient not taking: Reported on 1/22/2024) 110 mL 1   • glucose blood (Accu-Chek Guide) test strip Use 1 each 2 (two) times a day Use as instructed (Patient not taking: Reported on 6/29/2023) 100 each 5   • glucose blood (OneTouch Verio) test strip Use 1 each 2 (two) times a day Use as instructed (Patient not taking: Reported on 2/19/2024) 100 each 5   • hydrocortisone 1 % cream Apply topically 2 (two) times a day (Patient not taking: Reported on 3/6/2024) 1.5 g 0   • Lidocaine Viscous HCl (XYLOCAINE) 2 % mucosal solution Swish and spit 15 mL 4 (four) times a day as needed for mouth pain or discomfort (Patient not taking: Reported on 6/29/2023) 100 mL 0   • methocarbamol (ROBAXIN) 500 mg tablet TAKE 1 TABLET BY MOUTH 4 TIMES DAILY AS NEEDED FOR MUSCLE SPASMS (Patient not taking: Reported on 3/6/2024) 30 tablet 0   • OneTouch Delica Lancets 33G MISC May substitute brand based on insurance coverage. Check glucose TID. (Patient not taking: Reported on 2/19/2024) 100 each 0     No current facility-administered medications for this visit.       No Known Allergies    PAST HISTORY    Past Medical History:   Diagnosis Date   • Diabetes mellitus (HCC)    • Disease of thyroid gland    • Elevated  "LFTs     last assessed 02Nov2015   • Hyperlipidemia    • Hypertension    • Impaired fasting glucose     last assessed 29Jan2014       Past Surgical History:   Procedure Laterality Date   • APPENDECTOMY     • BRAIN BIOPSY Left 6/8/2023    Procedure: Subgaleal aspiration;  Surgeon: Harsha Ambriz MD;  Location: [facility];  Service: Neurosurgery   • COLONOSCOPY     • FL CRANIEC TREPHINE BONE FLP BRAIN TUMOR SUPRTENTOR Left 4/11/2023    Procedure: IMAGE-GUIDED LEFT FRONTAL CRANIOTOMY FOR RESECTION OF EXTRA-AXIAL & SKULL MASS, WITH CRANIOPLASTY, EXENTERATION/OBLITERATION OF FRONTAL SINUS; ORBITAL OSTEOTOMY;  Surgeon: Harsha Ambriz MD;  Location: [facility];  Service: Neurosurgery   • FL SECONDARY RPR DURA CSF LEAK FREE TISSUE GRAFT N/A 6/8/2023    Procedure: Image guided endoscopic transnasal skull base repair;  Surgeon: Flo Hardy MD;  Location: [facility];  Service: ENT       Social History     Tobacco Use   • Smoking status: Never   • Smokeless tobacco: Never   Vaping Use   • Vaping status: Never Used   Substance Use Topics   • Alcohol use: Not Currently   • Drug use: Never       Family History   Problem Relation Age of Onset   • Diabetes Mother    • Breast cancer Mother    • Diabetes Father          EXAM    Vitals:Blood pressure 126/72, pulse 89, temperature 98.3 °F (36.8 °C), temperature source Temporal, resp. rate 16, height 5' 9\" (1.753 m), weight 89.1 kg (196 lb 6.4 oz), SpO2 98%.,Body mass index is 29 kg/m².     Physical Exam  Forehead non edematous, no erythema. Flat, no collection or fluctuance. Incision well healed.     Neurologic Exam  Non-focal.       MEDICAL DECISION MAKING    Imaging Studies:     MRI brain 3/1/24 personally reviewed - not yet reported.         Lab Results   Component Value Date    HGBA1C 8.1 (H) 02/06/2024         PLEASE NOTE:  This encounter may have been completed utilizing the [employer] Direct Speech Voice Recognition Software. Grammatical errors, random " word insertions, pronoun errors and incomplete sentences are occasional consequences of the system due to software limitations, ambient noise and hardware issues.These may be missed by proof reading prior to affixing electronic signature. Any questions or concerns about the content, text or information contained within the body of this dictation should be directly addressed to the advanced practitioner or physician for clarification.

## 2024-03-08 NOTE — PROGRESS NOTES
Neurosurgery Office Note  Joseph Arnold 58 y.o. male MRN: [id number]    Assessment/Plan      Diagnoses and all orders for this visit:    Atypical meningioma of brain (HCC)  -     MRI brain w wo contrast; Future      Discussion:    Pain Score: 0-No pain    Status post left/semicoronal craniotomy with craniectomy and orbital osteotomy with resection of extradural mass left frontal, with exenteration/obliteration frontal sinus and custom synthetic cranioplasty on 4/11/2023. S/p endoscopic skull base repair with Dr. Hardy on 6/8/23.     Pathology who grade 2 meningioma     Between those procedures, he was admitted twice with subgaleal/extradural air collection. Did not have fevers, chills, profound rhinorrhea. The collection spontaneously resolved/reduced on its own after his first admission, but after the second admission, it waxed and waned.  We aspirated out the air on a few occasions, including during his repair with Dr. Hardy. Went off antibiotics 6/29/23, with no signs of infection.     Had occasional L frontal/pterional H/As for some time after surgery, but on follow-up today states these are better. Continues to have some left eye photophobia, but this is not new, and is not worsening.     No swelling over the bone flap (mentioned some at his prior visit).     New MRI scan of the brain, is unfortunately not yet reported.  On my review, it shows no overt residual or recurrence.  There is some persistent encephalomalacia in the left frontal lobe, but the enhancement in the frontal lobe has improved compared with 5/8/23.     The patient appears quite well. I have recommended close surveillance as his pathology was grade 2. I have suggested a follow-up MRI in 6 months, and he agrees. I will see him back afterward.     01/11/23 Metrics: EQ5D5L 96835=5.000; ; MOCA 25/30     03/10/23 Metrics: EQ5D5L 13196=1.000;      03/22/23 Metrics: EQ5D5L 76411=0.000;      05/01/23 Metrics: EQ5D5L  98377=3.000;      05/17/23 Metrics: EQ5D5L 99059=3.861; VAS 80     05/31/23 Metrics: EQ5D5L 50234=8.827; VAS 85    07/17/23 Metrics: EQ5D5L 47261=5.000; VAS 90    03/08/24 Metrics: EQ5D5L 68551=9.860; VAS 95;         CHIEF COMPLAINT    Chief Complaint   Patient presents with    Follow-up     AFTER MRI BRAIN 3/1/24       HISTORY    History of Present Illness     58 y.o. year old male     See Discussion    REVIEW OF SYSTEMS    Review of Systems   Constitutional: Negative.    HENT:  Negative for rhinorrhea.    Eyes:  Positive for photophobia (left eye, wears sunglasses). Negative for pain.   Respiratory: Negative.     Cardiovascular: Negative.    Gastrointestinal: Negative.    Endocrine: Negative.    Genitourinary: Negative.    Musculoskeletal: Negative.    Skin: Negative.    Allergic/Immunologic: Negative.    Neurological:  Negative for dizziness, seizures, syncope, weakness, numbness and headaches (improved).   Hematological: Negative.    Psychiatric/Behavioral: Negative.     All other systems reviewed and are negative.        Meds/Allergies     Current Outpatient Medications   Medication Sig Dispense Refill    atorvastatin (LIPITOR) 40 mg tablet Take 1 tablet by mouth once daily 90 tablet 0    clotrimazole (LOTRIMIN) 1 % cream Apply topically 2 (two) times a day (Patient taking differently: Apply topically if needed) 30 g 0    levothyroxine 137 mcg tablet Take 1 tablet (137 mcg total) by mouth daily 90 tablet 3    lidocaine (LIDODERM) 5 % Apply 1 patch topically over 12 hours daily Remove & Discard patch within 12 hours or as directed by MD (Patient taking differently: Apply 1 patch topically if needed Remove & Discard patch within 12 hours or as directed by MD) 7 patch 0    metFORMIN (GLUCOPHAGE) 1000 MG tablet Take 1 tablet (1,000 mg total) by mouth 2 (two) times a day with meals 180 tablet 1    acetaminophen (TYLENOL) 325 mg tablet Take 2 tablets (650 mg total) by mouth every 6 (six) hours as needed for  headaches or mild pain (Patient not taking: Reported on 3/6/2024)  0    Alcohol Swabs 70 % PADS May substitute brand based on insurance coverage. Check glucose TID. (Patient not taking: Reported on 3/6/2024) 100 each 0    Blood Glucose Monitoring Suppl (OneTouch Verio Reflect) w/Device KIT May substitute brand based on insurance coverage. Check glucose TID. (Patient not taking: Reported on 3/6/2024) 1 kit 0    Continuous Blood Gluc Sensor (FreeStyle Khanh 14 Day Sensor) MISC 1 each by Device route in the morning (Patient not taking: Reported on 3/6/2024) 2 each 0    diphenhydramine, lidocaine, Al/Mg hydroxide, simethicone (Magic Mouthwash) SUSP Swish and spit 10 mL every 4 (four) hours as needed for mouth pain or discomfort (Patient not taking: Reported on 1/22/2024) 110 mL 1    glucose blood (Accu-Chek Guide) test strip Use 1 each 2 (two) times a day Use as instructed (Patient not taking: Reported on 6/29/2023) 100 each 5    glucose blood (OneTouch Verio) test strip Use 1 each 2 (two) times a day Use as instructed (Patient not taking: Reported on 2/19/2024) 100 each 5    hydrocortisone 1 % cream Apply topically 2 (two) times a day (Patient not taking: Reported on 3/6/2024) 1.5 g 0    Lidocaine Viscous HCl (XYLOCAINE) 2 % mucosal solution Swish and spit 15 mL 4 (four) times a day as needed for mouth pain or discomfort (Patient not taking: Reported on 6/29/2023) 100 mL 0    methocarbamol (ROBAXIN) 500 mg tablet TAKE 1 TABLET BY MOUTH 4 TIMES DAILY AS NEEDED FOR MUSCLE SPASMS (Patient not taking: Reported on 3/6/2024) 30 tablet 0    OneTouch Delica Lancets 33G MISC May substitute brand based on insurance coverage. Check glucose TID. (Patient not taking: Reported on 2/19/2024) 100 each 0     No current facility-administered medications for this visit.       No Known Allergies    PAST HISTORY    Past Medical History:   Diagnosis Date    Diabetes mellitus (HCC)     Disease of thyroid gland     Elevated LFTs     last  "assessed 02Nov2015    Hyperlipidemia     Hypertension     Impaired fasting glucose     last assessed 29Jan2014       Past Surgical History:   Procedure Laterality Date    APPENDECTOMY      BRAIN BIOPSY Left 6/8/2023    Procedure: Subgaleal aspiration;  Surgeon: Harsha Ambriz MD;  Location: [facility];  Service: Neurosurgery    COLONOSCOPY      PA CRANIEC TREPHINE BONE FLP BRAIN TUMOR SUPRTENTOR Left 4/11/2023    Procedure: IMAGE-GUIDED LEFT FRONTAL CRANIOTOMY FOR RESECTION OF EXTRA-AXIAL & SKULL MASS, WITH CRANIOPLASTY, EXENTERATION/OBLITERATION OF FRONTAL SINUS; ORBITAL OSTEOTOMY;  Surgeon: Harsha Ambriz MD;  Location[facility];  Service: Neurosurgery    PA SECONDARY RPR DURA CSF LEAK FREE TISSUE GRAFT N/A 6/8/2023    Procedure: Image guided endoscopic transnasal skull base repair;  Surgeon: Flo Hardy MD;  Location[facility];  Service: ENT       Social History     Tobacco Use    Smoking status: Never    Smokeless tobacco: Never   Vaping Use    Vaping status: Never Used   Substance Use Topics    Alcohol use: Not Currently    Drug use: Never       Family History   Problem Relation Age of Onset    Diabetes Mother     Breast cancer Mother     Diabetes Father          EXAM    Vitals:Blood pressure 126/72, pulse 89, temperature 98.3 °F (36.8 °C), temperature source Temporal, resp. rate 16, height 5' 9\" (1.753 m), weight 89.1 kg (196 lb 6.4 oz), SpO2 98%.,Body mass index is 29 kg/m².     Physical Exam  Forehead non edematous, no erythema. Flat, no collection or fluctuance. Incision well healed.     Neurologic Exam  Non-focal.       MEDICAL DECISION MAKING    Imaging Studies:     MRI brain 3/1/24 personally reviewed - not yet reported.         Lab Results   Component Value Date    HGBA1C 8.1 (H) 02/06/2024         PLEASE NOTE:  This encounter may have been completed utilizing the [employer] Direct Speech Voice Recognition Software. Grammatical errors, random word insertions, pronoun errors " and incomplete sentences are occasional consequences of the system due to software limitations, ambient noise and hardware issues.These may be missed by proof reading prior to affixing electronic signature. Any questions or concerns about the content, text or information contained within the body of this dictation should be directly addressed to the advanced practitioner or physician for clarification.

## 2024-03-18 ENCOUNTER — TELEPHONE (OUTPATIENT)
Dept: FAMILY MEDICINE CLINIC | Facility: CLINIC | Age: 59
End: 2024-03-18

## 2024-03-19 ENCOUNTER — TELEPHONE (OUTPATIENT)
Dept: FAMILY MEDICINE CLINIC | Facility: CLINIC | Age: 59
End: 2024-03-19

## 2024-03-30 DIAGNOSIS — E11.9 TYPE 2 DIABETES MELLITUS WITHOUT COMPLICATION, WITHOUT LONG-TERM CURRENT USE OF INSULIN (HCC): ICD-10-CM

## 2024-05-20 DIAGNOSIS — I10 ESSENTIAL HYPERTENSION: ICD-10-CM

## 2024-05-20 RX ORDER — LISINOPRIL AND HYDROCHLOROTHIAZIDE 12.5; 1 MG/1; MG/1
1 TABLET ORAL DAILY
Qty: 90 TABLET | Refills: 0 | Status: SHIPPED | OUTPATIENT
Start: 2024-05-20

## 2024-06-29 DIAGNOSIS — E03.9 HYPOTHYROIDISM, UNSPECIFIED TYPE: ICD-10-CM

## 2024-07-01 RX ORDER — LEVOTHYROXINE SODIUM 137 UG/1
137 TABLET ORAL DAILY
Qty: 90 TABLET | Refills: 0 | Status: SHIPPED | OUTPATIENT
Start: 2024-07-01

## 2024-07-03 DIAGNOSIS — E11.9 TYPE 2 DIABETES MELLITUS WITHOUT COMPLICATION, WITHOUT LONG-TERM CURRENT USE OF INSULIN (HCC): ICD-10-CM

## 2024-07-19 ENCOUNTER — APPOINTMENT (OUTPATIENT)
Dept: LAB | Facility: CLINIC | Age: 59
End: 2024-07-19
Payer: COMMERCIAL

## 2024-07-19 ENCOUNTER — TELEPHONE (OUTPATIENT)
Dept: FAMILY MEDICINE CLINIC | Facility: CLINIC | Age: 59
End: 2024-07-19

## 2024-07-19 ENCOUNTER — OFFICE VISIT (OUTPATIENT)
Dept: FAMILY MEDICINE CLINIC | Facility: CLINIC | Age: 59
End: 2024-07-19

## 2024-07-19 VITALS
WEIGHT: 193.6 LBS | DIASTOLIC BLOOD PRESSURE: 84 MMHG | SYSTOLIC BLOOD PRESSURE: 141 MMHG | HEART RATE: 103 BPM | BODY MASS INDEX: 28.68 KG/M2 | RESPIRATION RATE: 18 BRPM | HEIGHT: 69 IN | TEMPERATURE: 97.5 F

## 2024-07-19 DIAGNOSIS — R10.13 EPIGASTRIC PAIN: ICD-10-CM

## 2024-07-19 DIAGNOSIS — D32.9 MENINGIOMA (HCC): ICD-10-CM

## 2024-07-19 DIAGNOSIS — R10.13 EPIGASTRIC PAIN: Primary | ICD-10-CM

## 2024-07-19 DIAGNOSIS — E03.8 OTHER SPECIFIED HYPOTHYROIDISM: ICD-10-CM

## 2024-07-19 DIAGNOSIS — R74.8 ELEVATED LIVER ENZYMES: ICD-10-CM

## 2024-07-19 LAB
ALBUMIN SERPL BCG-MCNC: 4.3 G/DL (ref 3.5–5)
ALP SERPL-CCNC: 71 U/L (ref 34–104)
ALT SERPL W P-5'-P-CCNC: 48 U/L (ref 7–52)
AMYLASE SERPL-CCNC: 38 IU/L (ref 29–103)
ANION GAP SERPL CALCULATED.3IONS-SCNC: 6 MMOL/L (ref 4–13)
AST SERPL W P-5'-P-CCNC: 29 U/L (ref 13–39)
BASOPHILS # BLD AUTO: 0.03 THOUSANDS/ÂΜL (ref 0–0.1)
BASOPHILS NFR BLD AUTO: 0 % (ref 0–1)
BILIRUB SERPL-MCNC: 1.09 MG/DL (ref 0.2–1)
BUN SERPL-MCNC: 10 MG/DL (ref 5–25)
CALCIUM SERPL-MCNC: 10.1 MG/DL (ref 8.4–10.2)
CHLORIDE SERPL-SCNC: 99 MMOL/L (ref 96–108)
CO2 SERPL-SCNC: 31 MMOL/L (ref 21–32)
CREAT SERPL-MCNC: 0.91 MG/DL (ref 0.6–1.3)
EOSINOPHIL # BLD AUTO: 0.12 THOUSAND/ÂΜL (ref 0–0.61)
EOSINOPHIL NFR BLD AUTO: 1 % (ref 0–6)
ERYTHROCYTE [DISTWIDTH] IN BLOOD BY AUTOMATED COUNT: 12.1 % (ref 11.6–15.1)
GFR SERPL CREATININE-BSD FRML MDRD: 93 ML/MIN/1.73SQ M
GLUCOSE SERPL-MCNC: 250 MG/DL (ref 65–140)
HCT VFR BLD AUTO: 43.1 % (ref 36.5–49.3)
HGB BLD-MCNC: 15.3 G/DL (ref 12–17)
IMM GRANULOCYTES # BLD AUTO: 0.03 THOUSAND/UL (ref 0–0.2)
IMM GRANULOCYTES NFR BLD AUTO: 0 % (ref 0–2)
LIPASE SERPL-CCNC: 35 U/L (ref 11–82)
LYMPHOCYTES # BLD AUTO: 2.33 THOUSANDS/ÂΜL (ref 0.6–4.47)
LYMPHOCYTES NFR BLD AUTO: 25 % (ref 14–44)
MCH RBC QN AUTO: 33 PG (ref 26.8–34.3)
MCHC RBC AUTO-ENTMCNC: 35.5 G/DL (ref 31.4–37.4)
MCV RBC AUTO: 93 FL (ref 82–98)
MONOCYTES # BLD AUTO: 0.67 THOUSAND/ÂΜL (ref 0.17–1.22)
MONOCYTES NFR BLD AUTO: 7 % (ref 4–12)
NEUTROPHILS # BLD AUTO: 6.24 THOUSANDS/ÂΜL (ref 1.85–7.62)
NEUTS SEG NFR BLD AUTO: 67 % (ref 43–75)
NRBC BLD AUTO-RTO: 0 /100 WBCS
PLATELET # BLD AUTO: 161 THOUSANDS/UL (ref 149–390)
PMV BLD AUTO: 9.6 FL (ref 8.9–12.7)
POTASSIUM SERPL-SCNC: 4 MMOL/L (ref 3.5–5.3)
PROT SERPL-MCNC: 7.1 G/DL (ref 6.4–8.4)
RBC # BLD AUTO: 4.63 MILLION/UL (ref 3.88–5.62)
SODIUM SERPL-SCNC: 136 MMOL/L (ref 135–147)
WBC # BLD AUTO: 9.42 THOUSAND/UL (ref 4.31–10.16)

## 2024-07-19 PROCEDURE — 80053 COMPREHEN METABOLIC PANEL: CPT

## 2024-07-19 PROCEDURE — 93000 ELECTROCARDIOGRAM COMPLETE: CPT | Performed by: FAMILY MEDICINE

## 2024-07-19 PROCEDURE — 36415 COLL VENOUS BLD VENIPUNCTURE: CPT

## 2024-07-19 PROCEDURE — 99214 OFFICE O/P EST MOD 30 MIN: CPT | Performed by: FAMILY MEDICINE

## 2024-07-19 PROCEDURE — 82150 ASSAY OF AMYLASE: CPT

## 2024-07-19 PROCEDURE — 83690 ASSAY OF LIPASE: CPT

## 2024-07-19 PROCEDURE — 85025 COMPLETE CBC W/AUTO DIFF WBC: CPT

## 2024-07-19 RX ORDER — FAMOTIDINE 20 MG/1
20 TABLET, FILM COATED ORAL 2 TIMES DAILY
Qty: 60 TABLET | Refills: 0 | Status: SHIPPED | OUTPATIENT
Start: 2024-07-19 | End: 2024-08-18

## 2024-07-19 NOTE — PROGRESS NOTES
Ambulatory Visit  Name: Joseph Arnold      : 1965      MRN: [id number]  Encounter Provider: Reyes Ocasio MD  Encounter Date: 2024   Encounter department: [facility]    Assessment & Plan   1. Epigastric pain  Assessment & Plan:  Patietn c/o 10/10 epigastric pain x4 days suspicious for GERD vs gestroenteritis vs gastric ulcer vs ACS. Patient was also tachycardic on presentation but given ECG with sinus tachy, most likely secondary to pain. Pain is worse with eating. Patient denies drinking more than usual. Denies N/V. Denies blood in the stool    Plan  - CMP, CBC, Lipase, Amylase  - Start Famotidine 20mg bid and monitor for improvement  - f/u 2 weeks  - Consider EGD and GI referral  Orders:  -     POCT ECG  -     Comprehensive metabolic panel; Future  -     CBC and differential; Future  -     Amylase; Future  -     Lipase; Future  -     famotidine (PEPCID) 20 mg tablet; Take 1 tablet (20 mg total) by mouth 2 (two) times a day       History of Present Illness     HPI  Joseph Arnold 58 y.o. M w Hx alcohol use disorder. Patient states he has epigastric pain for the last three days.  4 days ago in the evening the patient had mild stomach discomfort but then 3 days ago in the morning the pain became significantly worse with breakfast. He says tums has not helped. Sometimes he burps and it makes him feel slightly better. His appetite is decreased. He feels a little low eneergy because he has not been eating much. He has not vomited but does feel nauseus. He is still moving his bowels but feels it is decreased. Denies dietary changes or recent travel. He has never had a similar episode. Like this before. His symtpoms started Monday night but got to their worst at Tuesday morning when he ate breakfast and has continued to have the burning sensation.      Review of Systems   Constitutional:  Negative for chills and fever.   HENT:  Negative for ear pain and  "sore throat.    Eyes:  Negative for pain and visual disturbance.   Respiratory:  Negative for cough and shortness of breath.    Cardiovascular:  Negative for chest pain and palpitations.   Gastrointestinal:  Positive for abdominal pain and nausea. Negative for constipation, diarrhea and vomiting.   Genitourinary:  Negative for dysuria and hematuria.   Musculoskeletal:  Negative for arthralgias and back pain.   Skin:  Negative for color change and rash.   Neurological:  Negative for seizures and syncope.   All other systems reviewed and are negative.      Objective     /84   Pulse 103   Temp 97.5 °F (36.4 °C)   Resp 18   Ht 5' 9\" (1.753 m)   Wt 87.8 kg (193 lb 9.6 oz)   BMI 28.59 kg/m²     Physical Exam  Vitals and nursing note reviewed.   Constitutional:       General: He is not in acute distress.     Appearance: He is well-developed.   HENT:      Head: Normocephalic and atraumatic.   Eyes:      Conjunctiva/sclera: Conjunctivae normal.   Cardiovascular:      Rate and Rhythm: Regular rhythm. Tachycardia present.      Pulses: Normal pulses.      Heart sounds: Normal heart sounds. No murmur heard.  Pulmonary:      Effort: Pulmonary effort is normal. No respiratory distress.      Breath sounds: Normal breath sounds.   Abdominal:      General: Abdomen is flat.      Palpations: Abdomen is soft.      Tenderness: There is abdominal tenderness.      Comments: Tenderness in epigastric area and radiates to the anterior subcostal area b/l but no lower ab tenderness or lake's sign   Musculoskeletal:         General: No swelling.      Cervical back: Neck supple.   Skin:     General: Skin is warm and dry.      Capillary Refill: Capillary refill takes less than 2 seconds.   Neurological:      Mental Status: He is alert.   Psychiatric:         Mood and Affect: Mood normal.     Administrative Statements     "

## 2024-07-19 NOTE — ASSESSMENT & PLAN NOTE
Penelope c/o 10/10 epigastric pain x4 days suspicious for GERD vs gestroenteritis vs gastric ulcer vs ACS. Patient was also tachycardic on presentation but given ECG with sinus tachy, most likely secondary to pain. Pain is worse with eating. Patient denies drinking more than usual. Denies N/V. Denies blood in the stool    Plan  - CMP, CBC, Lipase, Amylase  - Start Famotidine 20mg bid and monitor for improvement  - f/u 2 weeks  - Consider EGD and GI referral

## 2024-07-20 NOTE — ASSESSMENT & PLAN NOTE
BP today 144/92, above goal most likely due to lack of sleep and drinking previous night  Currently on lisinopril hydrochlorothiazide 10-12 5 milligrams       Plan:  · Continue current regimen; sent refill  · Lifestyle modification with diet and exercise  · Follow-up microalbumin/creatinine, CMP, lipid panel in 3 months
Currently on levothyroxine 137 mcg daily  Pt does not endorse fatigue, cold intolerance, weight gain, constipation, dry skin, and myalgias  TSH (6/28/21) within normal limits  Continue on current dose      Plan:  · Sent refill for levothyroxine  · Ordered TSH, CMP  · Follow-up labs in 3 months
Improved with physical therapy and steroid injection  Continue physical therapy 
Lab Results   Component Value Date    HGBA1C 7 5 (A) 02/14/2022   A1c increased from previous (6 7)    Plan:  · Encouraged compliance with Metformin 1000mg BID; sent refill  · Continue to monitor blood sugar and encouraged dietary adherence    · Ordered CMP, microalbumin/creatine; follow-up labs in 3 months  · Foot exam done today
4 = No assist / stand by assistance

## 2024-08-13 NOTE — PROGRESS NOTES
Neurosurgery Office Note  Cierra Villareal 56 y o  male MRN: [id number]      Assessment/Plan      Diagnoses and all orders for this visit:    Meningioma [facility])    Facial swelling  -     amoxicillin-clavulanate (AUGMENTIN) 875-125 mg per tablet; Take 1 tablet by mouth 2 (two) times a day    Pneumocephalus  -     amoxicillin-clavulanate (AUGMENTIN) 875-125 mg per tablet; Take 1 tablet by mouth 2 (two) times a day    Status post craniotomy and cranioplasty  -     amoxicillin-clavulanate (AUGMENTIN) 875-125 mg per tablet; Take 1 tablet by mouth 2 (two) times a day    Type 2 diabetes mellitus without complication, without long-term current use of insulin ([facility])          Discussion:    Pain Score:   7 (left side head/forehead/left eye)     Status post left/semicoronal craniotomy with craniectomy and orbital osteotomy with resection of extradural mass left frontal, with exenteration/obliteration frontal sinus and custom synthetic cranioplasty on 4/11/2023  Pathology who grade 2 meningioma    I saw the patient in follow-up on 5/1/2023 and he was doing well making good recovery  We had plans for surveillance imaging, to be ordered later, MRI scan at 3 months postop (which would be around 7/11/23, and will still need to be ordered/scheduled)  He since has been admitted twice with subgaleal/extradural air collection  He has had no fevers, chills, profound rhinorrhea  The collection spontaneously resolved/reduced on its own after his first admission, but after this second admission, it has waxed and waned  We have aspirated out the area twice to date, and he was discharged to home a few days ago, before the holiday weekend  At that point it seemed to be under control and not causing pressure or symptoms  I see him back today in the office a few days after his discharge  The collection has recurred, is quite full but not tense  Incision without signs of duress  No erythema, induration    Again denies fevers, Superior eta 2045   chills, nausea, vomiting  No rhinorrhea  I discussed with the patient and his father options for management, and recommended he undergo endonasal endoscopic obliteration of the nasal aspect of the fronto-nasal duct  Together with the already performed exenteration/obliteration of the frontal sinus, this should form a sandwich/buttonhole closure across the frontal sinus, and hopefully fully occlude it  I have discussed this with Dr Quin Durham, who agrees  We will tentatively plan for next week for his surgery, barring suggestion of imminent worsening  We agreed I will aspirate more subgaleal air today, to temporize his symptoms of pressure/headache  That was performed without difficulty, see separate note  He is tolerating the antibiotics well and I renewed his prescription so that he will be able to continue to take them  I will tentatively plan to see him back early next week to assure he is progressing well, he needs no further aspiration, etc      01/11/23 Metrics: EQ5D5L 34324=6 000; ; MOCA 25/30    03/10/23 Metrics: EQ5D5L 56782=8 000;     03/22/23 Metrics: EQ5D5L 45561=8 000;     05/01/23 Metrics: EQ5D5L 40544=4 000;     05/17/23 Metrics: EQ5D5L 70436=9 861; VAS 80    05/31/23 Metrics: EQ5D5L 25679=1 827; VAS 85          CHIEF COMPLAINT    Chief Complaint   Patient presents with   • [address] [place] [address] follow up per BLANCA Juarez       HISTORY    History of Present Illness     56y o  year old male     HPI    See Discussion    REVIEW OF SYSTEMS    Review of Systems   Constitutional: Negative  HENT: Positive for rhinorrhea (improving since last visit)  Eyes: Positive for pain (unchanged since surgery, left eye) and visual disturbance (unchanged since surgery, left eye blurry vision, started since surgery)          Left eye issue at night time when sleep will wake up and can't open due to dry but also a lot of watery eyes, especially with bright lights will cause increase watery eyes   Respiratory: Negative  Cardiovascular: Negative  Gastrointestinal: Negative  Endocrine: Negative  Genitourinary: Negative  Musculoskeletal: Negative  Skin: Negative  Allergic/Immunologic: Negative  Neurological: Positive for numbness (unchanged since last visit, currently also left side of head numbness and noted at lat visit, right side back of head radiates to top of head numbness)  Negative for dizziness, seizures, syncope, weakness (improved since last visit, generalized) and headaches (unchanged since last visit, and more pressure then pain, left side head (temple/ear/left side of head))  Currently forehead to the left side swollen also painful, pain also in the left eye/forehead    Frontal skull lesion   Hematological: Negative  Psychiatric/Behavioral: Negative  Meds/Allergies     Current Outpatient Medications   Medication Sig Dispense Refill   • acetaminophen (TYLENOL) 325 mg tablet Take 2 tablets (650 mg total) by mouth every 6 (six) hours as needed for headaches or mild pain  0   • acetaminophen (TYLENOL) 500 mg tablet Take 1,000 mg by mouth if needed for mild pain     • Alcohol Swabs 70 % PADS May substitute brand based on insurance coverage  Check glucose TID  100 each 0   • amoxicillin-clavulanate (AUGMENTIN) 875-125 mg per tablet Take 1 tablet by mouth 2 (two) times a day 42 tablet 1   • atorvastatin (LIPITOR) 40 mg tablet Take 1 tablet (40 mg total) by mouth daily 90 tablet 1   • Blood Glucose Monitoring Suppl (OneTouch Verio Reflect) w/Device KIT May substitute brand based on insurance coverage   Check glucose TID  1 kit 0   • Empagliflozin (Jardiance) 10 MG TABS tablet Take 1 tablet (10 mg total) by mouth every morning 90 tablet 1   • glucose blood (Accu-Chek Guide) test strip Use 1 each 2 (two) times a day Use as instructed 100 each 5   • glucose blood (OneTouch Verio) test strip Use 1 each 2 (two) times a day Use as instructed 100 each 5   • glucose blood (OneTouch Verio) test strip May substitute brand based on insurance coverage  Check glucose TID  100 each 0   • Insulin Glargine Solostar (Lantus SoloStar) 100 UNIT/ML SOPN Inject 0 22 mL (22 Units total) under the skin daily at bedtime 4 5 mL 0   • insulin lispro (HumaLOG KwikPen) 100 units/mL injection pen Inject 5 Units under the skin 3 (three) times a day with meals 15 mL 0   • Insulin Pen Needle (BD Pen Needle Mariela 2nd Gen) 32G X 4 MM MISC For use with insulin pen  Pharmacy may dispense brand covered by insurance  100 each 0   • Insulin Pen Needle (BD Pen Needle Mariela 2nd Gen) 32G X 4 MM MISC For use with insulin pen  Pharmacy may dispense brand covered by insurance  100 each 0   • Insulin Pen Needle (BD Pen Needle Mariela 2nd Gen) 32G X 4 MM MISC For use with insulin pen  Pharmacy may dispense brand covered by insurance  100 each 0   • levothyroxine 137 mcg tablet Take 1 tablet by mouth once daily 60 tablet 0   • lidocaine (LIDODERM) 5 % APPLY ONE PATCH TO AFFECTED AREA DAILY  REMOVE AND DISCARD PATCH WITHIN 12 HOURS OR AS DIRECTED BY DOCTOR 30 patch 0   • lisinopril-hydrochlorothiazide (PRINZIDE,ZESTORETIC) 10-12 5 MG per tablet Take 1 tablet by mouth daily 90 tablet 1   • OneTouch Delica Lancets 05J MISC May substitute brand based on insurance coverage  Check glucose TID  100 each 0     No current facility-administered medications for this visit         No Known Allergies    PAST HISTORY    Past Medical History:   Diagnosis Date   • Diabetes mellitus ([facility] [address] )    • Elevated LFTs     last assessed 02Nov2015   • Hyperlipidemia    • Hypertension    • Impaired fasting glucose     last assessed 29Jan2014       Past Surgical History:   Procedure Laterality Date   • APPENDECTOMY     • COLONOSCOPY     • UT CRANIEC TREPHINE BONE FLP BRAIN TUMOR SUPRTENTOR Left 4/11/2023    Procedure: IMAGE-GUIDED LEFT FRONTAL CRANIOTOMY FOR RESECTION OF EXTRA-AXIAL & SKULL MASS, WITH CRANIOPLASTY, "EXENTERATION/OBLITERATION OF FRONTAL SINUS; ORBITAL OSTEOTOMY;  Surgeon: Herman Reina MD;  Location: [facility];  Service: Neurosurgery       Social History     Tobacco Use   • Smoking status: Never   • Smokeless tobacco: Never   Vaping Use   • Vaping Use: Never used   Substance Use Topics   • Alcohol use: Not Currently     Alcohol/week: 3 0 standard drinks of alcohol     Types: 3 Glasses of wine per week     Comment: drinks daily    • Drug use: Never       Family History   Problem Relation Age of Onset   • Diabetes Mother    • Breast cancer Mother    • Diabetes Father          The following portions of the patient's history were reviewed in this encounter and updated as appropriate: Past medical, surgical, family, and social history, as well as medications, allergies, and review of systems  EXAM    Vitals:Blood pressure 108/74, pulse 90, temperature 98 1 °F (36 7 °C), resp  rate 16, height 5' 9\" (1 753 m), weight 83 5 kg (184 lb)  ,Body mass index is 27 17 kg/m²  Physical Exam    Neurologic Exam        PLEASE NOTE:  This encounter may have been completed utilizing the [employer] Direct Speech Voice Recognition Software  Grammatical errors, random word insertions, pronoun errors and incomplete sentences are occasional consequences of the system due to software limitations, ambient noise and hardware issues  These may be missed by proof reading prior to affixing electronic signature  Any questions or concerns about the content, text or information contained within the body of this dictation should be directly addressed to the advanced practitioner or physician for clarification     "

## 2024-08-27 DIAGNOSIS — I10 ESSENTIAL HYPERTENSION: ICD-10-CM

## 2024-08-27 RX ORDER — LISINOPRIL/HYDROCHLOROTHIAZIDE 10-12.5 MG
1 TABLET ORAL DAILY
Qty: 90 TABLET | Refills: 0 | Status: SHIPPED | OUTPATIENT
Start: 2024-08-27

## 2024-09-16 ENCOUNTER — TELEPHONE (OUTPATIENT)
Dept: NEUROSURGERY | Facility: CLINIC | Age: 59
End: 2024-09-16

## 2024-09-16 NOTE — TELEPHONE ENCOUNTER
Contacted patient regarding appt scheduled for 9/17/24. MRI needed is not completed, explained to patient it is needed as the appointment is for a review of the MRI once completed and the appt will have to be rescheduled. Patient understood and requested Central Sched # texted to his phone. Sent msg through [facility] w/ cancellation notice, Central Sched # & Office # For RS.

## 2024-09-26 DIAGNOSIS — E11.9 TYPE 2 DIABETES MELLITUS WITHOUT COMPLICATION, WITHOUT LONG-TERM CURRENT USE OF INSULIN (HCC): ICD-10-CM

## 2024-10-11 DIAGNOSIS — R21 RASH: ICD-10-CM

## 2024-10-14 RX ORDER — BENZOCAINE/MENTHOL 6 MG-10 MG
LOZENGE MUCOUS MEMBRANE
Qty: 29 G | Refills: 0 | Status: SHIPPED | OUTPATIENT
Start: 2024-10-14

## 2024-11-05 ENCOUNTER — OFFICE VISIT (OUTPATIENT)
Dept: FAMILY MEDICINE CLINIC | Facility: CLINIC | Age: 59
End: 2024-11-05

## 2024-11-05 VITALS
OXYGEN SATURATION: 98 % | BODY MASS INDEX: 28.29 KG/M2 | HEART RATE: 90 BPM | HEIGHT: 69 IN | SYSTOLIC BLOOD PRESSURE: 138 MMHG | WEIGHT: 191 LBS | DIASTOLIC BLOOD PRESSURE: 80 MMHG | TEMPERATURE: 97.7 F

## 2024-11-05 DIAGNOSIS — E11.9 TYPE 2 DIABETES MELLITUS WITHOUT COMPLICATION, WITH LONG-TERM CURRENT USE OF INSULIN (HCC): ICD-10-CM

## 2024-11-05 DIAGNOSIS — Z79.4 TYPE 2 DIABETES MELLITUS WITHOUT COMPLICATION, WITH LONG-TERM CURRENT USE OF INSULIN (HCC): ICD-10-CM

## 2024-11-05 DIAGNOSIS — M77.11 LATERAL EPICONDYLITIS OF RIGHT ELBOW: Primary | ICD-10-CM

## 2024-11-05 LAB — SL AMB POCT HEMOGLOBIN AIC: 7.7 (ref ?–6.5)

## 2024-11-05 PROCEDURE — 99213 OFFICE O/P EST LOW 20 MIN: CPT | Performed by: FAMILY MEDICINE

## 2024-11-05 PROCEDURE — 83036 HEMOGLOBIN GLYCOSYLATED A1C: CPT | Performed by: FAMILY MEDICINE

## 2024-11-05 NOTE — PROGRESS NOTES
Ambulatory Visit  Name: Joseph Arnold      : 1965      MRN: [id number]  Encounter Provider: Mika Brooks DO  Encounter Date: 2024   [facility]    Assessment & Plan  Lateral epicondylitis of right elbow   Right elbow pain for about 2 months  Unsure if he sustained injury  He has full ROM, but has pain/discomfort with pronation/supination and use of extensor muscles of forearm  No bony tenderness, redness, swelling, warmth, rash, popping/clicking  Pain is overlying extensor tendons  Likely lateral epicondylitis    Plan:  -Can use NSAIDs, Tylenol, ice/heat, lidocaine patches as he has been using  -Prescribed Voltaren gel   -Referred to PT  -No need for xrays at this time  -F/u for Annual physical and DM foot exam  -F/u PRN for continued pain    Orders:    Ambulatory Referral to Physical Therapy; Future    Diclofenac Sodium (VOLTAREN) 1 %; Apply 2 g topically 4 (four) times a day    Type 2 diabetes mellitus without complication, with long-term current use of insulin ([facility])  Uncontrolled, goal hemoglobin A1c less than 7.  Patient is currently on metformin 1000 mg BID  Also counseled on lifestyle modification with diet and exercise  He has improved his diet and his A1c is improving  He would like to give lifestyle and diet more of a chance before changing his medication further  Will repeat hemoglobin A1c in 3 months  IRIS and A1c performed  Lab Results   Component Value Date    HGBA1C 7.7 (A) 2024       Orders:    POCT hemoglobin A1c    IRIS Diabetic eye exam       History of Present Illness     Patient is a 58-year-old male who presents to the office today for right elbow pain for about 2 months.  He is unsure if he injured it 2 months ago, but it has been persistent.  No overlying skin changes.  No popping or clicking, decreased ROM.  He has most pain with extension of wrist and with pronation/supination of forearm.  He has used  "lidocaine patch with minimal improvement of symptoms.  He has not been using Tylenol, NSAIDs, ice/heat consistently.  He has full ROM, and no swelling.    Patient also due for IRIS exam, repeat A1c.  He will get his DM foot exam and annual physical at a later date.  His A1c today is improved to 7.7 from 8.1.  He has been taking his metformin 1000mg BID and improved his diet.              Review of Systems   Constitutional:  Negative for activity change, fatigue and fever.   HENT:  Negative for congestion.    Eyes:  Negative for visual disturbance.   Respiratory:  Negative for cough, shortness of breath and wheezing.    Cardiovascular:  Negative for chest pain, palpitations and leg swelling.   Gastrointestinal:  Negative for abdominal pain, constipation and diarrhea.   Endocrine: Negative for cold intolerance, polydipsia and polyuria.   Genitourinary:  Negative for difficulty urinating and dysuria.   Musculoskeletal:  Negative for joint swelling.        Right elbow pain   Skin:  Negative for rash.   Neurological:  Negative for dizziness.   Psychiatric/Behavioral:  Negative for dysphoric mood and suicidal ideas.            Objective     /80 (BP Location: Left arm, Patient Position: Sitting, Cuff Size: Large)   Pulse 90   Temp 97.7 °F (36.5 °C) (Temporal)   Ht 5' 9\" (1.753 m)   Wt 86.6 kg (191 lb)   SpO2 98%   BMI 28.21 kg/m²     Physical Exam  Vitals reviewed.   Constitutional:       General: He is not in acute distress.     Appearance: He is well-developed. He is not ill-appearing.   HENT:      Head: Normocephalic and atraumatic.      Right Ear: External ear normal.      Left Ear: External ear normal.      Nose: Nose normal.      Mouth/Throat:      Mouth: Mucous membranes are moist.      Pharynx: Oropharynx is clear.   Eyes:      Extraocular Movements: Extraocular movements intact.      Conjunctiva/sclera: Conjunctivae normal.   Cardiovascular:      Rate and Rhythm: Normal rate and regular rhythm.      " Heart sounds: Normal heart sounds. No murmur heard.  Pulmonary:      Effort: Pulmonary effort is normal. No respiratory distress.      Breath sounds: Normal breath sounds.   Musculoskeletal:         General: No swelling.      Right elbow: No swelling, deformity, effusion or lacerations. Normal range of motion. Tenderness present in lateral epicondyle.      Left elbow: Normal.      Comments: Pain worse with wrist extension, supination/pronation   Skin:     General: Skin is warm and dry.      Capillary Refill: Capillary refill takes less than 2 seconds.   Neurological:      Mental Status: He is alert.

## 2024-11-05 NOTE — ASSESSMENT & PLAN NOTE
Right elbow pain for about 2 months  Unsure if he sustained injury  He has full ROM, but has pain/discomfort with pronation/supination and use of extensor muscles of forearm  No bony tenderness, redness, swelling, warmth, rash, popping/clicking  Pain is overlying extensor tendons  Likely lateral epicondylitis    Plan:  -Can use NSAIDs, Tylenol, ice/heat, lidocaine patches as he has been using  -Prescribed Voltaren gel   -Referred to PT  -No need for xrays at this time  -F/u for Annual physical and DM foot exam  -F/u PRN for continued pain    Orders:    Ambulatory Referral to Physical Therapy; Future    Diclofenac Sodium (VOLTAREN) 1 %; Apply 2 g topically 4 (four) times a day

## 2024-11-05 NOTE — ASSESSMENT & PLAN NOTE
Uncontrolled, goal hemoglobin A1c less than 7.  Patient is currently on metformin 1000 mg BID  Also counseled on lifestyle modification with diet and exercise  He has improved his diet and his A1c is improving  He would like to give lifestyle and diet more of a chance before changing his medication further  Will repeat hemoglobin A1c in 3 months  IRIS and A1c performed  Lab Results   Component Value Date    HGBA1C 7.7 (A) 11/05/2024       Orders:    POCT hemoglobin A1c    IRIS Diabetic eye exam

## 2024-11-18 ENCOUNTER — EVALUATION (OUTPATIENT)
Dept: OCCUPATIONAL THERAPY | Facility: CLINIC | Age: 59
End: 2024-11-18
Payer: COMMERCIAL

## 2024-11-18 DIAGNOSIS — M77.11 LATERAL EPICONDYLITIS OF RIGHT ELBOW: Primary | ICD-10-CM

## 2024-11-18 PROCEDURE — 97165 OT EVAL LOW COMPLEX 30 MIN: CPT

## 2024-11-18 PROCEDURE — 97110 THERAPEUTIC EXERCISES: CPT

## 2024-11-18 NOTE — PROGRESS NOTES
OT Evaluation     Today's date: 2024  Patient name: Joseph Arnold  : 1965  MRN: [id number]  Referring provider: No ref. provider found  Dx:   Encounter Diagnosis     ICD-10-CM    1. Lateral epicondylitis of right elbow  M77.11                      Assessment  Impairments: activity intolerance, impaired physical strength, pain with function and weight-bearing intolerance  Symptom irritability: low    Assessment details: Patient presenting with a diagnosis of R lateral epicondylitis. Patient symptoms began in July with no mechanism of injury.  Patient initially saw family medicine on 24 and was provided lidocaine patches without changes in symptoms. Patient was educated on nighttime bracing including wrist braces as well as counter force bracing over the wrist extensor mass during the daytime. Patient reports moderate pain during supination/pronation and when extending the wrist. Wrist and elbow ROM is WNL. Supination/pronation is WNL. /pinch strength is decreased in the normal test position and decreased with the elbow extended and the hand in pronation. Patient has positive symptoms during resisted wrist extension and resisted middle finger special testing, which is consistent with diagnosis. Patient was provided a custom HEP and instructed on how to complete it. See below for a more detailed assessment.   Understanding of Dx/Px/POC: good     Prognosis: good    Goals  STGs:    Patient will increase  strength by 5-10 lbs in 4 weeks    Patient will increase pinch strength by 3-5 lbs in 4 weeks     Patient will report decreased pain during functional movement by 1-2 grades in 4 weeks    Patient will demonstrate an understanding of HEP by end of initial session    LTGs:    Patient will increase  strength by 10-20 lbs in 8 weeks or discharge    Patient will improve pinch strength by 5-10 lbs in 8 weeks or discharge    Patient will report resolution of pain symptoms during functional  "movement in 8 weeks or discharge      Plan  Patient would benefit from: OT eval, skilled occupational therapy and custom splinting  Referral necessary: No  Planned modality interventions: unattended electrical stimulation, ultrasound, thermotherapy: hydrocollator packs and TENS    Planned therapy interventions: compression, fine motor coordination training, flexibility, functional ROM exercises, home exercise program, therapeutic exercise, therapeutic activities, stretching, strengthening, patient education, IADL retraining, manual therapy and joint mobilization    Frequency: 2x week  Duration in weeks: 10  Plan of Care beginning date: 2024  Plan of Care expiration date: 2025  Treatment plan discussed with: patient  Plan details: Patient presented with a diagnosis of R lateral epicondylitis. Patient would benefit from skilled OP OT hand therapy services 2x a week for 6-8 weeks to decrease pain, increase functional use, and improve functional strength.        Subjective Evaluation    History of Present Illness  Mechanism of injury: Subjective:  \"I've had the same problem before and it went away with therapy\". 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 \"This has been going on for 2 and a half months\". \"I do physical tasks at work\". \"When I put ice on it at night it helps\". \"It the morning time I use heat and that helps\". \"I've been using Voltaren\". \"I haven't tried a wrist brace but I use an elbow brace\". \"I have to avoid picking things up like grocery bags\". \"My muscle feels weak\". \"I have the most pain when I go palm down (pronation)\".           Not a recurrent problem   Quality of life: good    Patient Goals  Patient goals for therapy: decreased pain, increased motion and increased strength    Pain  Current pain rating: 10  At best pain ratin  At worst pain rating: 10  Location: Lateral Epicondlye, extensor mass, and triceps  Relieving factors: ice and " heat  Aggravating factors: lifting (Work tasks, movement, supination/pronation)  Progression: worsening    Social Support    Employment status: working  Hand dominance: right    Treatments  Current treatment: occupational therapy        Objective     Tenderness     Right Elbow   Tenderness in the lateral epicondyle.     Right Wrist/Hand   Tenderness in the common extensor tendon and lateral epicondyle.     Neurological Testing     Sensation     Wrist/Hand   Left   Intact: light touch    Right   Intact: light touch    Active Range of Motion     Left Elbow   Normal active range of motion    Right Elbow   Normal active range of motion  Forearm supination: with pain  Forearm pronation: with pain    Left Wrist   Wrist flexion: 60 degrees   Wrist extension: 70 degrees     Right Wrist   Wrist flexion: 60 degrees   Wrist extension: 70 degrees     Left Thumb   Kapandji score: 10 degrees      Right Thumb   Kapandji score: 10 degrees    Additional Active Range of Motion Details  R:  + extensor tightness  B/L Full composite fists    Strength/Myotome Testing     Left Wrist/Hand   Wrist extension: 5  Wrist flexion: 5  Radial deviation: 5  Ulnar deviation: 5     (2nd hand position)     Trial 1: 86    Comments: Elbow Extended forearm in pronation: 82    Thumb Strength  Key/Lateral Pinch     Trial 1: 17  Tip/Two-Point Pinch     Trial 1: 13  Palmar/Three-Point Pinch     Trial 1: 14    Right Wrist/Hand   Wrist extension: 3  Wrist flexion: 5  Radial deviation: 4+  Ulnar deviation: 3+     (2nd hand position)     Trial 1: 36    Comments: Elbow Extended forearm in pronation: 27.5    Thumb Strength   Key/Lateral Pinch     Trial 1: 14  Tip/Two-Point Pinch     Trial 1: 12  Palmar/Three-Point Pinch     Trial 1: 16    Tests     Left Elbow   Negative Cozen's.     Right Elbow   Positive Cozen's.     Left Wrist/Hand   Negative Finkelstein's and resisted middle finger.     Right Wrist/Hand   Positive resisted middle finger.     Swelling      Left Wrist/Hand     Additional Swelling Details  Elbow:  27.2    Right Wrist/Hand     Additional Swelling Details  Elbow:  27.4             Precautions: Universal      Manuals 11/18            IASTM             Taping Extensors             Cupping Lateral Epi.                          Neuro Re-Ed                                                                                                        Ther Ex             HEP Extensor Stretch    Wrist AROM    Extensor Isometrics            Med ball rolls             Wrist AAROM             Extensor Stretch             Shoulder Pulley             Wrist eccentrics             Sup/pronation Roller             Sup/pro cones             Wrist Maze             Isometrics             Internal/external rotation             Rows             Ther Activity             Keypegs elbow extended                                                                 Modalities             P

## 2024-11-26 ENCOUNTER — OFFICE VISIT (OUTPATIENT)
Dept: FAMILY MEDICINE CLINIC | Facility: CLINIC | Age: 59
End: 2024-11-26

## 2024-11-26 VITALS
HEIGHT: 69 IN | BODY MASS INDEX: 28.44 KG/M2 | TEMPERATURE: 98.2 F | SYSTOLIC BLOOD PRESSURE: 132 MMHG | DIASTOLIC BLOOD PRESSURE: 88 MMHG | WEIGHT: 192 LBS | HEART RATE: 92 BPM | OXYGEN SATURATION: 98 %

## 2024-11-26 DIAGNOSIS — E78.5 HYPERLIPIDEMIA, UNSPECIFIED HYPERLIPIDEMIA TYPE: ICD-10-CM

## 2024-11-26 DIAGNOSIS — Z00.00 ANNUAL PHYSICAL EXAM: Primary | ICD-10-CM

## 2024-11-26 DIAGNOSIS — E11.9 TYPE 2 DIABETES MELLITUS WITHOUT COMPLICATION, WITHOUT LONG-TERM CURRENT USE OF INSULIN (HCC): ICD-10-CM

## 2024-11-26 DIAGNOSIS — M77.11 LATERAL EPICONDYLITIS OF RIGHT ELBOW: ICD-10-CM

## 2024-11-26 DIAGNOSIS — E03.8 OTHER SPECIFIED HYPOTHYROIDISM: ICD-10-CM

## 2024-11-26 DIAGNOSIS — I10 BENIGN ESSENTIAL HYPERTENSION: ICD-10-CM

## 2024-11-26 PROCEDURE — 99213 OFFICE O/P EST LOW 20 MIN: CPT | Performed by: FAMILY MEDICINE

## 2024-11-26 PROCEDURE — 99396 PREV VISIT EST AGE 40-64: CPT | Performed by: FAMILY MEDICINE

## 2024-11-26 RX ORDER — GABAPENTIN 300 MG/1
300 CAPSULE ORAL DAILY
Qty: 30 CAPSULE | Refills: 3 | Status: SHIPPED | OUTPATIENT
Start: 2024-11-26

## 2024-11-26 NOTE — PROGRESS NOTES
Adult Annual Physical  Name: Joseph Arnold      : 1965      MRN: [id number]  Encounter Provider: Mika Brooks DO  Encounter Date: 2024   Encounter department: [facility]    Assessment & Plan  Annual physical exam  No acute complaints today  Due for diabetic foot exam  Obtain diabetic eye exam last visit --will obtain data for chart  Still notes right elbow pain and is seeing PT for this given lateral epicondylitis  Blood pressure controlled  Thyroid level controlled  Hyperlipidemia controlled       Benign essential hypertension  - Most recent BP Blood Pressure: 132/88   Current regimen: Lisinopril-hydrochlorothiazide 10-12.5mg  Controlled on this medication  Continue this regimen     Type 2 diabetes mellitus without complication, without long-term current use of insulin (HCC)  Uncontrolled, goal hemoglobin A1c less than 7.  Patient is currently on metformin 1000 mg BID  Also counseled on lifestyle modification with diet and exercise  He has improved his diet and his A1c is improving  He would like to give lifestyle and diet more of a chance before changing his medication further  He does note some tingling and burning of his bilateral feet  Will repeat hemoglobin A1c at 2025 appt  [facility] and A1c performed on  --awaiting data from [facility] to pull into the chart  Diabetic foot exam today    Lab Results   Component Value Date    HGBA1C 7.7 (A) 2024     Plan:  -Continue current diabetes management  -Will repeat A1c in February  -Refill gabapentin    Orders:    Diabetic foot exam; Future    gabapentin (Neurontin) 300 mg capsule; Take 1 capsule (300 mg total) by mouth daily    Other specified hypothyroidism  Thyroid    Lab Results   Component Value Date    OKQ9XHUDYHVI 3.655 2024    MUT7FTWEGDEC 11.730 (H) 2023    DSL8BGAQHVAU 2.739 2023    CPV2USPDWNPS 3.075 2023    EOE9YKDCMEGP 3.191 2022    HSZ0NVNBWLVR 1.366  06/28/2021    YVD2KOTYJHSI 0.169 (L) 04/13/2021      TSH 3.655, within normal limits.    Continue levothyroxine at 137mcg/day         Lateral epicondylitis of right elbow   Right elbow pain for about 2 months  Unsure if he sustained injury  He has full ROM, but has pain/discomfort with pronation/supination and use of extensor muscles of forearm  No bony tenderness, redness, swelling, warmth, rash, popping/clicking  Pain is overlying extensor tendons  Likely lateral epicondylitis    Plan:  -Can use NSAIDs, Tylenol, ice/heat, lidocaine patches as he has been using  -Continue Voltaren gel   -Continue with PT  -No need for xrays at this time           Hyperlipidemia, unspecified hyperlipidemia type  Lipid       Lab Results   Component Value Date    CHOLESTEROL 122 02/06/2024    TRIG 87 02/06/2024    HDL 50 02/06/2024    LDLCALC 55 02/06/2024      Repeat lipid panel next year  Continue home Lipitor 40mg               Immunizations and preventive care screenings were discussed with patient today. Appropriate education was printed on patient's after visit summary.    Discussed risks and benefits of prostate cancer screening. We discussed the controversial history of PSA screening for prostate cancer in the [place] as well as the risk of over detection and over treatment of prostate cancer by way of PSA screening.  The patient understands that PSA blood testing is an imperfect way to screen for prostate cancer and that elevated PSA levels in the blood may also be caused by infection, inflammation, prostatic trauma or manipulation, urological procedures, or by benign prostatic enlargement.    The role of the digital rectal examination in prostate cancer screening was also discussed and I discussed with him that there is large interobserver variability in the findings of digital rectal examination.    Counseling:  Alcohol/drug use: discussed moderation in alcohol intake, the recommendations for healthy alcohol use, and  avoidance of illicit drug use.  Dental Health: discussed importance of regular tooth brushing, flossing, and dental visits.  Injury prevention: discussed safety/seat belts, safety helmets, smoke detectors, carbon monoxide detectors, and smoking near bedding or upholstery.  Sexual health: discussed sexually transmitted diseases, partner selection, use of condoms, avoidance of unintended pregnancy, and contraceptive alternatives.  Exercise: the importance of regular exercise/physical activity was discussed. Recommend exercise 3-5 times per week for at least 30 minutes.     BMI Counseling: Body mass index is 28.35 kg/m². The BMI is above normal. Nutrition recommendations include decreasing portion sizes, encouraging healthy choices of fruits and vegetables, limiting drinks that contain sugar and moderation in carbohydrate intake. Exercise recommendations include moderate physical activity 150 minutes/week and exercising 3-5 times per week. No pharmacotherapy was ordered. Rationale for BMI follow-up plan is due to patient being overweight or obese.         History of Present Illness     Adult Annual Physical:  Patient presents for annual physical. Patient is a 59-year-old male who presents for annual wellness visit and for diabetic foot exam.  He has no acute complaints today.  He still has his right tennis elbow pain for which he is seeing PT and OT..     Diet and Physical Activity:  - Diet/Nutrition: well balanced diet, consuming 3-5 servings of fruits/vegetables daily and diabetic diet.  - Exercise: 5-7 times a week on average, walking, moderate cardiovascular exercise and 30-60 minutes on average.    General Health:  - Sleep: sleeps well and 7-8 hours of sleep on average.  - Hearing: normal hearing bilateral ears.  - Vision: wears glasses. Reading glasses  - Dental: regular dental visits and brushes teeth twice daily. Flosses     Health:  - History of STDs: no.   - Urinary symptoms: none.     Advanced Care  "Planning:  - Has an advanced directive?: no    - Has a durable medical POA?: no    - ACP document given to patient?: no      Review of Systems   Constitutional:  Negative for chills, fatigue and fever.   Eyes:  Negative for visual disturbance.   Respiratory:  Negative for cough, chest tightness, shortness of breath and wheezing.    Cardiovascular:  Negative for chest pain, palpitations and leg swelling.   Gastrointestinal:  Negative for abdominal distention, abdominal pain, diarrhea, nausea and vomiting.   Genitourinary:  Negative for difficulty urinating, dysuria, flank pain, frequency, hematuria and urgency.   Musculoskeletal:  Negative for neck pain and neck stiffness.        Right elbow pain   Skin:  Negative for color change and rash.   Neurological:  Negative for dizziness, syncope, weakness, light-headedness and headaches.        Tingling of b/l feet   Psychiatric/Behavioral:  Negative for dysphoric mood, sleep disturbance and suicidal ideas. The patient is not nervous/anxious.          Objective   /88 (BP Location: Left arm, Patient Position: Sitting, Cuff Size: Standard)   Pulse 92   Temp 98.2 °F (36.8 °C) (Temporal)   Ht 5' 9\" (1.753 m)   Wt 87.1 kg (192 lb)   SpO2 98%   BMI 28.35 kg/m²     Physical Exam  Vitals reviewed.   Constitutional:       General: He is not in acute distress.     Appearance: Normal appearance. He is well-developed and normal weight. He is not ill-appearing.   HENT:      Head: Normocephalic and atraumatic.      Right Ear: External ear normal.      Left Ear: External ear normal.      Nose: Nose normal.      Mouth/Throat:      Mouth: Mucous membranes are moist.      Pharynx: Oropharynx is clear.   Eyes:      Extraocular Movements: Extraocular movements intact.      Conjunctiva/sclera: Conjunctivae normal.   Cardiovascular:      Rate and Rhythm: Normal rate and regular rhythm.      Pulses: no weak pulses.           Dorsalis pedis pulses are 2+ on the right side and 2+ on the " left side.        Posterior tibial pulses are 2+ on the right side and 2+ on the left side.      Heart sounds: Normal heart sounds. No murmur heard.  Pulmonary:      Effort: Pulmonary effort is normal. No respiratory distress.      Breath sounds: Normal breath sounds.   Abdominal:      General: Abdomen is flat. Bowel sounds are normal. There is no distension.      Palpations: Abdomen is soft.      Tenderness: There is no abdominal tenderness. There is no guarding.   Musculoskeletal:         General: Tenderness present. No swelling.      Right elbow: No swelling, deformity, effusion or lacerations. Normal range of motion. Tenderness present in lateral epicondyle.      Left elbow: Normal.      Cervical back: Normal range of motion. Tenderness present.      Right lower leg: No edema.      Left lower leg: No edema.      Comments: Pain worse with wrist extension, supination/pronation  Trapezius tender/stiff on R   Feet:      Right foot:      Skin integrity: No ulcer, skin breakdown, erythema, warmth, callus or dry skin.      Left foot:      Skin integrity: No ulcer, skin breakdown, erythema, warmth, callus or dry skin.   Skin:     General: Skin is warm and dry.      Capillary Refill: Capillary refill takes less than 2 seconds.   Neurological:      General: No focal deficit present.      Mental Status: He is alert and oriented to person, place, and time.   Psychiatric:         Mood and Affect: Mood normal.         Behavior: Behavior normal.     Patient's shoes and socks removed.    Right Foot/Ankle   Right Foot Inspection  Skin Exam: skin normal and skin intact. No dry skin, no warmth, no callus, no erythema, no maceration, no abnormal color, no pre-ulcer, no ulcer and no callus.     Toe Exam: ROM and strength within normal limits.     Sensory   Vibration: intact  Proprioception: intact  Monofilament testing: diminished    Vascular  Capillary refills: < 3 seconds  The right DP pulse is 2+. The right PT pulse is 2+.      Left Foot/Ankle  Left Foot Inspection  Skin Exam: skin normal and skin intact. No dry skin, no warmth, no erythema, no maceration, normal color, no pre-ulcer, no ulcer and no callus.     Toe Exam: ROM and strength within normal limits.     Sensory   Vibration: intact  Proprioception: intact  Monofilament testing: diminished    Vascular  Capillary refills: < 3 seconds  The left DP pulse is 2+. The left PT pulse is 2+.     Assign Risk Category  No deformity present  No loss of protective sensation  No weak pulses  Risk: 0

## 2024-11-26 NOTE — ASSESSMENT & PLAN NOTE
Right elbow pain for about 2 months  Unsure if he sustained injury  He has full ROM, but has pain/discomfort with pronation/supination and use of extensor muscles of forearm  No bony tenderness, redness, swelling, warmth, rash, popping/clicking  Pain is overlying extensor tendons  Likely lateral epicondylitis    Plan:  -Can use NSAIDs, Tylenol, ice/heat, lidocaine patches as he has been using  -Continue Voltaren gel   -Continue with PT  -No need for xrays at this time

## 2024-11-26 NOTE — ASSESSMENT & PLAN NOTE
No acute complaints today  Due for diabetic foot exam  Obtain diabetic eye exam last visit --will obtain data for chart  Still notes right elbow pain and is seeing PT for this given lateral epicondylitis  Blood pressure controlled  Thyroid level controlled  Hyperlipidemia controlled

## 2024-11-26 NOTE — ASSESSMENT & PLAN NOTE
Lipid       Lab Results   Component Value Date    CHOLESTEROL 122 02/06/2024    TRIG 87 02/06/2024    HDL 50 02/06/2024    LDLCALC 55 02/06/2024      Repeat lipid panel next year  Continue home Lipitor 40mg

## 2024-11-26 NOTE — ASSESSMENT & PLAN NOTE
Uncontrolled, goal hemoglobin A1c less than 7.  Patient is currently on metformin 1000 mg BID  Also counseled on lifestyle modification with diet and exercise  He has improved his diet and his A1c is improving  He would like to give lifestyle and diet more of a chance before changing his medication further  He does note some tingling and burning of his bilateral feet  Will repeat hemoglobin A1c at February 2025 appt  [facility] and A1c performed on 11/5 --awaiting data from [facility] to pull into the chart  Diabetic foot exam today    Lab Results   Component Value Date    HGBA1C 7.7 (A) 11/05/2024     Plan:  -Continue current diabetes management  -Will repeat A1c in February  -Refill gabapentin    Orders:    Diabetic foot exam; Future    gabapentin (Neurontin) 300 mg capsule; Take 1 capsule (300 mg total) by mouth daily

## 2024-11-26 NOTE — PATIENT INSTRUCTIONS
"Patient Education     Routine physical for adults   The Basics   Written by the doctors and editors at [facility]   What is a physical? -- A physical is a routine visit, or \"check-up,\" with your doctor. You might also hear it called a \"wellness visit\" or \"preventive visit.\"  During each visit, the doctor will:   Ask about your physical and mental health   Ask about your habits, behaviors, and lifestyle   Do an exam   Give you vaccines if needed   Talk to you about any medicines you take   Give advice about your health   Answer your questions  Getting regular check-ups is an important part of taking care of your health. It can help your doctor find and treat any problems you have. But it's also important for preventing health problems.  A routine physical is different from a \"sick visit.\" A sick visit is when you see a doctor because of a health concern or problem. Since physicals are scheduled ahead of time, you can think about what you want to ask the doctor.  How often should I get a physical? -- It depends on your age and health. In general, for people age 21 years and older:   If you are younger than 50 years, you might be able to get a physical every 3 years.   If you are 50 years or older, your doctor might recommend a physical every year.  If you have an ongoing health condition, like diabetes or high blood pressure, your doctor will probably want to see you more often.  What happens during a physical? -- In general, each visit will include:   Physical exam - The doctor or nurse will check your height, weight, heart rate, and blood pressure. They will also look at your eyes and ears. They will ask about how you are feeling and whether you have any symptoms that bother you.   Medicines - It's a good idea to bring a list of all the medicines you take to each doctor visit. Your doctor will talk to you about your medicines and answer any questions. Tell them if you are having any side effects that bother you. You " "should also tell them if you are having trouble paying for any of your medicines.   Habits and behaviors - This includes:   Your diet   Your exercise habits   Whether you smoke, drink alcohol, or use drugs   Whether you are sexually active   Whether you feel safe at home  Your doctor will talk to you about things you can do to improve your health and lower your risk of health problems. They will also offer help and support. For example, if you want to quit smoking, they can give you advice and might prescribe medicines. If you want to improve your diet or get more physical activity, they can help you with this, too.   Lab tests, if needed - The tests you get will depend on your age and situation. For example, your doctor might want to check your:   Cholesterol   Blood sugar   Iron level   Vaccines - The recommended vaccines will depend on your age, health, and what vaccines you already had. Vaccines are very important because they can prevent certain serious or deadly infections.   Discussion of screening - \"Screening\" means checking for diseases or other health problems before they cause symptoms. Your doctor can recommend screening based on your age, risk, and preferences. This might include tests to check for:   Cancer, such as breast, prostate, cervical, ovarian, colorectal, prostate, lung, or skin cancer   Sexually transmitted infections, such as chlamydia and gonorrhea   Mental health conditions like depression and anxiety  Your doctor will talk to you about the different types of screening tests. They can help you decide which screenings to have. They can also explain what the results might mean.   Answering questions - The physical is a good time to ask the doctor or nurse questions about your health. If needed, they can refer you to other doctors or specialists, too.  Adults older than 65 years often need other care, too. As you get older, your doctor will talk to you about:   How to prevent falling at " home   Hearing or vision tests   Memory testing   How to take your medicines safely   Making sure that you have the help and support you need at home  All topics are updated as new evidence becomes available and our peer review process is complete.  This topic retrieved from [employer] on: May 02, 2024.  Topic 264465 Version 1.0  Release: 32.4.3 - C32.122  © 2024 UpToDate, Inc. and/or its affiliates. All rights reserved.  Consumer Information Use and Disclaimer   Disclaimer: This generalized information is a limited summary of diagnosis, treatment, and/or medication information. It is not meant to be comprehensive and should be used as a tool to help the user understand and/or assess potential diagnostic and treatment options. It does NOT include all information about conditions, treatments, medications, side effects, or risks that may apply to a specific patient. It is not intended to be medical advice or a substitute for the medical advice, diagnosis, or treatment of a health care provider based on the health care provider's examination and assessment of a patient's specific and unique circumstances. Patients must speak with a health care provider for complete information about their health, medical questions, and treatment options, including any risks or benefits regarding use of medications. This information does not endorse any treatments or medications as safe, effective, or approved for treating a specific patient. UpToDate, Inc. and its affiliates disclaim any warranty or liability relating to this information or the use thereof.The use of this information is governed by the Terms of Use, available at https://www.woltersZipzoomuwer.com/en/know/clinical-effectiveness-terms. 2024© UpToDate, Inc. and its affiliates and/or licensors. All rights reserved.  Copyright   © 2024 UpToDate, Inc. and/or its affiliates. All rights reserved.

## 2024-11-26 NOTE — ASSESSMENT & PLAN NOTE
- Most recent BP Blood Pressure: 132/88   Current regimen: Lisinopril-hydrochlorothiazide 10-12.5mg  Controlled on this medication  Continue this regimen

## 2024-11-26 NOTE — ASSESSMENT & PLAN NOTE
Thyroid    Lab Results   Component Value Date    ZKM5DPFXWFLA 3.655 02/06/2024    HQJ7BHSGFSHQ 11.730 (H) 09/12/2023    WWJ8ZQFLCQXQ 2.739 03/29/2023    OOO2VREZIICX 3.075 01/23/2023    IVO8CCWQMGIT 3.191 04/18/2022    NNZ2IZMYGSLM 1.366 06/28/2021    VKR6KBVUSSOO 0.169 (L) 04/13/2021      TSH 3.655, within normal limits.    Continue levothyroxine at 137mcg/day

## 2024-11-27 ENCOUNTER — OFFICE VISIT (OUTPATIENT)
Dept: OCCUPATIONAL THERAPY | Facility: CLINIC | Age: 59
End: 2024-11-27
Payer: COMMERCIAL

## 2024-11-27 DIAGNOSIS — M77.11 LATERAL EPICONDYLITIS OF RIGHT ELBOW: Primary | ICD-10-CM

## 2024-11-27 PROCEDURE — 97110 THERAPEUTIC EXERCISES: CPT

## 2024-11-27 NOTE — PROGRESS NOTES
"Daily Note     Today's date: 2024  Patient name: Joseph Arnold  : 1965  MRN: [id number]  Referring provider: Mika Brooks DO  Dx:   Encounter Diagnosis     ICD-10-CM    1. Lateral epicondylitis of right elbow  M77.11 Ambulatory Referral to Physical Therapy                     Subjective: \"It is sore today\".       Objective: See treatment diary below      Assessment: Tolerated treatment well. Patient tolerated exercises well. Will progress treatment as able.       Plan: Continue per plan of care.  Progress treatment as tolerated.       Precautions: Universal      Manuals            IASTM  8           Taping Extensors             Cupping Lateral Epi.                          Neuro Re-Ed                                                                                                        Ther Ex             HEP Extensor Stretch    Wrist AROM    Extensor Isometrics            Med ball rolls  2'           Wrist AAROM  4'           Extensor Stretch             Shoulder Pulley             Wrist eccentrics  #1 x 20           Sup/pronation Roller  3'           Sup/pro cones  Pegs x5           Wrist Maze  x5           Isometrics  Y x 20           Internal/external rotation             Rows             Ther Activity             Keypegs elbow extended  x1                                                               Modalities             MHP  5'                             "

## 2024-12-03 ENCOUNTER — OFFICE VISIT (OUTPATIENT)
Dept: OCCUPATIONAL THERAPY | Facility: CLINIC | Age: 59
End: 2024-12-03
Payer: COMMERCIAL

## 2024-12-03 DIAGNOSIS — M77.11 LATERAL EPICONDYLITIS OF RIGHT ELBOW: Primary | ICD-10-CM

## 2024-12-03 PROCEDURE — 97110 THERAPEUTIC EXERCISES: CPT

## 2024-12-03 NOTE — PROGRESS NOTES
"Daily Note     Today's date: 12/3/2024  Patient name: Joseph Arnold  : 1965  MRN: [id number]  Referring provider: Mika Brooks DO  Dx:   Encounter Diagnosis     ICD-10-CM    1. Lateral epicondylitis of right elbow  M77.11                      Subjective: \"It is very sore after the exercises\".       Objective: See treatment diary below      Assessment: Tolerated treatment well. Patient unable to tolerate increases in resistance secondary to pain. Will trial BFR next session to decrease load on extensors while facilitating strengthening.       Plan: Continue per plan of care.  Progress treatment as tolerated.       Precautions: Universal      Manuals 11/18 11/27 12/3          IASTM  8 8'          Taping Extensors             Cupping Lateral Epi.                          Neuro Re-Ed                                                                                                        Ther Ex             HEP Extensor Stretch    Wrist AROM    Extensor Isometrics            Med ball rolls  2' 2'          Wrist AAROM  4' 4'          Extensor Stretch             Shoulder Pulley             Wrist eccentrics  #1 x 20 #2 x x20          Sup/pronation Roller  3' 3'          Sup/pro cones  Pegs x5 Pegs x 5          Wrist Maze  x5 x5          Isometrics  Y x 20 Y x 20          Internal/external rotation             BFR wrist extension,sup/pro    Next session         Rows             Ther Activity             Keypegs elbow extended  x1 x1                                                              Modalities             MHP  5' 5'          Ice   End of sesison 5'               "

## 2024-12-06 DIAGNOSIS — I10 ESSENTIAL HYPERTENSION: ICD-10-CM

## 2024-12-06 RX ORDER — LISINOPRIL AND HYDROCHLOROTHIAZIDE 10; 12.5 MG/1; MG/1
1 TABLET ORAL DAILY
Qty: 90 TABLET | Refills: 3 | Status: SHIPPED | OUTPATIENT
Start: 2024-12-06

## 2024-12-12 ENCOUNTER — OFFICE VISIT (OUTPATIENT)
Dept: OCCUPATIONAL THERAPY | Facility: CLINIC | Age: 59
End: 2024-12-12
Payer: COMMERCIAL

## 2024-12-12 DIAGNOSIS — M77.11 LATERAL EPICONDYLITIS OF RIGHT ELBOW: Primary | ICD-10-CM

## 2024-12-12 PROCEDURE — 97140 MANUAL THERAPY 1/> REGIONS: CPT | Performed by: OCCUPATIONAL THERAPIST

## 2024-12-12 PROCEDURE — 97110 THERAPEUTIC EXERCISES: CPT | Performed by: OCCUPATIONAL THERAPIST

## 2024-12-12 NOTE — PROGRESS NOTES
"Daily Note     Today's date: 2024  Patient name: Joseph Arnold  : 1965  MRN: [id number]  Referring provider: No ref. provider found  Dx:   Encounter Diagnosis     ICD-10-CM    1. Lateral epicondylitis of right elbow  M77.11           Start Time: 1730  Stop Time: 1800  Total time in clinic (min): 30 minutes    Subjective: \"it is a little sore\" after the exercises       Objective: See treatment diary below.       Assessment: Tolerated treatment well. Initiated BFR today for extensors and supination. Tolerated well. Will be able to upgrade next visit.       Plan: Continue per plan of care.  Progress treatment as tolerated.       Precautions: Universal      Manuals 11/18 11/27 12/3 12/12         IASTM  8 8' 8'         Taping Extensors             Cupping Lateral Epi.                          Neuro Re-Ed                                                                                                        Ther Ex             HEP Extensor Stretch    Wrist AROM    Extensor Isometrics            Med ball rolls  2' 2' 2'         Wrist AAROM  4' 4'          Extensor Stretch             Shoulder Pulley             Wrist eccentrics  #1 x 20 #2 x x20          Sup/pronation Roller  3' 3' 2'         Sup/pro cones  Pegs x5 Pegs x 5          Wrist Maze  x5 x5          Isometrics  Y x 20 Y x 20          Internal/external rotation             BFR wrist extension,sup/pro    1# WE, hammer sup/pro         Rows             Ther Activity             Keypegs elbow extended  x1 x1 x1                                                             Modalities             MHP  5' 5'          Ice   End of sesison 5'               "

## 2024-12-27 DIAGNOSIS — E11.9 TYPE 2 DIABETES MELLITUS WITHOUT COMPLICATION, WITHOUT LONG-TERM CURRENT USE OF INSULIN (HCC): ICD-10-CM

## 2025-01-24 ENCOUNTER — TELEPHONE (OUTPATIENT)
Dept: NEUROSURGERY | Facility: CLINIC | Age: 60
End: 2025-01-24

## 2025-01-24 NOTE — TELEPHONE ENCOUNTER
Called patient left  to reschedule cancelled appointment from 09/2024 solo [facility] with mri brain waiting for gary back.

## 2025-03-06 DIAGNOSIS — E03.9 HYPOTHYROIDISM, UNSPECIFIED TYPE: ICD-10-CM

## 2025-03-06 DIAGNOSIS — I10 BENIGN ESSENTIAL HYPERTENSION: ICD-10-CM

## 2025-03-06 DIAGNOSIS — E11.9 TYPE 2 DIABETES MELLITUS WITHOUT COMPLICATION, WITHOUT LONG-TERM CURRENT USE OF INSULIN (HCC): Primary | ICD-10-CM

## 2025-03-06 DIAGNOSIS — E03.8 OTHER SPECIFIED HYPOTHYROIDISM: ICD-10-CM

## 2025-03-06 RX ORDER — LEVOTHYROXINE SODIUM 137 UG/1
137 TABLET ORAL DAILY
Qty: 30 TABLET | Refills: 0 | Status: SHIPPED | OUTPATIENT
Start: 2025-03-06

## 2025-03-13 ENCOUNTER — RESULTS FOLLOW-UP (OUTPATIENT)
Dept: FAMILY MEDICINE CLINIC | Facility: CLINIC | Age: 60
End: 2025-03-13

## 2025-03-13 ENCOUNTER — APPOINTMENT (OUTPATIENT)
Dept: LAB | Facility: CLINIC | Age: 60
End: 2025-03-13
Payer: COMMERCIAL

## 2025-03-13 DIAGNOSIS — E03.8 OTHER SPECIFIED HYPOTHYROIDISM: ICD-10-CM

## 2025-03-13 DIAGNOSIS — E11.9 TYPE 2 DIABETES MELLITUS WITHOUT COMPLICATION, WITHOUT LONG-TERM CURRENT USE OF INSULIN (HCC): ICD-10-CM

## 2025-03-13 LAB
ALBUMIN SERPL BCG-MCNC: 4.3 G/DL (ref 3.5–5)
ALP SERPL-CCNC: 64 U/L (ref 34–104)
ALT SERPL W P-5'-P-CCNC: 36 U/L (ref 7–52)
ANION GAP SERPL CALCULATED.3IONS-SCNC: 6 MMOL/L (ref 4–13)
AST SERPL W P-5'-P-CCNC: 33 U/L (ref 13–39)
BILIRUB SERPL-MCNC: 0.77 MG/DL (ref 0.2–1)
BUN SERPL-MCNC: 14 MG/DL (ref 5–25)
CALCIUM SERPL-MCNC: 9.4 MG/DL (ref 8.4–10.2)
CHLORIDE SERPL-SCNC: 100 MMOL/L (ref 96–108)
CHOLEST SERPL-MCNC: 151 MG/DL (ref ?–200)
CO2 SERPL-SCNC: 29 MMOL/L (ref 21–32)
CREAT SERPL-MCNC: 0.78 MG/DL (ref 0.6–1.3)
CREAT UR-MCNC: 168.5 MG/DL
EST. AVERAGE GLUCOSE BLD GHB EST-MCNC: 192 MG/DL
GFR SERPL CREATININE-BSD FRML MDRD: 100 ML/MIN/1.73SQ M
GLUCOSE P FAST SERPL-MCNC: 191 MG/DL (ref 65–99)
HBA1C MFR BLD: 8.3 %
HDLC SERPL-MCNC: 46 MG/DL
LDLC SERPL CALC-MCNC: 69 MG/DL (ref 0–100)
MICROALBUMIN UR-MCNC: 15.5 MG/L
MICROALBUMIN/CREAT 24H UR: 9 MG/G CREATININE (ref 0–30)
NONHDLC SERPL-MCNC: 105 MG/DL
POTASSIUM SERPL-SCNC: 4.2 MMOL/L (ref 3.5–5.3)
PROT SERPL-MCNC: 7.1 G/DL (ref 6.4–8.4)
SODIUM SERPL-SCNC: 135 MMOL/L (ref 135–147)
TRIGL SERPL-MCNC: 179 MG/DL (ref ?–150)
TSH SERPL DL<=0.05 MIU/L-ACNC: 2.57 UIU/ML (ref 0.45–4.5)

## 2025-03-13 PROCEDURE — 80061 LIPID PANEL: CPT

## 2025-03-13 PROCEDURE — 83036 HEMOGLOBIN GLYCOSYLATED A1C: CPT

## 2025-03-13 PROCEDURE — 82570 ASSAY OF URINE CREATININE: CPT

## 2025-03-13 PROCEDURE — 82043 UR ALBUMIN QUANTITATIVE: CPT

## 2025-03-24 DIAGNOSIS — E11.9 TYPE 2 DIABETES MELLITUS WITHOUT COMPLICATION, WITHOUT LONG-TERM CURRENT USE OF INSULIN (HCC): ICD-10-CM

## 2025-04-03 ENCOUNTER — OFFICE VISIT (OUTPATIENT)
Dept: FAMILY MEDICINE CLINIC | Facility: CLINIC | Age: 60
End: 2025-04-03

## 2025-04-03 VITALS
TEMPERATURE: 98 F | HEART RATE: 98 BPM | DIASTOLIC BLOOD PRESSURE: 84 MMHG | BODY MASS INDEX: 28.88 KG/M2 | HEIGHT: 69 IN | OXYGEN SATURATION: 98 % | SYSTOLIC BLOOD PRESSURE: 127 MMHG | WEIGHT: 195 LBS

## 2025-04-03 DIAGNOSIS — M77.11 LATERAL EPICONDYLITIS OF RIGHT ELBOW: ICD-10-CM

## 2025-04-03 DIAGNOSIS — E11.9 TYPE 2 DIABETES MELLITUS WITHOUT COMPLICATION, WITHOUT LONG-TERM CURRENT USE OF INSULIN (HCC): Primary | ICD-10-CM

## 2025-04-03 PROCEDURE — 99213 OFFICE O/P EST LOW 20 MIN: CPT | Performed by: FAMILY MEDICINE

## 2025-04-03 RX ORDER — METFORMIN HYDROCHLORIDE 500 MG/1
2000 TABLET, EXTENDED RELEASE ORAL
Qty: 400 TABLET | Refills: 3 | Status: SHIPPED | OUTPATIENT
Start: 2025-04-03

## 2025-04-03 NOTE — PROGRESS NOTES
Name: Joseph Arnold      : 1965      MRN: [id number]  Encounter Provider: Mika Brooks DO  Encounter Date: 4/3/2025   Encounter department: [facility] [place]  :  Assessment & Plan  Type 2 diabetes mellitus without complication, without long-term current use of insulin (HCC)  Home regimen: metformin 1000mg BID  Lab Results   Component Value Date    HGBA1C 8.3 (H) 2025     Reports that he misses his evening dose of metformin about 3-4x per week    Would benefit from once daily dosing    Transition to metformin xr 2000mg daily  Recheck A1c in   IRIS exam in     Orders:    metFORMIN (GLUCOPHAGE-XR) 500 mg 24 hr tablet; Take 4 tablets (2,000 mg total) by mouth daily with breakfast    Lateral epicondylitis of right elbow  Continued pain of right lateral elbow despite oral and topical agents and PT    Reasonable to get xray -- possible referral to orthopedic surgery  Orders:    XR elbow 3+ vw right; Future           History of Present Illness   Patient is a 60yo M who presents to discuss his recent A1c result.  His A1c did worsen to 8.3.  Patient reports that he misses a metformin dose 3-4 times per week.  No changes to his peripheral neuropathy.  Also continues to have right lateral elbow pain despite PO, topical medications and PT.      Review of Systems   Constitutional:  Negative for chills, fatigue and fever.   Eyes:  Negative for visual disturbance.   Respiratory:  Negative for cough, chest tightness, shortness of breath and wheezing.    Cardiovascular:  Negative for chest pain, palpitations and leg swelling.   Gastrointestinal:  Negative for abdominal distention, abdominal pain, diarrhea, nausea and vomiting.   Genitourinary:  Negative for difficulty urinating, dysuria, flank pain, frequency, hematuria and urgency.   Musculoskeletal:  Negative for neck pain and neck stiffness.        Right elbow pain   Skin:  Negative for color change and rash.  "  Neurological:  Negative for dizziness, syncope, weakness, light-headedness and headaches.   Psychiatric/Behavioral:  Negative for dysphoric mood, sleep disturbance and suicidal ideas. The patient is not nervous/anxious.        Objective   /84 (BP Location: Right arm, Patient Position: Sitting, Cuff Size: Standard)   Pulse 98   Temp 98 °F (36.7 °C) (Temporal)   Ht 5' 9\" (1.753 m)   Wt 88.5 kg (195 lb)   SpO2 98%   BMI 28.80 kg/m²      Physical Exam  Vitals reviewed.   Constitutional:       General: He is not in acute distress.     Appearance: Normal appearance. He is well-developed and normal weight. He is not ill-appearing.   HENT:      Head: Normocephalic and atraumatic.      Right Ear: External ear normal.      Left Ear: External ear normal.      Nose: Nose normal.      Mouth/Throat:      Mouth: Mucous membranes are moist.      Pharynx: Oropharynx is clear.   Eyes:      Extraocular Movements: Extraocular movements intact.      Conjunctiva/sclera: Conjunctivae normal.   Cardiovascular:      Rate and Rhythm: Normal rate and regular rhythm.      Heart sounds: Normal heart sounds. No murmur heard.  Pulmonary:      Effort: Pulmonary effort is normal. No respiratory distress.      Breath sounds: Normal breath sounds.   Abdominal:      General: Abdomen is flat. Bowel sounds are normal. There is no distension.      Palpations: Abdomen is soft.      Tenderness: There is no abdominal tenderness. There is no guarding.   Musculoskeletal:         General: Tenderness present. No swelling.      Right elbow: No swelling, deformity, effusion or lacerations. Normal range of motion. Tenderness present in lateral epicondyle.      Left elbow: Normal.      Cervical back: Normal range of motion.      Right lower leg: No edema.      Left lower leg: No edema.      Comments: Pain worse with wrist extension, supination/pronation  Trapezius tender/stiff on R   Skin:     General: Skin is warm and dry.      Capillary Refill: " Capillary refill takes less than 2 seconds.   Neurological:      General: No focal deficit present.      Mental Status: He is alert and oriented to person, place, and time.   Psychiatric:         Mood and Affect: Mood normal.         Behavior: Behavior normal.

## 2025-04-03 NOTE — ASSESSMENT & PLAN NOTE
Home regimen: metformin 1000mg BID  Lab Results   Component Value Date    HGBA1C 8.3 (H) 03/13/2025     Reports that he misses his evening dose of metformin about 3-4x per week    Would benefit from once daily dosing    Transition to metformin xr 2000mg daily  Recheck A1c in June  IRIS exam in June    Orders:    metFORMIN (GLUCOPHAGE-XR) 500 mg 24 hr tablet; Take 4 tablets (2,000 mg total) by mouth daily with breakfast

## 2025-05-16 ENCOUNTER — HOSPITAL ENCOUNTER (OUTPATIENT)
Dept: MRI IMAGING | Facility: HOSPITAL | Age: 60
Discharge: HOME | End: 2025-05-16
Attending: NEUROLOGICAL SURGERY
Payer: COMMERCIAL

## 2025-05-16 DIAGNOSIS — Z98.890 S/P CRANIOTOMY: ICD-10-CM

## 2025-05-16 DIAGNOSIS — D42.0 ATYPICAL MENINGIOMA OF BRAIN (HCC): ICD-10-CM

## 2025-05-16 PROCEDURE — A9585 GADOBUTROL INJECTION: HCPCS | Performed by: NEUROLOGICAL SURGERY

## 2025-05-16 PROCEDURE — 70553 MRI BRAIN STEM W/O & W/DYE: CPT

## 2025-05-16 RX ORDER — GADOBUTROL 604.72 MG/ML
8 INJECTION INTRAVENOUS
Status: DISCONTINUED | OUTPATIENT
Start: 2025-05-16 | End: 2025-05-16

## 2025-05-16 RX ORDER — GADOBUTROL 604.72 MG/ML
10 INJECTION INTRAVENOUS
Status: COMPLETED | OUTPATIENT
Start: 2025-05-16 | End: 2025-05-16

## 2025-05-16 RX ADMIN — GADOBUTROL 10 ML: 604.72 INJECTION INTRAVENOUS at 20:39

## 2025-05-23 ENCOUNTER — TELEPHONE (OUTPATIENT)
Dept: FAMILY MEDICINE CLINIC | Facility: CLINIC | Age: 60
End: 2025-05-23

## 2025-05-23 NOTE — TELEPHONE ENCOUNTER
Called patient leaving a detail message in regards to changing appointment, for June 13 @ 4:40 pm due to changes in the Doctor scheduled.  Appointment type :ovs for A1c Check-up

## 2025-06-11 ENCOUNTER — OFFICE VISIT (OUTPATIENT)
Dept: NEUROSURGERY | Facility: CLINIC | Age: 60
End: 2025-06-11
Payer: COMMERCIAL

## 2025-06-11 VITALS
HEIGHT: 69 IN | WEIGHT: 192 LBS | OXYGEN SATURATION: 98 % | TEMPERATURE: 97.7 F | SYSTOLIC BLOOD PRESSURE: 140 MMHG | BODY MASS INDEX: 28.44 KG/M2 | DIASTOLIC BLOOD PRESSURE: 82 MMHG | HEART RATE: 110 BPM

## 2025-06-11 DIAGNOSIS — Z98.890 S/P CRANIOTOMY: ICD-10-CM

## 2025-06-11 DIAGNOSIS — D42.0 ATYPICAL MENINGIOMA OF BRAIN (HCC): Primary | ICD-10-CM

## 2025-06-11 PROCEDURE — 99214 OFFICE O/P EST MOD 30 MIN: CPT | Performed by: NEUROLOGICAL SURGERY

## 2025-06-11 NOTE — PROGRESS NOTES
Name: Joseph Arnold      : 1965      MRN: [id number]  Encounter Provider: Harsha Ambriz MD  Encounter Date: 2025   Encounter department: [facility]OSURG[facility]  :  Assessment & Plan  Atypical meningioma of brain (HCC)  S/P craniotomy    Status post left/semicoronal craniotomy with craniectomy and orbital osteotomy with resection of extradural mass left frontal, with exenteration/obliteration frontal sinus and custom synthetic cranioplasty on 2023. S/p endoscopic skull base repair with Dr. Hardy on 23.     Pathology who grade 2 meningioma     Between those procedures, he was admitted twice with subgaleal/extradural air collection. Did not have fevers, chills, profound rhinorrhea. The collection spontaneously resolved/reduced on its own after his first admission, but after the second admission, it waxed and waned.  We aspirated out the air on a few occasions, including during his repair with Dr. Hardy. Went off antibiotics 23, with no signs of infection.      Had occasional L frontal/pterional H/As for some time after surgery, but these appear to have resolved. Still has some OS light sensitivity, but appears mild. No swelling over the bone flap (mentioned some at his prior visits).      New MRI scan of the brain shows no residual or recurrence.      I have recommended close surveillance as his pathology was grade 2. I have suggested a follow-up MRI in 12 months, per NCCN guidelines, and he agrees. I will see him back afterward.      23 Metrics: EQ5D5L 63823=7.000; ; MOCA 25/30     03/10/23 Metrics: EQ5D5L 68413=8.000;      23 Metrics: EQ5D5L 63468=7.000;      23 Metrics: EQ5D5L 86173=2.000;      23 Metrics: EQ5D5L 43655=2.861; VAS 80     23 Metrics: EQ5D5L 14238=0.827; VAS 85     23 Metrics: EQ5D5L 65574=9.000; VAS 90     03/08/24 Metrics: EQ5D5L 23579=6.860; VAS 95;     25  Metrics: EQ5D5L 80380=5.000; ; MOCA 20/30    Orders:  •  MRI brain w wo contrast; Future        History of Present Illness     Joseph Arnold is a 59 y.o. male who presents for follow-up    HPI     Pain Score: 0-No pain      Review of Systems   Eyes:  Positive for photophobia (left eye, wears sunglasses).   Neurological:  Headaches: improved.   Psychiatric/Behavioral:  Positive for confusion (slight confusion).    All other systems reviewed and are negative.    I have personally reviewed the MA's review of systems and made changes as necessary.          Past Medical History   Past Medical History[1]  Past Surgical History[2]  Family History[3]  he reports that he has never smoked. He has never used smokeless tobacco. He reports current alcohol use. He reports that he does not use drugs.  Current Outpatient Medications   Medication Instructions   • acetaminophen (TYLENOL) 650 mg, Oral, Every 6 hours PRN   • Alcohol Swabs 70 % PADS May substitute brand based on insurance coverage. Check glucose TID.   • atorvastatin (LIPITOR) 40 mg tablet Take 1 tablet by mouth once daily   • Blood Glucose Monitoring Suppl (OneTouch Verio Reflect) w/Device KIT May substitute brand based on insurance coverage. Check glucose TID.   • clotrimazole (LOTRIMIN) 1 % cream Topical, 2 times daily   • Continuous Blood Gluc Sensor (FreeStyle Khanh 14 Day Sensor) MISC 1 each, Device, Daily   • Diclofenac Sodium (VOLTAREN) 2 g, Topical, 4 times daily   • diphenhydramine, lidocaine, Al/Mg hydroxide, simethicone (Magic Mouthwash) SUSP 10 mL, Swish & Spit, Every 4 hours PRN   • famotidine (PEPCID) 20 mg, Oral, 2 times daily   • gabapentin (NEURONTIN) 300 mg, Oral, Daily   • glucose blood (Accu-Chek Guide) test strip 1 each, Other, 2 times daily, Use as instructed   • glucose blood (OneTouch Verio) test strip 1 each, Other, 2 times daily, Use as instructed   • hydrocortisone 1 % cream APPLY  CREAM EXTERNALLY TWICE DAILY   • levothyroxine  "137 mcg, Oral, Daily   • lidocaine (LIDODERM) 5 % 1 patch, Topical, Daily, Remove & Discard patch within 12 hours or as directed by MD   • Lidocaine Viscous HCl (XYLOCAINE) 2 % mucosal solution 15 mL, Swish & Spit, 4 times daily PRN   • lisinopril-hydrochlorothiazide (PRINZIDE,ZESTORETIC) 10-12.5 MG per tablet 1 tablet, Oral, Daily   • metFORMIN (GLUCOPHAGE-XR) 2,000 mg, Oral, Daily with breakfast   • methocarbamol (ROBAXIN) 500 mg tablet TAKE 1 TABLET BY MOUTH 4 TIMES DAILY AS NEEDED FOR MUSCLE SPASMS   • OneTouch Delica Lancets 33G MISC May substitute brand based on insurance coverage. Check glucose TID.   Allergies[4]   Objective   /82 (BP Location: Left arm, Patient Position: Sitting, Cuff Size: Adult)   Pulse (!) 110   Temp 97.7 °F (36.5 °C) (Temporal)   Ht 5' 9\" (1.753 m)   Wt 87.1 kg (192 lb)   SpO2 98%   BMI 28.35 kg/m²     Physical Exam  Neurological Exam    Radiology Results Review: I have reviewed the following images/report studies in PACS:     MRI brain from 5/16/2025:    IMPRESSION:     Stable postsurgical change from prior left frontal lobe atypical meningioma resection. No evidence of residual or recurrent meningioma.                 [1]  Past Medical History:  Diagnosis Date   • Diabetes mellitus (HCC)    • Disease of thyroid gland    • Elevated LFTs     last assessed 02Nov2015   • Hyperlipidemia    • Hypertension    • Impaired fasting glucose     last assessed 29Jan2014   [2]  Past Surgical History:  Procedure Laterality Date   • APPENDECTOMY     • BRAIN BIOPSY Left 6/8/2023    Procedure: Subgaleal aspiration;  Surgeon: Harsha Ambriz MD;  Location: [facility];  Service: Neurosurgery   • COLONOSCOPY     • OK CRNEC TREPH BONE FLAP CRNOT EXC BRAIN TUMOR STTL Left 4/11/2023    Procedure: IMAGE-GUIDED LEFT FRONTAL CRANIOTOMY FOR RESECTION OF EXTRA-AXIAL & SKULL MASS, WITH CRANIOPLASTY, EXENTERATION/OBLITERATION OF FRONTAL SINUS; ORBITAL OSTEOTOMY;  Surgeon: Harsha Ambriz MD;  " Location: [facility];  Service: Neurosurgery   • CO SECONDARY RPR DURA CSF LEAK FREE TISSUE GRAFT N/A 6/8/2023    Procedure: Image guided endoscopic transnasal skull base repair;  Surgeon: Flo Hardy MD;  Location: [facility];  Service: ENT   [3]  Family History  Problem Relation Name Age of Onset   • Diabetes Mother     • Breast cancer Mother     • Diabetes Father     [4]  No Known Allergies

## 2025-07-06 ENCOUNTER — HOSPITAL ENCOUNTER (EMERGENCY)
Facility: HOSPITAL | Age: 60
Discharge: HOME | End: 2025-07-06
Attending: EMERGENCY MEDICINE
Payer: COMMERCIAL

## 2025-07-06 VITALS
SYSTOLIC BLOOD PRESSURE: 166 MMHG | TEMPERATURE: 97.7 F | DIASTOLIC BLOOD PRESSURE: 81 MMHG | HEART RATE: 89 BPM | RESPIRATION RATE: 18 BRPM | OXYGEN SATURATION: 100 %

## 2025-07-06 DIAGNOSIS — R10.9 FLANK PAIN: Primary | ICD-10-CM

## 2025-07-06 DIAGNOSIS — R10.9 ABDOMINAL PAIN: ICD-10-CM

## 2025-07-06 PROCEDURE — 99283 EMERGENCY DEPT VISIT LOW MDM: CPT

## 2025-07-06 PROCEDURE — 99284 EMERGENCY DEPT VISIT MOD MDM: CPT | Performed by: EMERGENCY MEDICINE

## 2025-07-06 RX ORDER — LIDOCAINE 50 MG/G
1 PATCH TOPICAL EVERY 24 HOURS
Qty: 15 PATCH | Refills: 0 | Status: SHIPPED | OUTPATIENT
Start: 2025-07-06

## 2025-07-06 RX ORDER — IBUPROFEN 600 MG/1
600 TABLET, FILM COATED ORAL ONCE
Status: COMPLETED | OUTPATIENT
Start: 2025-07-06 | End: 2025-07-06

## 2025-07-06 RX ORDER — METHOCARBAMOL 500 MG/1
500 TABLET, FILM COATED ORAL 2 TIMES DAILY
Qty: 20 TABLET | Refills: 0 | Status: SHIPPED | OUTPATIENT
Start: 2025-07-06

## 2025-07-06 RX ORDER — ACETAMINOPHEN 325 MG/1
975 TABLET ORAL ONCE
Status: COMPLETED | OUTPATIENT
Start: 2025-07-06 | End: 2025-07-06

## 2025-07-06 RX ORDER — LIDOCAINE 50 MG/G
1 PATCH TOPICAL ONCE
Status: DISCONTINUED | OUTPATIENT
Start: 2025-07-06 | End: 2025-07-06 | Stop reason: HOSPADM

## 2025-07-06 RX ADMIN — IBUPROFEN 600 MG: 600 TABLET ORAL at 09:49

## 2025-07-06 RX ADMIN — ACETAMINOPHEN 975 MG: 325 TABLET ORAL at 09:49

## 2025-07-06 RX ADMIN — LIDOCAINE 1 PATCH: 700 PATCH TOPICAL at 09:49

## 2025-07-06 NOTE — DISCHARGE INSTRUCTIONS
Please take up to 650 mg of Tylenol every 6 hours as needed for pain, may take 600 mg of ibuprofen every 6 hours as needed.  Please apply a lidocaine patch for 12 hours and ensure removal of previous patch prior to applying any new lidocaine patch.  May use Robaxin muscle relaxer before bed, be aware that medication can make you sleepy, do not drive or operate machinery while on muscle relaxer

## 2025-07-06 NOTE — ED PROVIDER NOTES
Time reflects when diagnosis was documented in both MDM as applicable and the Disposition within this note       Time User Action Codes Description Comment    7/6/2025 10:31 AM CuongGrzegorz Clarissa [R10.9] Flank pain     7/6/2025 10:31 AM Grzegorz Hutchins Clarissa [R10.9] Abdominal pain           ED Disposition       ED Disposition   Discharge    Condition   Stable    Date/Time   Sun Jul 6, 2025 10:33 AM    Comment   Joseph Arnold discharge to home/self care.                   Assessment & Plan       Medical Decision Making  Joseph Arnold is a 59 y.o. male with a past medical history of type 2 diabetes, hypertension being evaluated for right-sided lower abdominal/flank pain.     Differential diagnoses include but not limited to: Herniated disc, muscle strain/sprain, hernia, fracture    See ED Course for data/imaging interpretation and further MDM.    Disposition: Physical exam findings without any evidence of hernia.  No bony tenderness along midline spine, low suspicion for acute fractures or hernia.  Suspect musculoskeletal etiology of symptoms, patient given symptomatic treatment with Tylenol, ibuprofen, lidocaine patch.  Reports improvement of symptoms following treatment.  Given prescription for lidocaine patch, Robaxin and instructed to utilize scheduled Tylenol/ibuprofen for symptomatic relief.  Strict return precautions discussed, discharged home in stable condition.      Risk  OTC drugs.  Prescription drug management.             Medications   acetaminophen (TYLENOL) tablet 975 mg (975 mg Oral Given 7/6/25 0949)   ibuprofen (MOTRIN) tablet 600 mg (600 mg Oral Given 7/6/25 0949)       ED Risk Strat Scores                    No data recorded                            History of Present Illness       Chief Complaint   Patient presents with    Abdominal Pain     On Wednesday bent over to pick something up at work and felt a pop the right side of his abd. Hurts worse with movement.        Past Medical History[1]    Past Surgical History[2]   Family History[3]   Social History[4]   E-Cigarette/Vaping    E-Cigarette Use Never User       E-Cigarette/Vaping Substances    Nicotine No     THC No     CBD No     Flavoring No     Other No     Unknown No       I have reviewed and agree with the history as documented.     Joseph Arnold is a 59 y.o. male with a past medical history of type 2 diabetes, hypertension being evaluated for right-sided lower abdominal/flank pain.  Patient reports being at work approximately 5 days ago where he was shifting heavy pallet, reporting no significant pain during initial event.  Had soreness to right lower abdomen/right flank that night, has been taking Tylenol intermittently and icing injury with minimal relief.  Denies any additional traumatic injuries since initial event.  No prior history of IV drug use, bowel or bladder incontinence, malignancy.        Abdominal Pain      Review of Systems   Gastrointestinal:  Positive for abdominal pain (Right lower).   Genitourinary:  Positive for flank pain (Right-sided).   All other systems reviewed and are negative.          Objective       ED Triage Vitals   Temperature Pulse Blood Pressure Respirations SpO2 Patient Position - Orthostatic VS   07/06/25 0857 07/06/25 0857 07/06/25 0858 07/06/25 0857 07/06/25 0857 07/06/25 0857   97.7 °F (36.5 °C) 89 166/81 18 100 % Sitting      Temp Source Heart Rate Source BP Location FiO2 (%) Pain Score    07/06/25 0857 07/06/25 0857 07/06/25 0857 -- 07/06/25 0949    Oral Monitor Left arm  10 - Worst Possible Pain      Vitals      Date and Time Temp Pulse SpO2 Resp BP Pain Score FACES Pain Rating User   07/06/25 0949 -- -- -- -- -- 10 - Worst Possible Pain -- KV   07/06/25 0858 -- -- -- -- 166/81 -- -- KM   07/06/25 0857 97.7 °F (36.5 °C) 89 100 % 18 -- -- -- KM            Physical Exam  Vitals and nursing note reviewed.   Constitutional:       General: He is not in acute distress.     Appearance: He is  well-developed.   HENT:      Head: Normocephalic and atraumatic.     Eyes:      Conjunctiva/sclera: Conjunctivae normal.       Cardiovascular:      Rate and Rhythm: Normal rate and regular rhythm.      Heart sounds: Normal heart sounds. No murmur heard.  Pulmonary:      Effort: Pulmonary effort is normal. No respiratory distress.      Breath sounds: Normal breath sounds. No stridor. No wheezing, rhonchi or rales.   Abdominal:      General: Abdomen is flat. Bowel sounds are normal. There is no distension.      Palpations: Abdomen is soft.      Tenderness: There is no abdominal tenderness.      Hernia: No hernia is present.      Comments: Right side pain, no evidence of umbilical or inguinal hernia.  No traumatic discoloration or wounds on right side     Musculoskeletal:         General: No swelling.      Cervical back: Normal and neck supple. No bony tenderness. Normal range of motion.      Thoracic back: Normal. No bony tenderness. Normal range of motion.      Lumbar back: Normal. No bony tenderness. Normal range of motion.     Skin:     General: Skin is warm and dry.      Capillary Refill: Capillary refill takes less than 2 seconds.     Neurological:      Mental Status: He is alert.     Psychiatric:         Mood and Affect: Mood normal.         Results Reviewed       None            No orders to display       Procedures    ED Medication and Procedure Management   Prior to Admission Medications   Prescriptions Last Dose Informant Patient Reported? Taking?   Alcohol Swabs 70 % PADS  Self No No   Sig: May substitute brand based on insurance coverage. Check glucose TID.   Patient not taking: Reported on 3/6/2024   Blood Glucose Monitoring Suppl (OneTouch Verio Reflect) w/Device KIT  Self No No   Sig: May substitute brand based on insurance coverage. Check glucose TID.   Patient not taking: Reported on 2025   Continuous Blood Gluc Sensor (FreeStyle Khanh 14 Day Sensor) [facility]   No No   Si each by Device route in  the morning   Patient not taking: Reported on 6/11/2025   Diclofenac Sodium (VOLTAREN) 1 %   No No   Sig: Apply 2 g topically 4 (four) times a day   Patient not taking: Reported on 6/11/2025   Lidocaine Viscous HCl (XYLOCAINE) 2 % mucosal solution  Self No No   Sig: Swish and spit 15 mL 4 (four) times a day as needed for mouth pain or discomfort   OneTouch Delica Lancets 33G MISC  Self No No   Sig: May substitute brand based on insurance coverage. Check glucose TID.   Patient not taking: Reported on 6/11/2025   acetaminophen (TYLENOL) 325 mg tablet  Self No No   Sig: Take 2 tablets (650 mg total) by mouth every 6 (six) hours as needed for headaches or mild pain   atorvastatin (LIPITOR) 40 mg tablet  Self No No   Sig: Take 1 tablet by mouth once daily   clotrimazole (LOTRIMIN) 1 % cream  Self No No   Sig: Apply topically 2 (two) times a day   Patient not taking: Reported on 6/11/2025   diphenhydramine, lidocaine, Al/Mg hydroxide, simethicone (Magic Mouthwash) SUSP  Self No No   Sig: Swish and spit 10 mL every 4 (four) hours as needed for mouth pain or discomfort   Patient not taking: Reported on 6/11/2025   famotidine (PEPCID) 20 mg tablet   No No   Sig: Take 1 tablet (20 mg total) by mouth 2 (two) times a day   gabapentin (Neurontin) 300 mg capsule   No No   Sig: Take 1 capsule (300 mg total) by mouth daily   glucose blood (Accu-Chek Guide) test strip  Self No No   Sig: Use 1 each 2 (two) times a day Use as instructed   Patient not taking: Reported on 6/11/2025   glucose blood (OneTouch Verio) test strip   No No   Sig: Use 1 each 2 (two) times a day Use as instructed   Patient not taking: Reported on 6/11/2025   hydrocortisone 1 % cream   No No   Sig: APPLY  CREAM EXTERNALLY TWICE DAILY   Patient not taking: Reported on 6/11/2025   levothyroxine 137 mcg tablet   No No   Sig: Take 1 tablet by mouth once daily   lidocaine (LIDODERM) 5 %   No No   Sig: Apply 1 patch topically over 12 hours daily Remove & Discard patch  within 12 hours or as directed by MD   Patient taking differently: Apply 1 patch topically as needed Remove & Discard patch within 12 hours or as directed by MD   lisinopril-hydrochlorothiazide (PRINZIDE,ZESTORETIC) 10-12.5 MG per tablet   No No   Sig: Take 1 tablet by mouth daily   metFORMIN (GLUCOPHAGE-XR) 500 mg 24 hr tablet   No No   Sig: Take 4 tablets (2,000 mg total) by mouth daily with breakfast   methocarbamol (ROBAXIN) 500 mg tablet   No No   Sig: TAKE 1 TABLET BY MOUTH 4 TIMES DAILY AS NEEDED FOR MUSCLE SPASMS   Patient not taking: Reported on 6/11/2025      Facility-Administered Medications: None     Discharge Medication List as of 7/6/2025 10:33 AM        START taking these medications    Details   !! lidocaine (LIDODERM) 5 % Apply 1 patch topically every 24 hours over 12 hours Remove & Discard patch within 12 hours or as directed by MD, Starting Sun 7/6/2025, Normal      !! methocarbamol (ROBAXIN) 500 mg tablet Take 1 tablet (500 mg total) by mouth 2 (two) times a day, Starting Sun 7/6/2025, Normal       !! - Potential duplicate medications found. Please discuss with provider.        CONTINUE these medications which have NOT CHANGED    Details   acetaminophen (TYLENOL) 325 mg tablet Take 2 tablets (650 mg total) by mouth every 6 (six) hours as needed for headaches or mild pain, Starting Sat 5/13/2023, No Print      Alcohol Swabs 70 % PADS May substitute brand based on insurance coverage. Check glucose TID., Normal      atorvastatin (LIPITOR) 40 mg tablet Take 1 tablet by mouth once daily, Normal      Blood Glucose Monitoring Suppl (OneTouch Verio Reflect) w/Device KIT May substitute brand based on insurance coverage. Check glucose TID., Normal      clotrimazole (LOTRIMIN) 1 % cream Apply topically 2 (two) times a day, Starting Thu 6/15/2023, Normal      Continuous Blood Gluc Sensor (FreeStyle Khanh 14 Day Sensor) MISC 1 each by Device route in the morning, Starting Mon 2/19/2024, Normal      Diclofenac  Sodium (VOLTAREN) 1 % Apply 2 g topically 4 (four) times a day, Starting Tue 11/5/2024, Normal      diphenhydramine, lidocaine, Al/Mg hydroxide, simethicone (Magic Mouthwash) SUSP Swish and spit 10 mL every 4 (four) hours as needed for mouth pain or discomfort, Starting Thu 6/15/2023, Normal      famotidine (PEPCID) 20 mg tablet Take 1 tablet (20 mg total) by mouth 2 (two) times a day, Starting Fri 7/19/2024, Until Tue 11/26/2024, Normal      gabapentin (Neurontin) 300 mg capsule Take 1 capsule (300 mg total) by mouth daily, Starting Tue 11/26/2024, Normal      !! glucose blood (Accu-Chek Guide) test strip Use 1 each 2 (two) times a day Use as instructed, Starting Mon 12/7/2020, Normal      !! glucose blood (OneTouch Verio) test strip Use 1 each 2 (two) times a day Use as instructed, Starting Mon 1/22/2024, Normal      hydrocortisone 1 % cream APPLY  CREAM EXTERNALLY TWICE DAILY, Normal      levothyroxine 137 mcg tablet Take 1 tablet by mouth once daily, Starting Thu 3/6/2025, Normal      !! lidocaine (LIDODERM) 5 % Apply 1 patch topically over 12 hours daily Remove & Discard patch within 12 hours or as directed by MD, Starting Mon 9/25/2023, Normal      Lidocaine Viscous HCl (XYLOCAINE) 2 % mucosal solution Swish and spit 15 mL 4 (four) times a day as needed for mouth pain or discomfort, Starting Fri 6/16/2023, Normal      lisinopril-hydrochlorothiazide (PRINZIDE,ZESTORETIC) 10-12.5 MG per tablet Take 1 tablet by mouth daily, Starting Fri 12/6/2024, Normal      metFORMIN (GLUCOPHAGE-XR) 500 mg 24 hr tablet Take 4 tablets (2,000 mg total) by mouth daily with breakfast, Starting Thu 4/3/2025, Normal      !! methocarbamol (ROBAXIN) 500 mg tablet TAKE 1 TABLET BY MOUTH 4 TIMES DAILY AS NEEDED FOR MUSCLE SPASMS, Normal      OneTouch Delica Lancets 33G MISC May substitute brand based on insurance coverage. Check glucose TID., Normal       !! - Potential duplicate medications found. Please discuss with provider.        No  discharge procedures on file.  ED SEPSIS DOCUMENTATION   Time reflects when diagnosis was documented in both MDM as applicable and the Disposition within this note       Time User Action Codes Description Comment    7/6/2025 10:31 AM Grzegorz Hutchins [R10.9] Flank pain     7/6/2025 10:31 AM Grzegorz Hutchins [R10.9] Abdominal pain                      [1]   Past Medical History:  Diagnosis Date    Diabetes mellitus (HCC)     Disease of thyroid gland     Elevated LFTs     last assessed [id number]    Hyperlipidemia     Hypertension     Impaired fasting glucose     last assessed [id number]   [2]   Past Surgical History:  Procedure Laterality Date    APPENDECTOMY      BRAIN BIOPSY Left 6/8/2023    Procedure: Subgaleal aspiration;  Surgeon: Harsha Ambriz MD;  Location: [facility];  Service: Neurosurgery    COLONOSCOPY      IN CRNEC TREPH BONE FLAP CRNOT EXC BRAIN TUMOR STTL Left 4/11/2023    Procedure: IMAGE-GUIDED LEFT FRONTAL CRANIOTOMY FOR RESECTION OF EXTRA-AXIAL & SKULL MASS, WITH CRANIOPLASTY, EXENTERATION/OBLITERATION OF FRONTAL SINUS; ORBITAL OSTEOTOMY;  Surgeon: Harsha Ambriz MD;  Location: [facility];  Service: Neurosurgery    IN SECONDARY RPR DURA CSF LEAK FREE TISSUE GRAFT N/A 6/8/2023    Procedure: Image guided endoscopic transnasal skull base repair;  Surgeon: Flo Hardy MD;  Location: [facility];  Service: ENT   [3]   Family History  Problem Relation Name Age of Onset    Diabetes Mother      Breast cancer Mother      Diabetes Father     [4]   Social History  Tobacco Use    Smoking status: Never    Smokeless tobacco: Never   Vaping Use    Vaping status: Never Used   Substance Use Topics    Alcohol use: Yes     Comment: social    Drug use: Never        Grzegorz Hutchins DO  07/06/25 1602

## 2025-07-06 NOTE — ED ATTENDING ATTESTATION
7/6/2025  I, Matthew Suazo DO, saw and evaluated the patient. I have discussed the patient with the resident/non-physician practitioner and agree with the resident's/non-physician practitioner's findings, Plan of Care, and MDM as documented in the resident's/non-physician practitioner's note, except where noted. All available labs and Radiology studies were reviewed.  I was present for key portions of any procedure(s) performed by the resident/non-physician practitioner and I was immediately available to provide assistance.       At this point I agree with the current assessment done in the Emergency Department.  I have conducted an independent evaluation of this patient a history and physical is as follows:    Patient is a 59-year-old male with a history of diabetes, hypertension, appendectomy who presents with right-sided abdominal and flank pain.  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  He did not have any pain at work but began having right sided abdominal and flank pain later that evening.  It has been present ever since.  He states that it is worse with certain movements, particularly lifting up his right lower extremity and twisting his torso.  He denies fevers, chills, nausea, vomiting, diarrhea, constipation, dysuria, hematuria, other complaints.  He does have a history of previous appendectomy.  He has been taking Tylenol, Motrin and applying ice intermittently since the pain started.    On exam, patient is in no acute distress.  Heart is regular rate and rhythm.  Breath sounds normal.  Abdomen is soft, nontender, nondistended.  No rebound or guarding.  No rash.  Scar from previous appendectomy present and without overlying skin changes.  No palpable masses in the abdomen.  No CVA tenderness.  Lower back is nontender to palpation.  However pain is reproduced with elevation of the right lower extremity.  Motor, sensation normal.    Given history and  "physical, suspect abdominal wall strain/musculoskeletal etiology. Neurovascularly intact. Do not suspect AAA, aortic dissection, renal pathology, acute appendicitis, perforated viscus. Low suspicion for intraabdominal surgical pathology, cauda equina or epidural compression syndrome as there is no bowel or bladder dysfunction, bilateral symptoms or saddle paresthesias. No red flag features such as trauma, fever, immunocompromised state, history of malignancy. Do not feel that emergent imaging indicated at this time. Patient advised to follow up with PCP and return to ED if symptoms progress.      Portions of the above record have been created with voice recognition software.  Occasional wrong word or \"sound alike\" substitutions may have occurred due to the inherent limitations of voice recognition software.  Read the chart carefully and recognize, using context, where substitutions may have occurred.      ED Course         Critical Care Time  Procedures      "

## 2025-07-09 DIAGNOSIS — E03.9 HYPOTHYROIDISM, UNSPECIFIED TYPE: ICD-10-CM

## 2025-07-09 RX ORDER — LEVOTHYROXINE SODIUM 137 UG/1
137 TABLET ORAL DAILY
Qty: 30 TABLET | Refills: 0 | Status: SHIPPED | OUTPATIENT
Start: 2025-07-09

## 2025-07-26 DIAGNOSIS — E03.9 HYPOTHYROIDISM, UNSPECIFIED TYPE: ICD-10-CM

## 2025-07-26 DIAGNOSIS — E11.9 CONTROLLED TYPE 2 DIABETES MELLITUS WITHOUT COMPLICATION, WITHOUT LONG-TERM CURRENT USE OF INSULIN (HCC): ICD-10-CM

## 2025-07-26 DIAGNOSIS — E78.5 HYPERLIPIDEMIA, UNSPECIFIED HYPERLIPIDEMIA TYPE: ICD-10-CM

## 2025-07-28 RX ORDER — LEVOTHYROXINE SODIUM 137 UG/1
137 TABLET ORAL DAILY
Qty: 90 TABLET | Refills: 0 | Status: SHIPPED | OUTPATIENT
Start: 2025-07-28

## 2025-07-28 RX ORDER — ATORVASTATIN CALCIUM 40 MG/1
40 TABLET, FILM COATED ORAL DAILY
Qty: 90 TABLET | Refills: 0 | Status: SHIPPED | OUTPATIENT
Start: 2025-07-28

## 2025-08-01 DIAGNOSIS — I10 ESSENTIAL HYPERTENSION: ICD-10-CM

## 2025-08-04 RX ORDER — LISINOPRIL AND HYDROCHLOROTHIAZIDE 10; 12.5 MG/1; MG/1
1 TABLET ORAL DAILY
Qty: 90 TABLET | Refills: 3 | Status: SHIPPED | OUTPATIENT
Start: 2025-08-04 | End: 2025-08-06 | Stop reason: SDUPTHER

## 2025-08-06 DIAGNOSIS — I10 ESSENTIAL HYPERTENSION: ICD-10-CM

## 2025-08-06 RX ORDER — LISINOPRIL AND HYDROCHLOROTHIAZIDE 10; 12.5 MG/1; MG/1
1 TABLET ORAL DAILY
Qty: 90 TABLET | Refills: 3 | Status: SHIPPED | OUTPATIENT
Start: 2025-08-06

## (undated) DEVICE — SPECIMEN CONTAINER STERILE PEEL PACK

## (undated) DEVICE — PLASTIC ADHESIVE BANDAGE: Brand: CURITY

## (undated) DEVICE — PACKING 440406 10PK POPE EPISTAXIS: Brand: MEROCEL®

## (undated) DEVICE — NEEDLE SPINAL 22G X 3.5IN  QUINCKE

## (undated) DEVICE — NEURO PATTIES 1/2 X 3

## (undated) DEVICE — DURASEAL MICROMYST APPLICATOR TIP

## (undated) DEVICE — SUCTION COAGULATOR 10FR FOOT CNTRL MEGADYNE

## (undated) DEVICE — SURGIFOAM 7 X 12 SPONGE ABS

## (undated) DEVICE — PAD GROUNDING ADULT

## (undated) DEVICE — TUBING SUCTION 5MM X 12 FT

## (undated) DEVICE — BLADE 1884080EM TRICUT 4MMX13CM M4 ROHS: Brand: FUSION®

## (undated) DEVICE — DRAPE SURGIKIT SADDLE BAG

## (undated) DEVICE — SYRINGE 20ML LL

## (undated) DEVICE — NEEDLE 25G X 1 1/2

## (undated) DEVICE — DURASEAL 5ML

## (undated) DEVICE — ANTI-FOG SOLUTION WITH FOAM PAD: Brand: DEVON

## (undated) DEVICE — SUT NUROLON 4-0 TF CR/8 18 IN C584D

## (undated) DEVICE — GLOVE SRG BIOGEL ECLIPSE 7

## (undated) DEVICE — INSTRUMENT TRACKER 9733533XOM ENT 1PK

## (undated) DEVICE — PATIENT TRACKER 9734887XOM NON-INVASIVE

## (undated) DEVICE — STERILE NASAL PACK: Brand: CARDINAL HEALTH

## (undated) DEVICE — EXTENDED TIP APPLICATOR SINGAL USE APPLICATOR 8CM SINGAL USE EXTENDED TIP APPLICATOR: Brand: EXTENDED TIP APPLICATOR

## (undated) DEVICE — DRAPE FLUID WARMER (BIRD BATH)

## (undated) DEVICE — PROXIMATE SKIN STAPLERS (35 WIDE) CONTAINS 35 STAINLESS STEEL STAPLES (FIXED HEAD): Brand: PROXIMATE

## (undated) DEVICE — IV EXTENSION TUBING 33 IN

## (undated) DEVICE — TUBING 1895522 5PK STRAIGHTSHOT TO XPS: Brand: STRAIGHTSHOT®

## (undated) DEVICE — SYRINGE 50ML LL